# Patient Record
Sex: FEMALE | Race: WHITE | NOT HISPANIC OR LATINO | Employment: PART TIME | URBAN - METROPOLITAN AREA
[De-identification: names, ages, dates, MRNs, and addresses within clinical notes are randomized per-mention and may not be internally consistent; named-entity substitution may affect disease eponyms.]

---

## 2018-02-09 ENCOUNTER — OFFICE VISIT (OUTPATIENT)
Dept: URGENT CARE | Facility: CLINIC | Age: 68
End: 2018-02-09
Payer: COMMERCIAL

## 2018-02-09 VITALS
RESPIRATION RATE: 16 BRPM | WEIGHT: 149.2 LBS | BODY MASS INDEX: 29.29 KG/M2 | HEART RATE: 75 BPM | OXYGEN SATURATION: 98 % | SYSTOLIC BLOOD PRESSURE: 151 MMHG | TEMPERATURE: 98.3 F | DIASTOLIC BLOOD PRESSURE: 81 MMHG | HEIGHT: 60 IN

## 2018-02-09 DIAGNOSIS — J02.9 SORE THROAT: Primary | ICD-10-CM

## 2018-02-09 DIAGNOSIS — J06.9 VIRAL URI: ICD-10-CM

## 2018-02-09 PROCEDURE — G0463 HOSPITAL OUTPT CLINIC VISIT: HCPCS | Performed by: FAMILY MEDICINE

## 2018-02-09 PROCEDURE — 87070 CULTURE OTHR SPECIMN AEROBIC: CPT | Performed by: FAMILY MEDICINE

## 2018-02-09 PROCEDURE — 87430 STREP A AG IA: CPT | Performed by: FAMILY MEDICINE

## 2018-02-09 PROCEDURE — 87798 DETECT AGENT NOS DNA AMP: CPT | Performed by: FAMILY MEDICINE

## 2018-02-09 PROCEDURE — 99203 OFFICE O/P NEW LOW 30 MIN: CPT | Performed by: FAMILY MEDICINE

## 2018-02-09 RX ORDER — LISINOPRIL 5 MG/1
5 TABLET ORAL DAILY
COMMUNITY
End: 2019-04-23 | Stop reason: SDUPTHER

## 2018-02-09 RX ORDER — NAPROXEN 500 MG/1
250 TABLET ORAL 2 TIMES DAILY WITH MEALS
COMMUNITY
End: 2018-02-28 | Stop reason: DRUGHIGH

## 2018-02-09 RX ORDER — PRAVASTATIN SODIUM 40 MG
40 TABLET ORAL DAILY
COMMUNITY
End: 2019-04-23 | Stop reason: SDUPTHER

## 2018-02-09 RX ORDER — OMEPRAZOLE 20 MG/1
20 CAPSULE, DELAYED RELEASE ORAL DAILY
COMMUNITY
End: 2019-04-23 | Stop reason: ALTCHOICE

## 2018-02-09 RX ORDER — AMLODIPINE BESYLATE AND ATORVASTATIN CALCIUM 2.5; 1 MG/1; MG/1
1 TABLET, FILM COATED ORAL DAILY
COMMUNITY
End: 2018-02-28 | Stop reason: CLARIF

## 2018-02-09 RX ORDER — MONTELUKAST SODIUM 10 MG/1
10 TABLET ORAL
COMMUNITY
End: 2019-04-23 | Stop reason: SDUPTHER

## 2018-02-09 NOTE — PATIENT INSTRUCTIONS
Rapid strep -  will send for culture  Influenza culture done today will call with results, if positive patient will still be within the window for treatment within the next 48 hours  Tylenol or Motrin as needed  Started antihistamine, did not use any decongestants with history of hypertension

## 2018-02-09 NOTE — PROGRESS NOTES
Assessment/Plan:    No problem-specific Assessment & Plan notes found for this encounter  Diagnoses and all orders for this visit:    Sore throat  -     Throat culture    Viral URI  -     Influenza A/B and RSV by PCR    Other orders  -     amLODIPine-atorvastatin (CADUET) 2 5-10 MG per tablet; Take 1 tablet by mouth daily  -     fluticasone-salmeterol (ADVAIR) 100-50 mcg/dose; Inhale 1 puff every 12 (twelve) hours  -     lisinopril (ZESTRIL) 5 mg tablet; Take 5 mg by mouth daily  -     montelukast (SINGULAIR) 10 mg tablet; Take 10 mg by mouth daily at bedtime  -     naproxen (NAPROSYN) 500 mg tablet; Take 250 mg by mouth 2 (two) times a day with meals  -     pravastatin (PRAVACHOL) 40 mg tablet; Take 40 mg by mouth daily  -     omeprazole (PriLOSEC) 20 mg delayed release capsule; Take 20 mg by mouth daily  -     sertraline (ZOLOFT) 50 mg tablet; Take 50 mg by mouth daily          Subjective:      Patient ID: Barbara Burrell is a 75 y o  female  Patient presents with the acute onset of sore throat which started approximately 5 hours ago  She is concerned she may have influenza because she works at [employer] and has a lot of interaction with people  She states her daughter and her grandson both had influenza although denies direct contact with them  She is requesting to be tested for the flu  She denies chest tightness shortness of breath or wheezing, no cough  She thinks she may be starting to feel a little achey she  She denies fevers or chills, no nausea no vomiting  She has a history of allergic rhinitis for which she is currently taking Singulair and using Flonase  She also believes in the past 5 hours she can hear her voice starting to change          The following portions of the patient's history were reviewed and updated as appropriate: current medications, past family history, past medical history, past social history, past surgical history and problem list     Review of Systems Constitutional: Negative  HENT: Positive for congestion and sore throat  Eyes: Negative  Respiratory: Negative  Cardiovascular: Negative  Musculoskeletal: Negative  Objective:    Vitals:    02/09/18 1703   BP: 151/81   Pulse: 75   Resp: 16   Temp: 98 3 °F (36 8 °C)   SpO2: 98%        Physical Exam   Constitutional: She appears well-developed and well-nourished  No distress  HENT:   Head: Normocephalic  Right Ear: External ear normal    Left Ear: External ear normal    Mouth/Throat: Oropharynx is clear and moist  No oropharyngeal exudate  Mucosal erythema and clear rhinorrhea, mild PND, bilateral clear TM effusions   Eyes: Conjunctivae are normal  Pupils are equal, round, and reactive to light  Neck: Neck supple  Cardiovascular: Normal rate, regular rhythm and normal heart sounds  Pulmonary/Chest: Effort normal and breath sounds normal  No respiratory distress  She has no wheezes  She has no rales  Abdominal: Soft  Lymphadenopathy:     She has no cervical adenopathy

## 2018-02-10 LAB
FLUAV AG SPEC QL: NORMAL
FLUBV AG SPEC QL: NORMAL
RSV B RNA SPEC QL NAA+PROBE: NORMAL

## 2018-02-11 LAB — BACTERIA THROAT CULT: NORMAL

## 2018-02-13 ENCOUNTER — TELEPHONE (OUTPATIENT)
Dept: URGENT CARE | Facility: CLINIC | Age: 68
End: 2018-02-13

## 2018-02-13 NOTE — TELEPHONE ENCOUNTER
----- Message from Angel Batres DO sent at 2/13/2018  8:12 AM EST -----  Influenza culture and throat culture are negative

## 2018-02-28 ENCOUNTER — OFFICE VISIT (OUTPATIENT)
Dept: FAMILY MEDICINE CLINIC | Facility: HOSPITAL | Age: 68
End: 2018-02-28
Payer: COMMERCIAL

## 2018-02-28 VITALS
SYSTOLIC BLOOD PRESSURE: 128 MMHG | HEIGHT: 60 IN | HEART RATE: 76 BPM | DIASTOLIC BLOOD PRESSURE: 72 MMHG | TEMPERATURE: 98.1 F | WEIGHT: 143.6 LBS | BODY MASS INDEX: 28.19 KG/M2

## 2018-02-28 DIAGNOSIS — J45.31 MILD PERSISTENT ASTHMA WITH EXACERBATION: Primary | ICD-10-CM

## 2018-02-28 DIAGNOSIS — F33.0 MILD EPISODE OF RECURRENT MAJOR DEPRESSIVE DISORDER (HCC): ICD-10-CM

## 2018-02-28 DIAGNOSIS — J45.30 MILD PERSISTENT ASTHMA WITHOUT COMPLICATION: ICD-10-CM

## 2018-02-28 DIAGNOSIS — F41.1 GENERALIZED ANXIETY DISORDER: ICD-10-CM

## 2018-02-28 DIAGNOSIS — I10 ESSENTIAL HYPERTENSION: ICD-10-CM

## 2018-02-28 DIAGNOSIS — E78.2 MIXED HYPERLIPIDEMIA: ICD-10-CM

## 2018-02-28 PROBLEM — K21.9 GERD WITHOUT ESOPHAGITIS: Status: ACTIVE | Noted: 2018-02-28

## 2018-02-28 PROCEDURE — 3074F SYST BP LT 130 MM HG: CPT | Performed by: NURSE PRACTITIONER

## 2018-02-28 PROCEDURE — 3078F DIAST BP <80 MM HG: CPT | Performed by: NURSE PRACTITIONER

## 2018-02-28 PROCEDURE — 99203 OFFICE O/P NEW LOW 30 MIN: CPT | Performed by: NURSE PRACTITIONER

## 2018-02-28 PROCEDURE — 3725F SCREEN DEPRESSION PERFORMED: CPT | Performed by: NURSE PRACTITIONER

## 2018-02-28 RX ORDER — FLUTICASONE PROPIONATE 50 MCG
SPRAY, SUSPENSION (ML) NASAL
COMMUNITY
Start: 2018-01-02 | End: 2019-04-23 | Stop reason: ALTCHOICE

## 2018-02-28 RX ORDER — AMLODIPINE BESYLATE 2.5 MG/1
TABLET ORAL
COMMUNITY
Start: 2018-01-01 | End: 2018-02-28 | Stop reason: SDUPTHER

## 2018-02-28 RX ORDER — DOXYCYCLINE 100 MG/1
100 TABLET ORAL 2 TIMES DAILY
Qty: 20 TABLET | Refills: 0 | Status: SHIPPED | OUTPATIENT
Start: 2018-02-28 | End: 2018-03-10

## 2018-02-28 RX ORDER — GUAIFENESIN AND DEXTROMETHORPHAN HYDROBROMIDE 600; 30 MG/1; MG/1
1 TABLET, EXTENDED RELEASE ORAL EVERY 12 HOURS PRN
Refills: 3 | COMMUNITY
Start: 2018-01-09 | End: 2019-04-23 | Stop reason: ALTCHOICE

## 2018-02-28 RX ORDER — AMLODIPINE BESYLATE 2.5 MG/1
2.5 TABLET ORAL DAILY
Refills: 0
Start: 2018-02-28 | End: 2019-04-23 | Stop reason: SDUPTHER

## 2018-02-28 NOTE — ASSESSMENT & PLAN NOTE
Here today with possible exacerbation  Will start on antibiotic given fever  No need for prednisone at this time  Advised she start albuterol nebulizer  Call with any worsening or no improvement

## 2018-02-28 NOTE — ASSESSMENT & PLAN NOTE
Questionable need for omeprazole started by ENT  Has f/u with them this month so will discuss d/c with them

## 2018-02-28 NOTE — PROGRESS NOTES
Assessment/Plan: Old records reviewed  Reviewed chronic conditions and medications  F/U in 6 months  Mild persistent asthma without complication  Here today with possible exacerbation  Will start on antibiotic given fever  No need for prednisone at this time  Advised she start albuterol nebulizer  Call with any worsening or no improvement  Essential hypertension  BP well controlled  Continue on current regimen  Labs done 9/2017 with no issues  Mixed hyperlipidemia  FLP 9/2017 with good control  Can recheck in September  Continue on current regimen  Mild episode of recurrent major depressive disorder ([facility] [address] )  Depression well controlled  Continue on current regimen  Generalized anxiety disorder  Anxiety well controlled  Continue on current regimen  GERD without esophagitis  Questionable need for omeprazole started by ENT  Has f/u with them this month so will discuss d/c with them  Diagnoses and all orders for this visit:    Mild persistent asthma with exacerbation  -     doxycycline (ADOXA) 100 MG tablet; Take 1 tablet (100 mg total) by mouth 2 (two) times a day for 10 days    Mild persistent asthma without complication    Essential hypertension    Mixed hyperlipidemia    Generalized anxiety disorder    Mild episode of recurrent major depressive disorder ([facility] [address] )    Other orders  -     NAPROXEN  MG EC tablet;   -     fluticasone (FLONASE) 50 mcg/act nasal spray;   -     guaiFENesin-dextromethorphan (MUCINEX DM) 600-30 mg; Take 1 tablet by mouth every 12 (twelve) hours as needed  -     Discontinue: amLODIPine (NORVASC) 2 5 mg tablet;           Subjective:      Patient ID: Robina Lizarraga is a 75 y o  female  4 days ago started with chest congestion  Progressively getting worse  Last night with chills and temp 101 7  This morning no fever  Has asthma  Coughing up green mucus  Had flu shot  Some nasal congestion  Yellow mucus from nose  Doing 2 sinus rinses  H/o sinus surgery  Last surgery very effective  Sees pulmonary  Has albuterol neb at home but has not used  No sob or wheezing  Cough is mild  Aspergillosis  Found mold  Exercises and has been doing Weight Watchers with 10 pound recent weight loss  Was started on omeprazole about 1 month ago by ENT for possible reflux causing increased mucus production and coughing  Pt has not noticed a difference  Denies any heartburn symptoms  Has f/u soon to discuss  Feels mood has been good  No real depressive or anxiety symptoms  Was started on Zoloft in December and notes improvement in mood  The following portions of the patient's history were reviewed and updated as appropriate: allergies, current medications, past family history, past medical history, past social history, past surgical history and problem list     Review of Systems   Constitutional: Positive for fatigue and fever  Negative for chills and unexpected weight change  HENT: Positive for congestion and postnasal drip  Negative for ear pain, sinus pain, sinus pressure and sore throat  Respiratory: Positive for cough  Negative for shortness of breath and wheezing  Cardiovascular: Negative for chest pain and palpitations  Musculoskeletal: Negative for myalgias  Neurological: Negative for weakness, light-headedness and numbness  Psychiatric/Behavioral: Negative for dysphoric mood  The patient is not nervous/anxious  Objective:  Vitals:    02/28/18 0953   BP: 128/72   Pulse: 76   Temp: 98 1 °F (36 7 °C)      Physical Exam   Constitutional: She is oriented to person, place, and time  She appears well-developed and well-nourished  HENT:   Right Ear: Tympanic membrane, external ear and ear canal normal    Left Ear: Tympanic membrane, external ear and ear canal normal    Mouth/Throat: Uvula is midline, oropharynx is clear and moist and mucous membranes are normal    Cardiovascular: Normal rate, regular rhythm and normal heart sounds  Pulmonary/Chest: Effort normal and breath sounds normal    Lymphadenopathy:     She has no cervical adenopathy  Neurological: She is alert and oriented to person, place, and time  Skin: Skin is warm and dry  Psychiatric: She has a normal mood and affect  Vitals reviewed

## 2018-06-09 ENCOUNTER — APPOINTMENT (EMERGENCY)
Dept: RADIOLOGY | Facility: HOSPITAL | Age: 68
End: 2018-06-09
Payer: COMMERCIAL

## 2018-06-09 ENCOUNTER — HOSPITAL ENCOUNTER (EMERGENCY)
Facility: HOSPITAL | Age: 68
Discharge: HOME | End: 2018-06-09
Attending: EMERGENCY MEDICINE
Payer: COMMERCIAL

## 2018-06-09 VITALS
TEMPERATURE: 97 F | HEART RATE: 73 BPM | OXYGEN SATURATION: 97 % | DIASTOLIC BLOOD PRESSURE: 90 MMHG | RESPIRATION RATE: 18 BRPM | SYSTOLIC BLOOD PRESSURE: 185 MMHG

## 2018-06-09 DIAGNOSIS — S42.251A: Primary | ICD-10-CM

## 2018-06-09 PROCEDURE — 73080 X-RAY EXAM OF ELBOW: CPT

## 2018-06-09 PROCEDURE — 73030 X-RAY EXAM OF SHOULDER: CPT

## 2018-06-09 PROCEDURE — 99283 EMERGENCY DEPT VISIT LOW MDM: CPT

## 2018-06-09 PROCEDURE — 96372 THER/PROPH/DIAG INJ SC/IM: CPT

## 2018-06-09 RX ORDER — KETOROLAC TROMETHAMINE 30 MG/ML
30 INJECTION, SOLUTION INTRAMUSCULAR; INTRAVENOUS ONCE
Status: COMPLETED | OUTPATIENT
Start: 2018-06-09 | End: 2018-06-09

## 2018-06-09 RX ORDER — TRAMADOL HYDROCHLORIDE 50 MG/1
50 TABLET ORAL EVERY 6 HOURS PRN
Qty: 12 TABLET | Refills: 0 | Status: SHIPPED | OUTPATIENT
Start: 2018-06-09 | End: 2018-06-19

## 2018-06-09 RX ADMIN — KETOROLAC TROMETHAMINE 30 MG: 30 INJECTION, SOLUTION INTRAMUSCULAR; INTRAVENOUS at 20:17

## 2018-06-10 NOTE — DISCHARGE INSTRUCTIONS
Scapular Fracture   WHAT YOU NEED TO KNOW:   A scapular fracture is a break in the scapula (shoulder blade)  The scapula is a large, flat bone shaped like a triangle that is located on each side of your upper back  DISCHARGE INSTRUCTIONS:   Medicines:   · Pain medicine: You may be given a prescription medicine to decrease pain  Do not wait until the pain is severe before you ask for more medicine  If your pain medicine contains acetaminophen, do not take over-the-counter acetaminophen as well  Too much acetaminophen can damage your liver  · Take your medicine as directed  Contact your healthcare provider if you think your medicine is not helping or if you have side effects  Tell him of her if you are allergic to any medicine  Keep a list of the medicines, vitamins, and herbs you take  Include the amounts, and when and why you take them  Bring the list or the pill bottles to follow-up visits  Carry your medicine list with you in case of an emergency  Follow up with your healthcare provider or orthopedist within 2 to 3 days:  Write down your questions so you remember to ask them during your visits  Activity:  Talk to your healthcare provider or orthopedist before you start to exercise  Start slowly and do more as you get stronger  Exercise will help make your bones and muscles stronger  Do not play contact sports, such as football and wrestling, while your scapula is still healing  Ask when you can start playing contact sports  Physical therapy:  You may need physical therapy once your swelling and pain have improved  A physical therapist will teach you exercises to help improve movement and strength  Ice:  Ice helps decrease swelling and pain  Ice may also help prevent tissue damage  Use an ice pack, or put crushed ice in a plastic bag  Cover it with a towel and place it on your scapula for 15 to 20 minutes every hour or as directed    Sling care:  Healthcare providers may put your arm in a sling to support your scapula while it heals  Keep your sling clean, and adjust it so that your arm is comfortable  Rest:  Rest when you feel it is needed  Slowly start to do more each day  Return to your daily activities as directed  Contact your healthcare provider or orthopedist if:   · You have a fever  · You have more swelling than you did before your arm was put into the sling  · Your skin is itchy, swollen, or has a rash  · You have questions or concerns about your condition or care  Return to the emergency department if:   · You cannot move your fingers  · Any part of your arm becomes blue, pale, cold, or numb  · Your pain is not relieved or gets worse, even after you take medicine  · You suddenly feel lightheaded and short of breath  · You have chest pain when you take a deep breath or cough  You may cough up blood  · Your arm feels warm, tender, and painful  It may look swollen and red  © 2017 [facility] Information is for End User's use only and may not be sold, redistributed or otherwise used for commercial purposes  All illustrations and images included in CareNotes® are the copyrighted property of A D A M , Inc  or [employer]ey [employer]  The above information is an  only  It is not intended as medical advice for individual conditions or treatments  Talk to your doctor, nurse or pharmacist before following any medical regimen to see if it is safe and effective for you  How to Use a Sling   WHAT YOU NEED TO KNOW:   What is a sling? A sling is a strong fabric loop that hangs from your neck to support your arm  Your arm, bent at the elbow, rests in the sling  Some slings have a strap that goes down your back to take the weight off your neck  This strap is connected to the elbow side of the sling  Your healthcare provider will help you decide which sling is best for you and where you can get one  Why do I need a sling?   You may need a sling because of an injury or surgery to your hand, wrist, arm, or shoulder  A sling helps prevent your hand, arm, and shoulder from moving so your injury can heal  A sling can also help if you have a heavy cast on your arm  How do I use the sling? Your healthcare provider will teach you how to put on your sling  Follow the directions below to help you put on the sling at home:  · Gently bend your injured arm at the elbow and hold it in front of you, across your body  Your thumb should be pointing up  · Put the strap of the sling around your neck  The strap usually has an adhesive fabric to make it easy for you to fasten the strap  · Put your arm in the sling so your elbow is in the closed end of the sling  Your hand will be at the open end of the sling  · Put the edge of the sling so it covers the first fold of the little finger  · Wiggle your fingers as directed to prevent stiffness in your hand  CARE AGREEMENT:   You have the right to help plan your care  Learn about your health condition and how it may be treated  Discuss treatment options with your caregivers to decide what care you want to receive  You always have the right to refuse treatment  The above information is an  only  It is not intended as medical advice for individual conditions or treatments  Talk to your doctor, nurse or pharmacist before following any medical regimen to see if it is safe and effective for you  © 2017 [address] Information is for End User's use only and may not be sold, redistributed or otherwise used for commercial purposes  All illustrations and images included in CareNotes® are the copyrighted property of [employer] , Inc  or Ney Davila

## 2018-06-10 NOTE — ED PROVIDER NOTES
History  Chief Complaint   Patient presents with    Shoulder Injury     pt presents to ER stating this morning she was on a step stool on the third step, pt lost balance and fell landing on her right arm around 630 this am  pt has no complaints other then arm/shoulder pain  Standing on top level of 3 step stool this am and lost balance and fell onto her right side  Didn't hit head, no loc  Able to get up and has been ambulatory all day  Denies neck or back pain  Notes mild pain in the right hip and c/o more significant pain in the right shoulder / upper arm  Pain in right arm worse w/ movement  Took naproxen w/ some relief earlier today  Denies numbness or weakness to the arm  Notes pain in the shoulder w/ radiation toward the trapezius/lateral neck and down towards the elbow  Denies hx of previous fractures/surgeries to same arm  Denies cp/sob, no abd pain  No other co        History provided by:  Patient   used: No    Shoulder Injury   Location:  Shoulder, elbow and arm  Shoulder location:  R shoulder  Arm location:  R arm  Elbow location:  R elbow  Injury: yes    Mechanism of injury: fall    Fall:     Fall occurred:  From a ladder    Height of fall:  Less than 3 feet    Impact surface:  Hard floor    Point of impact: right side  Entrapped after fall: no    Pain details:     Quality:  Throbbing    Radiates to:  R upper arm    Severity:  Moderate    Onset quality:  Sudden    Timing:  Constant    Progression:  Worsening  Handedness:  Right-handed  Dislocation: no    Foreign body present:  No foreign bodies  Prior injury to area:  No  Relieved by:  Nothing  Worsened by:   Movement  Ineffective treatments:  NSAIDs and being still  Associated symptoms: decreased range of motion    Associated symptoms: no back pain, no fatigue, no fever, no muscle weakness, no neck pain, no numbness, no stiffness, no swelling and no tingling    Risk factors: no concern for non-accidental trauma Prior to Admission Medications   Prescriptions Last Dose Informant Patient Reported? Taking? NAPROXEN  MG EC tablet   Yes No   amLODIPine (NORVASC) 2 5 mg tablet   No No   Sig: Take 1 tablet (2 5 mg total) by mouth daily   fluticasone (FLONASE) 50 mcg/act nasal spray   Yes No   guaiFENesin-dextromethorphan (MUCINEX DM) 600-30 mg   Yes No   Sig: Take 1 tablet by mouth every 12 (twelve) hours as needed   lisinopril (ZESTRIL) 5 mg tablet   Yes No   Sig: Take 5 mg by mouth daily   montelukast (SINGULAIR) 10 mg tablet   Yes No   Sig: Take 10 mg by mouth daily at bedtime   omeprazole (PriLOSEC) 20 mg delayed release capsule   Yes No   Sig: Take 20 mg by mouth daily   pravastatin (PRAVACHOL) 40 mg tablet   Yes No   Sig: Take 40 mg by mouth daily   sertraline (ZOLOFT) 50 mg tablet   Yes No   Sig: Take 50 mg by mouth daily      Facility-Administered Medications: None       Past Medical History:   Diagnosis Date    Asthma     C  difficile enteritis     History of acute respiratory failure     Hypertension     Pneumonia     Respiratory disorder 2013       Past Surgical History:   Procedure Laterality Date    BRONCHOSCOPY      FRACTURE SURGERY      NASAL SINUS SURGERY Bilateral 09/2017    SINUS SURGERY      TUBAL LIGATION      WISDOM TOOTH EXTRACTION         Family History   Problem Relation Age of Onset    Heart disease Father     Heart failure Father     Diabetes Father     Rectal cancer Father     Heart disease Sister     Heart disease Brother     Aortic aneurysm Brother     Sudden death Brother     Cirrhosis Mother     Colon cancer Paternal Grandmother      I have reviewed and agree with the history as documented  Social History   Substance Use Topics    Smoking status: Never Smoker    Smokeless tobacco: Never Used    Alcohol use Yes      Comment: soically        Review of Systems   Constitutional: Negative for fatigue and fever     Musculoskeletal: Negative for back pain, neck pain and stiffness  All other systems reviewed and are negative  Physical Exam  Physical Exam   Constitutional: She appears well-developed and well-nourished  HENT:   Nose: Nose normal    Eyes: Conjunctivae are normal    Neck: No spinous process tenderness present  Cardiovascular: Normal rate  Pulmonary/Chest: Effort normal  She exhibits no tenderness  Musculoskeletal: She exhibits no deformity  Right shoulder: She exhibits tenderness, bony tenderness and pain  She exhibits normal range of motion, no swelling, no effusion, no spasm, normal pulse and normal strength  Right elbow: She exhibits normal range of motion, no swelling, no effusion and no deformity  Tenderness found  Radial head, medial epicondyle and lateral epicondyle tenderness noted  No olecranon process tenderness noted  Right wrist: She exhibits no bony tenderness  Cervical back: She exhibits no bony tenderness  Neurological: She is alert  Skin: Skin is warm  Psychiatric: She has a normal mood and affect  Nursing note and vitals reviewed        Vital Signs  ED Triage Vitals   Temperature Pulse Respirations Blood Pressure SpO2   06/09/18 1956 06/09/18 1955 06/09/18 1954 06/09/18 1954 06/09/18 1955   (!) 97 °F (36 1 °C) 73 18 (!) 185/90 97 %      Temp Source Heart Rate Source Patient Position - Orthostatic VS BP Location FiO2 (%)   06/09/18 1956 06/09/18 1954 06/09/18 1954 06/09/18 1954 --   Temporal Monitor Lying Right arm       Pain Score       06/09/18 1954       Worst Possible Pain           Vitals:    06/09/18 1954 06/09/18 1955   BP: (!) 185/90    Pulse:  73   Patient Position - Orthostatic VS: Lying        Visual Acuity      ED Medications  Medications   ketorolac (TORADOL) injection 30 mg (30 mg Intramuscular Given 6/9/18 2017)       Diagnostic Studies  Results Reviewed     None                 XR shoulder 2+ views RIGHT   ED Interpretation by Carissa Antony, DO (06/09 2041)   Abnormal   ?fx tubercle humeral head      XR elbow 3+ vw RIGHT   ED Interpretation by Adolph Jones DO (06/09 2039)   neg                 Procedures  Procedures       Phone Contacts  ED Phone Contact    ED Course                               MDM  Number of Diagnoses or Management Options  Closed fracture of greater tuberosity of right humerus: new and requires workup     Amount and/or Complexity of Data Reviewed  Tests in the radiology section of CPT®: ordered and reviewed  Obtain history from someone other than the patient: yes  Independent visualization of images, tracings, or specimens: yes      CritCare Time    Disposition  Final diagnoses:   Closed fracture of greater tuberosity of right humerus     Time reflects when diagnosis was documented in both MDM as applicable and the Disposition within this note     Time User Action Codes Description Comment    6/9/2018  8:44 PM Ruslan Saliva L Add [[id number]] Closed fracture of greater tuberosity of right humerus       ED Disposition     ED Disposition Condition Comment    Discharge  Maye Fox discharge to home/self care      Condition at discharge: Good        Follow-up Information     Follow up With Specialties Details Why Contact Info    [facility]  Schedule an appointment as soon as possible for a visit in 1 week for re-evaluation [address] [place] [id number]  [phone]          Discharge Medication List as of 6/9/2018  8:47 PM      START taking these medications    Details   traMADol (ULTRAM) 50 mg tablet Take 1 tablet (50 mg total) by mouth every 6 (six) hours as needed for moderate pain for up to 10 days, Starting Sat 6/9/2018, Until Tue 6/19/2018, Normal         CONTINUE these medications which have NOT CHANGED    Details   amLODIPine (NORVASC) 2 5 mg tablet Take 1 tablet (2 5 mg total) by mouth daily, Starting Wed 2/28/2018, No Print      fluticasone (FLONASE) 50 mcg/act nasal spray Starting Tue 1/2/2018, Historical Med guaiFENesin-dextromethorphan (MUCINEX DM) 600-30 mg Take 1 tablet by mouth every 12 (twelve) hours as needed, Starting Tue 1/9/2018, Historical Med      lisinopril (ZESTRIL) 5 mg tablet Take 5 mg by mouth daily, Historical Med      montelukast (SINGULAIR) 10 mg tablet Take 10 mg by mouth daily at bedtime, Historical Med      NAPROXEN  MG EC tablet Starting Mon 1/1/2018, Historical Med      omeprazole (PriLOSEC) 20 mg delayed release capsule Take 20 mg by mouth daily, Historical Med      pravastatin (PRAVACHOL) 40 mg tablet Take 40 mg by mouth daily, Historical Med      sertraline (ZOLOFT) 50 mg tablet Take 50 mg by mouth daily, Historical Med           No discharge procedures on file      ED Provider  Electronically Signed by           Rosaline Funes DO  06/09/18 7166

## 2018-06-13 ENCOUNTER — OFFICE VISIT (OUTPATIENT)
Dept: OBGYN CLINIC | Facility: CLINIC | Age: 68
End: 2018-06-13
Payer: COMMERCIAL

## 2018-06-13 VITALS — SYSTOLIC BLOOD PRESSURE: 122 MMHG | HEIGHT: 60 IN | DIASTOLIC BLOOD PRESSURE: 89 MMHG | HEART RATE: 87 BPM

## 2018-06-13 DIAGNOSIS — S42.291A CLOSED FRACTURE OF HEAD OF RIGHT HUMERUS, INITIAL ENCOUNTER: Primary | ICD-10-CM

## 2018-06-13 PROCEDURE — 23600 CLTX PROX HUMRL FX W/O MNPJ: CPT | Performed by: ORTHOPAEDIC SURGERY

## 2018-06-13 PROCEDURE — 99203 OFFICE O/P NEW LOW 30 MIN: CPT | Performed by: ORTHOPAEDIC SURGERY

## 2018-06-13 NOTE — ASSESSMENT & PLAN NOTE
Findings consistent with left proximal humerus head fracture, nondisplaced  Discussed findings and treatment options with the patient  I reviewed patient's left shoulder x-ray with her  Discussed prognosis of her left shoulder injury  I recommend patient to continue use the sling for comfort  I advised patient to avoid lifting and weight-bearing with her left arm  She may start doing pain gentle range of motion with her shoulder as tolerated  I would like to see patient back in 3 weeks and may increase her motion depends on her pain  All patient's questions were answered to his satisfaction  This note is created using dictation transcription  It may contain typographical errors, grammatical errors, improperly dictated words, background noise and other errors

## 2018-06-13 NOTE — PROGRESS NOTES
Assessment:     1  Closed fracture of head of right humerus, initial encounter        Plan:     Problem List Items Addressed This Visit        Musculoskeletal and Integument    Fracture of humeral head, right, closed - Primary     Findings consistent with left proximal humerus head fracture, nondisplaced  Discussed findings and treatment options with the patient  I reviewed patient's left shoulder x-ray with her  Discussed prognosis of her left shoulder injury  I recommend patient to continue use the sling for comfort  I advised patient to avoid lifting and weight-bearing with her left arm  She may start doing pain gentle range of motion with her shoulder as tolerated  I would like to see patient back in 3 weeks and may increase her motion depends on her pain  All patient's questions were answered to his satisfaction  This note is created using dictation transcription  It may contain typographical errors, grammatical errors, improperly dictated words, background noise and other errors  Subjective:     Patient ID: Mk Kim is a 75 y o  female  Chief Complaint:  68-year-old white female fell of a step ladder about 4 ft height on 6/9/2018  Patient was try to retreat a balloon while straddle between the ladder and the sofa  Patient fell on her left arm in start complaining of pain  Patient was seen in the ER and placed in a sling  She denies any other injury  She is complaining of pain in her shoulder when try to lift her left arm  She otherwise has no pain at rest   Information on patient's intake form was reviewed        Allergy:  Allergies   Allergen Reactions    Biaxin [Clarithromycin] Vomiting    Augmentin [Amoxicillin-Pot Clavulanate] Rash     Medications:  all current active meds have been reviewed  Past Medical History:  Past Medical History:   Diagnosis Date    Asthma     C  difficile enteritis     History of acute respiratory failure     Hypertension     Pneumonia     Respiratory disorder 2013     Past Surgical History:  Past Surgical History:   Procedure Laterality Date    BRONCHOSCOPY      FRACTURE SURGERY      NASAL SINUS SURGERY Bilateral 09/2017    SINUS SURGERY      TUBAL LIGATION      WISDOM TOOTH EXTRACTION       Family History:  Family History   Problem Relation Age of Onset    Heart disease Father     Heart failure Father     Diabetes Father     Rectal cancer Father     Heart disease Sister     Heart disease Brother     Aortic aneurysm Brother     Sudden death Brother     Cirrhosis Mother     Colon cancer Paternal Grandmother      Social History:  History   Alcohol Use    Yes     Comment: soically     History   Drug Use No     History   Smoking Status    Never Smoker   Smokeless Tobacco    Never Used     Review of Systems   Constitutional: Negative  HENT: Negative  Eyes: Negative  Respiratory: Negative  Cardiovascular: Negative  Gastrointestinal: Negative  Endocrine: Negative  Genitourinary: Negative  Musculoskeletal: Positive for arthralgias (Left shoulder)  Skin: Negative  Allergic/Immunologic: Negative  Neurological: Negative  Hematological: Negative  Psychiatric/Behavioral: Negative  Objective:  BP Readings from Last 1 Encounters:   06/13/18 122/89      Wt Readings from Last 1 Encounters:   02/28/18 65 1 kg (143 lb 9 6 oz)      BMI:   Estimated body mass index is 28 04 kg/m² as calculated from the following:    Height as of 2/28/18: 5' (1 524 m)  Weight as of 2/28/18: 65 1 kg (143 lb 9 6 oz)  BSA:   Estimated body surface area is 1 62 meters squared as calculated from the following:    Height as of 2/28/18: 5' (1 524 m)  Weight as of 2/28/18: 65 1 kg (143 lb 9 6 oz)  Physical Exam   Constitutional: She is oriented to person, place, and time  She appears well-developed  HENT:   Head: Normocephalic and atraumatic  Eyes: EOM are normal  Pupils are equal, round, and reactive to light     Neck: Neck supple  Neurological: She is alert and oriented to person, place, and time  Skin: Skin is warm  Psychiatric: She has a normal mood and affect  Nursing note and vitals reviewed  Left Shoulder Exam     Tenderness   Left shoulder tenderness location: Proximal humerus  Range of Motion   Active Abduction: abnormal   Left shoulder passive abduction: Limited due to pain  Left shoulder forward flexion: Pain  Other   Erythema: absent  Sensation: normal  Pulse: present             I have personally reviewed pertinent films in PACS and my interpretation is Right shoulder show nondisplaced humeral head fracture

## 2018-07-07 ENCOUNTER — OFFICE VISIT (OUTPATIENT)
Dept: URGENT CARE | Facility: CLINIC | Age: 68
End: 2018-07-07
Payer: COMMERCIAL

## 2018-07-07 VITALS
DIASTOLIC BLOOD PRESSURE: 80 MMHG | HEART RATE: 85 BPM | RESPIRATION RATE: 16 BRPM | TEMPERATURE: 98.7 F | HEIGHT: 60 IN | SYSTOLIC BLOOD PRESSURE: 128 MMHG | OXYGEN SATURATION: 98 % | WEIGHT: 138 LBS | BODY MASS INDEX: 27.09 KG/M2

## 2018-07-07 DIAGNOSIS — J06.9 UPPER RESPIRATORY TRACT INFECTION, UNSPECIFIED TYPE: Primary | ICD-10-CM

## 2018-07-07 PROCEDURE — 99213 OFFICE O/P EST LOW 20 MIN: CPT | Performed by: PREVENTIVE MEDICINE

## 2018-07-07 PROCEDURE — G0463 HOSPITAL OUTPT CLINIC VISIT: HCPCS | Performed by: PREVENTIVE MEDICINE

## 2018-07-07 RX ORDER — AZITHROMYCIN 250 MG/1
TABLET, FILM COATED ORAL
Qty: 6 TABLET | Refills: 0 | Status: SHIPPED | OUTPATIENT
Start: 2018-07-07 | End: 2018-07-12

## 2018-07-07 RX ORDER — ALPRAZOLAM 0.5 MG/1
TABLET ORAL
COMMUNITY
Start: 2014-03-25

## 2018-07-07 RX ORDER — ALBUTEROL SULFATE 2.5 MG/3ML
2.5 SOLUTION RESPIRATORY (INHALATION) EVERY 6 HOURS PRN
COMMUNITY
End: 2021-06-21 | Stop reason: SDUPTHER

## 2018-07-07 NOTE — PROGRESS NOTES
[facility]        NAME: Martinez Moctezuma is a 75 y o  female  : 1950    MRN: [id number]  DATE: 2018  TIME: 12:26 PM    Assessment and Plan   Upper respiratory tract infection, unspecified type [J06 9]  1  Upper respiratory tract infection, unspecified type  azithromycin (ZITHROMAX) 250 mg tablet         Patient Instructions       Follow up with PCP in 3-5 days  Proceed to  ER if symptoms worsen  Chief Complaint     Chief Complaint   Patient presents with    Sore Throat     Woke up with really bad sore throat and chest congestion, yellow phlegm with cough  Hx) Asthma, URI, sinus         History of Present Illness       Increased cough and congestion x2 days  History of asthma, sinusitis and asthmatic bronchitis  Review of Systems   Review of Systems   Respiratory: Positive for cough  Negative for chest tightness and wheezing            Current Medications       Current Outpatient Prescriptions:     ALPRAZolam (XANAX) 0 5 mg tablet, , Disp: , Rfl:     amLODIPine (NORVASC) 2 5 mg tablet, Take 1 tablet (2 5 mg total) by mouth daily, Disp: , Rfl: 0    fluticasone (FLONASE) 50 mcg/act nasal spray, , Disp: , Rfl:     lisinopril (ZESTRIL) 5 mg tablet, Take 5 mg by mouth daily, Disp: , Rfl:     montelukast (SINGULAIR) 10 mg tablet, Take 10 mg by mouth daily at bedtime, Disp: , Rfl:     NAPROXEN  MG EC tablet, , Disp: , Rfl:     pravastatin (PRAVACHOL) 40 mg tablet, Take 40 mg by mouth daily, Disp: , Rfl:     sertraline (ZOLOFT) 50 mg tablet, Take 50 mg by mouth daily, Disp: , Rfl:     albuterol (2 5 mg/3 mL) 0 083 % nebulizer solution, 2 5 mg every 6 (six) hours as needed, Disp: , Rfl:     aspirin 81 MG tablet, Take 81 mg by mouth, Disp: , Rfl:     azithromycin (ZITHROMAX) 250 mg tablet, Take 2 tablets today then 1 tablet daily x 4 days, Disp: 6 tablet, Rfl: 0    guaiFENesin-dextromethorphan (MUCINEX DM) 600-30 mg, Take 1 tablet by mouth every 12 (twelve) hours as needed, Disp: , Rfl: 3    omeprazole (PriLOSEC) 20 mg delayed release capsule, Take 20 mg by mouth daily, Disp: , Rfl:     Current Allergies     Allergies as of 07/07/2018 - Reviewed 07/07/2018   Allergen Reaction Noted    Azithromycin  09/07/2011    Biaxin [clarithromycin] Vomiting 09/07/2011    Succinylcholine  09/07/2011    Augmentin [amoxicillin-pot clavulanate] Rash 02/09/2018            The following portions of the patient's history were reviewed and updated as appropriate: allergies, current medications, past family history, past medical history, past social history, past surgical history and problem list      Past Medical History:   Diagnosis Date    Asthma     C  difficile enteritis     History of acute respiratory failure     Hypertension     Pneumonia     Respiratory disorder 2013       Past Surgical History:   Procedure Laterality Date    BRONCHOSCOPY      FRACTURE SURGERY      NASAL SINUS SURGERY Bilateral 09/2017    SINUS SURGERY      TUBAL LIGATION      WISDOM TOOTH EXTRACTION         Family History   Problem Relation Age of Onset    Heart disease Father     Heart failure Father     Diabetes Father     Rectal cancer Father     Heart disease Sister     Heart disease Brother     Aortic aneurysm Brother     Sudden death Brother     Cirrhosis Mother     Colon cancer Paternal Grandmother          Medications have been verified  Objective   /80   Pulse 85   Temp 98 7 °F (37 1 °C)   Resp 16   Ht 5' (1 524 m)   Wt 62 6 kg (138 lb)   SpO2 98%   BMI 26 95 kg/m²        Physical Exam     Physical Exam   HENT:   Right Ear: External ear normal    Left Ear: External ear normal    Mouth/Throat: Oropharynx is clear and moist    Cardiovascular: Normal heart sounds  Pulmonary/Chest: Breath sounds normal    Lymphadenopathy:     She has no cervical adenopathy

## 2018-07-10 ENCOUNTER — OFFICE VISIT (OUTPATIENT)
Dept: OBGYN CLINIC | Facility: CLINIC | Age: 68
End: 2018-07-10

## 2018-07-10 ENCOUNTER — APPOINTMENT (OUTPATIENT)
Dept: RADIOLOGY | Facility: CLINIC | Age: 68
End: 2018-07-10
Payer: COMMERCIAL

## 2018-07-10 VITALS
SYSTOLIC BLOOD PRESSURE: 150 MMHG | HEART RATE: 87 BPM | HEIGHT: 60 IN | WEIGHT: 140.8 LBS | DIASTOLIC BLOOD PRESSURE: 83 MMHG | BODY MASS INDEX: 27.64 KG/M2

## 2018-07-10 DIAGNOSIS — S42.291S CLOSED FRACTURE OF HEAD OF RIGHT HUMERUS, SEQUELA: Primary | ICD-10-CM

## 2018-07-10 DIAGNOSIS — S42.291S CLOSED FRACTURE OF HEAD OF RIGHT HUMERUS, SEQUELA: ICD-10-CM

## 2018-07-10 PROCEDURE — 73030 X-RAY EXAM OF SHOULDER: CPT

## 2018-07-10 PROCEDURE — 99024 POSTOP FOLLOW-UP VISIT: CPT | Performed by: ORTHOPAEDIC SURGERY

## 2018-07-10 NOTE — ASSESSMENT & PLAN NOTE
Findings consistent with right proximal humerus fracture  Discussed findings and treatment options with the patient  We will refer patient to physical therapy in 2 weeks  I have instructed patient exercise that she could do at home  She will continue to limit use of her right arm in weight-bearing and lifting  I will see patient back in 4-6 weeks for re-evaluation  All patient's questions were answered to her satisfaction  This note is created using dictation transcription  It may contain typographical errors, grammatical errors, improperly dictated words, background noise and other errors

## 2018-07-10 NOTE — PROGRESS NOTES
Assessment:     1  Closed fracture of head of right humerus, sequela        Plan:     Problem List Items Addressed This Visit        Musculoskeletal and Integument    Fracture of humeral head, right, closed - Primary     Findings consistent with right proximal humerus fracture  Discussed findings and treatment options with the patient  We will refer patient to physical therapy in 2 weeks  I have instructed patient exercise that she could do at home  She will continue to limit use of her right arm in weight-bearing and lifting  I will see patient back in 4-6 weeks for re-evaluation  All patient's questions were answered to her satisfaction  This note is created using dictation transcription  It may contain typographical errors, grammatical errors, improperly dictated words, background noise and other errors  Relevant Orders    XR shoulder 2+ vw right    Ambulatory referral to Physical Therapy         Subjective:     Patient ID: Mk  is a 75 y o  female  Chief Complaint:  14-year-old white female fell of a step ladder about 4 ft height on 6/9/2018  Patient was try to retreat a balloon while straddle between the ladder and the sofa  She has been using the sling when she is at work  She does remove the sling and do gentle shoulder motion  Pain is much less  She is able to actively moving her shoulder without significant pain        Allergy:  Allergies   Allergen Reactions    Biaxin [Clarithromycin] Vomiting    Succinylcholine     Augmentin [Amoxicillin-Pot Clavulanate] Rash     Medications:  all current active meds have been reviewed  Past Medical History:  Past Medical History:   Diagnosis Date    Asthma     C  difficile enteritis     History of acute respiratory failure     Hypertension     Pneumonia     Respiratory disorder 2013     Past Surgical History:  Past Surgical History:   Procedure Laterality Date    BRONCHOSCOPY      FRACTURE SURGERY      NASAL SINUS SURGERY Bilateral 09/2017    SINUS SURGERY      TUBAL LIGATION      WISDOM TOOTH EXTRACTION       Family History:  Family History   Problem Relation Age of Onset    Heart disease Father     Heart failure Father     Diabetes Father     Rectal cancer Father     Heart disease Sister     Heart disease Brother     Aortic aneurysm Brother    Matti Crews Sudden death Brother     Cirrhosis Mother     Colon cancer Paternal Grandmother      Social History:  History   Alcohol Use    Yes     Comment: soically     History   Drug Use No     History   Smoking Status    Never Smoker   Smokeless Tobacco    Never Used     Review of Systems   Constitutional: Negative  HENT: Negative  Eyes: Negative  Respiratory: Negative  Cardiovascular: Negative  Gastrointestinal: Negative  Endocrine: Negative  Genitourinary: Negative  Musculoskeletal: Positive for arthralgias (Left shoulder)  Skin: Negative  Allergic/Immunologic: Negative  Neurological: Negative  Hematological: Negative  Psychiatric/Behavioral: Negative  Objective:  BP Readings from Last 1 Encounters:   07/10/18 150/83      Wt Readings from Last 1 Encounters:   07/10/18 63 9 kg (140 lb 12 8 oz)      BMI:   Estimated body mass index is 27 5 kg/m² as calculated from the following:    Height as of this encounter: 5' (1 524 m)  Weight as of this encounter: 63 9 kg (140 lb 12 8 oz)  BSA:   Estimated body surface area is 1 61 meters squared as calculated from the following:    Height as of this encounter: 5' (1 524 m)  Weight as of this encounter: 63 9 kg (140 lb 12 8 oz)  Physical Exam   Constitutional: She is oriented to person, place, and time  She appears well-developed  HENT:   Head: Normocephalic and atraumatic  Eyes: EOM are normal  Pupils are equal, round, and reactive to light  Neck: Neck supple  Neurological: She is alert and oriented to person, place, and time  Skin: Skin is warm     Psychiatric: She has a normal mood and affect  Nursing note and vitals reviewed  Left Shoulder Exam     Tenderness   Left shoulder tenderness location: Proximal humerus  Range of Motion   Active Abduction:  90 abnormal   Left shoulder passive abduction: Limited due to pain  Forward Flexion:  110 (Pain) abnormal     Muscle Strength   Abduction: 3/5     Other   Erythema: absent  Sensation: normal  Pulse: present             I have personally reviewed pertinent films in PACS and my interpretation is Right shoulder show nondisplaced humeral head fracture, healing with good alignment

## 2018-08-08 ENCOUNTER — OFFICE VISIT (OUTPATIENT)
Dept: FAMILY MEDICINE CLINIC | Facility: HOSPITAL | Age: 68
End: 2018-08-08
Payer: COMMERCIAL

## 2018-08-08 VITALS
HEIGHT: 60 IN | TEMPERATURE: 98.7 F | SYSTOLIC BLOOD PRESSURE: 136 MMHG | WEIGHT: 143 LBS | HEART RATE: 84 BPM | DIASTOLIC BLOOD PRESSURE: 82 MMHG | BODY MASS INDEX: 28.07 KG/M2

## 2018-08-08 DIAGNOSIS — R21 RASH: Primary | ICD-10-CM

## 2018-08-08 PROCEDURE — 99214 OFFICE O/P EST MOD 30 MIN: CPT | Performed by: NURSE PRACTITIONER

## 2018-08-08 PROCEDURE — 3008F BODY MASS INDEX DOCD: CPT | Performed by: NURSE PRACTITIONER

## 2018-08-08 PROCEDURE — 1036F TOBACCO NON-USER: CPT | Performed by: NURSE PRACTITIONER

## 2018-08-08 RX ORDER — METHYLPREDNISOLONE 4 MG/1
TABLET ORAL
Qty: 21 TABLET | Refills: 0 | Status: SHIPPED | OUTPATIENT
Start: 2018-08-08 | End: 2019-04-23 | Stop reason: ALTCHOICE

## 2018-08-08 RX ORDER — SODIUM CHLORIDE FOR INHALATION 7 %
VIAL, NEBULIZER (ML) INHALATION
COMMUNITY
Start: 2014-02-23

## 2018-08-08 RX ORDER — SULFAMETHOXAZOLE AND TRIMETHOPRIM 800; 160 MG/1; MG/1
1 TABLET ORAL EVERY 12 HOURS SCHEDULED
Qty: 20 TABLET | Refills: 0 | Status: SHIPPED | OUTPATIENT
Start: 2018-08-08 | End: 2018-08-18

## 2018-08-08 NOTE — PROGRESS NOTES
Assessment/Plan:     Rash- possible allergic dermatitis of some sort but unclear of etiology  Given pain cannot exclude cellulitis  Will treat with both a steroid and antibiotic to cover both cellulitis and dermatitis  If not improving should see dermatology  Referral given  If worsening should call to let me know  Recommend she start using Aquaphor to help with dryness  Diagnoses and all orders for this visit:    Rash  -     Methylprednisolone 4 MG TBPK; Use as directed on package  -     sulfamethoxazole-trimethoprim (BACTRIM DS) 800-160 mg per tablet; Take 1 tablet by mouth every 12 (twelve) hours for 10 days  -     Ambulatory referral to Dermatology; Future    Other orders  -     Sodium Chloride 7 % NEBU;           Subjective:     Patient ID: Viviana Baer is a 70 y o  female  Last week started with dry itchy redness on face  Was in [facility]  Was out to dinner  Took benadryl w/o effect  On her way home her neck started to hurt and itch  Red  Feels like it moving down to chest  Burning pain  Intense itching  Used 's steroid cream w/o effect  Denies any new soap, lotion, make up  Denies any new medication  Denies exposure to poison ivy or other types of plants  Review of Systems   Constitutional: Negative for chills and fever  Skin: Positive for rash  The following portions of the patient's history were reviewed and updated as appropriate: allergies, current medications, past family history, past medical history, past social history, past surgical history and problem list     Objective:  Vitals:    08/08/18 1021   BP: 136/82   Pulse: 84   Temp: 98 7 °F (37 1 °C)      Physical Exam   Constitutional: She is oriented to person, place, and time  She appears well-developed and well-nourished  Cardiovascular: Normal rate, regular rhythm and normal heart sounds      Pulmonary/Chest: Effort normal and breath sounds normal    Neurological: She is alert and oriented to person, place, and time  Skin: Skin is warm and dry  Anterior neck with large red dry taunt patch  Non vesicular  Extending to upper chest    Areas of dryness and redness noted forehead and perioral     Psychiatric: She has a normal mood and affect

## 2018-11-11 ENCOUNTER — OFFICE VISIT (OUTPATIENT)
Dept: URGENT CARE | Facility: CLINIC | Age: 68
End: 2018-11-11
Payer: COMMERCIAL

## 2018-11-11 VITALS
OXYGEN SATURATION: 98 % | RESPIRATION RATE: 16 BRPM | HEART RATE: 90 BPM | HEIGHT: 60 IN | BODY MASS INDEX: 28.47 KG/M2 | TEMPERATURE: 98.4 F | DIASTOLIC BLOOD PRESSURE: 88 MMHG | SYSTOLIC BLOOD PRESSURE: 128 MMHG | WEIGHT: 145 LBS

## 2018-11-11 DIAGNOSIS — J40 BRONCHITIS: Primary | ICD-10-CM

## 2018-11-11 PROCEDURE — 99213 OFFICE O/P EST LOW 20 MIN: CPT | Performed by: PHYSICIAN ASSISTANT

## 2018-11-11 PROCEDURE — G0463 HOSPITAL OUTPT CLINIC VISIT: HCPCS | Performed by: PHYSICIAN ASSISTANT

## 2018-11-11 RX ORDER — AZITHROMYCIN 250 MG/1
TABLET, FILM COATED ORAL
Qty: 6 TABLET | Refills: 0 | Status: SHIPPED | OUTPATIENT
Start: 2018-11-11 | End: 2018-11-16

## 2018-11-11 NOTE — PATIENT INSTRUCTIONS
Take over-the-counter cough medications such as Mucinex    Take over-the-counter antihistamine such as Allegra or Zyrtec  Take azithromycin as directed  Increased fluids and rest  If no improvement in 3-4 days follow-up with PCP

## 2018-11-11 NOTE — PROGRESS NOTES
NAME: Gail Pryor is a 70 y o  female  : 1950    MRN: [id number]      Assessment and Plan   Bronchitis [J40]  1  Bronchitis  azithromycin (ZITHROMAX) 250 mg tablet       Patient reports she has taken azithromycin in the past, in fact the last time she was seen here she was prescribed it and reports she had no problems with it and would like it again today  Patient requesting abx as she reports long hx of respiratory illnesses and has been hospitalized in the past      Patient Instructions   Patient Instructions   Take over-the-counter cough medications such as Mucinex  Take over-the-counter antihistamine such as Allegra or Zyrtec  Take azithromycin as directed  Increased fluids and rest  If no improvement in 3-4 days follow-up with PCP    Proceed to ER if symptoms worsen  History of Present Illness     Patient presents complaining of three day history of congestion and cough  She reports that the past few morning she has been waking up with congestion of the bottom of her throat and states that throughout the day she has been feeling okay  She reports today she is worsened and is having more sinus pain and pressure, chest congestion and coughing  She reports a history of chronic sinusitis, asthma and several bronchoscopies due to lungs being full of mucus    She reports in the past she has been hospitalized for respiratory illnesses and states that when she gets sick Maggie Jaxson gets really sick    She denies any fever, chills, ear pain, runny nose shortness of breath or trouble breathing  She has not taken anything over-the-counter for this  Review of Systems   Review of Systems   Constitutional: Negative for chills and fever  HENT: Positive for congestion, sinus pain and sinus pressure  Negative for ear pain, rhinorrhea and sore throat  Respiratory: Positive for cough  Negative for chest tightness, shortness of breath, wheezing and stridor  Cardiovascular: Negative for chest pain  Current Medications       Current Outpatient Prescriptions:     albuterol (2 5 mg/3 mL) 0 083 % nebulizer solution, 2 5 mg every 6 (six) hours as needed, Disp: , Rfl:     ALPRAZolam (XANAX) 0 5 mg tablet, , Disp: , Rfl:     amLODIPine (NORVASC) 2 5 mg tablet, Take 1 tablet (2 5 mg total) by mouth daily, Disp: , Rfl: 0    aspirin 81 MG tablet, Take 81 mg by mouth, Disp: , Rfl:     fluticasone (FLONASE) 50 mcg/act nasal spray, , Disp: , Rfl:     lisinopril (ZESTRIL) 5 mg tablet, Take 5 mg by mouth daily, Disp: , Rfl:     montelukast (SINGULAIR) 10 mg tablet, Take 10 mg by mouth daily at bedtime, Disp: , Rfl:     NAPROXEN  MG EC tablet, , Disp: , Rfl:     pravastatin (PRAVACHOL) 40 mg tablet, Take 40 mg by mouth daily, Disp: , Rfl:     sertraline (ZOLOFT) 50 mg tablet, Take 50 mg by mouth daily, Disp: , Rfl:     Sodium Chloride 7 % NEBU, , Disp: , Rfl:     azithromycin (ZITHROMAX) 250 mg tablet, Take 2 tablets today then 1 tablet daily x 4 days, Disp: 6 tablet, Rfl: 0    guaiFENesin-dextromethorphan (MUCINEX DM) 600-30 mg, Take 1 tablet by mouth every 12 (twelve) hours as needed, Disp: , Rfl: 3    Methylprednisolone 4 MG TBPK, Use as directed on package (Patient not taking: Reported on 11/11/2018 ), Disp: 21 tablet, Rfl: 0    omeprazole (PriLOSEC) 20 mg delayed release capsule, Take 20 mg by mouth daily, Disp: , Rfl:     Current Allergies     Allergies as of 11/11/2018 - Reviewed 11/11/2018   Allergen Reaction Noted    Biaxin [clarithromycin] Vomiting 09/07/2011    Succinylcholine  09/07/2011    Augmentin [amoxicillin-pot clavulanate] Rash 02/09/2018              Past Medical History:   Diagnosis Date    Asthma     C  difficile enteritis     History of acute respiratory failure     Hypertension     Pneumonia     Respiratory disorder 2013       Past Surgical History:   Procedure Laterality Date    BRONCHOSCOPY      FRACTURE SURGERY      NASAL SINUS SURGERY Bilateral 09/2017    SINUS SURGERY      TUBAL LIGATION      WISDOM TOOTH EXTRACTION         Family History   Problem Relation Age of Onset    Heart disease Father     Heart failure Father     Diabetes Father     Rectal cancer Father     Heart disease Sister     Heart disease Brother     Aortic aneurysm Brother     Sudden death Brother     Cirrhosis Mother     Colon cancer Paternal Grandmother          Medications have been verified  The following portions of the patient's history were reviewed and updated as appropriate: allergies, current medications, past family history, past medical history, past social history, past surgical history and problem list     Objective   /88   Pulse 90   Temp 98 4 °F (36 9 °C)   Resp 16   Ht 5' (1 524 m)   Wt 65 8 kg (145 lb)   SpO2 98%   BMI 28 32 kg/m²      Physical Exam     Physical Exam   Constitutional: She appears well-developed and well-nourished  No distress  HENT:   TMs clear bilaterally without erythema or bulging  Nasal mucosa without erythema or edema  Oropharynx is clear without erythema, edema or exudates  +PND   Cardiovascular: Normal rate, regular rhythm and normal heart sounds  Pulmonary/Chest: Effort normal  No respiratory distress  She has no wheezes  She has no rales  Very mild expiratory rhonchi in the bilateral lower lung fields which clears with coughing   Lymphadenopathy:     She has no cervical adenopathy

## 2018-12-22 ENCOUNTER — OFFICE VISIT (OUTPATIENT)
Dept: URGENT CARE | Facility: CLINIC | Age: 68
End: 2018-12-22
Payer: COMMERCIAL

## 2018-12-22 VITALS
RESPIRATION RATE: 16 BRPM | OXYGEN SATURATION: 98 % | HEIGHT: 60 IN | BODY MASS INDEX: 28.47 KG/M2 | TEMPERATURE: 97.5 F | WEIGHT: 145 LBS | HEART RATE: 87 BPM

## 2018-12-22 DIAGNOSIS — L25.9 CONTACT DERMATITIS, UNSPECIFIED CONTACT DERMATITIS TYPE, UNSPECIFIED TRIGGER: Primary | ICD-10-CM

## 2018-12-22 PROCEDURE — 99213 OFFICE O/P EST LOW 20 MIN: CPT | Performed by: PHYSICIAN ASSISTANT

## 2018-12-22 PROCEDURE — G0463 HOSPITAL OUTPT CLINIC VISIT: HCPCS | Performed by: PHYSICIAN ASSISTANT

## 2018-12-22 NOTE — PROGRESS NOTES
[facility]        NAME: Dorien Felty is a 70 y o  female  : 1950    MRN: [id number]  DATE: 2018  TIME: 12:42 PM    Assessment and Plan   Contact dermatitis, unspecified contact dermatitis type, unspecified trigger [L25 9]  1  Contact dermatitis, unspecified contact dermatitis type, unspecified trigger           Patient Instructions     Patient Instructions   Use hydrocortisone cream as directed  Take Benadryl as directed  Monitor secondary signs of infection:  Redness, discharge, to touch, red streaking fever, chills  Follow up with PCP in 1-2 days  Go to the ER for worsening symptoms  Chief Complaint     Chief Complaint   Patient presents with    Rash     Rash on right arm started a few days ago  Spreading on arm  Area warm  Itchy  Starting on left arm now  History of Present Illness       This is a 68-year-old female complaining of rash on her right forearm times 2-3 days  Patient reports she was out pull some weeds in the garden bed recently  Patient reports it is itchy in worse at night  Patient reports trying over-the-counter hydrocortisone cream with minimal relief of symptoms  Patient denies any fever chills nausea vomiting diarrhea constipation chest pain shortness of breath      Rash   Pertinent negatives include no congestion, eye pain, fatigue, fever, shortness of breath, sore throat or vomiting  Review of Systems   Review of Systems   Constitutional: Negative for chills, fatigue and fever  HENT: Negative for congestion, ear pain, hearing loss, postnasal drip, sinus pain, sinus pressure and sore throat  Eyes: Negative for pain and discharge  Respiratory: Negative for chest tightness and shortness of breath  Cardiovascular: Negative for chest pain  Gastrointestinal: Negative for abdominal pain, constipation, nausea and vomiting  Genitourinary: Negative for difficulty urinating     Musculoskeletal: Negative for arthralgias and myalgias  Skin: Positive for rash  Neurological: Negative for dizziness and headaches  Psychiatric/Behavioral: Negative for behavioral problems           Current Medications       Current Outpatient Prescriptions:     albuterol (2 5 mg/3 mL) 0 083 % nebulizer solution, 2 5 mg every 6 (six) hours as needed, Disp: , Rfl:     ALPRAZolam (XANAX) 0 5 mg tablet, , Disp: , Rfl:     amLODIPine (NORVASC) 2 5 mg tablet, Take 1 tablet (2 5 mg total) by mouth daily, Disp: , Rfl: 0    aspirin 81 MG tablet, Take 81 mg by mouth, Disp: , Rfl:     fluticasone (FLONASE) 50 mcg/act nasal spray, , Disp: , Rfl:     guaiFENesin-dextromethorphan (MUCINEX DM) 600-30 mg, Take 1 tablet by mouth every 12 (twelve) hours as needed, Disp: , Rfl: 3    lisinopril (ZESTRIL) 5 mg tablet, Take 5 mg by mouth daily, Disp: , Rfl:     Methylprednisolone 4 MG TBPK, Use as directed on package (Patient not taking: Reported on 11/11/2018 ), Disp: 21 tablet, Rfl: 0    montelukast (SINGULAIR) 10 mg tablet, Take 10 mg by mouth daily at bedtime, Disp: , Rfl:     NAPROXEN  MG EC tablet, , Disp: , Rfl:     omeprazole (PriLOSEC) 20 mg delayed release capsule, Take 20 mg by mouth daily, Disp: , Rfl:     pravastatin (PRAVACHOL) 40 mg tablet, Take 40 mg by mouth daily, Disp: , Rfl:     sertraline (ZOLOFT) 50 mg tablet, Take 50 mg by mouth daily, Disp: , Rfl:     Sodium Chloride 7 % NEBU, , Disp: , Rfl:     Current Allergies     Allergies as of 12/22/2018 - Reviewed 12/22/2018   Allergen Reaction Noted    Biaxin [clarithromycin] Vomiting 09/07/2011    Succinylcholine  09/07/2011    Augmentin [amoxicillin-pot clavulanate] Rash 02/09/2018            The following portions of the patient's history were reviewed and updated as appropriate: allergies, current medications, past family history, past medical history, past social history, past surgical history and problem list      Past Medical History:   Diagnosis Date    Asthma     C  difficile enteritis     History of acute respiratory failure     Hypertension     Pneumonia     Respiratory disorder 2013       Past Surgical History:   Procedure Laterality Date    BRONCHOSCOPY      FRACTURE SURGERY      NASAL SINUS SURGERY Bilateral 09/2017    SINUS SURGERY      TUBAL LIGATION      WISDOM TOOTH EXTRACTION         Family History   Problem Relation Age of Onset    Heart disease Father     Heart failure Father     Diabetes Father     Rectal cancer Father     Heart disease Sister     Heart disease Brother     Aortic aneurysm Brother     Sudden death Brother     Cirrhosis Mother     Colon cancer Paternal Grandmother          Medications have been verified  Objective   Pulse 87   Temp 97 5 °F (36 4 °C)   Resp 16   Ht 5' (1 524 m)   Wt 65 8 kg (145 lb)   SpO2 98%   BMI 28 32 kg/m²        Physical Exam     Physical Exam   Constitutional: She is oriented to person, place, and time  She appears well-developed and well-nourished  HENT:   Right Ear: Tympanic membrane and external ear normal    Left Ear: Tympanic membrane and external ear normal    Neck: Normal range of motion  No edema present  Cardiovascular: Normal rate, regular rhythm, S1 normal, S2 normal and normal heart sounds  No murmur heard  Pulmonary/Chest: Effort normal and breath sounds normal  No respiratory distress  She has no wheezes  She has no rales  She exhibits no tenderness  Lymphadenopathy:     She has no cervical adenopathy  Neurological: She is alert and oriented to person, place, and time  Skin: Skin is warm, dry and intact  Rash noted  No purpura noted  Rash is maculopapular and vesicular  Rash is not macular, not papular, not nodular, not pustular and not urticarial         Psychiatric: She has a normal mood and affect  Her speech is normal and behavior is normal    Nursing note and vitals reviewed

## 2018-12-22 NOTE — PATIENT INSTRUCTIONS
Use hydrocortisone cream as directed  Take Benadryl as directed  Monitor secondary signs of infection:  Redness, discharge, to touch, red streaking fever, chills  Follow up with PCP in 1-2 days  Go to the ER for worsening symptoms

## 2019-04-09 ENCOUNTER — APPOINTMENT (OUTPATIENT)
Dept: RADIOLOGY | Facility: CLINIC | Age: 69
End: 2019-04-09
Payer: COMMERCIAL

## 2019-04-09 ENCOUNTER — OFFICE VISIT (OUTPATIENT)
Dept: OBGYN CLINIC | Facility: CLINIC | Age: 69
End: 2019-04-09
Payer: COMMERCIAL

## 2019-04-09 VITALS
HEIGHT: 60 IN | WEIGHT: 147 LBS | HEART RATE: 72 BPM | SYSTOLIC BLOOD PRESSURE: 122 MMHG | BODY MASS INDEX: 28.86 KG/M2 | DIASTOLIC BLOOD PRESSURE: 78 MMHG

## 2019-04-09 DIAGNOSIS — G89.29 CHRONIC RIGHT SHOULDER PAIN: ICD-10-CM

## 2019-04-09 DIAGNOSIS — M25.511 CHRONIC RIGHT SHOULDER PAIN: ICD-10-CM

## 2019-04-09 DIAGNOSIS — M77.8 TENDINITIS OF RIGHT SHOULDER: Primary | ICD-10-CM

## 2019-04-09 PROBLEM — S42.291A FRACTURE OF HUMERAL HEAD, RIGHT, CLOSED: Status: RESOLVED | Noted: 2018-06-13 | Resolved: 2019-04-09

## 2019-04-09 PROCEDURE — 73030 X-RAY EXAM OF SHOULDER: CPT

## 2019-04-09 PROCEDURE — 20610 DRAIN/INJ JOINT/BURSA W/O US: CPT | Performed by: ORTHOPAEDIC SURGERY

## 2019-04-09 PROCEDURE — 99213 OFFICE O/P EST LOW 20 MIN: CPT | Performed by: ORTHOPAEDIC SURGERY

## 2019-04-09 RX ORDER — LIDOCAINE HYDROCHLORIDE 10 MG/ML
7 INJECTION, SOLUTION EPIDURAL; INFILTRATION; INTRACAUDAL; PERINEURAL
Status: COMPLETED | OUTPATIENT
Start: 2019-04-09 | End: 2019-04-09

## 2019-04-09 RX ORDER — METHYLPREDNISOLONE ACETATE 40 MG/ML
1 INJECTION, SUSPENSION INTRA-ARTICULAR; INTRALESIONAL; INTRAMUSCULAR; SOFT TISSUE
Status: COMPLETED | OUTPATIENT
Start: 2019-04-09 | End: 2019-04-09

## 2019-04-09 RX ADMIN — METHYLPREDNISOLONE ACETATE 1 ML: 40 INJECTION, SUSPENSION INTRA-ARTICULAR; INTRALESIONAL; INTRAMUSCULAR; SOFT TISSUE at 14:48

## 2019-04-09 RX ADMIN — LIDOCAINE HYDROCHLORIDE 7 ML: 10 INJECTION, SOLUTION EPIDURAL; INFILTRATION; INTRACAUDAL; PERINEURAL at 14:48

## 2019-04-23 ENCOUNTER — OFFICE VISIT (OUTPATIENT)
Dept: FAMILY MEDICINE CLINIC | Facility: HOSPITAL | Age: 69
End: 2019-04-23
Payer: COMMERCIAL

## 2019-04-23 VITALS
TEMPERATURE: 98.6 F | SYSTOLIC BLOOD PRESSURE: 124 MMHG | WEIGHT: 144.6 LBS | DIASTOLIC BLOOD PRESSURE: 80 MMHG | BODY MASS INDEX: 28.39 KG/M2 | HEART RATE: 84 BPM | HEIGHT: 60 IN

## 2019-04-23 DIAGNOSIS — Z11.59 ENCOUNTER FOR HEPATITIS C SCREENING TEST FOR LOW RISK PATIENT: ICD-10-CM

## 2019-04-23 DIAGNOSIS — E78.2 MIXED HYPERLIPIDEMIA: ICD-10-CM

## 2019-04-23 DIAGNOSIS — J30.89 ALLERGIC RHINITIS DUE TO OTHER ALLERGIC TRIGGER, UNSPECIFIED SEASONALITY: ICD-10-CM

## 2019-04-23 DIAGNOSIS — F33.0 MILD EPISODE OF RECURRENT MAJOR DEPRESSIVE DISORDER (HCC): ICD-10-CM

## 2019-04-23 DIAGNOSIS — Z12.39 SCREENING FOR BREAST CANCER: ICD-10-CM

## 2019-04-23 DIAGNOSIS — J45.30 MILD PERSISTENT ASTHMA WITHOUT COMPLICATION: ICD-10-CM

## 2019-04-23 DIAGNOSIS — Z78.0 ASYMPTOMATIC POSTMENOPAUSAL STATE: ICD-10-CM

## 2019-04-23 DIAGNOSIS — F41.1 GENERALIZED ANXIETY DISORDER: ICD-10-CM

## 2019-04-23 DIAGNOSIS — Z00.00 MEDICARE ANNUAL WELLNESS VISIT, INITIAL: ICD-10-CM

## 2019-04-23 DIAGNOSIS — K21.9 GERD WITHOUT ESOPHAGITIS: ICD-10-CM

## 2019-04-23 DIAGNOSIS — I10 ESSENTIAL HYPERTENSION: Primary | ICD-10-CM

## 2019-04-23 PROCEDURE — G0438 PPPS, INITIAL VISIT: HCPCS | Performed by: NURSE PRACTITIONER

## 2019-04-23 PROCEDURE — 1170F FXNL STATUS ASSESSED: CPT | Performed by: NURSE PRACTITIONER

## 2019-04-23 PROCEDURE — 99214 OFFICE O/P EST MOD 30 MIN: CPT | Performed by: NURSE PRACTITIONER

## 2019-04-23 PROCEDURE — 1125F AMNT PAIN NOTED PAIN PRSNT: CPT | Performed by: NURSE PRACTITIONER

## 2019-04-23 PROCEDURE — 1160F RVW MEDS BY RX/DR IN RCRD: CPT | Performed by: NURSE PRACTITIONER

## 2019-04-23 RX ORDER — AMLODIPINE BESYLATE 2.5 MG/1
2.5 TABLET ORAL DAILY
Qty: 90 TABLET | Refills: 1
Start: 2019-04-23 | End: 2020-01-06 | Stop reason: SDUPTHER

## 2019-04-23 RX ORDER — PRAVASTATIN SODIUM 40 MG
40 TABLET ORAL DAILY
Qty: 90 TABLET | Refills: 1 | Status: SHIPPED | OUTPATIENT
Start: 2019-04-23 | End: 2019-11-13 | Stop reason: SDUPTHER

## 2019-04-23 RX ORDER — MONTELUKAST SODIUM 10 MG/1
10 TABLET ORAL
Qty: 90 TABLET | Refills: 1 | Status: SHIPPED | OUTPATIENT
Start: 2019-04-23 | End: 2020-01-02

## 2019-04-23 RX ORDER — LISINOPRIL 5 MG/1
5 TABLET ORAL DAILY
Qty: 90 TABLET | Refills: 1 | Status: SHIPPED | OUTPATIENT
Start: 2019-04-23 | End: 2019-11-13 | Stop reason: SDUPTHER

## 2019-05-14 ENCOUNTER — HOSPITAL ENCOUNTER (OUTPATIENT)
Dept: MAMMOGRAPHY | Facility: CLINIC | Age: 69
Discharge: HOME | End: 2019-05-14
Payer: COMMERCIAL

## 2019-05-14 VITALS — BODY MASS INDEX: 28.27 KG/M2 | WEIGHT: 144 LBS | HEIGHT: 60 IN

## 2019-05-14 DIAGNOSIS — Z78.0 ASYMPTOMATIC POSTMENOPAUSAL STATE: ICD-10-CM

## 2019-05-14 DIAGNOSIS — Z12.39 SCREENING FOR BREAST CANCER: ICD-10-CM

## 2019-05-14 PROCEDURE — 77063 BREAST TOMOSYNTHESIS BI: CPT

## 2019-05-14 PROCEDURE — 77080 DXA BONE DENSITY AXIAL: CPT

## 2019-05-14 PROCEDURE — 77067 SCR MAMMO BI INCL CAD: CPT

## 2019-05-15 LAB
ALBUMIN SERPL-MCNC: 4.4 G/DL (ref 3.6–4.8)
ALBUMIN/GLOB SERPL: 1.6 {RATIO} (ref 1.2–2.2)
ALP SERPL-CCNC: 64 IU/L (ref 39–117)
ALT SERPL-CCNC: 12 IU/L (ref 0–32)
AST SERPL-CCNC: 20 IU/L (ref 0–40)
BASOPHILS # BLD AUTO: 0 X10E3/UL (ref 0–0.2)
BASOPHILS NFR BLD AUTO: 1 %
BILIRUB SERPL-MCNC: 0.2 MG/DL (ref 0–1.2)
BUN SERPL-MCNC: 18 MG/DL (ref 8–27)
BUN/CREAT SERPL: 23 (ref 12–28)
CALCIUM SERPL-MCNC: 9.2 MG/DL (ref 8.7–10.3)
CHLORIDE SERPL-SCNC: 106 MMOL/L (ref 96–106)
CHOLEST SERPL-MCNC: 190 MG/DL (ref 100–199)
CHOLEST/HDLC SERPL: 3.2 RATIO (ref 0–4.4)
CO2 SERPL-SCNC: 24 MMOL/L (ref 20–29)
CREAT SERPL-MCNC: 0.79 MG/DL (ref 0.57–1)
EOSINOPHIL # BLD AUTO: 0.3 X10E3/UL (ref 0–0.4)
EOSINOPHIL NFR BLD AUTO: 6 %
ERYTHROCYTE [DISTWIDTH] IN BLOOD BY AUTOMATED COUNT: 14.9 % (ref 12.3–15.4)
GLOBULIN SER-MCNC: 2.7 G/DL (ref 1.5–4.5)
GLUCOSE SERPL-MCNC: 84 MG/DL (ref 65–99)
HCT VFR BLD AUTO: 42 % (ref 34–46.6)
HCV AB S/CO SERPL IA: <0.1 S/CO RATIO (ref 0–0.9)
HDLC SERPL-MCNC: 59 MG/DL
HGB BLD-MCNC: 13.7 G/DL (ref 11.1–15.9)
IMM GRANULOCYTES # BLD: 0 X10E3/UL (ref 0–0.1)
IMM GRANULOCYTES NFR BLD: 0 %
LDLC SERPL CALC-MCNC: 108 MG/DL (ref 0–99)
LYMPHOCYTES # BLD AUTO: 1.5 X10E3/UL (ref 0.7–3.1)
LYMPHOCYTES NFR BLD AUTO: 27 %
MCH RBC QN AUTO: 29.6 PG (ref 26.6–33)
MCHC RBC AUTO-ENTMCNC: 32.6 G/DL (ref 31.5–35.7)
MCV RBC AUTO: 91 FL (ref 79–97)
MONOCYTES # BLD AUTO: 0.4 X10E3/UL (ref 0.1–0.9)
MONOCYTES NFR BLD AUTO: 8 %
NEUTROPHILS # BLD AUTO: 3.1 X10E3/UL (ref 1.4–7)
NEUTROPHILS NFR BLD AUTO: 58 %
PLATELET # BLD AUTO: 270 X10E3/UL (ref 150–379)
POTASSIUM SERPL-SCNC: 4.7 MMOL/L (ref 3.5–5.2)
PROT SERPL-MCNC: 7.1 G/DL (ref 6–8.5)
RBC # BLD AUTO: 4.63 X10E6/UL (ref 3.77–5.28)
SL AMB EGFR AFRICAN AMERICAN: 88 ML/MIN/1.73
SL AMB EGFR NON AFRICAN AMERICAN: 77 ML/MIN/1.73
SL AMB VLDL CHOLESTEROL CALC: 23 MG/DL (ref 5–40)
SODIUM SERPL-SCNC: 142 MMOL/L (ref 134–144)
TRIGL SERPL-MCNC: 117 MG/DL (ref 0–149)
WBC # BLD AUTO: 5.3 X10E3/UL (ref 3.4–10.8)

## 2019-06-06 ENCOUNTER — OFFICE VISIT (OUTPATIENT)
Dept: FAMILY MEDICINE CLINIC | Facility: HOSPITAL | Age: 69
End: 2019-06-06
Payer: COMMERCIAL

## 2019-06-06 VITALS
DIASTOLIC BLOOD PRESSURE: 78 MMHG | SYSTOLIC BLOOD PRESSURE: 126 MMHG | BODY MASS INDEX: 28.23 KG/M2 | HEART RATE: 86 BPM | WEIGHT: 143.8 LBS | TEMPERATURE: 98.5 F | HEIGHT: 60 IN

## 2019-06-06 DIAGNOSIS — E78.2 MIXED HYPERLIPIDEMIA: ICD-10-CM

## 2019-06-06 DIAGNOSIS — I10 ESSENTIAL HYPERTENSION: ICD-10-CM

## 2019-06-06 DIAGNOSIS — M85.80 OSTEOPENIA, UNSPECIFIED LOCATION: ICD-10-CM

## 2019-06-06 DIAGNOSIS — F33.0 MILD EPISODE OF RECURRENT MAJOR DEPRESSIVE DISORDER (HCC): Primary | ICD-10-CM

## 2019-06-06 DIAGNOSIS — F41.1 GENERALIZED ANXIETY DISORDER: ICD-10-CM

## 2019-06-06 PROCEDURE — 99214 OFFICE O/P EST MOD 30 MIN: CPT | Performed by: NURSE PRACTITIONER

## 2019-06-06 PROCEDURE — 3725F SCREEN DEPRESSION PERFORMED: CPT | Performed by: NURSE PRACTITIONER

## 2019-06-06 PROCEDURE — 3008F BODY MASS INDEX DOCD: CPT | Performed by: NURSE PRACTITIONER

## 2019-06-06 PROCEDURE — 3074F SYST BP LT 130 MM HG: CPT | Performed by: NURSE PRACTITIONER

## 2019-06-06 PROCEDURE — 1036F TOBACCO NON-USER: CPT | Performed by: NURSE PRACTITIONER

## 2019-06-06 PROCEDURE — 3078F DIAST BP <80 MM HG: CPT | Performed by: NURSE PRACTITIONER

## 2019-08-15 ENCOUNTER — OFFICE VISIT (OUTPATIENT)
Dept: FAMILY MEDICINE CLINIC | Facility: HOSPITAL | Age: 69
End: 2019-08-15
Payer: COMMERCIAL

## 2019-08-15 VITALS
BODY MASS INDEX: 28.7 KG/M2 | DIASTOLIC BLOOD PRESSURE: 80 MMHG | TEMPERATURE: 99.1 F | HEART RATE: 97 BPM | HEIGHT: 60 IN | WEIGHT: 146.2 LBS | SYSTOLIC BLOOD PRESSURE: 128 MMHG | OXYGEN SATURATION: 100 %

## 2019-08-15 DIAGNOSIS — J45.30 MILD PERSISTENT ASTHMA WITHOUT COMPLICATION: ICD-10-CM

## 2019-08-15 DIAGNOSIS — J20.9 ACUTE BRONCHITIS, UNSPECIFIED ORGANISM: Primary | ICD-10-CM

## 2019-08-15 PROCEDURE — 99214 OFFICE O/P EST MOD 30 MIN: CPT | Performed by: NURSE PRACTITIONER

## 2019-08-15 PROCEDURE — 3008F BODY MASS INDEX DOCD: CPT | Performed by: NURSE PRACTITIONER

## 2019-08-15 PROCEDURE — 1036F TOBACCO NON-USER: CPT | Performed by: NURSE PRACTITIONER

## 2019-08-15 PROCEDURE — 1160F RVW MEDS BY RX/DR IN RCRD: CPT | Performed by: NURSE PRACTITIONER

## 2019-08-15 RX ORDER — ALBUTEROL SULFATE 90 UG/1
2 AEROSOL, METERED RESPIRATORY (INHALATION) EVERY 6 HOURS PRN
Qty: 1 INHALER | Refills: 0 | Status: SHIPPED | OUTPATIENT
Start: 2019-08-15 | End: 2021-10-07 | Stop reason: SDUPTHER

## 2019-08-15 RX ORDER — BENZONATATE 200 MG/1
200 CAPSULE ORAL 3 TIMES DAILY PRN
Qty: 20 CAPSULE | Refills: 0 | Status: SHIPPED | OUTPATIENT
Start: 2019-08-15 | End: 2020-03-09 | Stop reason: ALTCHOICE

## 2019-08-15 NOTE — PROGRESS NOTES
Assessment/Plan:     Acute bronchitis likely viral    Normal exam with afebrile  Watch closely given asthma history  Call with fever , worsening cough, sob, not improving  Start every 6 hour albuterol nebs  Diagnoses and all orders for this visit:    Acute bronchitis, unspecified organism  -     benzonatate (TESSALON) 200 MG capsule; Take 1 capsule (200 mg total) by mouth 3 (three) times a day as needed for cough  -     albuterol (PROAIR HFA) 90 mcg/act inhaler; Inhale 2 puffs every 6 (six) hours as needed for wheezing    Mild persistent asthma without complication          Subjective:     Patient ID: Jeffery Howell is a 78 y o  female  4 days ago lost voice and felt mucus in throt  2 days ago started with cough  Bringing up yellow green mucus  Cough is bad  No nasal congestion or runny nose  Denies sob  Some wheezing last night  No fever or chills  Just returned from [place]  Around a lot of people  Drove no air  Has asthma  Has albuterol nebs at home but not using  Has not tried any OTC meds  Prior to trip had an episode of severe abdominal cramps, diarrhea, weakness and feeling over heated  Went to ER  Was told she was fine  Normal blood work  Review of Systems   Constitutional: Positive for fatigue  Negative for chills and fever  HENT: Negative for congestion and sore throat  Respiratory: Positive for cough and wheezing  Negative for shortness of breath  The following portions of the patient's history were reviewed and updated as appropriate: allergies, current medications, past family history, past medical history, past social history, past surgical history and problem list     Objective:  Vitals:    08/15/19 1033   BP: 128/80   Pulse: 97   Temp: 99 1 °F (37 3 °C)   SpO2: 100%      Physical Exam   Constitutional: She is oriented to person, place, and time  She appears well-developed and well-nourished     HENT:   Right Ear: Tympanic membrane, external ear and ear canal normal    Left Ear: Tympanic membrane, external ear and ear canal normal    Mouth/Throat: Uvula is midline, oropharynx is clear and moist and mucous membranes are normal    Cardiovascular: Normal rate, regular rhythm and normal heart sounds  No murmur heard  Pulmonary/Chest: Effort normal and breath sounds normal    Lymphadenopathy:     She has no cervical adenopathy  Neurological: She is alert and oriented to person, place, and time  Skin: Skin is warm and dry  Psychiatric: She has a normal mood and affect

## 2019-09-13 DIAGNOSIS — F33.0 MILD EPISODE OF RECURRENT MAJOR DEPRESSIVE DISORDER (HCC): ICD-10-CM

## 2019-09-13 DIAGNOSIS — F41.1 GENERALIZED ANXIETY DISORDER: ICD-10-CM

## 2019-09-13 NOTE — TELEPHONE ENCOUNTER
Pt needs a refill of Zoloft  Please sent to [facility] [place] it is less expensive

## 2019-09-15 DIAGNOSIS — F33.0 MILD EPISODE OF RECURRENT MAJOR DEPRESSIVE DISORDER (HCC): ICD-10-CM

## 2019-09-15 DIAGNOSIS — F41.1 GENERALIZED ANXIETY DISORDER: ICD-10-CM

## 2019-09-20 ENCOUNTER — TELEPHONE (OUTPATIENT)
Dept: FAMILY MEDICINE CLINIC | Facility: HOSPITAL | Age: 69
End: 2019-09-20

## 2019-09-20 NOTE — TELEPHONE ENCOUNTER
Profile is a diet program    They will take $150 off program fee if a doctor recommends this program    Pt would like to speak with Dakotah Solis about it, can she call her or does she need to make an apt?  Please call pt

## 2019-09-23 NOTE — TELEPHONE ENCOUNTER
She is meeting someone tomorrow and she will find out what she actually needs  She will call back after that  She doesn't mind having to come in for appt

## 2019-09-23 NOTE — TELEPHONE ENCOUNTER
I would say if she wants to discuss in detail or needs a detailed note to have it covered than she should have an appointment  Otherwise I can just write a standard note detailing importance of weight loss  Not sure what she would like to do

## 2019-11-13 DIAGNOSIS — I10 ESSENTIAL HYPERTENSION: ICD-10-CM

## 2019-11-13 RX ORDER — PRAVASTATIN SODIUM 40 MG
40 TABLET ORAL DAILY
Qty: 90 TABLET | Refills: 0 | Status: SHIPPED | OUTPATIENT
Start: 2019-11-13 | End: 2020-01-02

## 2019-11-13 RX ORDER — LISINOPRIL 5 MG/1
5 TABLET ORAL DAILY
Qty: 90 TABLET | Refills: 0 | Status: SHIPPED | OUTPATIENT
Start: 2019-11-13 | End: 2020-01-02

## 2019-12-23 ENCOUNTER — HOSPITAL ENCOUNTER (EMERGENCY)
Facility: HOSPITAL | Age: 69
Discharge: HOME | End: 2019-12-23
Attending: EMERGENCY MEDICINE | Admitting: EMERGENCY MEDICINE
Payer: COMMERCIAL

## 2019-12-23 ENCOUNTER — APPOINTMENT (EMERGENCY)
Dept: RADIOLOGY | Facility: HOSPITAL | Age: 69
End: 2019-12-23
Payer: COMMERCIAL

## 2019-12-23 VITALS
DIASTOLIC BLOOD PRESSURE: 96 MMHG | HEART RATE: 92 BPM | OXYGEN SATURATION: 98 % | HEIGHT: 60 IN | SYSTOLIC BLOOD PRESSURE: 192 MMHG | BODY MASS INDEX: 25.13 KG/M2 | RESPIRATION RATE: 19 BRPM | TEMPERATURE: 98 F | WEIGHT: 128 LBS

## 2019-12-23 DIAGNOSIS — S52.532A CLOSED COLLES' FRACTURE OF LEFT RADIUS, INITIAL ENCOUNTER: Primary | ICD-10-CM

## 2019-12-23 PROCEDURE — 99283 EMERGENCY DEPT VISIT LOW MDM: CPT

## 2019-12-23 PROCEDURE — 96372 THER/PROPH/DIAG INJ SC/IM: CPT

## 2019-12-23 PROCEDURE — 29125 APPL SHORT ARM SPLINT STATIC: CPT | Performed by: EMERGENCY MEDICINE

## 2019-12-23 PROCEDURE — 99284 EMERGENCY DEPT VISIT MOD MDM: CPT | Performed by: EMERGENCY MEDICINE

## 2019-12-23 PROCEDURE — 73110 X-RAY EXAM OF WRIST: CPT

## 2019-12-23 RX ORDER — KETOROLAC TROMETHAMINE 30 MG/ML
30 INJECTION, SOLUTION INTRAMUSCULAR; INTRAVENOUS ONCE
Status: COMPLETED | OUTPATIENT
Start: 2019-12-23 | End: 2019-12-23

## 2019-12-23 RX ORDER — ACETAMINOPHEN 325 MG/1
650 TABLET ORAL ONCE
Status: COMPLETED | OUTPATIENT
Start: 2019-12-23 | End: 2019-12-23

## 2019-12-23 RX ORDER — HYDROCODONE BITARTRATE AND ACETAMINOPHEN 5; 325 MG/1; MG/1
.5-1 TABLET ORAL EVERY 6 HOURS PRN
Qty: 12 TABLET | Refills: 0 | Status: SHIPPED | OUTPATIENT
Start: 2019-12-23 | End: 2020-03-09 | Stop reason: ALTCHOICE

## 2019-12-23 RX ADMIN — KETOROLAC TROMETHAMINE 30 MG: 30 INJECTION, SOLUTION INTRAMUSCULAR at 17:39

## 2019-12-23 RX ADMIN — ACETAMINOPHEN 650 MG: 325 TABLET, FILM COATED ORAL at 18:29

## 2019-12-23 NOTE — ED PROCEDURE NOTE
PROCEDURE  Splint application  Date/Time: 12/23/2019 6:14 PM  Performed by: Bentley Howell DO  Authorized by: Bentley Howell DO     Patient location:  ED  Performing Provider:  Attending  Other Assisting Provider: No    Consent:     Consent obtained:  Verbal    Consent given by:  Patient and spouse    Risks discussed:  Numbness, pain and swelling    Alternatives discussed:  Referral  [place] protocol:     Procedure explained and questions answered to patient or proxy's satisfaction: yes      Radiology Images displayed and confirmed  If images not available, report reviewed: yes    Indication:     Indications: fracture    Pre-procedure details:     Sensation:  Normal and weakness    Skin color:  Pink  Procedure details:     Laterality:  Left    Location:  Wrist    Wrist:  L wrist    Strapping: no      Splint type:  Sugar tong    Supplies:  Cotton padding, elastic bandage, sling and Ortho-Glass  Post-procedure details:     Pain:  Improved    Sensation:  Normal    Neurovascular Exam: skin pink, capillary refill <2 sec, normal pulses and skin intact, warm, and dry      Patient tolerance of procedure:   Tolerated well, no immediate complications         Bentley Howell DO  12/23/19 [facility]

## 2019-12-23 NOTE — DISCHARGE INSTRUCTIONS
Alternate ibuprofen 400 milligrams with Tylenol 650 milligrams every 4 hours  Reserve Vicodin for severe pain  You may take 1/2 or 1 full pill of Vicodin every 6 hours if needed  Keep arm elevated  Follow up with Orthopedics tomorrow

## 2019-12-26 ENCOUNTER — OFFICE VISIT (OUTPATIENT)
Dept: OBGYN CLINIC | Facility: CLINIC | Age: 69
End: 2019-12-26
Payer: COMMERCIAL

## 2019-12-26 ENCOUNTER — HOSPITAL ENCOUNTER (OUTPATIENT)
Dept: RADIOLOGY | Facility: HOSPITAL | Age: 69
Discharge: HOME | End: 2019-12-26
Attending: ORTHOPAEDIC SURGERY
Payer: COMMERCIAL

## 2019-12-26 VITALS
SYSTOLIC BLOOD PRESSURE: 120 MMHG | WEIGHT: 128 LBS | BODY MASS INDEX: 25.13 KG/M2 | HEIGHT: 60 IN | DIASTOLIC BLOOD PRESSURE: 72 MMHG

## 2019-12-26 DIAGNOSIS — M25.532 PAIN IN LEFT WRIST: ICD-10-CM

## 2019-12-26 DIAGNOSIS — S52.532A CLOSED COLLES' FRACTURE OF LEFT RADIUS, INITIAL ENCOUNTER: Primary | ICD-10-CM

## 2019-12-26 PROCEDURE — 73110 X-RAY EXAM OF WRIST: CPT

## 2019-12-26 PROCEDURE — 25605 CLTX DST RDL FX/EPHYS SEP W/: CPT | Performed by: ORTHOPAEDIC SURGERY

## 2019-12-26 PROCEDURE — 99213 OFFICE O/P EST LOW 20 MIN: CPT | Performed by: ORTHOPAEDIC SURGERY

## 2019-12-27 NOTE — PROGRESS NOTES
Assessment:     1  Closed Colles' fracture of left radius, initial encounter    2  Pain in left wrist        Plan:     Problem List Items Addressed This Visit        Musculoskeletal and Integument    Closed Colles' fracture of left radius - Primary     Findings consistent with left distal radius fracture with slight angulation  Discussed findings and treatment options with the patient  I provided patient a hematoma block and place a short-arm cast   Post cast application x-ray showed the fracture is in good position  Cast care instructions provided  Continue elevation and cold compress over the wrist   Follow-up in 1 week with repeat x-ray in the cast   Discussed with family possibility of the fracture moving while casted  All patient's questions were answered to their satisfaction  This note is created using dictation transcription  It may contain typographical errors, grammatical errors, improperly dictated words, background noise and other errors  Relevant Orders    Fracture / Dislocation Treatment      Other Visit Diagnoses     Pain in left wrist        Relevant Orders    XR wrist 3+ vw left         Subjective:     Patient ID: Andreina Rutledge is a 78 y o  female  Chief Complaint:  78-year-old female status post fall on 12/23/2019  She fell backward and landed on her left wrist   She developed pain immediately over the left wrist   Patient was seen in the ER and placed in a splint  She denied other injury  She continued to complain of pain over her left wrist  Information on patient's intake form was reviewed      Allergy:  Allergies   Allergen Reactions    Succinylcholine GI Intolerance     Prolonged apnea      Amoxicillin-Pot Clavulanate Rash    Clarithromycin Vomiting, Nausea Only and Rash     Medications:  all current active meds have been reviewed  Past Medical History:  Past Medical History:   Diagnosis Date    Asthma     C  difficile enteritis     History of acute respiratory failure  Hypertension     Pneumonia     Respiratory disorder 2013     Past Surgical History:  Past Surgical History:   Procedure Laterality Date    BRONCHOSCOPY      FRACTURE SURGERY      NASAL SINUS SURGERY Bilateral 09/2017    SINUS SURGERY      TUBAL LIGATION      WISDOM TOOTH EXTRACTION       Family History:  Family History   Problem Relation Age of Onset    Heart disease Father     Heart failure Father     Diabetes Father     Rectal cancer Father     Heart disease Sister     Heart disease Brother     Aortic aneurysm Brother     Sudden death Brother     Cirrhosis Mother     Colon cancer Paternal Grandmother     No Known Problems Daughter     Lung cancer Paternal Uncle     No Known Problems Daughter     No Known Problems Daughter     No Known Problems Maternal Aunt     No Known Problems Maternal Aunt     No Known Problems Maternal Aunt     No Known Problems Paternal Aunt     No Known Problems Paternal Aunt      Social History:  Social History     Substance and Sexual Activity   Alcohol Use Yes    Comment: soically     Social History     Substance and Sexual Activity   Drug Use No     Social History     Tobacco Use   Smoking Status Never Smoker   Smokeless Tobacco Never Used     Review of Systems   Constitutional: Negative  HENT: Negative  Eyes: Negative  Respiratory: Negative  Cardiovascular: Negative  Gastrointestinal: Negative  Endocrine: Negative  Genitourinary: Negative  Musculoskeletal: Positive for arthralgias (Left wrist) and joint swelling (Left wrist)  Skin: Negative  Allergic/Immunologic: Negative  Neurological: Negative  Hematological: Negative  Psychiatric/Behavioral: Negative            Objective:  BP Readings from Last 1 Encounters:   12/26/19 120/72      Wt Readings from Last 1 Encounters:   12/26/19 58 1 kg (128 lb)      BMI:   Estimated body mass index is 25 kg/m² as calculated from the following:    Height as of this encounter: 5' (9 955 m)     Weight as of this encounter: 58 1 kg (128 lb)  BSA:   Estimated body surface area is 1 54 meters squared as calculated from the following:    Height as of this encounter: 5' (1 524 m)  Weight as of this encounter: 58 1 kg (128 lb)  Physical Exam   Constitutional: She is oriented to person, place, and time  She appears well-developed  HENT:   Head: Normocephalic and atraumatic  Eyes: Conjunctivae and EOM are normal    Neck: Neck supple  Pulmonary/Chest: Effort normal    Neurological: She is alert and oriented to person, place, and time  Skin: Skin is warm  Psychiatric: She has a normal mood and affect  Nursing note and vitals reviewed  Left Hand Exam     Tenderness   The patient is experiencing tenderness in the dorsal area and radial area  Range of Motion   Wrist   Extension: abnormal   Flexion: abnormal   Pronation: normal   Supination: abnormal     Other   Erythema: absent  Sensation: normal  Pulse: present    Comments: There is ecchymosis over the volar and dorsal of the wrist            I have personally reviewed pertinent films in PACS and my interpretation is Left wrist show distal radius fracture with dorsal angulation  Post cast application show the fracture on has improved alignment with volar tilting       Fracture / Dislocation Treatment  Date/Time: 12/26/2019 7:15 PM  Performed by: Villa Sarmiento MD  Authorized by: Villa Sarmiento MD     Patient Location:  Clinic  Verbal consent obtained?: Yes    Risks and benefits: Risks, benefits and alternatives were discussed    Consent given by:  Patient  Patient states understanding of procedure being performed: Yes    Injury location:  Wrist  Location details:  Left wrist  Injury type:  Fracture  Fracture type: distal radius    Fracture type: distal radius    Neurovascular status: Neurovascularly intact    Distal perfusion: normal    Neurological function: normal    Range of motion: reduced    Local anesthesia used?: Yes Anesthesia:  Hematoma block  Local anesthetic:  Lidocaine 1% without epinephrine  Anesthetic total (ml):  8  Manipulation performed?: Yes    Skeletal traction used: finger trap      Reduction successful?: Yes    Confirmation: Reduction confirmed by x-ray    Immobilization:  Cast  Cast type:  Short arm  Supplies used:  Cotton padding and fiberglass  Neurovascular status: Neurovascularly intact    Distal perfusion: normal    Neurological function: normal    Patient tolerance:  Patient tolerated the procedure well with no immediate complications

## 2019-12-27 NOTE — ASSESSMENT & PLAN NOTE
Findings consistent with left distal radius fracture with slight angulation  Discussed findings and treatment options with the patient  I provided patient a hematoma block and place a short-arm cast   Post cast application x-ray showed the fracture is in good position  Cast care instructions provided  Continue elevation and cold compress over the wrist   Follow-up in 1 week with repeat x-ray in the cast   Discussed with family possibility of the fracture moving while casted  All patient's questions were answered to their satisfaction  This note is created using dictation transcription  It may contain typographical errors, grammatical errors, improperly dictated words, background noise and other errors

## 2019-12-30 DIAGNOSIS — F33.0 MILD EPISODE OF RECURRENT MAJOR DEPRESSIVE DISORDER (HCC): Primary | ICD-10-CM

## 2019-12-31 ENCOUNTER — SOCIAL WORK (OUTPATIENT)
Dept: BEHAVIORAL/MENTAL HEALTH CLINIC | Facility: CLINIC | Age: 69
End: 2019-12-31
Payer: COMMERCIAL

## 2019-12-31 DIAGNOSIS — F33.0 MILD EPISODE OF RECURRENT MAJOR DEPRESSIVE DISORDER (HCC): ICD-10-CM

## 2019-12-31 DIAGNOSIS — I10 ESSENTIAL HYPERTENSION: ICD-10-CM

## 2019-12-31 DIAGNOSIS — F33.1 MODERATE RECURRENT MAJOR DEPRESSION (HCC): Primary | ICD-10-CM

## 2019-12-31 DIAGNOSIS — J30.89 ALLERGIC RHINITIS DUE TO OTHER ALLERGIC TRIGGER, UNSPECIFIED SEASONALITY: ICD-10-CM

## 2019-12-31 PROCEDURE — 90834 PSYTX W PT 45 MINUTES: CPT | Performed by: SOCIAL WORKER

## 2019-12-31 NOTE — PATIENT INSTRUCTIONS
Please attend your next psychotherapy session, you were added to the wait list for an earlier appointment
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
## 2020-01-02 RX ORDER — MONTELUKAST SODIUM 10 MG/1
TABLET ORAL
Qty: 90 TABLET | Refills: 0 | Status: SHIPPED | OUTPATIENT
Start: 2020-01-02 | End: 2021-06-21 | Stop reason: ALTCHOICE

## 2020-01-02 RX ORDER — LISINOPRIL 5 MG/1
5 TABLET ORAL DAILY
Qty: 90 TABLET | Refills: 0 | Status: SHIPPED | OUTPATIENT
Start: 2020-01-02 | End: 2020-03-23

## 2020-01-02 RX ORDER — PRAVASTATIN SODIUM 40 MG
40 TABLET ORAL DAILY
Qty: 90 TABLET | Refills: 0 | Status: SHIPPED | OUTPATIENT
Start: 2020-01-02 | End: 2020-03-23

## 2020-01-02 NOTE — TELEPHONE ENCOUNTER
Reills were sent without additional refills  Pt is overdue for f/u appointment  Needs to schedule for further refills

## 2020-01-06 ENCOUNTER — OFFICE VISIT (OUTPATIENT)
Dept: FAMILY MEDICINE CLINIC | Facility: HOSPITAL | Age: 70
End: 2020-01-06
Payer: COMMERCIAL

## 2020-01-06 VITALS
OXYGEN SATURATION: 99 % | SYSTOLIC BLOOD PRESSURE: 130 MMHG | WEIGHT: 132.6 LBS | DIASTOLIC BLOOD PRESSURE: 82 MMHG | HEIGHT: 60 IN | HEART RATE: 92 BPM | TEMPERATURE: 98.5 F | BODY MASS INDEX: 26.03 KG/M2

## 2020-01-06 DIAGNOSIS — E78.2 MIXED HYPERLIPIDEMIA: ICD-10-CM

## 2020-01-06 DIAGNOSIS — F33.0 MILD EPISODE OF RECURRENT MAJOR DEPRESSIVE DISORDER (HCC): ICD-10-CM

## 2020-01-06 DIAGNOSIS — I10 ESSENTIAL HYPERTENSION: ICD-10-CM

## 2020-01-06 DIAGNOSIS — F41.1 GENERALIZED ANXIETY DISORDER: ICD-10-CM

## 2020-01-06 DIAGNOSIS — J45.30 MILD PERSISTENT ASTHMA WITHOUT COMPLICATION: ICD-10-CM

## 2020-01-06 DIAGNOSIS — F32.0 CURRENT MILD EPISODE OF MAJOR DEPRESSIVE DISORDER WITHOUT PRIOR EPISODE (HCC): Primary | ICD-10-CM

## 2020-01-06 DIAGNOSIS — Z13.1 SCREENING FOR DIABETES MELLITUS: ICD-10-CM

## 2020-01-06 DIAGNOSIS — Z23 ENCOUNTER FOR IMMUNIZATION: ICD-10-CM

## 2020-01-06 PROCEDURE — G0008 ADMIN INFLUENZA VIRUS VAC: HCPCS | Performed by: NURSE PRACTITIONER

## 2020-01-06 PROCEDURE — 1160F RVW MEDS BY RX/DR IN RCRD: CPT | Performed by: NURSE PRACTITIONER

## 2020-01-06 PROCEDURE — 3075F SYST BP GE 130 - 139MM HG: CPT | Performed by: NURSE PRACTITIONER

## 2020-01-06 PROCEDURE — 90662 IIV NO PRSV INCREASED AG IM: CPT | Performed by: NURSE PRACTITIONER

## 2020-01-06 PROCEDURE — 99214 OFFICE O/P EST MOD 30 MIN: CPT | Performed by: NURSE PRACTITIONER

## 2020-01-06 PROCEDURE — 3725F SCREEN DEPRESSION PERFORMED: CPT | Performed by: NURSE PRACTITIONER

## 2020-01-06 PROCEDURE — 3079F DIAST BP 80-89 MM HG: CPT | Performed by: NURSE PRACTITIONER

## 2020-01-06 RX ORDER — AMLODIPINE BESYLATE 2.5 MG/1
2.5 TABLET ORAL DAILY
Qty: 90 TABLET | Refills: 1
Start: 2020-01-06 | End: 2020-05-12 | Stop reason: ALTCHOICE

## 2020-01-06 NOTE — ASSESSMENT & PLAN NOTE
Not controlled with multiple factors contributing to increased depression symptoms  Increase sertraline to 50 mg (full tablet)  Continue with therapy  F/U in 2 months  Call with worsening mood

## 2020-01-06 NOTE — PROGRESS NOTES
Assessment/Plan:    Current mild episode of major depressive disorder without prior episode ([address] )  Not controlled with multiple factors contributing to increased depression symptoms  Increase sertraline to 50 mg (full tablet)  Continue with therapy  F/U in 2 months  Call with worsening mood  Generalized anxiety disorder  See above plan  Mild persistent asthma without complication  Well controlled on current regimen  Continue with this  Call with any worsening breathing  Essential hypertension  BP well controlled  Continue on current regimen  F/U in 5 months  Mixed hyperlipidemia  Last FLP was controlled  Recheck in 5 months  Continue on current statin dose  Diagnoses and all orders for this visit:    Current mild episode of major depressive disorder without prior episode (HCC)    Generalized anxiety disorder  -     sertraline (ZOLOFT) 50 mg tablet; Take 1 tablet (50 mg total) by mouth daily    Essential hypertension  -     amLODIPine (NORVASC) 2 5 mg tablet; Take 1 tablet (2 5 mg total) by mouth daily  -     CBC and differential; Future  -     Comprehensive metabolic panel; Future  -     CBC and differential  -     Comprehensive metabolic panel    Mixed hyperlipidemia  -     CBC and differential; Future  -     Comprehensive metabolic panel; Future  -     Lipid panel; Future  -     CBC and differential  -     Comprehensive metabolic panel  -     Lipid panel    Mild persistent asthma without complication    Mild episode of recurrent major depressive disorder (HCC)  -     sertraline (ZOLOFT) 50 mg tablet; Take 1 tablet (50 mg total) by mouth daily    Screening for diabetes mellitus  -     Hemoglobin A1C; Future  -     Hemoglobin A1C          Subjective:      Patient ID: Amanda Balderas is a 78 y o  female  Has seen Sabrina Hauser  Has been feeling down  Issues with daughters  Marital issues  Friend issues  Jorgito Jimenez and brocruz wrist  Following with ortho   Has not started calcium supplement yet but did order them  Breathing has been great  Had sinus surgery which has made a significant difference  No coughing, wheezing, sob  No albuterol use  Started profile weight loss program in October  Has lost about 10 pounds  Has weekly coaching  Some shakes  Happy with results  Hypertension   This is a chronic problem  The current episode started more than 1 year ago  The problem is controlled  Pertinent negatives include no blurred vision, chest pain, headaches, palpitations, peripheral edema or shortness of breath  Risk factors for coronary artery disease include dyslipidemia and post-menopausal state  Past treatments include ACE inhibitors and calcium channel blockers  There are no compliance problems  Hyperlipidemia   This is a chronic problem  The current episode started more than 1 year ago  The problem is controlled  Recent lipid tests were reviewed and are normal  Pertinent negatives include no chest pain, focal sensory loss, focal weakness, leg pain, myalgias or shortness of breath  Current antihyperlipidemic treatment includes statins  There are no compliance problems  Risk factors for coronary artery disease include dyslipidemia and hypertension  The following portions of the patient's history were reviewed and updated as appropriate: allergies, current medications, past family history, past medical history, past social history, past surgical history and problem list     Review of Systems   Constitutional: Negative for unexpected weight change  Eyes: Negative for blurred vision and visual disturbance  Respiratory: Negative for cough, chest tightness, shortness of breath and wheezing  Cardiovascular: Negative for chest pain, palpitations and leg swelling  Musculoskeletal: Negative for myalgias  Neurological: Negative for dizziness, focal weakness, weakness, light-headedness, numbness and headaches  Psychiatric/Behavioral: Positive for dysphoric mood   Negative for sleep disturbance and suicidal ideas  The patient is nervous/anxious  Objective:  Vitals:    01/06/20 0856   BP: 130/82   Pulse: 92   Temp: 98 5 °F (36 9 °C)   SpO2: 99%      Physical Exam   Constitutional: She is oriented to person, place, and time  She appears well-developed and well-nourished  Cardiovascular: Normal rate, regular rhythm and normal heart sounds  No murmur heard  No carotid bruit B/L   Pulmonary/Chest: Effort normal and breath sounds normal    Neurological: She is alert and oriented to person, place, and time  Skin: Skin is warm and dry  Capillary refill takes less than 2 seconds  Psychiatric: She has a normal mood and affect  Vitals reviewed

## 2020-01-09 ENCOUNTER — APPOINTMENT (OUTPATIENT)
Dept: RADIOLOGY | Facility: CLINIC | Age: 70
End: 2020-01-09
Payer: COMMERCIAL

## 2020-01-09 ENCOUNTER — OFFICE VISIT (OUTPATIENT)
Dept: OBGYN CLINIC | Facility: CLINIC | Age: 70
End: 2020-01-09

## 2020-01-09 VITALS
DIASTOLIC BLOOD PRESSURE: 72 MMHG | HEART RATE: 80 BPM | BODY MASS INDEX: 25.13 KG/M2 | SYSTOLIC BLOOD PRESSURE: 118 MMHG | WEIGHT: 128 LBS | HEIGHT: 60 IN

## 2020-01-09 DIAGNOSIS — S52.532S CLOSED COLLES' FRACTURE OF LEFT RADIUS, SEQUELA: ICD-10-CM

## 2020-01-09 DIAGNOSIS — S52.532S CLOSED COLLES' FRACTURE OF LEFT RADIUS, SEQUELA: Primary | ICD-10-CM

## 2020-01-09 PROCEDURE — 73110 X-RAY EXAM OF WRIST: CPT

## 2020-01-09 PROCEDURE — 99024 POSTOP FOLLOW-UP VISIT: CPT | Performed by: PHYSICIAN ASSISTANT

## 2020-01-09 NOTE — PROGRESS NOTES
Assessment:     1  Closed Colles' fracture of left radius, sequela        Plan:     Problem List Items Addressed This Visit        Musculoskeletal and Integument    Closed Colles' fracture of left radius - Primary     Findings consistent with left distal radius fracture with slight angulation, status post closed reduction  Discussed findings and treatment options with the patient  X-rays were reviewed with her  Continue treatment with short-arm cast   Continue to limit use of that arm  Follow-up in 2 weeks with Dr Tammi Khan, x-rays in cast   All patient's questions were answered to their satisfaction  This note is created using dictation transcription  It may contain typographical errors, grammatical errors, improperly dictated words, background noise and other errors  Relevant Orders    XR wrist 3+ vw left         Subjective:     Patient ID: Marcella Yoder is a 78 y o  female  Chief Complaint:  14-year-old female status post fall on 12/23/2019 suffering a left distal radius fracture with slight angulation  She fell backward and landed on her left wrist   Dr Tammi Khan performed a closed reduction with short-arm cast application 2 weeks ago  She has tolerated the short-arm cast well  Pain is improving      Allergy:  Allergies   Allergen Reactions    Succinylcholine GI Intolerance     Prolonged apnea      Amoxicillin-Pot Clavulanate Rash    Clarithromycin Vomiting, Nausea Only and Rash     Medications:  all current active meds have been reviewed  Past Medical History:  Past Medical History:   Diagnosis Date    Asthma     C  difficile enteritis     History of acute respiratory failure     Hypertension     Pneumonia     Respiratory disorder 2013     Past Surgical History:  Past Surgical History:   Procedure Laterality Date    BRONCHOSCOPY      FRACTURE SURGERY      NASAL SINUS SURGERY Bilateral 09/2017    SINUS SURGERY      TUBAL LIGATION      WISDOM TOOTH EXTRACTION       Family History:  Family History   Problem Relation Age of Onset    Heart disease Father     Heart failure Father     Diabetes Father     Rectal cancer Father     Heart disease Sister     Heart disease Brother     Aortic aneurysm Brother     Sudden death Brother     Cirrhosis Mother     Colon cancer Paternal Grandmother     No Known Problems Daughter     Lung cancer Paternal Uncle     No Known Problems Daughter     No Known Problems Daughter     No Known Problems Maternal Aunt     No Known Problems Maternal Aunt     No Known Problems Maternal Aunt     No Known Problems Paternal Aunt     No Known Problems Paternal Aunt      Social History:  Social History     Substance and Sexual Activity   Alcohol Use Yes    Comment: soically     Social History     Substance and Sexual Activity   Drug Use No     Social History     Tobacco Use   Smoking Status Never Smoker   Smokeless Tobacco Never Used     Review of Systems   Constitutional: Negative  HENT: Negative  Eyes: Negative  Respiratory: Negative  Cardiovascular: Negative  Gastrointestinal: Negative  Endocrine: Negative  Genitourinary: Negative  Musculoskeletal: Positive for arthralgias (Left wrist)  Skin: Negative  Allergic/Immunologic: Negative  Neurological: Negative  Hematological: Negative  Psychiatric/Behavioral: Negative  Objective:  BP Readings from Last 1 Encounters:   01/09/20 118/72      Wt Readings from Last 1 Encounters:   01/09/20 58 1 kg (128 lb)      BMI:   Estimated body mass index is 25 kg/m² as calculated from the following:    Height as of this encounter: 5' (1 524 m)  Weight as of this encounter: 58 1 kg (128 lb)  BSA:   Estimated body surface area is 1 54 meters squared as calculated from the following:    Height as of this encounter: 5' (1 524 m)  Weight as of this encounter: 58 1 kg (128 lb)  Physical Exam   Constitutional: She is oriented to person, place, and time  She appears well-developed     HENT: Head: Normocephalic and atraumatic  Eyes: Conjunctivae and EOM are normal    Neck: Neck supple  Pulmonary/Chest: Effort normal    Neurological: She is alert and oriented to person, place, and time  Skin: Skin is warm  Psychiatric: She has a normal mood and affect  Nursing note and vitals reviewed  Left Hand Exam     Other   Erythema: absent  Sensation: normal    Comments:  Short arm cast intact with no evidence of loosening  No skin breakdown  Able to make a composite fist  Minimal swelling of fingers            I have personally reviewed pertinent films in PACS and my interpretation is Left wrist show distal radius fracture maintaining adequate alignment       Procedures

## 2020-01-09 NOTE — ASSESSMENT & PLAN NOTE
Findings consistent with left distal radius fracture with slight angulation, status post closed reduction  Discussed findings and treatment options with the patient  X-rays were reviewed with her  Continue treatment with short-arm cast   Continue to limit use of that arm  Follow-up in 2 weeks with Dr Godwin Arcos, x-rays in cast   All patient's questions were answered to their satisfaction  This note is created using dictation transcription  It may contain typographical errors, grammatical errors, improperly dictated words, background noise and other errors

## 2020-01-23 ENCOUNTER — APPOINTMENT (OUTPATIENT)
Dept: RADIOLOGY | Facility: CLINIC | Age: 70
End: 2020-01-23
Payer: COMMERCIAL

## 2020-01-23 ENCOUNTER — OFFICE VISIT (OUTPATIENT)
Dept: OBGYN CLINIC | Facility: CLINIC | Age: 70
End: 2020-01-23
Payer: COMMERCIAL

## 2020-01-23 VITALS
WEIGHT: 128 LBS | HEART RATE: 72 BPM | HEIGHT: 60 IN | SYSTOLIC BLOOD PRESSURE: 120 MMHG | BODY MASS INDEX: 25.13 KG/M2 | DIASTOLIC BLOOD PRESSURE: 62 MMHG

## 2020-01-23 DIAGNOSIS — S52.532D CLOSED COLLES' FRACTURE OF LEFT RADIUS WITH ROUTINE HEALING, SUBSEQUENT ENCOUNTER: ICD-10-CM

## 2020-01-23 DIAGNOSIS — M25.532 PAIN IN LEFT WRIST: ICD-10-CM

## 2020-01-23 DIAGNOSIS — M25.532 PAIN IN LEFT WRIST: Primary | ICD-10-CM

## 2020-01-23 PROCEDURE — 29075 APPL CST ELBW FNGR SHORT ARM: CPT | Performed by: PHYSICIAN ASSISTANT

## 2020-01-23 PROCEDURE — 99024 POSTOP FOLLOW-UP VISIT: CPT | Performed by: PHYSICIAN ASSISTANT

## 2020-01-23 PROCEDURE — 73110 X-RAY EXAM OF WRIST: CPT

## 2020-01-23 NOTE — PROGRESS NOTES
Assessment:     1  Pain in left wrist    2  Closed Colles' fracture of left radius with routine healing, subsequent encounter        Plan:     Problem List Items Addressed This Visit        Musculoskeletal and Integument    Closed Colles' fracture of left radius     Findings consistent with left distal radius fracture with slight angulation, status post closed reduction  Discussed findings and treatment options with the patient  X-rays were reviewed with her  Short-arm cast was replaced today since patient has been sticking a board down to itch her skin which has bunched up the padding  Discussed the importance of her not putting anything down her cast no matter how itchy she gets  She will follow up in 4 weeks, out of cast and x-rays  All patient's questions were answered to their satisfaction  This note is created using dictation transcription  It may contain typographical errors, grammatical errors, improperly dictated words, background noise and other errors  Relevant Orders    Cast application (Completed)      Other Visit Diagnoses     Pain in left wrist    -  Primary    Relevant Orders    XR wrist 3+ vw left         Subjective:     Patient ID: Sundar Locke is a 78 y o  female  Chief Complaint:  69-year-old female status post fall on 12/23/2019 suffering a left distal radius fracture with slight angulation  She fell backward and landed on her left wrist   Dr Blanca Garcia performed a closed reduction with short-arm cast application 4 weeks ago  She has tolerated the short-arm cast well  She admits to putting a metal piece down the proximal aspect for cast to scratch her skin at times    She denies any pain in her wrist     Allergy:  Allergies   Allergen Reactions    Succinylcholine GI Intolerance     Prolonged apnea      Amoxicillin-Pot Clavulanate Rash    Clarithromycin Vomiting, Nausea Only and Rash     Medications:  all current active meds have been reviewed  Past Medical History:  Past Medical History:   Diagnosis Date    Asthma     C  difficile enteritis     History of acute respiratory failure     Hypertension     Pneumonia     Respiratory disorder 2013     Past Surgical History:  Past Surgical History:   Procedure Laterality Date    BRONCHOSCOPY      FRACTURE SURGERY      NASAL SINUS SURGERY Bilateral 09/2017    SINUS SURGERY      TUBAL LIGATION      WISDOM TOOTH EXTRACTION       Family History:  Family History   Problem Relation Age of Onset    Heart disease Father     Heart failure Father     Diabetes Father     Rectal cancer Father     Heart disease Sister     Heart disease Brother     Aortic aneurysm Brother     Sudden death Brother     Cirrhosis Mother     Colon cancer Paternal Grandmother     No Known Problems Daughter     Lung cancer Paternal Uncle     No Known Problems Daughter     No Known Problems Daughter     No Known Problems Maternal Aunt     No Known Problems Maternal Aunt     No Known Problems Maternal Aunt     No Known Problems Paternal Aunt     No Known Problems Paternal Aunt      Social History:  Social History     Substance and Sexual Activity   Alcohol Use Yes    Comment: soically     Social History     Substance and Sexual Activity   Drug Use No     Social History     Tobacco Use   Smoking Status Never Smoker   Smokeless Tobacco Never Used     Review of Systems   Constitutional: Negative  HENT: Negative  Eyes: Negative  Respiratory: Negative  Cardiovascular: Negative  Gastrointestinal: Negative  Endocrine: Negative  Genitourinary: Negative  Musculoskeletal: Negative for arthralgias (Left wrist)  Skin: Negative  Allergic/Immunologic: Negative  Neurological: Negative  Hematological: Negative  Psychiatric/Behavioral: Negative            Objective:  BP Readings from Last 1 Encounters:   01/23/20 120/62      Wt Readings from Last 1 Encounters:   01/23/20 58 1 kg (128 lb)      BMI:   Estimated body mass index is 25 kg/m² as calculated from the following:    Height as of this encounter: 5' (1 524 m)  Weight as of this encounter: 58 1 kg (128 lb)  BSA:   Estimated body surface area is 1 54 meters squared as calculated from the following:    Height as of this encounter: 5' (1 524 m)  Weight as of this encounter: 58 1 kg (128 lb)  Physical Exam   Constitutional: She is oriented to person, place, and time  She appears well-developed  HENT:   Head: Normocephalic and atraumatic  Eyes: Conjunctivae and EOM are normal    Neck: Neck supple  Pulmonary/Chest: Effort normal    Neurological: She is alert and oriented to person, place, and time  Skin: Skin is warm  Psychiatric: She has a normal mood and affect  Nursing note and vitals reviewed  Left Hand Exam     Tenderness   The patient is experiencing tenderness in the radial area  Range of Motion   Wrist   Extension: abnormal   Flexion: abnormal   Pronation: normal   Supination: abnormal     Other   Erythema: absent  Sensation: normal    Comments:  No skin breakdown, but indentation of skin where she was pushing metal piece to scratch her skin  Able to make a composite fist  Minimal swelling of fingers            I have personally reviewed pertinent films in PACS and my interpretation is Left wrist show distal radius fracture maintaining adequate alignment with some evidence of healing  Cast application  Date/Time: 1/23/2020 12:17 PM  Performed by: Autumn Fields PA-C  Authorized by: Autumn Fields PA-C     Consent:     Consent obtained:  Verbal    Consent given by:  Patient  Pre-procedure details:     Sensation:  Normal  Procedure details:     Laterality:  Left    Location:  Wrist    Wrist:  L wristCast type:  Short arm    Supplies:  Cotton padding and fiberglass  Post-procedure details:     Pain:  Improved    Sensation:  Normal    Patient tolerance of procedure:   Tolerated well, no immediate complications

## 2020-01-23 NOTE — ASSESSMENT & PLAN NOTE
Findings consistent with left distal radius fracture with slight angulation, status post closed reduction  Discussed findings and treatment options with the patient  X-rays were reviewed with her  Short-arm cast was replaced today since patient has been sticking a board down to itch her skin which has bunched up the padding  Discussed the importance of her not putting anything down her cast no matter how itchy she gets  She will follow up in 4 weeks, out of cast and x-rays  All patient's questions were answered to their satisfaction  This note is created using dictation transcription  It may contain typographical errors, grammatical errors, improperly dictated words, background noise and other errors

## 2020-02-18 ENCOUNTER — SOCIAL WORK (OUTPATIENT)
Dept: BEHAVIORAL/MENTAL HEALTH CLINIC | Facility: CLINIC | Age: 70
End: 2020-02-18
Payer: COMMERCIAL

## 2020-02-18 DIAGNOSIS — F32.0 CURRENT MILD EPISODE OF MAJOR DEPRESSIVE DISORDER WITHOUT PRIOR EPISODE (HCC): Primary | ICD-10-CM

## 2020-02-18 DIAGNOSIS — F41.1 GENERALIZED ANXIETY DISORDER: ICD-10-CM

## 2020-02-18 PROCEDURE — 1036F TOBACCO NON-USER: CPT | Performed by: SOCIAL WORKER

## 2020-02-18 PROCEDURE — 90834 PSYTX W PT 45 MINUTES: CPT | Performed by: SOCIAL WORKER

## 2020-02-18 NOTE — PSYCH
Assessment/Plan:      Diagnoses and all orders for this visit:    Current mild episode of major depressive disorder without prior episode ([facility] [address] )    Generalized anxiety disorder        Subjective:     Patient ID: Ashok Reese is a 78 y o  female  HPI     8:45am-9:35am     (D) Maritza attended a psychotherapy session with this writer this afternoon  Jessie Finch reports since her last session, she continues to have her cast on her right arm, and is hoping to get it off soon  Jessie Finch reports that since her last session, her  has been supportive of her and helping her  Jessie Stef describes ongoing interpersonal relationship stressors with her (2) youngest daughters, who both serve/ served in the [employer]  Jessie Finch reports that her youngest daughter named Anshu Oliveira, has been distance for the past 3 5 years  Jessie Finch reports that her middle daughter Radha Ochoa stopped communicating with her this past September, and reports that she doesn't know why  Jessie Stef reports that she has made attempts to message her, calling and leaving messages her, sending text messages, and reports that Radha Ochoa hasn't acknowledged these attempts  Jessie Finch reports that the only daughter that she is in contact with her oldest daughter Joelle Mena, and then her son Shruthi Gonzalez  Jessie Stef reports that she used to be so close to Radha Ochoa and describes significant symbolic loss in relation to this  Radha Ochoa reports that her two youngest daughters were away this past August, and reports that they called her while they were there, and talked to her over the telephone  Jessie Finch reported that they were upset with Jessie Stef, because she had fallen and went to the ER and didn't tell them about it   Jessie Finch reports that her oldest (2) children describe Jessie Stef as, "overbearing," and reports, "I have issues as a result of my childhood " Jessie Finch spent time discussing her upbringing, reporting that her mother left when she was 2years old, and then she lived with her maternal grandmother and was back and forth between her and her maternal Aunt  Tawana Stroud reports that when she was 5years old, she moved in with her father and her step-mother  Tawana Stroud describes herself, "as a really good mother " Tawana Stroud spent time discussing her symptoms of worrying, anxiety, sadness, and depression in relation to ongoing interpersonal relationship stressors with her (2) youngest daughters  Tawana Stroud reports that she is constantly worried about them, and desires to have a meaningful relationship with them  Discussed and processed this at length  Provided ongoing psychoeducation  Reviewed limits and boundaries  Modeled effective forms of communication during session  Discussed ongoing skills  (P) Maritza plans to work on drafting a letter to her daughter Erick Mederos, and bring it into her next psychotherapy session in order to organize her thoughts, feelings, and to express them  Charisma Vargas plans to work on gathering feedback from her oldest daughter Mike Wright and son Jose Luis Pacheco, in order to get specific examples as to how Tawana Stroud can be, "overbearing," in order to discuss, process, and explore in psychotherapy sessions  Maritza plans to continue to focus on reframing from negative engagement with others, and implementing limits and boundaries  Tawana Stroud plans to prioritize her mental health needs  Maritza plans to continue to implement effective skills to self-manage symptoms, work on being present in the moment, utilizing deep breathing techniques, grounding techniques, distress tolerance skills, and coping skills  Maritza plans to continue to implement effective forms of communication to address ongoing interpersonal relationship stressors  Maritza plans to monitor and track her symptoms, stick with a healthy sleep hygiene schedule  Maritza plans to outreach for additional support as needed  Review of Systems      Objective:     Physical Exam      (A) Cary mood presented as anxious and depressed and her affect appeared to be congruent and tearful at times   Tawana Stroud describes ongoing anticipatory anxiety in relation to interpersonal relationship  stressors  Prabhu Breath describes symptoms of worrying, anxiety, sadness, and depression  Prabhu Breath reports sleep disturbances as a result of fractured relationship with her (2) youngst daughters  Pelletier Breath presented as future oriented and denies that she is experiencing any evident or immediate risk factors for self-harm, SI, or HI  Discussed upcoming plans  Pelletier Breath presented with good eye contact  Pelletier Breath presented as alert and oriented x3  Maritza's speech presented at a normal rate, volume, and rhythm  Prabhu Breath denies that she is experiencing any symptoms of grandiose thinking, trent, elevated/ hyperactive moods, or impulsivity, and does not appear to be endorsing criteria for trent at this time

## 2020-02-20 ENCOUNTER — OFFICE VISIT (OUTPATIENT)
Dept: OBGYN CLINIC | Facility: CLINIC | Age: 70
End: 2020-02-20

## 2020-02-20 ENCOUNTER — OFFICE VISIT (OUTPATIENT)
Dept: OCCUPATIONAL THERAPY | Facility: CLINIC | Age: 70
End: 2020-02-20
Payer: COMMERCIAL

## 2020-02-20 ENCOUNTER — APPOINTMENT (OUTPATIENT)
Dept: RADIOLOGY | Facility: CLINIC | Age: 70
End: 2020-02-20
Payer: COMMERCIAL

## 2020-02-20 VITALS
WEIGHT: 123 LBS | HEART RATE: 80 BPM | SYSTOLIC BLOOD PRESSURE: 120 MMHG | DIASTOLIC BLOOD PRESSURE: 78 MMHG | HEIGHT: 60 IN | BODY MASS INDEX: 24.15 KG/M2

## 2020-02-20 DIAGNOSIS — S52.532D CLOSED COLLES' FRACTURE OF LEFT RADIUS WITH ROUTINE HEALING, SUBSEQUENT ENCOUNTER: ICD-10-CM

## 2020-02-20 DIAGNOSIS — S52.532D CLOSED COLLES' FRACTURE OF LEFT RADIUS WITH ROUTINE HEALING, SUBSEQUENT ENCOUNTER: Primary | ICD-10-CM

## 2020-02-20 PROCEDURE — 3008F BODY MASS INDEX DOCD: CPT | Performed by: PHYSICIAN ASSISTANT

## 2020-02-20 PROCEDURE — L3906 WHO W/O JOINTS CF: HCPCS

## 2020-02-20 PROCEDURE — 3078F DIAST BP <80 MM HG: CPT | Performed by: PHYSICIAN ASSISTANT

## 2020-02-20 PROCEDURE — 99024 POSTOP FOLLOW-UP VISIT: CPT | Performed by: PHYSICIAN ASSISTANT

## 2020-02-20 PROCEDURE — 4040F PNEUMOC VAC/ADMIN/RCVD: CPT | Performed by: PHYSICIAN ASSISTANT

## 2020-02-20 PROCEDURE — 3074F SYST BP LT 130 MM HG: CPT | Performed by: PHYSICIAN ASSISTANT

## 2020-02-20 PROCEDURE — 1160F RVW MEDS BY RX/DR IN RCRD: CPT | Performed by: PHYSICIAN ASSISTANT

## 2020-02-20 PROCEDURE — 73110 X-RAY EXAM OF WRIST: CPT

## 2020-02-20 NOTE — PROGRESS NOTES
Orthosis    Diagnosis:   1  Closed Colles' fracture of left radius with routine healing, subsequent encounter  Ambulatory referral to Occupational Therapy     Indication: Fracture    Location: Left  wrist and hand  Supplies: Custom Fit Orthotic  Orthosis type: Volar Hand-Wrist  Wearing Schedule: Remove for hygiene only  Describe Position: Wrist in slight ulnar deviation, pt could not tolerate neutral    Precautions: Fracture    Patient or Caregiver expresses understanding of wearing Schedule and Precautions? Yes  Patient or Caregiver able to don/doff orthotic independently? Yes    Written orders provided to patient?  Yes  Orders Obtained: Written  Orders Obtained from: Linsey Siddiqui    Return for evaluation and treatment Yes 2/24 at [facility]

## 2020-02-20 NOTE — ASSESSMENT & PLAN NOTE
Findings consistent with left distal radius fracture with slight angulation and collapse, status post closed reduction  Discussed findings and treatment options with the patient  X-rays were reviewed with her  Short-arm cast was removed  She was given a prescription to start occupational therapy treatment  She was also given a prescription to have the occupational therapist make a custom molded volar splint to use with activities  Discussed that we will have to observe to see if she has symptoms of ulnar impingement in the future  She will follow up in 6 weeks for re-evaluation with Dr Chacho Reynoso  All patient's questions were answered to their satisfaction  This note is created using dictation transcription  It may contain typographical errors, grammatical errors, improperly dictated words, background noise and other errors

## 2020-02-20 NOTE — PROGRESS NOTES
Assessment:     1  Closed Colles' fracture of left radius with routine healing, subsequent encounter        Plan:     Problem List Items Addressed This Visit        Musculoskeletal and Integument    Closed Colles' fracture of left radius - Primary     Findings consistent with left distal radius fracture with slight angulation and collapse, status post closed reduction  Discussed findings and treatment options with the patient  X-rays were reviewed with her  Short-arm cast was removed  She was given a prescription to start occupational therapy treatment  She was also given a prescription to have the occupational therapist make a custom molded volar splint to use with activities  Discussed that we will have to observe to see if she has symptoms of ulnar impingement in the future  She will follow up in 6 weeks for re-evaluation with Dr Miguel Khan  All patient's questions were answered to their satisfaction  This note is created using dictation transcription  It may contain typographical errors, grammatical errors, improperly dictated words, background noise and other errors  Relevant Orders    XR wrist 3+ vw left    Ambulatory referral to PT/OT hand therapy    Ambulatory referral to Occupational Therapy         Subjective:     Patient ID: Bridgett Olguin is a 78 y o  female  Chief Complaint:  76-year-old female status post fall on 12/23/2019 suffering a left distal radius fracture with slight angulation  She fell backward and landed on her left wrist   Dr Miguel Khan performed a closed reduction with short-arm cast application 8 weeks ago  She has tolerated the short-arm cast well  She feels significant stiffness in her wrist at this time      Allergy:  Allergies   Allergen Reactions    Succinylcholine GI Intolerance     Prolonged apnea      Amoxicillin-Pot Clavulanate Rash    Clarithromycin Vomiting, Nausea Only and Rash     Medications:  all current active meds have been reviewed  Past Medical History:  Past Medical History:   Diagnosis Date    Asthma     C  difficile enteritis     History of acute respiratory failure     Hypertension     Pneumonia     Respiratory disorder 2013     Past Surgical History:  Past Surgical History:   Procedure Laterality Date    BRONCHOSCOPY      FRACTURE SURGERY      NASAL SINUS SURGERY Bilateral 09/2017    SINUS SURGERY      TUBAL LIGATION      WISDOM TOOTH EXTRACTION       Family History:  Family History   Problem Relation Age of Onset    Heart disease Father     Heart failure Father     Diabetes Father     Rectal cancer Father     Heart disease Sister     Heart disease Brother     Aortic aneurysm Brother     Sudden death Brother     Cirrhosis Mother     Colon cancer Paternal Grandmother     No Known Problems Daughter     Lung cancer Paternal Uncle     No Known Problems Daughter     No Known Problems Daughter     No Known Problems Maternal Aunt     No Known Problems Maternal Aunt     No Known Problems Maternal Aunt     No Known Problems Paternal Aunt     No Known Problems Paternal Aunt      Social History:  Social History     Substance and Sexual Activity   Alcohol Use Yes    Comment: soically     Social History     Substance and Sexual Activity   Drug Use No     Social History     Tobacco Use   Smoking Status Never Smoker   Smokeless Tobacco Never Used     Review of Systems   Constitutional: Negative  HENT: Negative  Eyes: Negative  Respiratory: Negative  Cardiovascular: Negative  Gastrointestinal: Negative  Endocrine: Negative  Genitourinary: Negative  Musculoskeletal: Negative for arthralgias (Left wrist)  Skin: Negative  Allergic/Immunologic: Negative  Neurological: Negative  Hematological: Negative  Psychiatric/Behavioral: Negative            Objective:  BP Readings from Last 1 Encounters:   02/20/20 120/78      Wt Readings from Last 1 Encounters:   02/20/20 55 8 kg (123 lb)      BMI:   Estimated body mass index is 24 02 kg/m² as calculated from the following:    Height as of this encounter: 5' (1 524 m)  Weight as of this encounter: 55 8 kg (123 lb)  BSA:   Estimated body surface area is 1 52 meters squared as calculated from the following:    Height as of this encounter: 5' (1 524 m)  Weight as of this encounter: 55 8 kg (123 lb)  Physical Exam   Constitutional: She is oriented to person, place, and time  She appears well-developed  HENT:   Head: Normocephalic and atraumatic  Eyes: Conjunctivae and EOM are normal    Neck: Neck supple  Pulmonary/Chest: Effort normal    Neurological: She is alert and oriented to person, place, and time  Skin: Skin is warm  Psychiatric: She has a normal mood and affect  Nursing note and vitals reviewed  Left Hand Exam     Tenderness   The patient is experiencing no tenderness  Range of Motion   Wrist   Extension:  20 abnormal   Flexion:  30 abnormal   Pronation: normal   Supination:  60 abnormal     Other   Erythema: absent  Sensation: normal    Comments:    Able to make a composite fist  Minimal swelling of fingers            I have personally reviewed pertinent films in PACS and my interpretation is X-rays left wrist reveal significant bone callus formation of the distal radius fracture  There is some collapse causing ulna plus  Procedures   Short-arm cast removed

## 2020-02-24 ENCOUNTER — EVALUATION (OUTPATIENT)
Dept: OCCUPATIONAL THERAPY | Facility: CLINIC | Age: 70
End: 2020-02-24
Payer: COMMERCIAL

## 2020-02-24 DIAGNOSIS — S52.532D CLOSED COLLES' FRACTURE OF LEFT RADIUS WITH ROUTINE HEALING, SUBSEQUENT ENCOUNTER: Primary | ICD-10-CM

## 2020-02-24 PROCEDURE — 97140 MANUAL THERAPY 1/> REGIONS: CPT | Performed by: OCCUPATIONAL THERAPIST

## 2020-02-24 PROCEDURE — 97110 THERAPEUTIC EXERCISES: CPT | Performed by: OCCUPATIONAL THERAPIST

## 2020-02-24 PROCEDURE — 97165 OT EVAL LOW COMPLEX 30 MIN: CPT | Performed by: OCCUPATIONAL THERAPIST

## 2020-02-24 NOTE — PROGRESS NOTES
OT Evaluation     Today's date: 2020  Patient name: Josep Feldman  : 1950  MRN: [id number]  Referring provider: Deep Araya PA-C  Dx:   Encounter Diagnosis     ICD-10-CM    1  Closed Colles' fracture of left radius with routine healing, subsequent encounter [id number] [id number]                   Assessment  Assessment details: Abdelrahman Garcia presents post immobilization for Colles fx   She has significant pain with use  She has limited wrist and forearm ROM   She has moderate edema   She is limited with use of L for heavy ADL  She struggles with  Self care   She is able to work her part time job  She has help from her  at home   Impairments: abnormal or restricted ROM, impaired physical strength, lacks appropriate home exercise program and pain with function  Functional limitations: limited with L for self care , housework , meal prep   Symptom irritability: moderateUnderstanding of Dx/Px/POC: excellent  Goals  STG- 2 wks  1- improve pain 25%  2-improve ROM 10%  3-I with HEP    LTG- 6 wks  1-improve ROM 50%  2-improve pain to 2/10 worst  3-improve  to 30#  4- I with ADL- self care and meals    Plan  Plan details: Pt may only attend 1 x wk secondary to high copay  Patient would benefit from: OT eval and skilled occupational therapy  Planned modality interventions: thermotherapy: hydrocollator packs, fluidotherapy and cryotherapy  Planned therapy interventions: manual therapy, joint mobilization, ADL retraining, massage, strengthening, stretching, therapeutic exercise, therapeutic activities, functional ROM exercises and home exercise program  Frequency: 2x week  Duration in weeks: 4  Plan of Care beginning date: 2020  Treatment plan discussed with: patient        Subjective Evaluation    History of Present Illness  Date of onset: 2019  Mechanism of injury: trauma  Mechanism of injury: Abdelrahman Garcia fell onto L wrist and suffered a colles fx  She was casted until last week   She was fitted with a WHO last week   Quality of life: good    Pain  Current pain ratin  At best pain ratin  At worst pain rating: 10  Location: L wrist   Quality: sharp  Relieving factors: rest  Aggravating factors: lifting  Progression: improved    Social Support  Steps to enter house: no  Stairs in house: no   Lives in: condominium  Lives with: spouse    Employment status: working  Hand dominance: right    Treatments  Previous treatment: immobilization  Patient Goals  Patient goals for therapy: decreased edema, decreased pain, increased motion, independence with ADLs/IADLs, increased strength and return to sport/leisure activities  Patient goal: regain use of L hand         Objective     Tenderness     Additional Tenderness Details  Tenderness RS and TFCC     Neurological Testing     Additional Neurological Details  Pt reports occasional parasthesias     2 point WNL    Active Range of Motion     Left Elbow   Flexion: WFL  Extension: WFL  Forearm supination: 60 degrees   Forearm pronation: 60 degrees     Left Wrist   Wrist flexion: 30 degrees   Wrist extension: 50 degrees   Radial deviation: 20 degrees   Ulnar deviation: 28 degrees     Additional Active Range of Motion Details  L digit and thumb ROM WNL     Passive Range of Motion     Left Wrist   Wrist flexion: 42 degrees   Wrist extension: 52 degrees   Radial deviation: 25 degrees   Ulnar deviation: 35 degrees     Strength/Myotome Testing     Left Wrist/Hand      (2nd hand position)     Trial 1: 2    Thumb Strength  Key/Lateral Pinch     Trail 1: 2  Palmar/Three-Point Pinch     Trial 1: 2    Right Wrist/Hand      (2nd hand position)     Trial 1: 44    Thumb Strength   Key/Lateral Pinch     Trial 1: 12  Palmar/Three-Point Pinch     Trial 1: 5    Tests     Left Wrist/Hand   Negative intrinsic muscle tightness       Swelling     Left Wrist/Hand   Circumference wrist: 17 cm    Right Wrist/Hand   Circumference wrist: 15 2 cm             Precautions: universal      Manual   retrograde 4m            graston 4m            MOB wrist 4m                                          Exercise Diary  2/24            AROM wrist 2x10            PROM wrist 2x10            AROM  digits 2x10            PROM digits   2x10                                                                                                                                                                                                                                Modalities  2/24            MH pre 10m

## 2020-03-02 ENCOUNTER — OFFICE VISIT (OUTPATIENT)
Dept: OCCUPATIONAL THERAPY | Facility: CLINIC | Age: 70
End: 2020-03-02
Payer: COMMERCIAL

## 2020-03-02 DIAGNOSIS — S52.532D CLOSED COLLES' FRACTURE OF LEFT RADIUS WITH ROUTINE HEALING, SUBSEQUENT ENCOUNTER: Primary | ICD-10-CM

## 2020-03-02 PROCEDURE — 97110 THERAPEUTIC EXERCISES: CPT | Performed by: OCCUPATIONAL THERAPIST

## 2020-03-02 PROCEDURE — 97022 WHIRLPOOL THERAPY: CPT | Performed by: OCCUPATIONAL THERAPIST

## 2020-03-02 PROCEDURE — 97140 MANUAL THERAPY 1/> REGIONS: CPT | Performed by: OCCUPATIONAL THERAPIST

## 2020-03-09 ENCOUNTER — OFFICE VISIT (OUTPATIENT)
Dept: OCCUPATIONAL THERAPY | Facility: CLINIC | Age: 70
End: 2020-03-09
Payer: COMMERCIAL

## 2020-03-09 ENCOUNTER — OFFICE VISIT (OUTPATIENT)
Dept: FAMILY MEDICINE CLINIC | Facility: HOSPITAL | Age: 70
End: 2020-03-09
Payer: COMMERCIAL

## 2020-03-09 VITALS
SYSTOLIC BLOOD PRESSURE: 126 MMHG | DIASTOLIC BLOOD PRESSURE: 76 MMHG | TEMPERATURE: 98.1 F | HEIGHT: 60 IN | WEIGHT: 129 LBS | HEART RATE: 72 BPM | BODY MASS INDEX: 25.32 KG/M2

## 2020-03-09 DIAGNOSIS — F33.0 MILD EPISODE OF RECURRENT MAJOR DEPRESSIVE DISORDER (HCC): ICD-10-CM

## 2020-03-09 DIAGNOSIS — F41.1 GENERALIZED ANXIETY DISORDER: ICD-10-CM

## 2020-03-09 DIAGNOSIS — F32.0 CURRENT MILD EPISODE OF MAJOR DEPRESSIVE DISORDER WITHOUT PRIOR EPISODE (HCC): Primary | ICD-10-CM

## 2020-03-09 DIAGNOSIS — I10 ESSENTIAL HYPERTENSION: ICD-10-CM

## 2020-03-09 DIAGNOSIS — S52.532D CLOSED COLLES' FRACTURE OF LEFT RADIUS WITH ROUTINE HEALING, SUBSEQUENT ENCOUNTER: Primary | ICD-10-CM

## 2020-03-09 PROCEDURE — 99214 OFFICE O/P EST MOD 30 MIN: CPT | Performed by: NURSE PRACTITIONER

## 2020-03-09 PROCEDURE — 97140 MANUAL THERAPY 1/> REGIONS: CPT | Performed by: OCCUPATIONAL THERAPIST

## 2020-03-09 PROCEDURE — 4040F PNEUMOC VAC/ADMIN/RCVD: CPT | Performed by: NURSE PRACTITIONER

## 2020-03-09 PROCEDURE — 3008F BODY MASS INDEX DOCD: CPT | Performed by: NURSE PRACTITIONER

## 2020-03-09 PROCEDURE — 97110 THERAPEUTIC EXERCISES: CPT | Performed by: OCCUPATIONAL THERAPIST

## 2020-03-09 PROCEDURE — 1160F RVW MEDS BY RX/DR IN RCRD: CPT | Performed by: NURSE PRACTITIONER

## 2020-03-09 PROCEDURE — 97022 WHIRLPOOL THERAPY: CPT | Performed by: OCCUPATIONAL THERAPIST

## 2020-03-09 PROCEDURE — 1036F TOBACCO NON-USER: CPT | Performed by: NURSE PRACTITIONER

## 2020-03-09 PROCEDURE — 3078F DIAST BP <80 MM HG: CPT | Performed by: NURSE PRACTITIONER

## 2020-03-09 PROCEDURE — 3074F SYST BP LT 130 MM HG: CPT | Performed by: NURSE PRACTITIONER

## 2020-03-09 RX ORDER — SERTRALINE HYDROCHLORIDE 100 MG/1
100 TABLET, FILM COATED ORAL DAILY
Qty: 30 TABLET | Refills: 2 | Status: SHIPPED | OUTPATIENT
Start: 2020-03-09 | End: 2020-05-12 | Stop reason: SDUPTHER

## 2020-03-09 NOTE — ASSESSMENT & PLAN NOTE
BP has been stable off amlodipine  Can continue off for now  F/U in 2 months and if BP remains stable can stay off permanently

## 2020-03-09 NOTE — PROGRESS NOTES
Daily Note     Today's date: 3/9/2020  Patient name: John Taylor  : 1950  MRN: [id number]  Referring provider: Vianca Melissa PA-C  Dx:   Encounter Diagnosis     ICD-10-CM    1  Closed Colles' fracture of left radius with routine healing, subsequent encounter S52 532D                   Subjective: I am sore      Objective: See treatment diary below      Assessment: Tolerated treatment well  Patient has pain at end range      Plan: Continue per plan of care        Precautions: universal      Manual  2/24 3/2 3/9          retrograde 4m 4m 4m          graston 4m 4m 4m          MOB wrist 4m 4m 4m                                        Exercise Diary  2/24 3/2 3/9          AROM wrist 2x10 2x10 2x10          PROM wrist 2x10 2x10 2x10          AROM  digits 2x10 2x10 2x10          PROM digits   2x10 2x10 2x10           wrist ROM w/cone  3x10 3x10          powerweb    Yellow  3x10 Red  3x10          Pinch pins   Yellow  3x10 Red  3x10          Pegs with    3x10 3x10                                                                                                                                                                          Modalities  2/24 3/2 3/9          MH pre 10m            fluido  10m 10m                          Pt has not been seen for > 2 wks - discharge

## 2020-03-09 NOTE — ASSESSMENT & PLAN NOTE
Improved with increase in sertraline but could use further improvement  Increase sertraline to 100 mg  Continue with therapy  F/U in 2 months  Call with any worsening mood

## 2020-03-09 NOTE — PROGRESS NOTES
[facility] Plan of Care: balance, strength, and gait training instructions were provided  Assessment/Plan:    Current mild episode of major depressive disorder without prior episode ([address] )  Improved with increase in sertraline but could use further improvement  Increase sertraline to 100 mg  Continue with therapy  F/U in 2 months  Call with any worsening mood  Generalized anxiety disorder  See above plan  Essential hypertension  BP has been stable off amlodipine  Can continue off for now  F/U in 2 months and if BP remains stable can stay off permanently  Diagnoses and all orders for this visit:    Current mild episode of major depressive disorder without prior episode (HCC)    Generalized anxiety disorder  -     sertraline (ZOLOFT) 100 mg tablet; Take 1 tablet (100 mg total) by mouth daily    Mild episode of recurrent major depressive disorder (HCC)  -     sertraline (ZOLOFT) 100 mg tablet; Take 1 tablet (100 mg total) by mouth daily    Essential hypertension          Subjective:      Patient ID: Sundar Locke is a 78 y o  female  Things have been going well with the medication adjustment  She spoke with her daughter yesterday and feels more hopeful about that situation  She is estranged from both daughters and that has been difficult for her; both daughters had a conference call with her  She is still having periods of crying, waking up at night crying at times  She also has childhood issues affecting her relationships now  No side effects noted with the medication increase from last appointment  Denies any plan to hurt herself  Appetite has been good  There was an issue with amlodipine prescription in January and she never received it  Has not been taking since January  BP has been good at doctor appointments  Feels good  Would like to continue off of it if possible         The following portions of the patient's history were reviewed and updated as appropriate: allergies, current medications, past family history, past medical history, past social history, past surgical history and problem list     Review of Systems   Constitutional: Negative for activity change, appetite change, fatigue and unexpected weight change  Psychiatric/Behavioral: Positive for dysphoric mood  Negative for sleep disturbance and suicidal ideas  The patient is nervous/anxious  Objective:  Vitals:    03/09/20 0854   BP: 126/76   Pulse: 72   Temp: 98 1 °F (36 7 °C)      Physical Exam   Constitutional: She is oriented to person, place, and time  She appears well-developed and well-nourished  Cardiovascular: Normal rate, regular rhythm and normal heart sounds  No murmur heard  Pulmonary/Chest: Effort normal and breath sounds normal    Neurological: She is alert and oriented to person, place, and time  Skin: Skin is warm and dry  Psychiatric: She has a normal mood and affect  Her behavior is normal  Judgment and thought content normal    Vitals reviewed

## 2020-03-21 DIAGNOSIS — I10 ESSENTIAL HYPERTENSION: ICD-10-CM

## 2020-03-23 ENCOUNTER — APPOINTMENT (OUTPATIENT)
Dept: OCCUPATIONAL THERAPY | Facility: CLINIC | Age: 70
End: 2020-03-23
Payer: COMMERCIAL

## 2020-03-23 RX ORDER — LISINOPRIL 5 MG/1
TABLET ORAL
Qty: 90 TABLET | Refills: 0 | Status: SHIPPED | OUTPATIENT
Start: 2020-03-23 | End: 2020-07-18

## 2020-03-23 RX ORDER — PRAVASTATIN SODIUM 40 MG
TABLET ORAL
Qty: 90 TABLET | Refills: 0 | Status: SHIPPED | OUTPATIENT
Start: 2020-03-23 | End: 2020-07-18

## 2020-04-24 ENCOUNTER — TELEPHONE (OUTPATIENT)
Dept: BEHAVIORAL/MENTAL HEALTH CLINIC | Facility: CLINIC | Age: 70
End: 2020-04-24

## 2020-04-28 ENCOUNTER — TELEPHONE (OUTPATIENT)
Dept: BEHAVIORAL/MENTAL HEALTH CLINIC | Facility: CLINIC | Age: 70
End: 2020-04-28

## 2020-05-12 ENCOUNTER — OFFICE VISIT (OUTPATIENT)
Dept: FAMILY MEDICINE CLINIC | Facility: HOSPITAL | Age: 70
End: 2020-05-12
Payer: COMMERCIAL

## 2020-05-12 VITALS
HEIGHT: 60 IN | BODY MASS INDEX: 26.27 KG/M2 | WEIGHT: 133.8 LBS | TEMPERATURE: 97.4 F | HEART RATE: 78 BPM | DIASTOLIC BLOOD PRESSURE: 74 MMHG | SYSTOLIC BLOOD PRESSURE: 128 MMHG

## 2020-05-12 DIAGNOSIS — F32.0 CURRENT MILD EPISODE OF MAJOR DEPRESSIVE DISORDER WITHOUT PRIOR EPISODE (HCC): ICD-10-CM

## 2020-05-12 DIAGNOSIS — Z00.00 MEDICARE ANNUAL WELLNESS VISIT, SUBSEQUENT: Primary | ICD-10-CM

## 2020-05-12 DIAGNOSIS — F41.1 GENERALIZED ANXIETY DISORDER: ICD-10-CM

## 2020-05-12 DIAGNOSIS — Z12.31 SCREENING MAMMOGRAM, ENCOUNTER FOR: ICD-10-CM

## 2020-05-12 DIAGNOSIS — F33.0 MILD EPISODE OF RECURRENT MAJOR DEPRESSIVE DISORDER (HCC): ICD-10-CM

## 2020-05-12 DIAGNOSIS — J45.30 MILD PERSISTENT ASTHMA WITHOUT COMPLICATION: ICD-10-CM

## 2020-05-12 DIAGNOSIS — Z12.11 SCREEN FOR COLON CANCER: ICD-10-CM

## 2020-05-12 DIAGNOSIS — E78.2 MIXED HYPERLIPIDEMIA: ICD-10-CM

## 2020-05-12 DIAGNOSIS — I10 ESSENTIAL HYPERTENSION: ICD-10-CM

## 2020-05-12 PROBLEM — S52.532A CLOSED COLLES' FRACTURE OF LEFT RADIUS: Status: RESOLVED | Noted: 2019-12-26 | Resolved: 2020-05-12

## 2020-05-12 PROBLEM — K21.9 GERD WITHOUT ESOPHAGITIS: Status: RESOLVED | Noted: 2018-02-28 | Resolved: 2020-05-12

## 2020-05-12 PROCEDURE — 1036F TOBACCO NON-USER: CPT | Performed by: NURSE PRACTITIONER

## 2020-05-12 PROCEDURE — 1170F FXNL STATUS ASSESSED: CPT | Performed by: NURSE PRACTITIONER

## 2020-05-12 PROCEDURE — 3078F DIAST BP <80 MM HG: CPT | Performed by: NURSE PRACTITIONER

## 2020-05-12 PROCEDURE — 99214 OFFICE O/P EST MOD 30 MIN: CPT | Performed by: NURSE PRACTITIONER

## 2020-05-12 PROCEDURE — G0439 PPPS, SUBSEQ VISIT: HCPCS | Performed by: NURSE PRACTITIONER

## 2020-05-12 PROCEDURE — 3074F SYST BP LT 130 MM HG: CPT | Performed by: NURSE PRACTITIONER

## 2020-05-12 PROCEDURE — 4040F PNEUMOC VAC/ADMIN/RCVD: CPT | Performed by: NURSE PRACTITIONER

## 2020-05-12 PROCEDURE — 3008F BODY MASS INDEX DOCD: CPT | Performed by: NURSE PRACTITIONER

## 2020-05-12 PROCEDURE — 1125F AMNT PAIN NOTED PAIN PRSNT: CPT | Performed by: NURSE PRACTITIONER

## 2020-05-12 PROCEDURE — 1160F RVW MEDS BY RX/DR IN RCRD: CPT | Performed by: NURSE PRACTITIONER

## 2020-05-12 RX ORDER — SERTRALINE HYDROCHLORIDE 100 MG/1
100 TABLET, FILM COATED ORAL DAILY
Qty: 90 TABLET | Refills: 1 | Status: SHIPPED | OUTPATIENT
Start: 2020-05-12 | End: 2021-03-09 | Stop reason: SDUPTHER

## 2020-07-14 ENCOUNTER — OFFICE VISIT (OUTPATIENT)
Dept: FAMILY MEDICINE CLINIC | Facility: HOSPITAL | Age: 70
End: 2020-07-14
Payer: COMMERCIAL

## 2020-07-14 VITALS
WEIGHT: 136.6 LBS | SYSTOLIC BLOOD PRESSURE: 132 MMHG | TEMPERATURE: 97.1 F | HEART RATE: 92 BPM | BODY MASS INDEX: 26.82 KG/M2 | OXYGEN SATURATION: 99 % | HEIGHT: 60 IN | DIASTOLIC BLOOD PRESSURE: 82 MMHG

## 2020-07-14 DIAGNOSIS — I80.01 THROMBOPHLEBITIS OF SUPERFICIAL VEINS OF RIGHT LOWER EXTREMITY: Primary | ICD-10-CM

## 2020-07-14 PROCEDURE — 1160F RVW MEDS BY RX/DR IN RCRD: CPT | Performed by: NURSE PRACTITIONER

## 2020-07-14 PROCEDURE — 3079F DIAST BP 80-89 MM HG: CPT | Performed by: NURSE PRACTITIONER

## 2020-07-14 PROCEDURE — 99213 OFFICE O/P EST LOW 20 MIN: CPT | Performed by: NURSE PRACTITIONER

## 2020-07-14 PROCEDURE — 4040F PNEUMOC VAC/ADMIN/RCVD: CPT | Performed by: NURSE PRACTITIONER

## 2020-07-14 PROCEDURE — 3075F SYST BP GE 130 - 139MM HG: CPT | Performed by: NURSE PRACTITIONER

## 2020-07-14 PROCEDURE — 1036F TOBACCO NON-USER: CPT | Performed by: NURSE PRACTITIONER

## 2020-07-14 PROCEDURE — 3008F BODY MASS INDEX DOCD: CPT | Performed by: NURSE PRACTITIONER

## 2020-07-14 NOTE — PROGRESS NOTES
Assessment/Plan:     Reassured pt that this appears as superficial thrombophlebitis  Advised ibuprofen, heat and elevation  Call if not improving or worsening or any swelling or pain of lower leg  Discussed compression stockings but pt deferring at this time  Diagnoses and all orders for this visit:    Thrombophlebitis of superficial veins of right lower extremity          Subjective:     Patient ID: Johnson Purcell is a 78 y o  female  This morning started with intense itching over right lower leg varicose vein  Then started to swell in that area  Swelling has gone down and itching has stopped  Swelling is localized to the vein  Has multiple varicose veins in both legs  Does not wear compression hose  No pain in lower leg and able to bear weight  Review of Systems   Cardiovascular: Positive for leg swelling  The following portions of the patient's history were reviewed and updated as appropriate: allergies, current medications, past family history, past medical history, past social history, past surgical history and problem list     Objective:  Vitals:    07/14/20 1419   BP: 132/82   Pulse: 92   Temp: (!) 97 1 °F (36 2 °C)   SpO2: 99%      Physical Exam   Constitutional: She is oriented to person, place, and time  She appears well-developed and well-nourished  Cardiovascular: Normal rate, regular rhythm and normal heart sounds  Pulmonary/Chest: Effort normal and breath sounds normal    Neurological: She is alert and oriented to person, place, and time  Skin: Skin is warm and dry  Capillary refill takes less than 2 seconds  Right lateral aspect of lower leg (below knee) with superficial hard and swollen varicosity noted  No redness  Ecchymosis noted  No calf swelling  No calf pain  Psychiatric: She has a normal mood and affect

## 2020-07-14 NOTE — PATIENT INSTRUCTIONS
Superficial Thrombophlebitis   WHAT YOU NEED TO KNOW:   Superficial thrombophlebitis (STP) is inflammation of a vein just under your skin (superficial vein)  The inflammation causes a blood clot to form in your vein  STP most often happens in your leg but may also happen in your arm  DISCHARGE INSTRUCTIONS:   Call 911 for any of the following:   · You feel lightheaded, short of breath, and have chest pain  · You cough up blood  Seek care immediately if:   · Your leg or arm turns pale or blue  · Your leg or arm feels hot or cold  · Your arm or leg feels warm, tender, and painful  It may look swollen and red  Contact your healthcare provider or hematologist if:   · Your symptoms return after treatment  · You have questions or concerns about your condition or care  Medicines: You may  need any of the following:  · NSAIDs , such as ibuprofen, help decrease swelling, pain, and fever  NSAIDs can cause stomach bleeding or kidney problems in certain people  If you take blood thinner medicine, always ask your healthcare provider if NSAIDs are safe for you  Always read the medicine label and follow directions  · Blood thinners    help prevent blood clots  Examples of blood thinners include heparin and warfarin  Clots can cause strokes, heart attacks, and death  The following are general safety guidelines to follow while you are taking a blood thinner:    ¨ Watch for bleeding and bruising while you take blood thinners  Watch for bleeding from your gums or nose  Watch for blood in your urine and bowel movements  Use a soft washcloth on your skin, and a soft toothbrush to brush your teeth  This can keep your skin and gums from bleeding  If you shave, use an electric shaver  Do not play contact sports  ¨ Tell your dentist and other healthcare providers that you take anticoagulants  Wear a bracelet or necklace that says you take this medicine       ¨ Do not start or stop any medicines unless your healthcare provider tells you to  Many medicines cannot be used with blood thinners  ¨ Tell your healthcare provider right away if you forget to take the medicine, or if you take too much  ¨ Warfarin  is a blood thinner that you may need to take  The following are things you should be aware of if you take warfarin  § Foods and medicines can affect the amount of warfarin in your blood  Do not make major changes to your diet while you take warfarin  Warfarin works best when you eat about the same amount of vitamin K every day  Vitamin K is found in green leafy vegetables and certain other foods  Ask for more information about what to eat when you are taking warfarin  § You will need to see your healthcare provider for follow-up visits when you are on warfarin  You will need regular blood tests  These tests are used to decide how much medicine you need  · Antiplatelets , such as aspirin, help prevent blood clots  Take your antiplatelet medicine exactly as directed  These medicines make it more likely for you to bleed or bruise  If you are told to take aspirin, do not take acetaminophen or ibuprofen instead  · Take your medicine as directed  Contact your healthcare provider if you think your medicine is not helping or if you have side effects  Tell him of her if you are allergic to any medicine  Keep a list of the medicines, vitamins, and herbs you take  Include the amounts, and when and why you take them  Bring the list or the pill bottles to follow-up visits  Carry your medicine list with you in case of an emergency  Follow up with your healthcare provider as directed:  Write down your questions so you remember to ask them during your visits  Manage your symptoms and prevent STP:  STP can increase your risk for a blood clot in deeper veins in your arms or legs  It can also increase your risk for a blood clot in your lungs   Do the following to decrease your risk for more blood clots and manage your symptoms:  · Wear pressure stockings as directed  Pressure stockings improve blood flow and help prevent clots in your legs  Wear the stockings during the day  Do not wear them when you sleep  · Elevate your leg or arm above the level of your heart as often as you can  This will help decrease swelling and pain  Prop your leg or arm on pillows or blankets to keep it elevated comfortably  · Apply a warm compress to your arm or leg  This will help decrease swelling and pain  Wet a washcloth in warm water  Do not  use hot water  Apply the warm compress for 10 minutes  Repeat this 4 times each day  · Maintain a healthy weight  This will help decrease your risk for another blood clot  Ask your healthcare provider how much you should weigh  Ask him or her to help you create a weight loss plan if you are overweight  · Do not smoke  Nicotine and other chemicals in cigarettes and cigars can damage blood vessels and increase your risk for blood clots  Ask your healthcare provider for information if you currently smoke and need help to quit  E-cigarettes or smokeless tobacco still contain nicotine  Talk to your healthcare provider before you use these products  · Stay active  Activity helps prevent blood clots  Do not sit for more than an hour  If you travel by car or work at a desk, move and stretch in your seat several times each hour  In an airplane, get up and walk every hour  Exercise your legs while you are sitting by raising and lowering your heels  Keep your toes on the floor while you do this  You can also raise and lower your toes while keeping your heels on the floor  Also tighten and release your leg muscles while you are sitting  · Exercise regularly  Exercise can help increase your blood flow and prevent a blood clot  Walking is a good low-impact exercise  Talk to your healthcare provider about the best exercise plan for you  · Do not inject illegal drugs    Talk to your healthcare provider if you use IV drugs and need help to quit  © 2017 [address] [employer] Information is for End User's use only and may not be sold, redistributed or otherwise used for commercial purposes  All illustrations and images included in CareNotes® are the copyrighted property of A D A M [employer]  or [employer]  The above information is an  only  It is not intended as medical advice for individual conditions or treatments  Talk to your doctor, nurse or pharmacist before following any medical regimen to see if it is safe and effective for you

## 2020-07-17 DIAGNOSIS — I10 ESSENTIAL HYPERTENSION: ICD-10-CM

## 2020-07-18 RX ORDER — PRAVASTATIN SODIUM 40 MG
TABLET ORAL
Qty: 90 TABLET | Refills: 0 | Status: SHIPPED | OUTPATIENT
Start: 2020-07-18 | End: 2021-03-09 | Stop reason: SDUPTHER

## 2020-07-18 RX ORDER — LISINOPRIL 5 MG/1
TABLET ORAL
Qty: 90 TABLET | Refills: 0 | Status: SHIPPED | OUTPATIENT
Start: 2020-07-18 | End: 2021-03-09 | Stop reason: SDUPTHER

## 2020-10-23 ENCOUNTER — CLINICAL SUPPORT (OUTPATIENT)
Dept: FAMILY MEDICINE CLINIC | Facility: HOSPITAL | Age: 70
End: 2020-10-23
Payer: COMMERCIAL

## 2020-10-23 DIAGNOSIS — Z11.1 ENCOUNTER FOR PPD TEST: Primary | ICD-10-CM

## 2020-10-23 PROCEDURE — 86580 TB INTRADERMAL TEST: CPT

## 2020-10-26 ENCOUNTER — TELEPHONE (OUTPATIENT)
Dept: FAMILY MEDICINE CLINIC | Facility: HOSPITAL | Age: 70
End: 2020-10-26

## 2020-10-26 ENCOUNTER — CLINICAL SUPPORT (OUTPATIENT)
Dept: FAMILY MEDICINE CLINIC | Facility: HOSPITAL | Age: 70
End: 2020-10-26
Payer: COMMERCIAL

## 2020-10-26 DIAGNOSIS — Z11.1 ENCOUNTER FOR PPD SKIN TEST READING: Primary | ICD-10-CM

## 2020-10-26 DIAGNOSIS — Z23 ENCOUNTER FOR IMMUNIZATION: ICD-10-CM

## 2020-10-26 LAB
INDURATION: 0 MM
TB SKIN TEST: NEGATIVE

## 2020-10-26 PROCEDURE — G0008 ADMIN INFLUENZA VIRUS VAC: HCPCS | Performed by: INTERNAL MEDICINE

## 2020-10-26 PROCEDURE — 90662 IIV NO PRSV INCREASED AG IM: CPT | Performed by: INTERNAL MEDICINE

## 2021-02-08 ENCOUNTER — HOSPITAL ENCOUNTER (OUTPATIENT)
Dept: RADIOLOGY | Facility: HOSPITAL | Age: 71
Discharge: HOME | End: 2021-02-08
Payer: COMMERCIAL

## 2021-02-08 ENCOUNTER — OFFICE VISIT (OUTPATIENT)
Dept: FAMILY MEDICINE CLINIC | Facility: HOSPITAL | Age: 71
End: 2021-02-08

## 2021-02-08 VITALS
TEMPERATURE: 97 F | BODY MASS INDEX: 29.53 KG/M2 | HEIGHT: 60 IN | HEART RATE: 94 BPM | SYSTOLIC BLOOD PRESSURE: 140 MMHG | WEIGHT: 150.4 LBS | OXYGEN SATURATION: 98 % | DIASTOLIC BLOOD PRESSURE: 82 MMHG

## 2021-02-08 DIAGNOSIS — M54.2 NECK PAIN: ICD-10-CM

## 2021-02-08 DIAGNOSIS — M54.2 NECK PAIN: Primary | ICD-10-CM

## 2021-02-08 DIAGNOSIS — E78.2 MIXED HYPERLIPIDEMIA: ICD-10-CM

## 2021-02-08 LAB
ALBUMIN SERPL-MCNC: 4.2 G/DL (ref 3.7–4.7)
ALBUMIN/GLOB SERPL: 1.4 {RATIO} (ref 1.2–2.2)
ALP SERPL-CCNC: 75 IU/L (ref 39–117)
ALT SERPL-CCNC: 10 IU/L (ref 0–32)
AST SERPL-CCNC: 18 IU/L (ref 0–40)
BASOPHILS # BLD AUTO: 0.1 X10E3/UL (ref 0–0.2)
BASOPHILS NFR BLD AUTO: 1 %
BILIRUB SERPL-MCNC: 0.2 MG/DL (ref 0–1.2)
BUN SERPL-MCNC: 23 MG/DL (ref 8–27)
BUN/CREAT SERPL: 31 (ref 12–28)
CALCIUM SERPL-MCNC: 9.6 MG/DL (ref 8.7–10.3)
CHLORIDE SERPL-SCNC: 105 MMOL/L (ref 96–106)
CHOLEST SERPL-MCNC: 199 MG/DL (ref 100–199)
CHOLEST/HDLC SERPL: 3.4 RATIO (ref 0–4.4)
CO2 SERPL-SCNC: 26 MMOL/L (ref 20–29)
CREAT SERPL-MCNC: 0.75 MG/DL (ref 0.57–1)
EOSINOPHIL # BLD AUTO: 0.3 X10E3/UL (ref 0–0.4)
EOSINOPHIL NFR BLD AUTO: 4 %
ERYTHROCYTE [DISTWIDTH] IN BLOOD BY AUTOMATED COUNT: 13.1 % (ref 11.7–15.4)
GLOBULIN SER-MCNC: 3.1 G/DL (ref 1.5–4.5)
GLUCOSE SERPL-MCNC: 85 MG/DL (ref 65–99)
HCT VFR BLD AUTO: 41.1 % (ref 34–46.6)
HDLC SERPL-MCNC: 59 MG/DL
HGB BLD-MCNC: 13.6 G/DL (ref 11.1–15.9)
IMM GRANULOCYTES # BLD: 0 X10E3/UL (ref 0–0.1)
IMM GRANULOCYTES NFR BLD: 0 %
LDLC SERPL CALC-MCNC: 124 MG/DL (ref 0–99)
LYMPHOCYTES # BLD AUTO: 1.3 X10E3/UL (ref 0.7–3.1)
LYMPHOCYTES NFR BLD AUTO: 20 %
MCH RBC QN AUTO: 29.4 PG (ref 26.6–33)
MCHC RBC AUTO-ENTMCNC: 33.1 G/DL (ref 31.5–35.7)
MCV RBC AUTO: 89 FL (ref 79–97)
MONOCYTES # BLD AUTO: 0.5 X10E3/UL (ref 0.1–0.9)
MONOCYTES NFR BLD AUTO: 7 %
NEUTROPHILS # BLD AUTO: 4.3 X10E3/UL (ref 1.4–7)
NEUTROPHILS NFR BLD AUTO: 68 %
PLATELET # BLD AUTO: 240 X10E3/UL (ref 150–450)
POTASSIUM SERPL-SCNC: 4.6 MMOL/L (ref 3.5–5.2)
PROT SERPL-MCNC: 7.3 G/DL (ref 6–8.5)
RBC # BLD AUTO: 4.62 X10E6/UL (ref 3.77–5.28)
SL AMB EGFR AFRICAN AMERICAN: 93 ML/MIN/1.73
SL AMB EGFR NON AFRICAN AMERICAN: 80 ML/MIN/1.73
SL AMB VLDL CHOLESTEROL CALC: 16 MG/DL (ref 5–40)
SODIUM SERPL-SCNC: 142 MMOL/L (ref 134–144)
TRIGL SERPL-MCNC: 86 MG/DL (ref 0–149)
WBC # BLD AUTO: 6.4 X10E3/UL (ref 3.4–10.8)

## 2021-02-08 PROCEDURE — 99214 OFFICE O/P EST MOD 30 MIN: CPT | Performed by: NURSE PRACTITIONER

## 2021-02-08 PROCEDURE — 72050 X-RAY EXAM NECK SPINE 4/5VWS: CPT

## 2021-02-08 RX ORDER — CYCLOBENZAPRINE HCL 10 MG
10 TABLET ORAL 3 TIMES DAILY PRN
Qty: 30 TABLET | Refills: 0 | Status: SHIPPED | OUTPATIENT
Start: 2021-02-08 | End: 2022-02-14

## 2021-02-08 NOTE — PROGRESS NOTES
Assessment/Plan:   I suspect neck pain is more muscular in nature  Start muscle relaxant and obtain xray and start PT  Call if no better or worsening  Pt is overdue for f/u of chronic conditions and also has not had any blood work done  Reordered lab slips  F/U in 1 month  Diagnoses and all orders for this visit:    Neck pain  -     XR spine cervical complete 4 or 5 vw non injury; Future  -     cyclobenzaprine (FLEXERIL) 10 mg tablet; Take 1 tablet (10 mg total) by mouth 3 (three) times a day as needed for muscle spasms  -     Ambulatory referral to Physical Therapy; Future    Mixed hyperlipidemia  -     CBC and differential; Future  -     Comprehensive metabolic panel; Future  -     Lipid panel; Future  -     CBC and differential  -     Comprehensive metabolic panel  -     Lipid panel          Subjective:     Patient ID: Coral Fan is a 69 y o  female  B/L neck pain for almost 2 months  Was doing a lot of crocheting at the time  Difficulty moving neck  Loud clicking  Feels lumps in neck that feel better with massage  Also with pain at base of neck  No radiating pain to arms  Pain is starting to move to both shoulders  No N/T/weakness  Taking naproxen or Tylenol which helps a little  Using heat  Not affecting sleep  Neck Pain   Pertinent negatives include no fever, headaches, numbness or weakness  Review of Systems   Constitutional: Negative for chills and fever  Musculoskeletal: Positive for neck pain  Neurological: Negative for dizziness, weakness, numbness and headaches  The following portions of the patient's history were reviewed and updated as appropriate: allergies, current medications, past family history, past medical history, past social history, past surgical history and problem list     Objective:  Vitals:    02/08/21 0852   BP: 140/82   Pulse: 94   Temp: (!) 97 °F (36 1 °C)   SpO2: 98%      Physical Exam  Vitals signs reviewed     Constitutional:       General: She is not in acute distress  Appearance: Normal appearance  Cardiovascular:      Rate and Rhythm: Normal rate and regular rhythm  Heart sounds: Normal heart sounds  No murmur  Pulmonary:      Effort: Pulmonary effort is normal       Breath sounds: Normal breath sounds  Musculoskeletal:      Cervical back: She exhibits decreased range of motion  She exhibits no tenderness, no bony tenderness, no pain and normal pulse  Comments: Restricted ROM with cervical spine extension and lateral rotation both left and right  Tenderness noted over scapular area  No tenderness over cervical spine  Neurological:      Mental Status: She is alert and oriented to person, place, and time  Psychiatric:         Mood and Affect: Mood normal          Behavior: Behavior normal          Thought Content:  Thought content normal          Judgment: Judgment normal

## 2021-02-17 ENCOUNTER — EVALUATION (OUTPATIENT)
Dept: PHYSICAL THERAPY | Facility: CLINIC | Age: 71
End: 2021-02-17
Payer: COMMERCIAL

## 2021-02-17 DIAGNOSIS — M54.2 NECK PAIN: Primary | ICD-10-CM

## 2021-02-17 PROCEDURE — 97162 PT EVAL MOD COMPLEX 30 MIN: CPT | Performed by: PHYSICAL THERAPIST

## 2021-02-17 NOTE — PROGRESS NOTES
PT Evaluation     Today's date: 2021  Patient name: Nina Gonzalez  : 1950  MRN: [id number]  Referring provider: HANK Smith  Dx:   Encounter Diagnosis     ICD-10-CM    1  Neck pain  M54 2 Ambulatory referral to Physical Therapy                  Assessment  Assessment details: Pt is a 69y o  year old female coming to outpatient PT with a diagnosis of neck pain onset 2020  Pt presents with increased pain and TTP, decreased cervical AROM with (+) segmental hypomobility testing, and overall decreased functional mobility  Pt would benefit from skilled PT services in order to address these deficits and reach maximum level of function  Thank you kindly for the referral!  Impairments: abnormal muscle tone, abnormal or restricted ROM, activity intolerance, lacks appropriate home exercise program and pain with function  Barriers to therapy: 1  Pt has a $40 co pay and is not able to attend therapy > 1 time per week  Understanding of Dx/Px/POC: good   Prognosis: fair    Goals  STG's ( 3-4 weeks)  1  Pt will be independent in HEP  2  Pt will have decreased pain at best rated 2/10  LTG's ( 6- 8 weeks)  1  Improve FOTO score by 4-6 points  2  Improve cervical ROM to max abilty  3  Pt will have less pain with sleeping activities  4  Pt will have decreased pain when turning her head to drive    Plan  Patient would benefit from: PT eval and skilled physical therapy  Planned modality interventions: TENS  Planned therapy interventions: joint mobilization, manual therapy, neuromuscular re-education, home exercise program, functional ROM exercises, flexibility, strengthening, stretching, therapeutic activities and therapeutic exercise  Frequency: 1x week  Duration in weeks: 6  Plan of Care beginning date: 2021  Plan of Care expiration date: 3/31/2021  Treatment plan discussed with: patient        Subjective Evaluation    History of Present Illness  Mechanism of injury: Pt reports neck pain onset 2020   Pt thinks her neck was aggravated by increased crocheting activities, however the pain has not gone away  Pt has tried new pillows without relief  Pt was given a muscle relaxer, which makes her tired, but doesn't seem to help her neck pain  Pt had been getting increased clicking in her neck, but that is resolving this past week  X-ray testing shows multi level degenerative changes  Pt has increased pain when turning her head to drive  Pt notices increased muscle knots on the side of her neck  Pt has increased pain when looking up to the ceiling and looking down to read      Work: retired; part time work at an [employer] school  Hobbies: crocheting, crafts  Gait: no abnormalities  Pain  At best pain ratin  At worst pain ratin  Location: neck  Quality: tight  Relieving factors: heat    Social Support  Lives with: spouse and adult children    Employment status: working (part time)  Hand dominance: right      Diagnostic Tests  X-ray: abnormal  Treatments  Previous treatment: medication  Patient Goals  Patient goals for therapy: decreased pain  Patient goal: to learn home exercises        Objective     Neurological Testing     Sensation   Cervical/Thoracic   Left   Intact: light touch    Right   Intact: light touch    Reflexes   Left   Biceps (C5/C6): normal (2+)  Brachioradialis (C6): normal (2+)  Triceps (C7): normal (2+)    Right   Biceps (C5/C6): normal (2+)  Brachioradialis (C6): normal (2+)  Triceps (C7): normal (2+)    Active Range of Motion   Cervical/Thoracic Spine       Cervical    Flexion: 25 degrees  with pain  Extension: 50 degrees     with pain  Left lateral flexion: 18 degrees     with pain  Right lateral flexion: 18 degrees     with pain  Left rotation: 35 degrees with pain  Right rotation: 40 degrees    with pain    Additional Active Range of Motion Details  Posture: pt sits with mild rounded shoulders/ forward head  (+) TTP with increased muscle tone B cervical psp, upper trap, levator scap stretch  (-) Alar ligament laxity  (-) sharp isabella test  No change with cervical distraction  (+) cervical hypomobility with PA and lateral glides  Pt has very limited PROM > 50% limited with cervical rotation    UE ROM and strength are WF's      Flowsheet Rows      Most Recent Value   PT/OT G-Codes   Current Score  46   Projected Score  61          Dx: neck pain  EPOC: 3/31/21  CO-MORBIDITIES:  PERSONAL FACTORS:  Precautions: high pain      Manuals             Cervical/ upper trap MFR             Cervical PA and lateral joint mobs                                       Neuro Re-Ed                                                                                                        Ther Ex             UBE BW             TB LPD             TB row             Chin tuck             Cervical rotation SNAG             Gentle upper trap stretch             Gentle levator scap stretch                          Ther Activity                                       Gait Training                                       Modalities

## 2021-02-24 ENCOUNTER — OFFICE VISIT (OUTPATIENT)
Dept: PHYSICAL THERAPY | Facility: CLINIC | Age: 71
End: 2021-02-24
Payer: COMMERCIAL

## 2021-02-24 DIAGNOSIS — M54.2 NECK PAIN: Primary | ICD-10-CM

## 2021-02-24 DIAGNOSIS — M54.12 CERVICAL RADICULOPATHY: ICD-10-CM

## 2021-02-24 PROCEDURE — 97110 THERAPEUTIC EXERCISES: CPT

## 2021-02-24 PROCEDURE — 97140 MANUAL THERAPY 1/> REGIONS: CPT

## 2021-02-24 NOTE — PROGRESS NOTES
Daily Note     Today's date: 2021  Patient name: Martita Herr  : 1950  MRN: [id number]  Referring provider: HANK Pérez  Dx:   Encounter Diagnosis     ICD-10-CM    1  Neck pain  M54 2                   Subjective: Pt arrives 5 min late to session and reports traveling to [facility] to receive covid vaccine after session  Pt notes LV went well and MFR helped greatly and is feeling a lot better  Pt notes noticing a difference in cervical rotation when driving, but is overall more tight in the R side  Objective: See treatment diary below      Assessment: Tolerated treatment well  Verbal cues provided to pt to activate postural muscles during TB exer  Pt presents w/ tension in UT during exer  Patient would benefit from continued PT to increase strength and ROM  Plan: Continue per plan of care        Dx: neck pain  EPOC: 3/31/21  CO-MORBIDITIES:  PERSONAL FACTORS:  Precautions: high pain      Manuals             Cervical/ upper trap MFR nk            Cervical PA and lateral joint mobs              20'                         Neuro Re-Ed             Shrugs w/ UT decel  GTB                                                                                         Ther Ex             UBE BW 3'            TB LPD [id number]            TB row OTB x15            Chin tuck 10            Cervical rotation SNAG 10            Gentle upper trap stretch 3x20"            Gentle levator scap stretch 3x20"                         Ther Activity                                       Gait Training                                       Modalities

## 2021-03-03 ENCOUNTER — RA CDI HCC (OUTPATIENT)
Dept: OTHER | Facility: HOSPITAL | Age: 71
End: 2021-03-03

## 2021-03-03 DIAGNOSIS — Z23 ENCOUNTER FOR IMMUNIZATION: ICD-10-CM

## 2021-03-03 NOTE — PROGRESS NOTES
[place] [address]  coding oppertunities             Chart reviewed, (number of) suggestions sent to provider: 1     Problem listed updated   Provider Accepted, (number of) suggestions accepted: 1              [place] [address]  coding oppertunities             Chart reviewed, (number of) suggestions sent to provider: 1      DX: F33 0 Major depressive disorder, recurrent, mild

## 2021-03-09 ENCOUNTER — OFFICE VISIT (OUTPATIENT)
Dept: FAMILY MEDICINE CLINIC | Facility: HOSPITAL | Age: 71
End: 2021-03-09
Payer: COMMERCIAL

## 2021-03-09 VITALS
HEART RATE: 94 BPM | HEIGHT: 60 IN | SYSTOLIC BLOOD PRESSURE: 134 MMHG | DIASTOLIC BLOOD PRESSURE: 82 MMHG | OXYGEN SATURATION: 99 % | BODY MASS INDEX: 30.43 KG/M2 | WEIGHT: 155 LBS | TEMPERATURE: 97.3 F

## 2021-03-09 DIAGNOSIS — J45.30 MILD PERSISTENT ASTHMA WITHOUT COMPLICATION: ICD-10-CM

## 2021-03-09 DIAGNOSIS — F41.1 GENERALIZED ANXIETY DISORDER: ICD-10-CM

## 2021-03-09 DIAGNOSIS — F32.0 CURRENT MILD EPISODE OF MAJOR DEPRESSIVE DISORDER WITHOUT PRIOR EPISODE (HCC): ICD-10-CM

## 2021-03-09 DIAGNOSIS — I10 ESSENTIAL HYPERTENSION: Primary | ICD-10-CM

## 2021-03-09 DIAGNOSIS — E78.2 MIXED HYPERLIPIDEMIA: ICD-10-CM

## 2021-03-09 DIAGNOSIS — F33.0 MILD EPISODE OF RECURRENT MAJOR DEPRESSIVE DISORDER (HCC): ICD-10-CM

## 2021-03-09 PROBLEM — M77.8 TENDINITIS OF RIGHT SHOULDER: Status: RESOLVED | Noted: 2019-04-09 | Resolved: 2021-03-09

## 2021-03-09 PROCEDURE — 3008F BODY MASS INDEX DOCD: CPT | Performed by: NURSE PRACTITIONER

## 2021-03-09 PROCEDURE — 3725F SCREEN DEPRESSION PERFORMED: CPT | Performed by: NURSE PRACTITIONER

## 2021-03-09 PROCEDURE — 1160F RVW MEDS BY RX/DR IN RCRD: CPT | Performed by: NURSE PRACTITIONER

## 2021-03-09 PROCEDURE — 99214 OFFICE O/P EST MOD 30 MIN: CPT | Performed by: NURSE PRACTITIONER

## 2021-03-09 PROCEDURE — 3079F DIAST BP 80-89 MM HG: CPT | Performed by: NURSE PRACTITIONER

## 2021-03-09 PROCEDURE — 3075F SYST BP GE 130 - 139MM HG: CPT | Performed by: NURSE PRACTITIONER

## 2021-03-09 PROCEDURE — 1036F TOBACCO NON-USER: CPT | Performed by: NURSE PRACTITIONER

## 2021-03-09 RX ORDER — LISINOPRIL 5 MG/1
5 TABLET ORAL DAILY
Qty: 90 TABLET | Refills: 1 | Status: SHIPPED | OUTPATIENT
Start: 2021-03-09 | End: 2021-09-05

## 2021-03-09 RX ORDER — PRAVASTATIN SODIUM 80 MG/1
40 TABLET ORAL DAILY
Qty: 90 TABLET | Refills: 1 | Status: SHIPPED | OUTPATIENT
Start: 2021-03-09 | End: 2022-03-28 | Stop reason: SDUPTHER

## 2021-03-09 RX ORDER — SERTRALINE HYDROCHLORIDE 100 MG/1
100 TABLET, FILM COATED ORAL DAILY
Qty: 90 TABLET | Refills: 1 | Status: SHIPPED | OUTPATIENT
Start: 2021-03-09 | End: 2021-10-11 | Stop reason: ALTCHOICE

## 2021-03-09 NOTE — PROGRESS NOTES
Assessment/Plan:    Essential hypertension  BP well controlled  Continue on current regimen  F/U in 6 months  Mixed hyperlipidemia  LDL continues to climb  ASCVD risk=15 1%  Significant family history of CAD  Increase pravastatin to 80 mg  Recheck in 3 months and f/u at that time  Current mild episode of major depressive disorder without prior episode (HCC)  PHQ_9=5  Continue on current regimen  Generalized anxiety disorder  UBALDO-7=3  Continue on current regimen  Mild persistent asthma without complication  Well controlled for a long time  May trial off Singulair if she wishes but with any return of asthma or allergy symptoms should restart  Diagnoses and all orders for this visit:    Essential hypertension  -     pravastatin (PRAVACHOL) 80 mg tablet; Take 0 5 tablets (40 mg total) by mouth daily  -     lisinopril (ZESTRIL) 5 mg tablet; Take 1 tablet (5 mg total) by mouth daily    Mild persistent asthma without complication    Mixed hyperlipidemia  -     Lipid panel; Future  -     Lipid panel    Current mild episode of major depressive disorder without prior episode (HCC)    Generalized anxiety disorder  -     sertraline (ZOLOFT) 100 mg tablet; Take 1 tablet (100 mg total) by mouth daily    Mild episode of recurrent major depressive disorder (HCC)  -     sertraline (ZOLOFT) 100 mg tablet; Take 1 tablet (100 mg total) by mouth daily          Subjective:      Patient ID: Stephan Davidson is a 69 y o  female  Has been gaining weight  Eating a lot  Neck pain is much better  Doing PT  Mood has been good  Has good and bad days  Anxiety is ok  Not using Xanax  Breathing has been good  No albuterol use  Family h/o heart problems  Hypertension  This is a chronic problem  The current episode started more than 1 year ago  The problem is controlled   Pertinent negatives include no blurred vision, chest pain, headaches, malaise/fatigue, palpitations, peripheral edema or shortness of breath  Risk factors for coronary artery disease include dyslipidemia and obesity  Past treatments include ACE inhibitors  There are no compliance problems  Hyperlipidemia  This is a chronic problem  The current episode started more than 1 year ago  The problem is controlled  Recent lipid tests were reviewed and are high  Pertinent negatives include no chest pain, focal sensory loss, focal weakness, leg pain, myalgias or shortness of breath  Current antihyperlipidemic treatment includes statins  Compliance problems include adherence to diet and adherence to exercise  Risk factors for coronary artery disease include dyslipidemia, hypertension, male sex, obesity and post-menopausal        The following portions of the patient's history were reviewed and updated as appropriate: allergies, current medications, past family history, past medical history, past social history, past surgical history and problem list     Review of Systems   Constitutional: Negative for malaise/fatigue and unexpected weight change  Eyes: Negative for blurred vision and visual disturbance  Respiratory: Negative for shortness of breath  Cardiovascular: Negative for chest pain, palpitations and leg swelling  Musculoskeletal: Negative for myalgias  Neurological: Negative for dizziness, focal weakness, light-headedness and headaches  Psychiatric/Behavioral: Negative for dysphoric mood, sleep disturbance and suicidal ideas  The patient is not nervous/anxious  Objective:  Vitals:    03/09/21 0855   BP: 134/82   Pulse: 94   Temp: (!) 97 3 °F (36 3 °C)   SpO2: 99%      Physical Exam  Vitals signs reviewed  Constitutional:       Appearance: Normal appearance  She is well-developed  Neck:      Vascular: No carotid bruit  Cardiovascular:      Rate and Rhythm: Normal rate and regular rhythm  Heart sounds: Normal heart sounds  No murmur     Pulmonary:      Effort: Pulmonary effort is normal       Breath sounds: Normal breath sounds  Skin:     General: Skin is warm and dry  Neurological:      Mental Status: She is alert and oriented to person, place, and time  Psychiatric:         Mood and Affect: Mood normal          Behavior: Behavior normal          Thought Content: Thought content normal          Judgment: Judgment normal          BMI Counseling: Body mass index is 30 27 kg/m²  The BMI is above normal  Nutrition recommendations include reducing portion sizes, decreasing overall calorie intake, 3-5 servings of fruits/vegetables daily, reducing fast food intake, consuming healthier snacks, decreasing soda and/or juice intake, moderation in carbohydrate intake and increasing intake of lean protein  Exercise recommendations include moderate aerobic physical activity for 150 minutes/week

## 2021-03-09 NOTE — ASSESSMENT & PLAN NOTE
LDL continues to climb  ASCVD risk=15 1%  Significant family history of CAD  Increase pravastatin to 80 mg  Recheck in 3 months and f/u at that time

## 2021-03-09 NOTE — PATIENT INSTRUCTIONS
Obesity   AMBULATORY CARE:   Obesity  is when your body mass index (BMI) is greater than 30  Your healthcare provider will use your height and weight to measure your BMI  The risks of obesity include  many health problems, such as injuries or physical disability  You may need tests to check for the following:  · Diabetes    · High blood pressure or high cholesterol    · Heart disease    · Gallbladder or liver disease    · Cancer of the colon, breast, prostate, liver, or kidney    · Sleep apnea    · Arthritis or gout    Seek care immediately if:   · You have a severe headache, confusion, or difficulty speaking  · You have weakness on one side of your body  · You have chest pain, sweating, or shortness of breath  Contact your healthcare provider if:   · You have symptoms of gallbladder or liver disease, such as pain in your upper abdomen  · You have knee or hip pain and discomfort while walking  · You have symptoms of diabetes, such as intense hunger and thirst, and frequent urination  · You have symptoms of sleep apnea, such as snoring or daytime sleepiness  · You have questions or concerns about your condition or care  Treatment for obesity  focuses on helping you lose weight to improve your health  Even a small decrease in BMI can reduce the risk for many health problems  Your healthcare provider will help you set a weight-loss goal   · Lifestyle changes  are the first step in treating obesity  These include making healthy food choices and getting regular physical activity  Your healthcare provider may suggest a weight-loss program that involves coaching, education, and therapy  · Medicine  may help you lose weight when it is used with a healthy diet and physical activity  · Surgery  can help you lose weight if you are very obese and have other health problems  There are several types of weight-loss surgery  Ask your healthcare provider for more information      Be successful losing weight:   · Set small, realistic goals  An example of a small goal is to walk for 20 minutes 5 days a week  Anther goal is to lose 5% of your body weight  · Tell friends, family members, and coworkers about your goals  and ask for their support  Ask a friend to lose weight with you, or join a weight-loss support group  · Identify foods or triggers that may cause you to overeat , and find ways to avoid them  Remove tempting high-calorie foods from your home and workplace  Place a bowl of fresh fruit on your kitchen counter  If stress causes you to eat, then find other ways to cope with stress  · Keep a diary to track what you eat and drink  Also write down how many minutes of physical activity you do each day  Weigh yourself once a week and record it in your diary  Eating changes: You will need to eat 500 to 1,000 fewer calories each day than you currently eat to lose 1 to 2 pounds a week  The following changes will help you cut calories:  · Eat smaller portions  Use small plates, no larger than 9 inches in diameter  Fill your plate half full of fruits and vegetables  Measure your food using measuring cups until you know what a serving size looks like  · Eat 3 meals and 1 or 2 snacks each day  Plan your meals in advance  Nora Fisher and eat at home most of the time  Eat slowly  Do not skip meals  Skipping meals can lead to overeating later in the day  This can make it harder for you to lose weight  Talk with a dietitian to help you make a meal plan and schedule that is right for you  · Eat fruits and vegetables at every meal   They are low in calories and high in fiber, which makes you feel full  Do not add butter, margarine, or cream sauce to vegetables  Use herbs to season steamed vegetables  · Eat less fat and fewer fried foods  Eat more baked or grilled chicken and fish  These protein sources are lower in calories and fat than red meat  Limit fast food   Dress your salads with olive oil and vinegar instead of bottled dressing  · Limit the amount of sugar you eat  Do not drink sugary beverages  Limit alcohol  Activity changes:  Physical activity is good for your body in many ways  It helps you burn calories and build strong muscles  It decreases stress and depression, and improves your mood  It can also help you sleep better  Talk to your healthcare provider before you begin an exercise program   · Exercise for at least 30 minutes 5 days a week  Start slowly  Set aside time each day for physical activity that you enjoy and that is convenient for you  It is best to do both weight training and an activity that increases your heart rate, such as walking, bicycling, or swimming  · Find ways to be more active  Do yard work and housecleaning  Walk up the stairs instead of using elevators  Spend your leisure time going to events that require walking, such as outdoor festivals or fairs  This extra physical activity can help you lose weight and keep it off  Follow up with your healthcare provider as directed: You may need to meet with a dietitian  Write down your questions so you remember to ask them during your visits  © Copyright [facility] [address] Information is for End User's use only and may not be sold, redistributed or otherwise used for commercial purposes  All illustrations and images included in CareNotes® are the copyrighted property of [employer]  or [facility] Derik Humphreys   The above information is an  only  It is not intended as medical advice for individual conditions or treatments  Talk to your doctor, nurse or pharmacist before following any medical regimen to see if it is safe and effective for you

## 2021-03-09 NOTE — ASSESSMENT & PLAN NOTE
Well controlled for a long time  May trial off Singulair if she wishes but with any return of asthma or allergy symptoms should restart

## 2021-03-23 NOTE — PROGRESS NOTES
Daily Note     Today's date: 3/2/2020  Patient name: Nina Gonzalez  : 1950  MRN: [id number]  Referring provider: Gilma Dietrich PA-C  Dx:   Encounter Diagnosis     ICD-10-CM    1  Closed Colles' fracture of left radius with routine healing, subsequent encounter S52 532D                   Subjective: I am doing ok      Objective: See treatment diary below      Assessment: Tolerated treatment well  Patient has improved ROM   She has pain at end range  Plan: Continue per plan of care        Precautions: universal      Manual  2/24 3/2           retrograde 4m 4m           graston 4m 4m           MOB wrist 4m 4m                                         Exercise Diary             AROM wrist 2x10 2x10           PROM wrist 2x10 2x10           AROM  digits 2x10 2x10           PROM digits   2x10 2x10            wrist ROM w/cone  3x10           powerweb    Yellow  3x10           Pinch pins   Yellow  3x10           Pegs with    3x10                                                                                                                                                                           Modalities   3/2           MH pre 10m            fluido  10m That inpatient services furnished since the previous certification or recertification were, and continue to be required: for treatment that could reasonably be expected to improve the patient's condition; or, for diagnostic study;    That the hospital records show that the services furnished were: intensive treatment services; admission and related services necessary for diagnostic study or equivalent services;    That the patient continues to need, on a daily basis active inpatient treatment furnished directly by or requiring the supervision of inpatient psychiatric facility personnel.

## 2021-05-10 ENCOUNTER — OFFICE VISIT (OUTPATIENT)
Dept: FAMILY MEDICINE CLINIC | Facility: HOSPITAL | Age: 71
End: 2021-05-10
Payer: COMMERCIAL

## 2021-05-10 ENCOUNTER — HOSPITAL ENCOUNTER (OUTPATIENT)
Dept: RADIOLOGY | Facility: HOSPITAL | Age: 71
Discharge: HOME | End: 2021-05-10
Payer: COMMERCIAL

## 2021-05-10 VITALS
DIASTOLIC BLOOD PRESSURE: 70 MMHG | OXYGEN SATURATION: 95 % | HEIGHT: 60 IN | BODY MASS INDEX: 29.64 KG/M2 | WEIGHT: 151 LBS | SYSTOLIC BLOOD PRESSURE: 130 MMHG | HEART RATE: 76 BPM | TEMPERATURE: 98.6 F

## 2021-05-10 DIAGNOSIS — M25.562 ACUTE PAIN OF LEFT KNEE: Primary | ICD-10-CM

## 2021-05-10 DIAGNOSIS — M25.562 ACUTE PAIN OF LEFT KNEE: ICD-10-CM

## 2021-05-10 PROCEDURE — 73562 X-RAY EXAM OF KNEE 3: CPT

## 2021-05-10 PROCEDURE — 99214 OFFICE O/P EST MOD 30 MIN: CPT | Performed by: NURSE PRACTITIONER

## 2021-05-10 NOTE — PROGRESS NOTES
Assessment/Plan:     Check knee xray  Concern for meniscus injury  Refer to ortho  Diagnoses and all orders for this visit:    Acute pain of left knee  -     Ambulatory referral to Orthopedic Surgery; Future  -     XR knee 3 vw left non injury; Future          Subjective:     Patient ID: Shantel Hannon is a 69 y o  female  Months ago used her left knee to push herself up onto her bed  Since then has had pain but most recently pain has significantly worsened  Pain with walking, going up and down stairs  Has noticed some swelling that started about 2 weeks ago when the pain intensified  Will get piercing pain at rest from time to time otherwise no pain at rest  Has been taking naproxen which seems to help  Has been using ice also  No knee or leg weakness  Denies knee giving out  Review of Systems   Musculoskeletal: Positive for arthralgias (left knee) and joint swelling (left knee)  Neurological: Negative for weakness and numbness  The following portions of the patient's history were reviewed and updated as appropriate: allergies, current medications, past family history, past medical history, past social history, past surgical history and problem list     Objective:  Vitals:    05/10/21 1054   BP: 130/70   Pulse: 76   Temp: 98 6 °F (37 °C)   SpO2: 95%      Physical Exam  Vitals signs reviewed  Constitutional:       Appearance: Normal appearance  Cardiovascular:      Rate and Rhythm: Normal rate and regular rhythm  Heart sounds: Normal heart sounds  Pulmonary:      Effort: Pulmonary effort is normal       Breath sounds: Normal breath sounds  Musculoskeletal:      Left knee: She exhibits effusion  She exhibits normal range of motion, no deformity, no erythema, normal alignment, no LCL laxity, normal patellar mobility and no MCL laxity  Tenderness found  Medial joint line tenderness noted  Comments: Effusion anterior and medial aspect left knee      Skin:     General: Skin is warm and dry  Neurological:      Mental Status: She is alert and oriented to person, place, and time  Psychiatric:         Mood and Affect: Mood normal          Behavior: Behavior normal          Thought Content:  Thought content normal          Judgment: Judgment normal

## 2021-05-14 ENCOUNTER — OFFICE VISIT (OUTPATIENT)
Dept: OBGYN CLINIC | Facility: CLINIC | Age: 71
End: 2021-05-14
Payer: COMMERCIAL

## 2021-05-14 ENCOUNTER — APPOINTMENT (OUTPATIENT)
Dept: RADIOLOGY | Facility: CLINIC | Age: 71
End: 2021-05-14
Payer: COMMERCIAL

## 2021-05-14 VITALS
WEIGHT: 150 LBS | SYSTOLIC BLOOD PRESSURE: 120 MMHG | DIASTOLIC BLOOD PRESSURE: 80 MMHG | BODY MASS INDEX: 29.45 KG/M2 | TEMPERATURE: 98.6 F | HEIGHT: 60 IN

## 2021-05-14 DIAGNOSIS — M25.562 LEFT KNEE PAIN, UNSPECIFIED CHRONICITY: ICD-10-CM

## 2021-05-14 DIAGNOSIS — M25.562 ACUTE PAIN OF LEFT KNEE: ICD-10-CM

## 2021-05-14 DIAGNOSIS — M70.52 PES ANSERINUS BURSITIS OF LEFT KNEE: Primary | ICD-10-CM

## 2021-05-14 PROCEDURE — 99213 OFFICE O/P EST LOW 20 MIN: CPT | Performed by: ORTHOPAEDIC SURGERY

## 2021-05-14 PROCEDURE — 73560 X-RAY EXAM OF KNEE 1 OR 2: CPT

## 2021-05-14 PROCEDURE — 1160F RVW MEDS BY RX/DR IN RCRD: CPT | Performed by: ORTHOPAEDIC SURGERY

## 2021-05-14 PROCEDURE — 1036F TOBACCO NON-USER: CPT | Performed by: ORTHOPAEDIC SURGERY

## 2021-05-14 PROCEDURE — 3008F BODY MASS INDEX DOCD: CPT | Performed by: ORTHOPAEDIC SURGERY

## 2021-05-14 PROCEDURE — 20610 DRAIN/INJ JOINT/BURSA W/O US: CPT | Performed by: ORTHOPAEDIC SURGERY

## 2021-05-14 RX ORDER — BETAMETHASONE SODIUM PHOSPHATE AND BETAMETHASONE ACETATE 3; 3 MG/ML; MG/ML
12 INJECTION, SUSPENSION INTRA-ARTICULAR; INTRALESIONAL; INTRAMUSCULAR; SOFT TISSUE
Status: COMPLETED | OUTPATIENT
Start: 2021-05-14 | End: 2021-05-14

## 2021-05-14 RX ORDER — LIDOCAINE HYDROCHLORIDE 10 MG/ML
1 INJECTION, SOLUTION INFILTRATION; PERINEURAL
Status: COMPLETED | OUTPATIENT
Start: 2021-05-14 | End: 2021-05-14

## 2021-05-14 RX ADMIN — LIDOCAINE HYDROCHLORIDE 1 ML: 10 INJECTION, SOLUTION INFILTRATION; PERINEURAL at 13:57

## 2021-05-14 RX ADMIN — BETAMETHASONE SODIUM PHOSPHATE AND BETAMETHASONE ACETATE 12 MG: 3; 3 INJECTION, SUSPENSION INTRA-ARTICULAR; INTRALESIONAL; INTRAMUSCULAR; SOFT TISSUE at 14:38

## 2021-05-14 NOTE — PROGRESS NOTES
Assessment:     1  Pes anserinus bursitis of left knee    2  Left knee pain, unspecified chronicity        Plan:     Problem List Items Addressed This Visit        Musculoskeletal and Integument    Pes anserinus bursitis of left knee - Primary     Findings consistent with left knee pes bursitis, mild osteoarthritis  Imaging and prognosis reviewed  I discussed prognosis of her knee condition  Conservative measures discussed with patient  Start with CSI in pes bursa to calm inflammation, ice area over next few days  She can apply OTC voltaren as well, take naproxen  Reviewed stretching exercises in office with patient  Avoid kneeling, crawling, repetitive walking or stairs until symptoms calm down  This condition can take a few months to resolve  See back in 6-8 weeks, if doing better cancel appt  All patient's questions were answered to her satisfaction  This note is created using dictation transcription  It may contain typographical errors, grammatical errors, improperly dictated words, background noise and other errors  Relevant Medications    lidocaine (XYLOCAINE) 1 % injection 1 mL (Completed)    betamethasone acetate-betamethasone sodium phosphate (CELESTONE) injection 12 mg (Completed)    Other Relevant Orders    Large joint arthrocentesis: L anserine bursa (Completed)      Other Visit Diagnoses     Left knee pain, unspecified chronicity        Relevant Orders    XR knee 1 or 2 vw left          Subjective:     Patient ID: Giovani Mayorga is a 69 y o  female  Chief Complaint:  69 yr old female in for evaluation of her left knee  Referred by Zackery Mitchell  Sometime back she was getting into her bed, placed her left knee on mattress kneeling and tried to lift her entire body up on left leg/knee  Noted discomfort following event but nothing that had checked out   Few weeks ago her pain gradually increased along with swelling, pain was deep in knee with pulling sensation, also noted stabbing pain medial aspect  Pain with walking, stairs  Denies any instability  No treatment, no surgery  Icing as needed, using naproxen  Allergy:  Allergies   Allergen Reactions    Succinylcholine GI Intolerance     Prolonged apnea      Amoxicillin-Pot Clavulanate Rash    Clarithromycin Vomiting, Nausea Only and Rash     Medications:  all current active meds have been reviewed  Past Medical History:  Past Medical History:   Diagnosis Date    Asthma     C  difficile enteritis     History of acute respiratory failure     Hypertension     Pneumonia     Respiratory disorder 2013    Tendinitis of right shoulder 4/9/2019     Past Surgical History:  Past Surgical History:   Procedure Laterality Date    BRONCHOSCOPY      FRACTURE SURGERY      NASAL SINUS SURGERY Bilateral 09/2017    SINUS SURGERY      TUBAL LIGATION      WISDOM TOOTH EXTRACTION       Family History:  Family History   Problem Relation Age of Onset    Heart disease Father     Heart failure Father     Diabetes Father     Rectal cancer Father     Heart disease Sister     Heart disease Brother     Aortic aneurysm Brother     Sudden death Brother     Cirrhosis Mother     Colon cancer Paternal Grandmother     No Known Problems Daughter     Lung cancer Paternal Uncle     No Known Problems Daughter     No Known Problems Daughter     No Known Problems Maternal Aunt     No Known Problems Maternal Aunt     No Known Problems Maternal Aunt     No Known Problems Paternal Aunt     No Known Problems Paternal Aunt      Social History:  Social History     Substance and Sexual Activity   Alcohol Use Yes    Comment: soically     Social History     Substance and Sexual Activity   Drug Use No     Social History     Tobacco Use   Smoking Status Never Smoker   Smokeless Tobacco Never Used     Review of Systems   Constitutional: Negative for chills and fever  HENT: Negative for ear pain and sore throat  Eyes: Negative for pain and visual disturbance  Respiratory: Negative for cough and shortness of breath  Cardiovascular: Negative for chest pain and palpitations  Gastrointestinal: Negative for abdominal pain and vomiting  Genitourinary: Negative for dysuria and hematuria  Musculoskeletal: Positive for arthralgias (Left knee), gait problem (Antalgic) and joint swelling (Left knee)  Negative for back pain  Skin: Negative for color change and rash  Neurological: Negative for seizures and syncope  Psychiatric/Behavioral: Negative  All other systems reviewed and are negative  Objective:  BP Readings from Last 1 Encounters:   05/14/21 120/80      Wt Readings from Last 1 Encounters:   05/14/21 68 kg (150 lb)      BMI:   Estimated body mass index is 29 29 kg/m² as calculated from the following:    Height as of this encounter: 5' (1 524 m)  Weight as of this encounter: 68 kg (150 lb)  BSA:   Estimated body surface area is 1 65 meters squared as calculated from the following:    Height as of this encounter: 5' (1 524 m)  Weight as of this encounter: 68 kg (150 lb)  Physical Exam  Vitals signs and nursing note reviewed  Constitutional:       Appearance: Normal appearance  She is well-developed  HENT:      Head: Normocephalic and atraumatic  Right Ear: External ear normal       Left Ear: External ear normal    Eyes:      Extraocular Movements: Extraocular movements intact  Conjunctiva/sclera: Conjunctivae normal    Neck:      Musculoskeletal: Neck supple  Pulmonary:      Effort: Pulmonary effort is normal    Musculoskeletal:      Left knee: She exhibits no effusion  Skin:     General: Skin is warm and dry  Neurological:      Mental Status: She is alert and oriented to person, place, and time  Deep Tendon Reflexes: Reflexes are normal and symmetric     Psychiatric:         Mood and Affect: Mood normal          Behavior: Behavior normal        Left Knee Exam     Muscle Strength   The patient has normal left knee strength  Tenderness   The patient is experiencing tenderness in the medial joint line and pes anserinus  Range of Motion   Extension: normal   Flexion: normal     Tests   Kian:  Medial - negative Lateral - negative  Varus: negative Valgus: negative  Patellar apprehension: negative    Other   Erythema: absent  Scars: absent  Sensation: normal  Pulse: present  Swelling: mild  Effusion: no effusion present    Comments: Well tracking patella with crepitation   Localized swelling in region of pes bursa             I have personally reviewed pertinent films in PACS and my interpretation is xr left knee demonstrates narrowing medial and patellofemoral compartments with small periarticular osteophytes   Large joint arthrocentesis: L anserine bursa  Universal Protocol:  Consent: Verbal consent obtained    Risks and benefits: risks, benefits and alternatives were discussed  Consent given by: patient  Patient understanding: patient states understanding of the procedure being performed  Site marked: the operative site was marked  Patient identity confirmed: verbally with patient    Supporting Documentation  Indications: pain   Procedure Details  Location: knee - L anserine bursa  Preparation: Patient was prepped and draped in the usual sterile fashion  Needle size: 25 G  Ultrasound guidance: no  Approach: anteromedial  Medications administered: 1 mL lidocaine 1 %; 12 mg betamethasone acetate-betamethasone sodium phosphate 6 (3-3) mg/mL    Patient tolerance: patient tolerated the procedure well with no immediate complications  Dressing:  Sterile dressing applied        Scribe Attestation    I,:  Javier Bryson am acting as a scribe while in the presence of the attending physician :       I,:  Aditi Jamil MD personally performed the services described in this documentation    as scribed in my presence :

## 2021-05-14 NOTE — ASSESSMENT & PLAN NOTE
Findings consistent with left knee pes bursitis, mild osteoarthritis  Imaging and prognosis reviewed  I discussed prognosis of her knee condition  Conservative measures discussed with patient  Start with CSI in pes bursa to calm inflammation, ice area over next few days  She can apply OTC voltaren as well, take naproxen  Reviewed stretching exercises in office with patient  Avoid kneeling, crawling, repetitive walking or stairs until symptoms calm down  This condition can take a few months to resolve  See back in 6-8 weeks, if doing better cancel appt  All patient's questions were answered to her satisfaction  This note is created using dictation transcription  It may contain typographical errors, grammatical errors, improperly dictated words, background noise and other errors

## 2021-06-10 ENCOUNTER — RA CDI HCC (OUTPATIENT)
Dept: OTHER | Facility: HOSPITAL | Age: 71
End: 2021-06-10

## 2021-06-10 NOTE — PROGRESS NOTES
[facility] 74  coding opportunities          Chart reviewed, no opportunity found: CHART REVIEWED, NO OPPORTUNITY FOUND                     Patients insurance company: [employer] only)

## 2021-06-17 LAB
CHOLEST SERPL-MCNC: 180 MG/DL (ref 100–199)
CHOLEST/HDLC SERPL: 3.2 RATIO (ref 0–4.4)
HDLC SERPL-MCNC: 57 MG/DL
LDLC SERPL CALC-MCNC: 106 MG/DL (ref 0–99)
SL AMB VLDL CHOLESTEROL CALC: 17 MG/DL (ref 5–40)
TRIGL SERPL-MCNC: 96 MG/DL (ref 0–149)

## 2021-06-21 ENCOUNTER — OFFICE VISIT (OUTPATIENT)
Dept: FAMILY MEDICINE CLINIC | Facility: HOSPITAL | Age: 71
End: 2021-06-21
Payer: COMMERCIAL

## 2021-06-21 VITALS
TEMPERATURE: 97.8 F | HEIGHT: 60 IN | HEART RATE: 80 BPM | SYSTOLIC BLOOD PRESSURE: 122 MMHG | BODY MASS INDEX: 30 KG/M2 | DIASTOLIC BLOOD PRESSURE: 80 MMHG | WEIGHT: 152.8 LBS

## 2021-06-21 DIAGNOSIS — M85.80 OSTEOPENIA, UNSPECIFIED LOCATION: ICD-10-CM

## 2021-06-21 DIAGNOSIS — F41.1 GENERALIZED ANXIETY DISORDER: ICD-10-CM

## 2021-06-21 DIAGNOSIS — E78.2 MIXED HYPERLIPIDEMIA: ICD-10-CM

## 2021-06-21 DIAGNOSIS — I10 ESSENTIAL HYPERTENSION: Primary | ICD-10-CM

## 2021-06-21 DIAGNOSIS — Z00.00 MEDICARE ANNUAL WELLNESS VISIT, SUBSEQUENT: ICD-10-CM

## 2021-06-21 DIAGNOSIS — Z12.11 SCREENING FOR COLON CANCER: ICD-10-CM

## 2021-06-21 DIAGNOSIS — J45.30 MILD PERSISTENT ASTHMA WITHOUT COMPLICATION: ICD-10-CM

## 2021-06-21 DIAGNOSIS — Z12.39 ENCOUNTER FOR OTHER SCREENING FOR MALIGNANT NEOPLASM OF BREAST: ICD-10-CM

## 2021-06-21 DIAGNOSIS — F32.0 CURRENT MILD EPISODE OF MAJOR DEPRESSIVE DISORDER WITHOUT PRIOR EPISODE (HCC): ICD-10-CM

## 2021-06-21 PROCEDURE — 3079F DIAST BP 80-89 MM HG: CPT | Performed by: NURSE PRACTITIONER

## 2021-06-21 PROCEDURE — 99214 OFFICE O/P EST MOD 30 MIN: CPT | Performed by: NURSE PRACTITIONER

## 2021-06-21 PROCEDURE — 3725F SCREEN DEPRESSION PERFORMED: CPT | Performed by: NURSE PRACTITIONER

## 2021-06-21 PROCEDURE — 1125F AMNT PAIN NOTED PAIN PRSNT: CPT | Performed by: NURSE PRACTITIONER

## 2021-06-21 PROCEDURE — G0439 PPPS, SUBSEQ VISIT: HCPCS | Performed by: NURSE PRACTITIONER

## 2021-06-21 PROCEDURE — 1170F FXNL STATUS ASSESSED: CPT | Performed by: NURSE PRACTITIONER

## 2021-06-21 PROCEDURE — 3288F FALL RISK ASSESSMENT DOCD: CPT | Performed by: NURSE PRACTITIONER

## 2021-06-21 PROCEDURE — 3074F SYST BP LT 130 MM HG: CPT | Performed by: NURSE PRACTITIONER

## 2021-06-21 NOTE — PROGRESS NOTES
Assessment and Plan:     Problem List Items Addressed This Visit        Respiratory    Mild persistent asthma without complication     Well controlled off Singulair with no albuterol use  Cardiovascular and Mediastinum    Essential hypertension - Primary     BP well controlled  Continue on current regimen  F/U in 6 months  Relevant Orders    CBC and differential    Comprehensive metabolic panel       Musculoskeletal and Integument    Osteopenia     Due for dexa  Relevant Orders    DXA bone density spine hip and pelvis       Other    Generalized anxiety disorder     Mood is controlled off meds  No Xanax use  Call with worsening mood  Mixed hyperlipidemia     LDL is improved on only 40 mg (did not increase dose)  Keep at 40 mg for now  Recheck in 6 months  Relevant Orders    Comprehensive metabolic panel    Lipid panel    Current mild episode of major depressive disorder without prior episode ([address] )     Controlled off medication  Call with worsening mood  Other Visit Diagnoses     Medicare annual wellness visit, subsequent        Screening for colon cancer        Relevant Orders    Ambulatory referral to Gastroenterology    Encounter for other screening for malignant neoplasm of breast        Relevant Orders    Mammo screening bilateral w 3d & cad           Preventive health issues were discussed with patient, and age appropriate screening tests were ordered as noted in patient's After Visit Summary  Personalized health advice and appropriate referrals for health education or preventive services given if needed, as noted in patient's After Visit Summary       History of Present Illness:     Patient presents for Medicare Annual Wellness visit    Patient Care Team:  Maribel Crews as PCP - General (Family Medicine)     Problem List:     Patient Active Problem List   Diagnosis    Mild persistent asthma without complication    Essential hypertension    Generalized anxiety disorder    Mixed hyperlipidemia    Osteopenia    Current mild episode of major depressive disorder without prior episode (HCC)    Pes anserinus bursitis of left knee      Past Medical and Surgical History:     Past Medical History:   Diagnosis Date    Asthma     C  difficile enteritis     History of acute respiratory failure     Hypertension     Pneumonia     Respiratory disorder 2013    Tendinitis of right shoulder 4/9/2019     Past Surgical History:   Procedure Laterality Date    BRONCHOSCOPY      FRACTURE SURGERY      NASAL SINUS SURGERY Bilateral 09/2017    SINUS SURGERY      TUBAL LIGATION      WISDOM TOOTH EXTRACTION        Family History:     Family History   Problem Relation Age of Onset    Heart disease Father     Heart failure Father     Diabetes Father     Rectal cancer Father     Heart disease Sister     Heart disease Brother     Aortic aneurysm Brother     Sudden death Brother     Cirrhosis Mother     Colon cancer Paternal Grandmother     No Known Problems Daughter     Lung cancer Paternal Uncle     No Known Problems Daughter     No Known Problems Daughter     No Known Problems Maternal Aunt     No Known Problems Maternal Aunt     No Known Problems Maternal Aunt     No Known Problems Paternal Aunt     No Known Problems Paternal Aunt       Social History:     Social History     Socioeconomic History    Marital status: /Civil Union     Spouse name: None    Number of children: None    Years of education: None    Highest education level: None   Occupational History    None   Tobacco Use    Smoking status: Never Smoker    Smokeless tobacco: Never Used   Vaping Use    Vaping Use: Never used   Substance and Sexual Activity    Alcohol use: Yes     Comment: soically    Drug use: No    Sexual activity: None   Other Topics Concern    None   Social History Narrative    None     Social Determinants of Health     Financial Resource Strain:     Difficulty of Paying Living Expenses:    Food Insecurity:     Worried About Running Out of Food in the Last Year:     [address] in the Last Year:    Transportation Needs:     Lack of Transportation (Medical):  Lack of Transportation (Non-Medical):    Physical Activity:     Days of Exercise per Week:     Minutes of Exercise per Session:    Stress:     Feeling of Stress :    Social Connections:     Frequency of Communication with Friends and Family:     Frequency of Social Gatherings with Friends and Family:     Attends [employer] Services:     Active Member of Clubs or Organizations:     Attends Club or Organization Meetings:     Marital Status:    Intimate Partner Violence:     Fear of Current or Ex-Partner:     Emotionally Abused:     Physically Abused:     Sexually Abused:       Medications and Allergies:     Current Outpatient Medications   Medication Sig Dispense Refill    albuterol (PROAIR HFA) 90 mcg/act inhaler Inhale 2 puffs every 6 (six) hours as needed for wheezing 1 Inhaler 0    ALPRAZolam (XANAX) 0 5 mg tablet       aspirin 81 MG tablet Take 81 mg by mouth      cyclobenzaprine (FLEXERIL) 10 mg tablet Take 1 tablet (10 mg total) by mouth 3 (three) times a day as needed for muscle spasms 30 tablet 0    Diclofenac Sodium (VOLTAREN) 1 % Apply 2 g topically 4 (four) times a day      lisinopril (ZESTRIL) 5 mg tablet Take 1 tablet (5 mg total) by mouth daily 90 tablet 1    NAPROXEN  MG EC tablet       pravastatin (PRAVACHOL) 80 mg tablet Take 0 5 tablets (40 mg total) by mouth daily 90 tablet 1    sertraline (ZOLOFT) 100 mg tablet Take 1 tablet (100 mg total) by mouth daily 90 tablet 1    Sodium Chloride 7 % NEBU  (Patient not taking: Reported on 6/21/2021)       No current facility-administered medications for this visit       Allergies   Allergen Reactions    Succinylcholine GI Intolerance     Prolonged apnea      Amoxicillin-Pot Clavulanate Rash  Clarithromycin Vomiting, Nausea Only and Rash      Immunizations:     Immunization History   Administered Date(s) Administered    H1N1, All Formulations 11/21/2009    INFLUENZA 08/01/2014    Influenza, high dose seasonal 0 7 mL 01/06/2020, 10/26/2020    Influenza, seasonal, injectable 11/27/2017    Pneumococcal Conjugate 13-Valent 10/15/2015    Pneumococcal Polysaccharide PPV23 08/01/2014, 08/27/2014    SARS-CoV-2 / COVID-19 mRNA IM (FLX Micro) 02/24/2021, 03/17/2021    Tuberculin Skin Test-PPD Intradermal 10/23/2020      Health Maintenance:         Topic Date Due    Colorectal Cancer Screening  03/19/2020    MAMMOGRAM  05/14/2020    Hepatitis C Screening  Completed         Topic Date Due    DTaP,Tdap,and Td Vaccines (1 - Tdap) Never done      Medicare Health Risk Assessment:     /80   Pulse 80   Temp 97 8 °F (36 6 °C)   Ht 5' (1 524 m)   Wt 69 3 kg (152 lb 12 8 oz)   BMI 29 84 kg/m²      Marimar Che is here for her Subsequent Wellness visit  Health Risk Assessment:   Patient rates overall health as excellent  Patient feels that their physical health rating is slightly worse  Patient is satisfied with their life  Eyesight was rated as same  Hearing was rated as same  Patient feels that their emotional and mental health rating is much better  Patients states they are never, rarely angry  Patient states they are never, rarely unusually tired/fatigued  Pain experienced in the last 7 days has been some  Patient's pain rating has been 6/10  Patient states that she has experienced no weight loss or gain in last 6 months  Depression Screening:   PHQ-2 Score: 0  PHQ-9 Score: 0      Fall Risk Screening: In the past year, patient has experienced: no history of falling in past year      Urinary Incontinence Screening:   Patient has not leaked urine accidently in the last six months  Home Safety:  Patient has trouble with stairs inside or outside of their home   Patient has working smoke alarms and has working carbon monoxide detector  Home safety hazards include: none  Nutrition:   Current diet is Regular  Medications:   Patient is not currently taking any over-the-counter supplements  Patient is able to manage medications  Activities of Daily Living (ADLs)/Instrumental Activities of Daily Living (IADLs):   Walk and transfer into and out of bed and chair?: Yes  Dress and groom yourself?: Yes    Bathe or shower yourself?: Yes    Feed yourself? Yes  Do your laundry/housekeeping?: Yes  Manage your money, pay your bills and track your expenses?: Yes  Make your own meals?: Yes    Do your own shopping?: Yes    Previous Hospitalizations:   Any hospitalizations or ED visits within the last 12 months?: No      Advance Care Planning:   Living will: No    Durable POA for healthcare: No    Advanced directive: No      Cognitive Screening:   Provider or family/friend/caregiver concerned regarding cognition?: No    PREVENTIVE SCREENINGS      Cardiovascular Screening:    General: Screening Not Indicated and History Lipid Disorder      Diabetes Screening:     General: Screening Current      Colorectal Cancer Screening:     General: Screening Current    Due for: Colonoscopy - High Risk      Breast Cancer Screening:       Due for: Mammogram        Cervical Cancer Screening:    General: Screening Not Indicated      Osteoporosis Screening:      Due for: DXA Axial      Abdominal Aortic Aneurysm (AAA) Screening:        General: Screening Not Indicated      Lung Cancer Screening:     General: Screening Not Indicated      Hepatitis C Screening:    General: Screening Current    Screening, Brief Intervention, and Referral to Treatment (SBIRT)    Screening  Typical number of drinks in a day: 0  Typical number of drinks in a week: 0  Interpretation: Low risk drinking behavior      Single Item Drug Screening:  How often have you used an illegal drug (including marijuana) or a prescription medication for non-medical reasons in the past year? never    Single Item Drug Screen Score: 0  Interpretation: Negative screen for possible drug use disorder    Other Counseling Topics:   Skin self-exam, sunscreen and calcium and vitamin D intake and regular weightbearing exercise  Discussed shingles vaccine  Will likely get at pharmacy         Mumtaz Mendoza

## 2021-06-21 NOTE — PROGRESS NOTES
Assessment/Plan:    Essential hypertension  BP well controlled  Continue on current regimen  F/U in 6 months  Mild persistent asthma without complication  Well controlled off Singulair with no albuterol use  Osteopenia  Due for dexa  Current mild episode of major depressive disorder without prior episode ([facility] [address] )  Controlled off medication  Call with worsening mood  Generalized anxiety disorder  Mood is controlled off meds  No Xanax use  Call with worsening mood  Mixed hyperlipidemia  LDL is improved on only 40 mg (did not increase dose)  Keep at 40 mg for now  Recheck in 6 months  Diagnoses and all orders for this visit:    Essential hypertension  -     CBC and differential; Future  -     Comprehensive metabolic panel; Future  -     CBC and differential  -     Comprehensive metabolic panel    Current mild episode of major depressive disorder without prior episode (HCC)    Generalized anxiety disorder    Mixed hyperlipidemia  -     Comprehensive metabolic panel; Future  -     Lipid panel; Future  -     Comprehensive metabolic panel  -     Lipid panel    Mild persistent asthma without complication    Osteopenia, unspecified location  -     DXA bone density spine hip and pelvis; Future    Medicare annual wellness visit, subsequent    Screening for colon cancer  -     Ambulatory referral to Gastroenterology; Future    Encounter for other screening for malignant neoplasm of breast  -     Mammo screening bilateral w 3d & cad; Future    Other orders  -     Diclofenac Sodium (VOLTAREN) 1 %; Apply 2 g topically 4 (four) times a day          Subjective:      Patient ID: Rochelle iRvera is a 69 y o  female  Breathing has been fantastic w/o Singulair  No albuterol use  Mood has been excellent  No Xanax use  Hypertension  This is a chronic problem  The current episode started more than 1 year ago  The problem is controlled   Pertinent negatives include no anxiety, blurred vision, chest pain, headaches, palpitations, peripheral edema or shortness of breath  Risk factors for coronary artery disease include dyslipidemia and post-menopausal state  Past treatments include ACE inhibitors  There are no compliance problems  The following portions of the patient's history were reviewed and updated as appropriate: allergies, current medications, past family history, past medical history, past social history, past surgical history and problem list     Review of Systems   Constitutional: Negative for chills, fatigue and fever  Eyes: Negative for blurred vision and visual disturbance  Respiratory: Negative for cough, chest tightness, shortness of breath and wheezing  Cardiovascular: Negative for chest pain, palpitations and leg swelling  Neurological: Negative for dizziness, syncope, light-headedness and headaches  Psychiatric/Behavioral: Negative for dysphoric mood  The patient is not nervous/anxious  Objective:  Vitals:    06/21/21 1339   BP: 122/80   Pulse: 80   Temp: 97 8 °F (36 6 °C)      Physical Exam  Vitals reviewed  Constitutional:       Appearance: Normal appearance  She is well-developed  Neck:      Vascular: No carotid bruit  Cardiovascular:      Rate and Rhythm: Normal rate and regular rhythm  Heart sounds: Normal heart sounds  No murmur heard  Pulmonary:      Effort: Pulmonary effort is normal       Breath sounds: Normal breath sounds  Skin:     General: Skin is warm and dry  Neurological:      Mental Status: She is alert and oriented to person, place, and time  Psychiatric:         Mood and Affect: Mood normal          Behavior: Behavior normal          Thought Content:  Thought content normal          Judgment: Judgment normal

## 2021-06-21 NOTE — PATIENT INSTRUCTIONS
Medicare Preventive Visit Patient Instructions  Thank you for completing your Welcome to Medicare Visit or Medicare Annual Wellness Visit today  Your next wellness visit will be due in one year (6/22/2022)  The screening/preventive services that you may require over the next 5-10 years are detailed below  Some tests may not apply to you based off risk factors and/or age  Screening tests ordered at today's visit but not completed yet may show as past due  Also, please note that scanned in results may not display below  Preventive Screenings:  Service Recommendations Previous Testing/Comments   Colorectal Cancer Screening  * Colonoscopy    * Fecal Occult Blood Test (FOBT)/Fecal Immunochemical Test (FIT)  * Fecal DNA/Cologuard Test  * Flexible Sigmoidoscopy Age: 54-65 years old   Colonoscopy: every 10 years (may be performed more frequently if at higher risk)  OR  FOBT/FIT: every 1 year  OR  Cologuard: every 3 years  OR  Sigmoidoscopy: every 5 years  Screening may be recommended earlier than age 48 if at higher risk for colorectal cancer  Also, an individualized decision between you and your healthcare provider will decide whether screening between the ages of 74-80 would be appropriate  Colonoscopy: 03/19/2015  FOBT/FIT: Not on file  Cologuard: Not on file  Sigmoidoscopy: Not on file    Screening Current     Breast Cancer Screening Age: 36 years old  Frequency: every 1-2 years  Not required if history of left and right mastectomy Mammogram: 05/14/2019        Cervical Cancer Screening Between the ages of 21-29, pap smear recommended once every 3 years  Between the ages of 33-67, can perform pap smear with HPV co-testing every 5 years     Recommendations may differ for women with a history of total hysterectomy, cervical cancer, or abnormal pap smears in past  Pap Smear: Not on file    Screening Not Indicated   Hepatitis C Screening Once for adults born between 1945 and 1965  More frequently in patients at high risk for Hepatitis C Hep C Antibody: 05/14/2019    Screening Current   Diabetes Screening 1-2 times per year if you're at risk for diabetes or have pre-diabetes Fasting glucose: No results in last 5 years   A1C: No results in last 5 years    Screening Current   Cholesterol Screening Once every 5 years if you don't have a lipid disorder  May order more often based on risk factors  Lipid panel: 06/17/2021    Screening Not Indicated  History Lipid Disorder     Other Preventive Screenings Covered by Medicare:  1  Abdominal Aortic Aneurysm (AAA) Screening: covered once if your at risk  You're considered to be at risk if you have a family history of AAA  2  Lung Cancer Screening: covers low dose CT scan once per year if you meet all of the following conditions: (1) Age 50-69; (2) No signs or symptoms of lung cancer; (3) Current smoker or have quit smoking within the last 15 years; (4) You have a tobacco smoking history of at least 30 pack years (packs per day multiplied by number of years you smoked); (5) You get a written order from a healthcare provider  3  Glaucoma Screening: covered annually if you're considered high risk: (1) You have diabetes OR (2) Family history of glaucoma OR (3)  aged 48 and older OR (3)  American aged 72 and older  3  Osteoporosis Screening: covered every 2 years if you meet one of the following conditions: (1) You're estrogen deficient and at risk for osteoporosis based off medical history and other findings; (2) Have a vertebral abnormality; (3) On glucocorticoid therapy for more than 3 months; (4) Have primary hyperparathyroidism; (5) On osteoporosis medications and need to assess response to drug therapy  · Last bone density test (DXA Scan): 05/14/2019   5  HIV Screening: covered annually if you're between the age of 15-65  Also covered annually if you are younger than 13 and older than 72 with risk factors for HIV infection   For pregnant patients, it is covered up to 3 times per pregnancy  Immunizations:  Immunization Recommendations   Influenza Vaccine Annual influenza vaccination during flu season is recommended for all persons aged >= 6 months who do not have contraindications   Pneumococcal Vaccine (Prevnar and Pneumovax)  * Prevnar = PCV13  * Pneumovax = PPSV23   Adults 25-60 years old: 1-3 doses may be recommended based on certain risk factors  Adults 72 years old: Prevnar (PCV13) vaccine recommended followed by Pneumovax (PPSV23) vaccine  If already received PPSV23 since turning 65, then PCV13 recommended at least one year after PPSV23 dose  Hepatitis B Vaccine 3 dose series if at intermediate or high risk (ex: diabetes, end stage renal disease, liver disease)   Tetanus (Td) Vaccine - COST NOT COVERED BY MEDICARE PART B Following completion of primary series, a booster dose should be given every 10 years to maintain immunity against tetanus  Td may also be given as tetanus wound prophylaxis  Tdap Vaccine - COST NOT COVERED BY MEDICARE PART B Recommended at least once for all adults  For pregnant patients, recommended with each pregnancy  Shingles Vaccine (Shingrix) - COST NOT COVERED BY MEDICARE PART B  2 shot series recommended in those aged 48 and above     Health Maintenance Due:      Topic Date Due    Colorectal Cancer Screening  03/19/2020    MAMMOGRAM  05/14/2020    Hepatitis C Screening  Completed     Immunizations Due:      Topic Date Due    DTaP,Tdap,and Td Vaccines (1 - Tdap) Never done     Advance Directives   What are advance directives? Advance directives are legal documents that state your wishes and plans for medical care  These plans are made ahead of time in case you lose your ability to make decisions for yourself  Advance directives can apply to any medical decision, such as the treatments you want, and if you want to donate organs  What are the types of advance directives?   There are many types of advance directives, and each state has rules about how to use them  You may choose a combination of any of the following:  · Living will: This is a written record of the treatment you want  You can also choose which treatments you do not want, which to limit, and which to stop at a certain time  This includes surgery, medicine, IV fluid, and tube feedings  · Durable power of  for healthcare [facility]): This is a written record that states who you want to make healthcare choices for you when you are unable to make them for yourself  This person, called a proxy, is usually a family member or a friend  You may choose more than 1 proxy  · Do not resuscitate (DNR) order:  A DNR order is used in case your heart stops beating or you stop breathing  It is a request not to have certain forms of treatment, such as CPR  A DNR order may be included in other types of advance directives  · Medical directive: This covers the care that you want if you are in a coma, near death, or unable to make decisions for yourself  You can list the treatments you want for each condition  Treatment may include pain medicine, surgery, blood transfusions, dialysis, IV or tube feedings, and a ventilator (breathing machine)  · Values history: This document has questions about your views, beliefs, and how you feel and think about life  This information can help others choose the care that you would choose  Why are advance directives important? An advance directive helps you control your care  Although spoken wishes may be used, it is better to have your wishes written down  Spoken wishes can be misunderstood, or not followed  Treatments may be given even if you do not want them  An advance directive may make it easier for your family to make difficult choices about your care     Weight Management   Why it is important to manage your weight:  Being overweight increases your risk of health conditions such as heart disease, high blood pressure, type 2 diabetes, and certain types of cancer  It can also increase your risk for osteoarthritis, sleep apnea, and other respiratory problems  Aim for a slow, steady weight loss  Even a small amount of weight loss can lower your risk of health problems  How to lose weight safely:  A safe and healthy way to lose weight is to eat fewer calories and get regular exercise  You can lose up about 1 pound a week by decreasing the number of calories you eat by 500 calories each day  Healthy meal plan for weight management:  A healthy meal plan includes a variety of foods, contains fewer calories, and helps you stay healthy  A healthy meal plan includes the following:  · Eat whole-grain foods more often  A healthy meal plan should contain fiber  Fiber is the part of grains, fruits, and vegetables that is not broken down by your body  Whole-grain foods are healthy and provide extra fiber in your diet  Some examples of whole-grain foods are whole-wheat breads and pastas, oatmeal, brown rice, and bulgur  · Eat a variety of vegetables every day  Include dark, leafy greens such as spinach, kale, marva greens, and mustard greens  Eat yellow and orange vegetables such as carrots, sweet potatoes, and winter squash  · Eat a variety of fruits every day  Choose fresh or canned fruit (canned in its own juice or light syrup) instead of juice  Fruit juice has very little or no fiber  · Eat low-fat dairy foods  Drink fat-free (skim) milk or 1% milk  Eat fat-free yogurt and low-fat cottage cheese  Try low-fat cheeses such as mozzarella and other reduced-fat cheeses  · Choose meat and other protein foods that are low in fat  Choose beans or other legumes such as split peas or lentils  Choose fish, skinless poultry (chicken or turkey), or lean cuts of red meat (beef or pork)  Before you cook meat or poultry, cut off any visible fat  · Use less fat and oil  Try baking foods instead of frying them   Add less fat, such as margarine, sour cream, regular salad dressing and mayonnaise to foods  Eat fewer high-fat foods  Some examples of high-fat foods include french fries, doughnuts, ice cream, and cakes  · Eat fewer sweets  Limit foods and drinks that are high in sugar  This includes candy, cookies, regular soda, and sweetened drinks  Exercise:  Exercise at least 30 minutes per day on most days of the week  Some examples of exercise include walking, biking, dancing, and swimming  You can also fit in more physical activity by taking the stairs instead of the elevator or parking farther away from stores  Ask your healthcare provider about the best exercise plan for you  © Copyright [employer] 2018 Information is for End User's use only and may not be sold, redistributed or otherwise used for commercial purposes  All illustrations and images included in CareNotes® are the copyrighted property of [employer]  or [facility] Preventive Visit Patient Instructions  Thank you for completing your Welcome to Medicare Visit or Medicare Annual Wellness Visit today  Your next wellness visit will be due in one year (6/22/2022)  The screening/preventive services that you may require over the next 5-10 years are detailed below  Some tests may not apply to you based off risk factors and/or age  Screening tests ordered at today's visit but not completed yet may show as past due  Also, please note that scanned in results may not display below    Preventive Screenings:  Service Recommendations Previous Testing/Comments   Colorectal Cancer Screening  * Colonoscopy    * Fecal Occult Blood Test (FOBT)/Fecal Immunochemical Test (FIT)  * Fecal DNA/Cologuard Test  * Flexible Sigmoidoscopy Age: 54-65 years old   Colonoscopy: every 10 years (may be performed more frequently if at higher risk)  OR  FOBT/FIT: every 1 year  OR  Cologuard: every 3 years  OR  Sigmoidoscopy: every 5 years  Screening may be recommended earlier than age 48 if at higher risk for colorectal cancer  Also, an individualized decision between you and your healthcare provider will decide whether screening between the ages of 74-80 would be appropriate  Colonoscopy: 03/19/2015  FOBT/FIT: Not on file  Cologuard: Not on file  Sigmoidoscopy: Not on file    Screening Current     Breast Cancer Screening Age: 36 years old  Frequency: every 1-2 years  Not required if history of left and right mastectomy Mammogram: 05/14/2019        Cervical Cancer Screening Between the ages of 21-29, pap smear recommended once every 3 years  Between the ages of 33-67, can perform pap smear with HPV co-testing every 5 years  Recommendations may differ for women with a history of total hysterectomy, cervical cancer, or abnormal pap smears in past  Pap Smear: Not on file    Screening Not Indicated   Hepatitis C Screening Once for adults born between 1945 and 1965  More frequently in patients at high risk for Hepatitis C Hep C Antibody: 05/14/2019    Screening Current   Diabetes Screening 1-2 times per year if you're at risk for diabetes or have pre-diabetes Fasting glucose: No results in last 5 years   A1C: No results in last 5 years    Screening Current   Cholesterol Screening Once every 5 years if you don't have a lipid disorder  May order more often based on risk factors  Lipid panel: 06/17/2021    Screening Not Indicated  History Lipid Disorder     Other Preventive Screenings Covered by Medicare:  6  Abdominal Aortic Aneurysm (AAA) Screening: covered once if your at risk  You're considered to be at risk if you have a family history of AAA    7  Lung Cancer Screening: covers low dose CT scan once per year if you meet all of the following conditions: (1) Age 50-69; (2) No signs or symptoms of lung cancer; (3) Current smoker or have quit smoking within the last 15 years; (4) You have a tobacco smoking history of at least 30 pack years (packs per day multiplied by number of years you smoked); (5) You get a written order from a healthcare provider  8  Glaucoma Screening: covered annually if you're considered high risk: (1) You have diabetes OR (2) Family history of glaucoma OR (3)  aged 48 and older OR (3)  American aged 72 and older  5  Osteoporosis Screening: covered every 2 years if you meet one of the following conditions: (1) You're estrogen deficient and at risk for osteoporosis based off medical history and other findings; (2) Have a vertebral abnormality; (3) On glucocorticoid therapy for more than 3 months; (4) Have primary hyperparathyroidism; (5) On osteoporosis medications and need to assess response to drug therapy  · Last bone density test (DXA Scan): 05/14/2019   10  HIV Screening: covered annually if you're between the age of 15-65  Also covered annually if you are younger than 13 and older than 72 with risk factors for HIV infection  For pregnant patients, it is covered up to 3 times per pregnancy  Immunizations:  Immunization Recommendations   Influenza Vaccine Annual influenza vaccination during flu season is recommended for all persons aged >= 6 months who do not have contraindications   Pneumococcal Vaccine (Prevnar and Pneumovax)  * Prevnar = PCV13  * Pneumovax = PPSV23   Adults 25-60 years old: 1-3 doses may be recommended based on certain risk factors  Adults 72 years old: Prevnar (PCV13) vaccine recommended followed by Pneumovax (PPSV23) vaccine  If already received PPSV23 since turning 65, then PCV13 recommended at least one year after PPSV23 dose  Hepatitis B Vaccine 3 dose series if at intermediate or high risk (ex: diabetes, end stage renal disease, liver disease)   Tetanus (Td) Vaccine - COST NOT COVERED BY MEDICARE PART B Following completion of primary series, a booster dose should be given every 10 years to maintain immunity against tetanus  Td may also be given as tetanus wound prophylaxis     Tdap Vaccine - COST NOT COVERED BY MEDICARE PART B Recommended at least once for all adults  For pregnant patients, recommended with each pregnancy  Shingles Vaccine (Shingrix) - COST NOT COVERED BY MEDICARE PART B  2 shot series recommended in those aged 48 and above     Health Maintenance Due:      Topic Date Due    Colorectal Cancer Screening  03/19/2020    MAMMOGRAM  05/14/2020    Hepatitis C Screening  Completed     Immunizations Due:      Topic Date Due    DTaP,Tdap,and Td Vaccines (1 - Tdap) Never done     Advance Directives   What are advance directives? Advance directives are legal documents that state your wishes and plans for medical care  These plans are made ahead of time in case you lose your ability to make decisions for yourself  Advance directives can apply to any medical decision, such as the treatments you want, and if you want to donate organs  What are the types of advance directives? There are many types of advance directives, and each state has rules about how to use them  You may choose a combination of any of the following:  · Living will: This is a written record of the treatment you want  You can also choose which treatments you do not want, which to limit, and which to stop at a certain time  This includes surgery, medicine, IV fluid, and tube feedings  · Durable power of  for healthcare [place] [facility]): This is a written record that states who you want to make healthcare choices for you when you are unable to make them for yourself  This person, called a proxy, is usually a family member or a friend  You may choose more than 1 proxy  · Do not resuscitate (DNR) order:  A DNR order is used in case your heart stops beating or you stop breathing  It is a request not to have certain forms of treatment, such as CPR  A DNR order may be included in other types of advance directives  · Medical directive: This covers the care that you want if you are in a coma, near death, or unable to make decisions for yourself   You can list the treatments you want for each condition  Treatment may include pain medicine, surgery, blood transfusions, dialysis, IV or tube feedings, and a ventilator (breathing machine)  · Values history: This document has questions about your views, beliefs, and how you feel and think about life  This information can help others choose the care that you would choose  Why are advance directives important? An advance directive helps you control your care  Although spoken wishes may be used, it is better to have your wishes written down  Spoken wishes can be misunderstood, or not followed  Treatments may be given even if you do not want them  An advance directive may make it easier for your family to make difficult choices about your care  Weight Management   Why it is important to manage your weight:  Being overweight increases your risk of health conditions such as heart disease, high blood pressure, type 2 diabetes, and certain types of cancer  It can also increase your risk for osteoarthritis, sleep apnea, and other respiratory problems  Aim for a slow, steady weight loss  Even a small amount of weight loss can lower your risk of health problems  How to lose weight safely:  A safe and healthy way to lose weight is to eat fewer calories and get regular exercise  You can lose up about 1 pound a week by decreasing the number of calories you eat by 500 calories each day  Healthy meal plan for weight management:  A healthy meal plan includes a variety of foods, contains fewer calories, and helps you stay healthy  A healthy meal plan includes the following:  · Eat whole-grain foods more often  A healthy meal plan should contain fiber  Fiber is the part of grains, fruits, and vegetables that is not broken down by your body  Whole-grain foods are healthy and provide extra fiber in your diet  Some examples of whole-grain foods are whole-wheat breads and pastas, oatmeal, brown rice, and bulgur  · Eat a variety of vegetables every day    Include dark, leafy greens such as spinach, kale, marva greens, and mustard greens  Eat yellow and orange vegetables such as carrots, sweet potatoes, and winter squash  · Eat a variety of fruits every day  Choose fresh or canned fruit (canned in its own juice or light syrup) instead of juice  Fruit juice has very little or no fiber  · Eat low-fat dairy foods  Drink fat-free (skim) milk or 1% milk  Eat fat-free yogurt and low-fat cottage cheese  Try low-fat cheeses such as mozzarella and other reduced-fat cheeses  · Choose meat and other protein foods that are low in fat  Choose beans or other legumes such as split peas or lentils  Choose fish, skinless poultry (chicken or turkey), or lean cuts of red meat (beef or pork)  Before you cook meat or poultry, cut off any visible fat  · Use less fat and oil  Try baking foods instead of frying them  Add less fat, such as margarine, sour cream, regular salad dressing and mayonnaise to foods  Eat fewer high-fat foods  Some examples of high-fat foods include french fries, doughnuts, ice cream, and cakes  · Eat fewer sweets  Limit foods and drinks that are high in sugar  This includes candy, cookies, regular soda, and sweetened drinks  Exercise:  Exercise at least 30 minutes per day on most days of the week  Some examples of exercise include walking, biking, dancing, and swimming  You can also fit in more physical activity by taking the stairs instead of the elevator or parking farther away from stores  Ask your healthcare provider about the best exercise plan for you  © Copyright [employer] 2018 Information is for End User's use only and may not be sold, redistributed or otherwise used for commercial purposes   All illustrations and images included in CareNotes® are the copyrighted property of [employer] A M [employer]  or [address] [place] [address]

## 2021-07-02 ENCOUNTER — OFFICE VISIT (OUTPATIENT)
Dept: OBGYN CLINIC | Facility: CLINIC | Age: 71
End: 2021-07-02
Payer: COMMERCIAL

## 2021-07-02 VITALS
SYSTOLIC BLOOD PRESSURE: 123 MMHG | HEIGHT: 60 IN | BODY MASS INDEX: 29.84 KG/M2 | DIASTOLIC BLOOD PRESSURE: 70 MMHG | WEIGHT: 152 LBS

## 2021-07-02 DIAGNOSIS — M70.52 PES ANSERINUS BURSITIS OF LEFT KNEE: Primary | ICD-10-CM

## 2021-07-02 PROCEDURE — 1160F RVW MEDS BY RX/DR IN RCRD: CPT | Performed by: ORTHOPAEDIC SURGERY

## 2021-07-02 PROCEDURE — 99213 OFFICE O/P EST LOW 20 MIN: CPT | Performed by: ORTHOPAEDIC SURGERY

## 2021-07-02 PROCEDURE — 3008F BODY MASS INDEX DOCD: CPT | Performed by: ORTHOPAEDIC SURGERY

## 2021-07-02 PROCEDURE — 1036F TOBACCO NON-USER: CPT | Performed by: ORTHOPAEDIC SURGERY

## 2021-07-02 NOTE — ASSESSMENT & PLAN NOTE
Findings consistent with left knee pes anserine bursitis  Findings and treatment options were discussed with the patient  Discussed that her recent trip to [place] most likely a reaggravated the condition  Recommend formal physical therapy at this point  Hamstring stretches were demonstrated for the patient the office again that she has to do on a daily basis  Continue icing as needed  Follow-up in 6 weeks for re-evaluation  Possible repeat cortisone injection if there is no improvement  All patient's questions were answered to her satisfaction  This note is created using dictation transcription  It may contain typographical errors, grammatical errors, improperly dictated words, background noise and other errors

## 2021-07-02 NOTE — PROGRESS NOTES
Assessment:     1  Pes anserinus bursitis of left knee        Plan:    The patient was seen and examined by Dr James Garber and myself  Problem List Items Addressed This Visit        Musculoskeletal and Integument    Pes anserinus bursitis of left knee - Primary       Findings consistent with left knee pes anserine bursitis  Findings and treatment options were discussed with the patient  Discussed that her recent trip to [place] most likely a reaggravated the condition  Recommend formal physical therapy at this point  Hamstring stretches were demonstrated for the patient the office again that she has to do on a daily basis  Continue icing as needed  Follow-up in 6 weeks for re-evaluation  Possible repeat cortisone injection if there is no improvement  All patient's questions were answered to her satisfaction  This note is created using dictation transcription  It may contain typographical errors, grammatical errors, improperly dictated words, background noise and other errors  Relevant Orders    Ambulatory referral to Physical Therapy          Subjective:     Patient ID: Uriel Vergara is a 69 y o  female  Chief Complaint:  69 yr old female following up for left knee pes anserine bursitis  She was last seen 6 weeks ago and was given a cortisone injection to left pes bursa  She did get relief for a few weeks  She then went to [place] for 5 days recently and did a lot of walking on inclines and declines  The pain returned  She denies any injury      Allergy:  Allergies   Allergen Reactions    Succinylcholine GI Intolerance     Prolonged apnea      Amoxicillin-Pot Clavulanate Rash    Clarithromycin Vomiting, Nausea Only and Rash     Medications:  all current active meds have been reviewed  Past Medical History:  Past Medical History:   Diagnosis Date    Asthma     C  difficile enteritis     History of acute respiratory failure     Hypertension     Pneumonia     Respiratory disorder 2013  Tendinitis of right shoulder 4/9/2019     Past Surgical History:  Past Surgical History:   Procedure Laterality Date    BRONCHOSCOPY      FRACTURE SURGERY      NASAL SINUS SURGERY Bilateral 09/2017    SINUS SURGERY      TUBAL LIGATION      WISDOM TOOTH EXTRACTION       Family History:  Family History   Problem Relation Age of Onset    Heart disease Father     Heart failure Father     Diabetes Father     Rectal cancer Father     Heart disease Sister     Heart disease Brother     Aortic aneurysm Brother     Sudden death Brother     Cirrhosis Mother     Colon cancer Paternal Grandmother     No Known Problems Daughter     Lung cancer Paternal Uncle     No Known Problems Daughter     No Known Problems Daughter     No Known Problems Maternal Aunt     No Known Problems Maternal Aunt     No Known Problems Maternal Aunt     No Known Problems Paternal Aunt     No Known Problems Paternal Aunt      Social History:  Social History     Substance and Sexual Activity   Alcohol Use Yes    Comment: soically     Social History     Substance and Sexual Activity   Drug Use No     Social History     Tobacco Use   Smoking Status Never Smoker   Smokeless Tobacco Never Used     Review of Systems   Constitutional: Negative for chills and fever  HENT: Negative for ear pain and sore throat  Eyes: Negative for pain and visual disturbance  Respiratory: Negative for cough and shortness of breath  Cardiovascular: Negative for chest pain and palpitations  Gastrointestinal: Negative for abdominal pain and vomiting  Genitourinary: Negative for dysuria and hematuria  Musculoskeletal: Positive for arthralgias (Left knee) and gait problem (antalgic)  Negative for back pain and joint swelling  Skin: Negative for color change and rash  Neurological: Negative for seizures and syncope  Psychiatric/Behavioral: Negative  All other systems reviewed and are negative          Objective:  BP Readings from Last 1 Encounters:   07/02/21 123/70      Wt Readings from Last 1 Encounters:   07/02/21 68 9 kg (152 lb)      BMI:   Estimated body mass index is 29 69 kg/m² as calculated from the following:    Height as of this encounter: 5' (1 524 m)  Weight as of this encounter: 68 9 kg (152 lb)  BSA:   Estimated body surface area is 1 66 meters squared as calculated from the following:    Height as of this encounter: 5' (1 524 m)  Weight as of this encounter: 68 9 kg (152 lb)  Physical Exam  Vitals and nursing note reviewed  Constitutional:       Appearance: Normal appearance  She is well-developed  HENT:      Head: Normocephalic and atraumatic  Right Ear: External ear normal       Left Ear: External ear normal    Eyes:      Extraocular Movements: Extraocular movements intact  Conjunctiva/sclera: Conjunctivae normal    Pulmonary:      Effort: Pulmonary effort is normal    Musculoskeletal:      Cervical back: Neck supple  Left knee: No effusion  Instability Tests: Medial Kian test negative and lateral Kian test negative  Skin:     General: Skin is warm and dry  Neurological:      Mental Status: She is alert and oriented to person, place, and time  Deep Tendon Reflexes: Reflexes are normal and symmetric  Psychiatric:         Mood and Affect: Mood normal          Behavior: Behavior normal        Left Knee Exam     Muscle Strength   The patient has normal left knee strength  Tenderness   The patient is experiencing tenderness in the pes anserinus  Range of Motion   The patient has normal left knee ROM  Tests   Kian:  Medial - negative Lateral - negative  Varus: negative Valgus: negative  Patellar apprehension: negative    Other   Erythema: absent  Scars: absent  Sensation: normal  Pulse: present  Swelling: none  Effusion: no effusion present    Comments: Well tracking patella with crepitation               No new imaging       Procedures

## 2021-09-03 DIAGNOSIS — I10 ESSENTIAL HYPERTENSION: ICD-10-CM

## 2021-09-05 RX ORDER — LISINOPRIL 5 MG/1
TABLET ORAL
Qty: 90 TABLET | Refills: 1 | Status: SHIPPED | OUTPATIENT
Start: 2021-09-05 | End: 2022-01-18 | Stop reason: SDUPTHER

## 2021-09-30 ENCOUNTER — HOSPITAL ENCOUNTER (OUTPATIENT)
Dept: MAMMOGRAPHY | Facility: IMAGING CENTER | Age: 71
Discharge: HOME | End: 2021-09-30
Payer: COMMERCIAL

## 2021-09-30 VITALS — HEIGHT: 60 IN | BODY MASS INDEX: 29.45 KG/M2 | WEIGHT: 150 LBS

## 2021-09-30 DIAGNOSIS — Z12.31 VISIT FOR SCREENING MAMMOGRAM: ICD-10-CM

## 2021-09-30 PROCEDURE — 77063 BREAST TOMOSYNTHESIS BI: CPT

## 2021-09-30 PROCEDURE — 77067 SCR MAMMO BI INCL CAD: CPT

## 2021-10-07 ENCOUNTER — TELEMEDICINE (OUTPATIENT)
Dept: FAMILY MEDICINE CLINIC | Facility: HOSPITAL | Age: 71
End: 2021-10-07
Payer: COMMERCIAL

## 2021-10-07 VITALS — BODY MASS INDEX: 29.45 KG/M2 | TEMPERATURE: 96.8 F | WEIGHT: 150 LBS | HEIGHT: 60 IN

## 2021-10-07 DIAGNOSIS — J20.9 ACUTE BRONCHITIS, UNSPECIFIED ORGANISM: ICD-10-CM

## 2021-10-07 DIAGNOSIS — B34.9 VIRAL INFECTION, UNSPECIFIED: Primary | ICD-10-CM

## 2021-10-07 PROCEDURE — U0005 INFEC AGEN DETEC AMPLI PROBE: HCPCS | Performed by: NURSE PRACTITIONER

## 2021-10-07 PROCEDURE — U0003 INFECTIOUS AGENT DETECTION BY NUCLEIC ACID (DNA OR RNA); SEVERE ACUTE RESPIRATORY SYNDROME CORONAVIRUS 2 (SARS-COV-2) (CORONAVIRUS DISEASE [COVID-19]), AMPLIFIED PROBE TECHNIQUE, MAKING USE OF HIGH THROUGHPUT TECHNOLOGIES AS DESCRIBED BY CMS-2020-01-R: HCPCS | Performed by: NURSE PRACTITIONER

## 2021-10-07 PROCEDURE — 99213 OFFICE O/P EST LOW 20 MIN: CPT | Performed by: NURSE PRACTITIONER

## 2021-10-07 RX ORDER — ALBUTEROL SULFATE 90 UG/1
2 AEROSOL, METERED RESPIRATORY (INHALATION) EVERY 6 HOURS PRN
Qty: 18 G | Refills: 0 | Status: SHIPPED | OUTPATIENT
Start: 2021-10-07 | End: 2021-10-25

## 2021-10-08 ENCOUNTER — TELEPHONE (OUTPATIENT)
Dept: FAMILY MEDICINE CLINIC | Facility: HOSPITAL | Age: 71
End: 2021-10-08

## 2021-10-08 LAB — SARS-COV-2 RNA RESP QL NAA+PROBE: NEGATIVE

## 2021-10-11 ENCOUNTER — OFFICE VISIT (OUTPATIENT)
Dept: FAMILY MEDICINE CLINIC | Facility: HOSPITAL | Age: 71
End: 2021-10-11
Payer: COMMERCIAL

## 2021-10-11 VITALS
WEIGHT: 150 LBS | SYSTOLIC BLOOD PRESSURE: 128 MMHG | TEMPERATURE: 97.4 F | BODY MASS INDEX: 29.45 KG/M2 | HEIGHT: 60 IN | OXYGEN SATURATION: 98 % | DIASTOLIC BLOOD PRESSURE: 70 MMHG | HEART RATE: 90 BPM

## 2021-10-11 DIAGNOSIS — J06.9 UPPER RESPIRATORY INFECTION, VIRAL: ICD-10-CM

## 2021-10-11 DIAGNOSIS — J45.21 MILD INTERMITTENT ASTHMA WITH EXACERBATION: Primary | ICD-10-CM

## 2021-10-11 PROCEDURE — 3078F DIAST BP <80 MM HG: CPT | Performed by: NURSE PRACTITIONER

## 2021-10-11 PROCEDURE — 3008F BODY MASS INDEX DOCD: CPT | Performed by: NURSE PRACTITIONER

## 2021-10-11 PROCEDURE — 1160F RVW MEDS BY RX/DR IN RCRD: CPT | Performed by: NURSE PRACTITIONER

## 2021-10-11 PROCEDURE — 99214 OFFICE O/P EST MOD 30 MIN: CPT | Performed by: NURSE PRACTITIONER

## 2021-10-11 PROCEDURE — 1036F TOBACCO NON-USER: CPT | Performed by: NURSE PRACTITIONER

## 2021-10-11 PROCEDURE — 3074F SYST BP LT 130 MM HG: CPT | Performed by: NURSE PRACTITIONER

## 2021-10-11 RX ORDER — PREDNISONE 10 MG/1
TABLET ORAL
Qty: 32 TABLET | Refills: 0 | Status: SHIPPED | OUTPATIENT
Start: 2021-10-11 | End: 2021-12-16 | Stop reason: ALTCHOICE

## 2021-10-25 DIAGNOSIS — J20.9 ACUTE BRONCHITIS, UNSPECIFIED ORGANISM: ICD-10-CM

## 2021-10-25 RX ORDER — ALBUTEROL SULFATE 90 UG/1
AEROSOL, METERED RESPIRATORY (INHALATION)
Qty: 9 G | Refills: 1 | Status: SHIPPED | OUTPATIENT
Start: 2021-10-25 | End: 2021-12-21 | Stop reason: SDUPTHER

## 2021-12-06 ENCOUNTER — RA CDI HCC (OUTPATIENT)
Dept: OTHER | Facility: HOSPITAL | Age: 71
End: 2021-12-06

## 2021-12-16 ENCOUNTER — TELEMEDICINE (OUTPATIENT)
Dept: FAMILY MEDICINE CLINIC | Facility: HOSPITAL | Age: 71
End: 2021-12-16
Payer: COMMERCIAL

## 2021-12-16 VITALS — HEIGHT: 60 IN | BODY MASS INDEX: 29.45 KG/M2 | WEIGHT: 150 LBS

## 2021-12-16 DIAGNOSIS — J45.30 MILD PERSISTENT ASTHMA WITHOUT COMPLICATION: ICD-10-CM

## 2021-12-16 DIAGNOSIS — J06.9 VIRAL UPPER RESPIRATORY TRACT INFECTION WITH COUGH: Primary | ICD-10-CM

## 2021-12-16 LAB
ALBUMIN SERPL-MCNC: 4.4 G/DL (ref 3.7–4.7)
ALBUMIN/GLOB SERPL: 1.5 {RATIO} (ref 1.2–2.2)
ALP SERPL-CCNC: 74 IU/L (ref 44–121)
ALT SERPL-CCNC: 12 IU/L (ref 0–32)
AST SERPL-CCNC: 21 IU/L (ref 0–40)
BASOPHILS # BLD AUTO: 0.1 X10E3/UL (ref 0–0.2)
BASOPHILS NFR BLD AUTO: 2 %
BILIRUB SERPL-MCNC: 0.2 MG/DL (ref 0–1.2)
BUN SERPL-MCNC: 20 MG/DL (ref 8–27)
BUN/CREAT SERPL: 27 (ref 12–28)
CALCIUM SERPL-MCNC: 9.6 MG/DL (ref 8.7–10.3)
CHLORIDE SERPL-SCNC: 106 MMOL/L (ref 96–106)
CHOLEST SERPL-MCNC: 168 MG/DL (ref 100–199)
CHOLEST/HDLC SERPL: 2.9 RATIO (ref 0–4.4)
CO2 SERPL-SCNC: 24 MMOL/L (ref 20–29)
CREAT SERPL-MCNC: 0.73 MG/DL (ref 0.57–1)
EOSINOPHIL # BLD AUTO: 0.3 X10E3/UL (ref 0–0.4)
EOSINOPHIL NFR BLD AUTO: 6 %
ERYTHROCYTE [DISTWIDTH] IN BLOOD BY AUTOMATED COUNT: 13.3 % (ref 11.7–15.4)
GLOBULIN SER-MCNC: 2.9 G/DL (ref 1.5–4.5)
GLUCOSE SERPL-MCNC: 93 MG/DL (ref 65–99)
HCT VFR BLD AUTO: 39.6 % (ref 34–46.6)
HDLC SERPL-MCNC: 57 MG/DL
HGB BLD-MCNC: 13.2 G/DL (ref 11.1–15.9)
IMM GRANULOCYTES # BLD: 0 X10E3/UL (ref 0–0.1)
IMM GRANULOCYTES NFR BLD: 0 %
LDLC SERPL CALC-MCNC: 94 MG/DL (ref 0–99)
LYMPHOCYTES # BLD AUTO: 1.5 X10E3/UL (ref 0.7–3.1)
LYMPHOCYTES NFR BLD AUTO: 27 %
MCH RBC QN AUTO: 29.2 PG (ref 26.6–33)
MCHC RBC AUTO-ENTMCNC: 33.3 G/DL (ref 31.5–35.7)
MCV RBC AUTO: 88 FL (ref 79–97)
MONOCYTES # BLD AUTO: 0.4 X10E3/UL (ref 0.1–0.9)
MONOCYTES NFR BLD AUTO: 8 %
NEUTROPHILS # BLD AUTO: 3.2 X10E3/UL (ref 1.4–7)
NEUTROPHILS NFR BLD AUTO: 57 %
PLATELET # BLD AUTO: 289 X10E3/UL (ref 150–450)
POTASSIUM SERPL-SCNC: 4 MMOL/L (ref 3.5–5.2)
PROT SERPL-MCNC: 7.3 G/DL (ref 6–8.5)
RBC # BLD AUTO: 4.52 X10E6/UL (ref 3.77–5.28)
SL AMB EGFR AFRICAN AMERICAN: 95 ML/MIN/1.73
SL AMB EGFR NON AFRICAN AMERICAN: 83 ML/MIN/1.73
SL AMB VLDL CHOLESTEROL CALC: 17 MG/DL (ref 5–40)
SODIUM SERPL-SCNC: 141 MMOL/L (ref 134–144)
TRIGL SERPL-MCNC: 92 MG/DL (ref 0–149)
WBC # BLD AUTO: 5.6 X10E3/UL (ref 3.4–10.8)

## 2021-12-16 PROCEDURE — 99214 OFFICE O/P EST MOD 30 MIN: CPT | Performed by: NURSE PRACTITIONER

## 2021-12-16 RX ORDER — PREDNISONE 10 MG/1
TABLET ORAL
Qty: 32 TABLET | Refills: 0 | Status: SHIPPED | OUTPATIENT
Start: 2021-12-16 | End: 2022-01-18 | Stop reason: ALTCHOICE

## 2021-12-21 ENCOUNTER — OFFICE VISIT (OUTPATIENT)
Dept: FAMILY MEDICINE CLINIC | Facility: HOSPITAL | Age: 71
End: 2021-12-21
Payer: COMMERCIAL

## 2021-12-21 VITALS
WEIGHT: 151.2 LBS | DIASTOLIC BLOOD PRESSURE: 82 MMHG | TEMPERATURE: 98.1 F | SYSTOLIC BLOOD PRESSURE: 130 MMHG | OXYGEN SATURATION: 98 % | HEART RATE: 76 BPM | BODY MASS INDEX: 29.68 KG/M2 | HEIGHT: 60 IN

## 2021-12-21 DIAGNOSIS — F32.0 CURRENT MILD EPISODE OF MAJOR DEPRESSIVE DISORDER WITHOUT PRIOR EPISODE (HCC): ICD-10-CM

## 2021-12-21 DIAGNOSIS — J45.30 MILD PERSISTENT ASTHMA WITHOUT COMPLICATION: ICD-10-CM

## 2021-12-21 DIAGNOSIS — J20.9 ACUTE BRONCHITIS, UNSPECIFIED ORGANISM: ICD-10-CM

## 2021-12-21 DIAGNOSIS — I10 ESSENTIAL HYPERTENSION: Primary | ICD-10-CM

## 2021-12-21 DIAGNOSIS — J01.40 ACUTE NON-RECURRENT PANSINUSITIS: ICD-10-CM

## 2021-12-21 DIAGNOSIS — E78.2 MIXED HYPERLIPIDEMIA: ICD-10-CM

## 2021-12-21 PROCEDURE — 3079F DIAST BP 80-89 MM HG: CPT | Performed by: NURSE PRACTITIONER

## 2021-12-21 PROCEDURE — 3075F SYST BP GE 130 - 139MM HG: CPT | Performed by: NURSE PRACTITIONER

## 2021-12-21 PROCEDURE — 99214 OFFICE O/P EST MOD 30 MIN: CPT | Performed by: NURSE PRACTITIONER

## 2021-12-21 PROCEDURE — 3008F BODY MASS INDEX DOCD: CPT | Performed by: NURSE PRACTITIONER

## 2021-12-21 PROCEDURE — 1160F RVW MEDS BY RX/DR IN RCRD: CPT | Performed by: NURSE PRACTITIONER

## 2021-12-21 PROCEDURE — 1036F TOBACCO NON-USER: CPT | Performed by: NURSE PRACTITIONER

## 2021-12-21 RX ORDER — ALBUTEROL SULFATE 90 UG/1
2 AEROSOL, METERED RESPIRATORY (INHALATION) EVERY 6 HOURS PRN
Qty: 9 G | Refills: 1 | Status: SHIPPED | OUTPATIENT
Start: 2021-12-21

## 2021-12-21 RX ORDER — ESCITALOPRAM OXALATE 10 MG/1
10 TABLET ORAL DAILY
Qty: 30 TABLET | Refills: 1 | Status: SHIPPED | OUTPATIENT
Start: 2021-12-21 | End: 2022-01-18 | Stop reason: SDUPTHER

## 2022-01-04 ENCOUNTER — HOSPITAL ENCOUNTER (EMERGENCY)
Facility: HOSPITAL | Age: 72
Discharge: HOME | End: 2022-01-04
Attending: EMERGENCY MEDICINE
Payer: COMMERCIAL

## 2022-01-04 ENCOUNTER — APPOINTMENT (EMERGENCY)
Dept: RADIOLOGY | Facility: HOSPITAL | Age: 72
End: 2022-01-04
Payer: COMMERCIAL

## 2022-01-04 VITALS
TEMPERATURE: 98.3 F | OXYGEN SATURATION: 96 % | SYSTOLIC BLOOD PRESSURE: 191 MMHG | DIASTOLIC BLOOD PRESSURE: 122 MMHG | RESPIRATION RATE: 18 BRPM | HEART RATE: 122 BPM

## 2022-01-04 DIAGNOSIS — S90.32XA CONTUSION OF LEFT FOOT, INITIAL ENCOUNTER: Primary | ICD-10-CM

## 2022-01-04 PROCEDURE — 99284 EMERGENCY DEPT VISIT MOD MDM: CPT | Performed by: EMERGENCY MEDICINE

## 2022-01-04 PROCEDURE — 99283 EMERGENCY DEPT VISIT LOW MDM: CPT

## 2022-01-04 PROCEDURE — 73630 X-RAY EXAM OF FOOT: CPT

## 2022-01-04 RX ORDER — IBUPROFEN 400 MG/1
400 TABLET ORAL ONCE
Status: COMPLETED | OUTPATIENT
Start: 2022-01-04 | End: 2022-01-04

## 2022-01-04 RX ADMIN — IBUPROFEN 400 MG: 400 TABLET, FILM COATED ORAL at 21:19

## 2022-01-05 ENCOUNTER — TELEPHONE (OUTPATIENT)
Dept: FAMILY MEDICINE CLINIC | Facility: HOSPITAL | Age: 72
End: 2022-01-05

## 2022-01-05 ENCOUNTER — OFFICE VISIT (OUTPATIENT)
Dept: FAMILY MEDICINE CLINIC | Facility: HOSPITAL | Age: 72
End: 2022-01-05
Payer: COMMERCIAL

## 2022-01-05 VITALS
OXYGEN SATURATION: 98 % | BODY MASS INDEX: 29.57 KG/M2 | DIASTOLIC BLOOD PRESSURE: 86 MMHG | HEART RATE: 96 BPM | TEMPERATURE: 98 F | SYSTOLIC BLOOD PRESSURE: 146 MMHG | HEIGHT: 60 IN | WEIGHT: 150.6 LBS

## 2022-01-05 DIAGNOSIS — M79.672 LEFT FOOT PAIN: ICD-10-CM

## 2022-01-05 DIAGNOSIS — I10 ESSENTIAL HYPERTENSION: Primary | ICD-10-CM

## 2022-01-05 PROCEDURE — 3079F DIAST BP 80-89 MM HG: CPT | Performed by: NURSE PRACTITIONER

## 2022-01-05 PROCEDURE — 3077F SYST BP >= 140 MM HG: CPT | Performed by: NURSE PRACTITIONER

## 2022-01-05 PROCEDURE — 99214 OFFICE O/P EST MOD 30 MIN: CPT | Performed by: NURSE PRACTITIONER

## 2022-01-05 NOTE — ED PROVIDER NOTES
History  Chief Complaint   Patient presents with    Foot Injury     Pt to ED with c/o left foot injury earlier today , pt denies falling      56-year-old female presents for evaluation of an injury to her left foot which occurred earlier today  The patient states that she was walking up the garden and had a gurney implement fall on the dorsum of her left foot  Patient states her pain is worse with movement  She took Tylenol without improvement patient denies any numbness or tingling  Patient denies falling  She has associated swelling  Prior to Admission Medications   Prescriptions Last Dose Informant Patient Reported? Taking?    ALPRAZolam (XANAX) 0 5 mg tablet  Self Yes No   Diclofenac Sodium (VOLTAREN) 1 %  Self Yes No   Sig: Apply 2 g topically 4 (four) times a day   NAPROXEN  MG EC tablet  Self Yes No   Sodium Chloride 7 % NEBU  Self Yes No   albuterol (PROVENTIL HFA,VENTOLIN HFA) 90 mcg/act inhaler   No No   Sig: Inhale 2 puffs every 6 (six) hours as needed for wheezing   aspirin 81 MG tablet  Self Yes No   Sig: Take 81 mg by mouth   cyclobenzaprine (FLEXERIL) 10 mg tablet  Self No No   Sig: Take 1 tablet (10 mg total) by mouth 3 (three) times a day as needed for muscle spasms   escitalopram (Lexapro) 10 mg tablet   No No   Sig: Take 1 tablet (10 mg total) by mouth daily   lisinopril (ZESTRIL) 5 mg tablet   No No   Sig: TAKE 1 TABLET EVERY DAY   pravastatin (PRAVACHOL) 80 mg tablet  Self No No   Sig: Take 0 5 tablets (40 mg total) by mouth daily   predniSONE 10 mg tablet   No No   Si tabs x 3 days, 3 tabs x 3 days 2 tabs x 3 days 1 tab x 3 days, 1/2 tab x 3 days then stop      Facility-Administered Medications: None       Past Medical History:   Diagnosis Date    Asthma     C  difficile enteritis     History of acute respiratory failure     Hypertension     Pneumonia     Respiratory disorder 2013    Tendinitis of right shoulder 2019       Past Surgical History:   Procedure Laterality Date    BRONCHOSCOPY      FRACTURE SURGERY      NASAL SINUS SURGERY Bilateral 09/2017    SINUS SURGERY      TUBAL LIGATION      WISDOM TOOTH EXTRACTION         Family History   Problem Relation Age of Onset    Heart disease Father     Heart failure Father     Diabetes Father     Rectal cancer Father     Heart disease Sister     Heart disease Brother     Aortic aneurysm Brother     Sudden death Brother     Cirrhosis Mother     Colon cancer Paternal Grandmother     No Known Problems Daughter     Lung cancer Paternal Uncle     No Known Problems Daughter     No Known Problems Daughter     No Known Problems Maternal Aunt     No Known Problems Maternal Aunt     No Known Problems Maternal Aunt     No Known Problems Paternal Aunt     No Known Problems Paternal Aunt     No Known Problems Maternal Grandmother     No Known Problems Maternal Grandfather     No Known Problems Paternal Grandfather     Throat cancer Paternal Uncle      I have reviewed and agree with the history as documented  E-Cigarette/Vaping    E-Cigarette Use Never User      E-Cigarette/Vaping Substances     Social History     Tobacco Use    Smoking status: Never Smoker    Smokeless tobacco: Never Used   Vaping Use    Vaping Use: Never used   Substance Use Topics    Alcohol use: Yes     Comment: soically    Drug use: No       Review of Systems   Constitutional: Negative for fever  Musculoskeletal: Positive for arthralgias and joint swelling  All other systems reviewed and are negative  Physical Exam  Physical Exam  Vitals and nursing note reviewed  Constitutional:       General: She is not in acute distress  Appearance: She is well-developed  HENT:      Head: Normocephalic and atraumatic  Right Ear: External ear normal       Left Ear: External ear normal    Eyes:      General: No scleral icterus  Cardiovascular:      Pulses: Normal pulses     Pulmonary:      Effort: Pulmonary effort is normal  No respiratory distress  Abdominal:      General: There is no distension  Musculoskeletal:         General: Swelling ( Dorsum left foot), tenderness ( Dorsum left foot) and signs of injury present  Normal range of motion  Cervical back: Normal range of motion  Comments: No calf tenderness or tenderness over the Achilles   Skin:     General: Skin is warm and dry  Capillary Refill: Capillary refill takes less than 2 seconds  Findings: No rash  Neurological:      General: No focal deficit present  Mental Status: She is alert  Mental status is at baseline  Sensory: No sensory deficit  Psychiatric:         Mood and Affect: Mood normal          Vital Signs  ED Triage Vitals   Temperature Pulse Respirations Blood Pressure SpO2   01/04/22 1914 01/04/22 1914 01/04/22 1914 01/04/22 1915 01/04/22 1914   98 3 °F (36 8 °C) (!) 122 18 (!) 191/122 96 %      Temp src Heart Rate Source Patient Position - Orthostatic VS BP Location FiO2 (%)   -- -- -- -- --             Pain Score       01/04/22 2119       8           Vitals:    01/04/22 1914 01/04/22 1915   BP:  (!) 191/122   Pulse: (!) 122          Visual Acuity      ED Medications  Medications   ibuprofen (MOTRIN) tablet 400 mg (400 mg Oral Given 1/4/22 2119)       Diagnostic Studies  Results Reviewed     None                 XR foot 3+ views LEFT   ED Interpretation by Merle Shin DO (01/04 2114)   No acute osseous abnormality                 Procedures  Procedures         ED Course                                             MDM  Number of Diagnoses or Management Options  Contusion of left foot, initial encounter: new and requires workup  Diagnosis management comments: /obtain x-ray to rule out fracture versus dislocation    There is no tenderness over the deep venous system    Discussed plan for postop shoe, outpatient ortho follow-up pain control, ice and elevation    The patient (and any family present) verbalized understanding of the discharge instructions and warnings that would necessitate return to the Emergency Department  All questions were answered prior to discharge  Amount and/or Complexity of Data Reviewed  Tests in the radiology section of CPT®: reviewed  Independent visualization of images, tracings, or specimens: yes        Disposition  Final diagnoses:   Contusion of left foot, initial encounter     Time reflects when diagnosis was documented in both MDM as applicable and the Disposition within this note     Time User Action Codes Description Comment    1/4/2022  9:14 PM Bulmaro Gaspar Add [S90 32XA] Contusion of left foot, initial encounter       ED Disposition     ED Disposition Condition Date/Time Comment    Discharge Stable Tue Jan 4, 2022  9:13 PM Gail Other discharge to home/self care              Follow-up Information     Follow up With Specialties Details Why Contact Info Additional [id number] [facility] Orthopedic Surgery Schedule an appointment as soon as possible for a visit  For further evaluation [address] [place] [id number] [facility] [id number]  [facility], [address] [place] [address], [place], [place], [facility] [address]          Discharge Medication List as of 1/4/2022  9:21 PM      CONTINUE these medications which have NOT CHANGED    Details   albuterol (PROVENTIL HFA,VENTOLIN HFA) 90 mcg/act inhaler Inhale 2 puffs every 6 (six) hours as needed for wheezing, Starting Tue 12/21/2021, Normal      ALPRAZolam (XANAX) 0 5 mg tablet Starting Tue 3/25/2014, Historical Med      aspirin 81 MG tablet Take 81 mg by mouth, Historical Med      cyclobenzaprine (FLEXERIL) 10 mg tablet Take 1 tablet (10 mg total) by mouth 3 (three) times a day as needed for muscle spasms, Starting Mon 2/8/2021, Normal      Diclofenac Sodium (VOLTAREN) 1 % Apply 2 g topically 4 (four) times a day, Historical Med escitalopram (Lexapro) 10 mg tablet Take 1 tablet (10 mg total) by mouth daily, Starting Tue 12/21/2021, Normal      lisinopril (ZESTRIL) 5 mg tablet TAKE 1 TABLET EVERY DAY, Normal      NAPROXEN  MG EC tablet Starting Mon 1/1/2018, Historical Med      pravastatin (PRAVACHOL) 80 mg tablet Take 0 5 tablets (40 mg total) by mouth daily, Starting Tue 3/9/2021, Normal      predniSONE 10 mg tablet 4 tabs x 3 days, 3 tabs x 3 days 2 tabs x 3 days 1 tab x 3 days, 1/2 tab x 3 days then stop, Normal      Sodium Chloride 7 % NEBU Starting Sun 2/23/2014, Historical Med             No discharge procedures on file      PDMP Review     None          ED Provider  Electronically Signed by           Lavinia Dill DO  01/04/22 8778

## 2022-01-05 NOTE — PROGRESS NOTES
Assessment/Plan:     BP is elevated likely from some increased anxiety but will increase lisinopril to 10 mg  Take two 5 mg tabs for now  F/U on 1/18 as scheduled for BP check  Continue to take BP at home and call if continues to be over 150/90  Left foot pain and swelling have resolved  Xray is normal  Monitor for now  If pain recurs then should see ortho  Diagnoses and all orders for this visit:    Essential hypertension    Left foot pain          Subjective:     Patient ID: Fabio Valverde is a 66 y o  female  Tripped yesterday (did not fall)  A few hours later her left foot started hurting  Was unable to bear weight  Iced her foot  Went to ER  BP was very high 190s/100s  Never rechecked her BP>  Xray was normal  Pain and swelling are much better  This morning taking Bp and running 150-160s  Had bad HA this morning  Took ibuprofen and HA is better  Review of Systems   Eyes: Negative for visual disturbance  Respiratory: Negative for shortness of breath  Cardiovascular: Negative for chest pain and leg swelling  Musculoskeletal: Positive for arthralgias (left foot) and joint swelling (left foot)  Neurological: Positive for headaches  Negative for dizziness and light-headedness  The following portions of the patient's history were reviewed and updated as appropriate: allergies, current medications, past family history, past medical history, past social history, past surgical history and problem list     Objective:  Vitals:    01/05/22 1337   BP: 146/86   Pulse: 96   Temp: 98 °F (36 7 °C)   SpO2: 98%      Physical Exam  Vitals reviewed  Constitutional:       Appearance: Normal appearance  Cardiovascular:      Rate and Rhythm: Normal rate  Heart sounds: Normal heart sounds  Pulmonary:      Effort: Pulmonary effort is normal       Breath sounds: Normal breath sounds  Musculoskeletal:      Left foot: Normal    Skin:     General: Skin is warm and dry     Neurological: Mental Status: She is alert and oriented to person, place, and time  Psychiatric:         Mood and Affect: Mood normal          Behavior: Behavior normal          Thought Content:  Thought content normal          Judgment: Judgment normal

## 2022-01-05 NOTE — TELEPHONE ENCOUNTER
[place] last [place]  Injured left foot  She doesn't know what she did  She lunged over pillows on the floor and her pant leg got snagged on something, she was catching her fall, hours later she started feeling severe pain in her foot  Couldn't walk on it  When she returned home her bp 191/122  xrays were taken  Didn't show anything  Sent her home as a bruise  Swelling is down  Pain is gone  However they never rechecked her bp  She didn't realize it was so high until after reviewing the summary  Today she has a severe headache  Her kids are very concerned, wants her to pass on the message  Mostly concerned about the bp over the foot  She has a cuff at home  155/74 after the ER, home, relaxing  She will call back again to relay current bp  She needs help working the machine, her daughter will help her

## 2022-01-05 NOTE — DISCHARGE INSTRUCTIONS
Take OTC tylenol and motrin as needed for pain    Elevate the foot and ice the foot for pain control

## 2022-01-12 ENCOUNTER — RA CDI HCC (OUTPATIENT)
Dept: OTHER | Facility: HOSPITAL | Age: 72
End: 2022-01-12

## 2022-01-12 NOTE — PROGRESS NOTES
[facility] 75  coding opportunities          Number of diagnosis code(s) already on the problem list added to FYI fla     DX: F32 0 Major Depressive Disorder, Single Episode, Mild          Patients insurance company: [employer] (Medicare Advantage only)        DX: F32 0 was used

## 2022-01-18 ENCOUNTER — OFFICE VISIT (OUTPATIENT)
Dept: FAMILY MEDICINE CLINIC | Facility: HOSPITAL | Age: 72
End: 2022-01-18
Payer: COMMERCIAL

## 2022-01-18 ENCOUNTER — TELEPHONE (OUTPATIENT)
Dept: FAMILY MEDICINE CLINIC | Facility: HOSPITAL | Age: 72
End: 2022-01-18

## 2022-01-18 VITALS
HEART RATE: 92 BPM | HEIGHT: 60 IN | OXYGEN SATURATION: 98 % | SYSTOLIC BLOOD PRESSURE: 158 MMHG | WEIGHT: 150.4 LBS | DIASTOLIC BLOOD PRESSURE: 92 MMHG | BODY MASS INDEX: 29.53 KG/M2 | TEMPERATURE: 98.4 F

## 2022-01-18 DIAGNOSIS — Z12.11 SCREEN FOR COLON CANCER: ICD-10-CM

## 2022-01-18 DIAGNOSIS — F32.0 CURRENT MILD EPISODE OF MAJOR DEPRESSIVE DISORDER WITHOUT PRIOR EPISODE (HCC): ICD-10-CM

## 2022-01-18 DIAGNOSIS — I10 ESSENTIAL HYPERTENSION: Primary | ICD-10-CM

## 2022-01-18 DIAGNOSIS — E78.2 MIXED HYPERLIPIDEMIA: ICD-10-CM

## 2022-01-18 DIAGNOSIS — F41.1 GENERALIZED ANXIETY DISORDER: ICD-10-CM

## 2022-01-18 DIAGNOSIS — J32.9 CHRONIC SINUSITIS, UNSPECIFIED LOCATION: ICD-10-CM

## 2022-01-18 DIAGNOSIS — J45.30 MILD PERSISTENT ASTHMA WITHOUT COMPLICATION: ICD-10-CM

## 2022-01-18 PROBLEM — R09.81 NASAL CONGESTION: Status: ACTIVE | Noted: 2022-01-18

## 2022-01-18 PROCEDURE — 99214 OFFICE O/P EST MOD 30 MIN: CPT | Performed by: NURSE PRACTITIONER

## 2022-01-18 PROCEDURE — 3725F SCREEN DEPRESSION PERFORMED: CPT | Performed by: NURSE PRACTITIONER

## 2022-01-18 PROCEDURE — 1160F RVW MEDS BY RX/DR IN RCRD: CPT | Performed by: NURSE PRACTITIONER

## 2022-01-18 PROCEDURE — 3008F BODY MASS INDEX DOCD: CPT | Performed by: NURSE PRACTITIONER

## 2022-01-18 PROCEDURE — 1036F TOBACCO NON-USER: CPT | Performed by: NURSE PRACTITIONER

## 2022-01-18 RX ORDER — ESCITALOPRAM OXALATE 10 MG/1
10 TABLET ORAL DAILY
Qty: 30 TABLET | Refills: 1 | Status: SHIPPED | OUTPATIENT
Start: 2022-01-18 | End: 2022-02-15 | Stop reason: SDUPTHER

## 2022-01-18 RX ORDER — LISINOPRIL 20 MG/1
20 TABLET ORAL DAILY
Qty: 30 TABLET | Refills: 1 | Status: SHIPPED | OUTPATIENT
Start: 2022-01-18 | End: 2022-02-15 | Stop reason: SDUPTHER

## 2022-01-18 RX ORDER — ESCITALOPRAM OXALATE 10 MG/1
10 TABLET ORAL DAILY
Qty: 90 TABLET | Refills: 1 | Status: SHIPPED | OUTPATIENT
Start: 2022-01-18 | End: 2022-01-18 | Stop reason: SDUPTHER

## 2022-01-18 NOTE — ASSESSMENT & PLAN NOTE
Ongoing since fall  Gets short term relief with prednisone  H/O sinus surgery  Will be seeing ENT tomorrow

## 2022-01-18 NOTE — ASSESSMENT & PLAN NOTE
BP remains elevated despite increase of lisinopril  Will increase further to 20 mg    I suspect some of the sinus issues she is having may be contributing  F/U in 4 weeks for BP check

## 2022-01-18 NOTE — PROGRESS NOTES
Assessment/Plan:    Essential hypertension  BP remains elevated despite increase of lisinopril  Will increase further to 20 mg    I suspect some of the sinus issues she is having may be contributing  F/U in 4 weeks for BP check  Generalized anxiety disorder  UBALDO-7=0  Continue on current regimen  Call with worsening mood  Current mild episode of major depressive disorder without prior episode (HCC)  PHQ-9=7  Continue on current regimen  Call with worsening mood  Mixed hyperlipidemia  FLP excellent  Continue on statin dose  Recheck yearly  Mild persistent asthma without complication  Controlled  Can stop albuterol twice/day and use only prn  Chronic sinusitis  Ongoing since fall  Gets short term relief with prednisone  H/O sinus surgery  Will be seeing ENT tomorrow  Diagnoses and all orders for this visit:    Essential hypertension  -     lisinopril (ZESTRIL) 20 mg tablet; Take 1 tablet (20 mg total) by mouth daily    Generalized anxiety disorder    Current mild episode of major depressive disorder without prior episode (HCC)  -     escitalopram (Lexapro) 10 mg tablet; Take 1 tablet (10 mg total) by mouth daily    Mixed hyperlipidemia    Mild persistent asthma without complication    Chronic sinusitis, unspecified location    Screen for colon cancer  -     Ambulatory referral to Gastroenterology; Future          Subjective:      Patient ID: Rodolfo Mejia is a 66 y o  female  Checking BP and getting high in 170s/100s but also getting 120s/80s  Still with sinus pressure  Was on prednisone  Not sleeping at night  Going to see ENT tomorrow  Not taking any decongestants and antihistamine  Lisinopril was increased to 10 mg  Mood is better  No anxiety  Breathing has been good  Using albuterol twice/day since she was sick         The following portions of the patient's history were reviewed and updated as appropriate: allergies, current medications, past family history, past medical history, past social history, past surgical history and problem list     Review of Systems   Constitutional: Positive for appetite change and fatigue  Negative for chills and fever  HENT: Positive for congestion and sinus pressure  Eyes: Negative for visual disturbance  Respiratory: Negative for cough, shortness of breath and wheezing  Cardiovascular: Negative for chest pain, palpitations and leg swelling  Neurological: Positive for headaches  Negative for dizziness, syncope and light-headedness  Psychiatric/Behavioral: Positive for sleep disturbance  Negative for dysphoric mood and suicidal ideas  The patient is not nervous/anxious  Objective:  Vitals:    01/18/22 0933   BP: 158/92   Pulse: 92   Temp: 98 4 °F (36 9 °C)   SpO2: 98%      Physical Exam  Vitals reviewed  Constitutional:       Appearance: Normal appearance  Neck:      Vascular: No carotid bruit  Cardiovascular:      Rate and Rhythm: Normal rate and regular rhythm  Heart sounds: Normal heart sounds  No murmur heard  Pulmonary:      Effort: Pulmonary effort is normal       Breath sounds: Normal breath sounds  Skin:     General: Skin is warm and dry  Neurological:      Mental Status: She is alert and oriented to person, place, and time  Psychiatric:         Mood and Affect: Mood normal          Behavior: Behavior normal          Thought Content:  Thought content normal          Judgment: Judgment normal

## 2022-02-08 ENCOUNTER — RA CDI HCC (OUTPATIENT)
Dept: OTHER | Facility: HOSPITAL | Age: 72
End: 2022-02-08

## 2022-02-08 NOTE — PROGRESS NOTES
[facility] 75  coding opportunities       Chart reviewed, no opportunity found: CHART REVIEWED, NO OPPORTUNITY FOUND                        Patients insurance company: [employer] Advantage only)

## 2022-02-15 ENCOUNTER — OFFICE VISIT (OUTPATIENT)
Dept: FAMILY MEDICINE CLINIC | Facility: HOSPITAL | Age: 72
End: 2022-02-15
Payer: COMMERCIAL

## 2022-02-15 VITALS
WEIGHT: 146.6 LBS | HEART RATE: 97 BPM | SYSTOLIC BLOOD PRESSURE: 132 MMHG | DIASTOLIC BLOOD PRESSURE: 80 MMHG | HEIGHT: 60 IN | BODY MASS INDEX: 28.78 KG/M2 | TEMPERATURE: 97.5 F

## 2022-02-15 DIAGNOSIS — F32.0 CURRENT MILD EPISODE OF MAJOR DEPRESSIVE DISORDER WITHOUT PRIOR EPISODE (HCC): ICD-10-CM

## 2022-02-15 DIAGNOSIS — J45.30 MILD PERSISTENT ASTHMA WITHOUT COMPLICATION: ICD-10-CM

## 2022-02-15 DIAGNOSIS — I10 ESSENTIAL HYPERTENSION: Primary | ICD-10-CM

## 2022-02-15 PROCEDURE — 99214 OFFICE O/P EST MOD 30 MIN: CPT | Performed by: NURSE PRACTITIONER

## 2022-02-15 PROCEDURE — 3075F SYST BP GE 130 - 139MM HG: CPT | Performed by: NURSE PRACTITIONER

## 2022-02-15 PROCEDURE — 1160F RVW MEDS BY RX/DR IN RCRD: CPT | Performed by: NURSE PRACTITIONER

## 2022-02-15 PROCEDURE — 3008F BODY MASS INDEX DOCD: CPT | Performed by: NURSE PRACTITIONER

## 2022-02-15 PROCEDURE — 1036F TOBACCO NON-USER: CPT | Performed by: NURSE PRACTITIONER

## 2022-02-15 PROCEDURE — 3079F DIAST BP 80-89 MM HG: CPT | Performed by: NURSE PRACTITIONER

## 2022-02-15 RX ORDER — ESCITALOPRAM OXALATE 10 MG/1
10 TABLET ORAL DAILY
Qty: 90 TABLET | Refills: 1 | Status: SHIPPED | OUTPATIENT
Start: 2022-02-15 | End: 2022-02-15 | Stop reason: SDUPTHER

## 2022-02-15 RX ORDER — ESCITALOPRAM OXALATE 10 MG/1
10 TABLET ORAL DAILY
Qty: 30 TABLET | Refills: 0 | Status: SHIPPED | OUTPATIENT
Start: 2022-02-15 | End: 2022-03-10

## 2022-02-15 RX ORDER — LISINOPRIL 20 MG/1
20 TABLET ORAL DAILY
Qty: 30 TABLET | Refills: 0 | Status: SHIPPED | OUTPATIENT
Start: 2022-02-15 | End: 2022-02-17 | Stop reason: SINTOL

## 2022-02-15 RX ORDER — MUPIROCIN 100 %
30 POWDER (GRAM) MISCELLANEOUS 2 TIMES DAILY
COMMUNITY

## 2022-02-15 RX ORDER — ESCITALOPRAM OXALATE 10 MG/1
10 TABLET ORAL DAILY
Qty: 90 TABLET | Status: CANCELLED | OUTPATIENT
Start: 2022-02-15

## 2022-02-15 RX ORDER — LISINOPRIL 20 MG/1
20 TABLET ORAL DAILY
Qty: 90 TABLET | Refills: 1 | Status: SHIPPED | OUTPATIENT
Start: 2022-02-15 | End: 2022-02-15 | Stop reason: SDUPTHER

## 2022-02-15 RX ORDER — DEXAMETHASONE 4 MG/1
2 TABLET ORAL 2 TIMES DAILY
Qty: 36 G | Refills: 0 | Status: SHIPPED | OUTPATIENT
Start: 2022-02-15

## 2022-02-15 NOTE — PROGRESS NOTES
Assessment/Plan:    Essential hypertension  BP is well controlled  Continue on current regimen  Refill sent to mail order but also needed a 30 day supply sent to retail pharmacy since she only has a few pills left  F/U in 4 months  Mild persistent asthma without complication  With chronic cough which may be asthma related  Add Flovent inhaler to use twice daily  Instructed to rinse mouth after each use  Continue with albuterol prn  F/U in 4 months  Current mild episode of major depressive disorder without prior episode ([facility] )  Needs 30 day supply lexapro sent to retail pharmacy and 90 day sent to mail order  Diagnoses and all orders for this visit:    Essential hypertension  -     Discontinue: lisinopril (ZESTRIL) 20 mg tablet; Take 1 tablet (20 mg total) by mouth daily  -     lisinopril (ZESTRIL) 20 mg tablet; Take 1 tablet (20 mg total) by mouth daily    Mild persistent asthma without complication  -     fluticasone (Flovent HFA) 110 MCG/ACT inhaler; Inhale 2 puffs 2 (two) times a day Rinse mouth after use  Current mild episode of major depressive disorder without prior episode ([facility])  -     Discontinue: escitalopram (Lexapro) 10 mg tablet; Take 1 tablet (10 mg total) by mouth daily  -     escitalopram (Lexapro) 10 mg tablet; Take 1 tablet (10 mg total) by mouth daily    Other orders  -     Mupirocin POWD; Inhale 30 mg 2 (two) times a day          Subjective:      Patient ID: Lindsay Prajapati is a 66 y o  female  Not checking BP at home  Saw ENT and now using bactroban in sinus rinse and feeling much better  Bad cough  Has some wheezing with coughing  No sob  Coughing is daily throughout the day  Using albuterol multiple times/day         The following portions of the patient's history were reviewed and updated as appropriate: allergies, current medications, past family history, past medical history, past social history, past surgical history and problem list     Review of Systems Constitutional: Negative for fatigue  Eyes: Negative for visual disturbance  Respiratory: Positive for cough and wheezing  Negative for shortness of breath  Neurological: Negative for dizziness, syncope, light-headedness and headaches  Psychiatric/Behavioral: Negative for dysphoric mood  The patient is not nervous/anxious  Objective:  Vitals:    02/15/22 0955   BP: 132/80   Pulse: 97   Temp: 97 5 °F (36 4 °C)      Physical Exam  Vitals reviewed  Constitutional:       Appearance: Normal appearance  She is well-developed  Cardiovascular:      Rate and Rhythm: Normal rate and regular rhythm  Heart sounds: Normal heart sounds  No murmur heard  Pulmonary:      Effort: Pulmonary effort is normal       Breath sounds: Normal breath sounds  Skin:     General: Skin is warm and dry  Neurological:      Mental Status: She is alert and oriented to person, place, and time  Psychiatric:         Mood and Affect: Mood normal          Behavior: Behavior normal          Thought Content:  Thought content normal          Judgment: Judgment normal

## 2022-02-15 NOTE — ASSESSMENT & PLAN NOTE
BP is well controlled  Continue on current regimen  Refill sent to mail order but also needed a 30 day supply sent to retail pharmacy since she only has a few pills left  F/U in 4 months

## 2022-02-15 NOTE — ASSESSMENT & PLAN NOTE
With chronic cough which may be asthma related  Add Flovent inhaler to use twice daily  Instructed to rinse mouth after each use  Continue with albuterol prn  F/U in 4 months

## 2022-03-10 DIAGNOSIS — F32.0 CURRENT MILD EPISODE OF MAJOR DEPRESSIVE DISORDER WITHOUT PRIOR EPISODE (HCC): ICD-10-CM

## 2022-03-10 RX ORDER — ESCITALOPRAM OXALATE 10 MG/1
TABLET ORAL
Qty: 30 TABLET | Refills: 0 | Status: SHIPPED | OUTPATIENT
Start: 2022-03-10 | End: 2022-04-19

## 2022-03-28 DIAGNOSIS — I10 ESSENTIAL HYPERTENSION: ICD-10-CM

## 2022-03-28 RX ORDER — PRAVASTATIN SODIUM 80 MG/1
40 TABLET ORAL DAILY
Qty: 90 TABLET | Refills: 1 | Status: SHIPPED | OUTPATIENT
Start: 2022-03-28

## 2022-04-16 DIAGNOSIS — I10 ESSENTIAL HYPERTENSION: ICD-10-CM

## 2022-04-16 RX ORDER — LOSARTAN POTASSIUM 50 MG/1
TABLET ORAL
Qty: 30 TABLET | Refills: 1 | Status: SHIPPED | OUTPATIENT
Start: 2022-04-16 | End: 2022-06-16

## 2022-04-19 DIAGNOSIS — F32.0 CURRENT MILD EPISODE OF MAJOR DEPRESSIVE DISORDER WITHOUT PRIOR EPISODE (HCC): ICD-10-CM

## 2022-04-19 RX ORDER — ESCITALOPRAM OXALATE 10 MG/1
TABLET ORAL
Qty: 30 TABLET | Refills: 0 | Status: SHIPPED | OUTPATIENT
Start: 2022-04-19 | End: 2022-04-19 | Stop reason: SDUPTHER

## 2022-04-19 RX ORDER — ESCITALOPRAM OXALATE 10 MG/1
10 TABLET ORAL DAILY
Qty: 30 TABLET | Refills: 0 | Status: SHIPPED | OUTPATIENT
Start: 2022-04-19 | End: 2022-05-14

## 2022-05-14 DIAGNOSIS — F32.0 CURRENT MILD EPISODE OF MAJOR DEPRESSIVE DISORDER WITHOUT PRIOR EPISODE (HCC): ICD-10-CM

## 2022-05-14 RX ORDER — ESCITALOPRAM OXALATE 10 MG/1
TABLET ORAL
Qty: 30 TABLET | Refills: 0 | Status: SHIPPED | OUTPATIENT
Start: 2022-05-14 | End: 2022-06-19

## 2022-06-16 DIAGNOSIS — I10 ESSENTIAL HYPERTENSION: ICD-10-CM

## 2022-06-16 RX ORDER — LOSARTAN POTASSIUM 50 MG/1
TABLET ORAL
Qty: 30 TABLET | Refills: 1 | Status: SHIPPED | OUTPATIENT
Start: 2022-06-16 | End: 2022-08-09

## 2022-06-19 DIAGNOSIS — F32.0 CURRENT MILD EPISODE OF MAJOR DEPRESSIVE DISORDER WITHOUT PRIOR EPISODE (HCC): ICD-10-CM

## 2022-06-19 RX ORDER — ESCITALOPRAM OXALATE 10 MG/1
TABLET ORAL
Qty: 30 TABLET | Refills: 0 | Status: SHIPPED | OUTPATIENT
Start: 2022-06-19 | End: 2022-07-18

## 2022-06-22 ENCOUNTER — OFFICE VISIT (OUTPATIENT)
Dept: FAMILY MEDICINE CLINIC | Facility: HOSPITAL | Age: 72
End: 2022-06-22
Payer: COMMERCIAL

## 2022-06-22 VITALS
WEIGHT: 149.6 LBS | BODY MASS INDEX: 29.37 KG/M2 | TEMPERATURE: 97.2 F | HEIGHT: 60 IN | DIASTOLIC BLOOD PRESSURE: 90 MMHG | HEART RATE: 83 BPM | SYSTOLIC BLOOD PRESSURE: 132 MMHG

## 2022-06-22 DIAGNOSIS — I10 ESSENTIAL HYPERTENSION: ICD-10-CM

## 2022-06-22 DIAGNOSIS — F32.0 CURRENT MILD EPISODE OF MAJOR DEPRESSIVE DISORDER WITHOUT PRIOR EPISODE (HCC): ICD-10-CM

## 2022-06-22 DIAGNOSIS — Z12.11 SCREENING FOR COLON CANCER: ICD-10-CM

## 2022-06-22 DIAGNOSIS — E78.2 MIXED HYPERLIPIDEMIA: ICD-10-CM

## 2022-06-22 DIAGNOSIS — Z00.00 MEDICARE ANNUAL WELLNESS VISIT, SUBSEQUENT: Primary | ICD-10-CM

## 2022-06-22 DIAGNOSIS — Z12.11 ENCOUNTER FOR SCREENING COLONOSCOPY: ICD-10-CM

## 2022-06-22 DIAGNOSIS — K21.9 GASTROESOPHAGEAL REFLUX DISEASE, UNSPECIFIED WHETHER ESOPHAGITIS PRESENT: ICD-10-CM

## 2022-06-22 DIAGNOSIS — K59.00 CONSTIPATION, UNSPECIFIED CONSTIPATION TYPE: ICD-10-CM

## 2022-06-22 DIAGNOSIS — J45.30 MILD PERSISTENT ASTHMA WITHOUT COMPLICATION: ICD-10-CM

## 2022-06-22 DIAGNOSIS — F41.1 GENERALIZED ANXIETY DISORDER: ICD-10-CM

## 2022-06-22 PROCEDURE — 3288F FALL RISK ASSESSMENT DOCD: CPT | Performed by: NURSE PRACTITIONER

## 2022-06-22 PROCEDURE — 99214 OFFICE O/P EST MOD 30 MIN: CPT | Performed by: NURSE PRACTITIONER

## 2022-06-22 PROCEDURE — 3080F DIAST BP >= 90 MM HG: CPT | Performed by: NURSE PRACTITIONER

## 2022-06-22 PROCEDURE — G0439 PPPS, SUBSEQ VISIT: HCPCS | Performed by: NURSE PRACTITIONER

## 2022-06-22 PROCEDURE — 1160F RVW MEDS BY RX/DR IN RCRD: CPT | Performed by: NURSE PRACTITIONER

## 2022-06-22 PROCEDURE — 1036F TOBACCO NON-USER: CPT | Performed by: NURSE PRACTITIONER

## 2022-06-22 PROCEDURE — 3725F SCREEN DEPRESSION PERFORMED: CPT | Performed by: NURSE PRACTITIONER

## 2022-06-22 PROCEDURE — 3075F SYST BP GE 130 - 139MM HG: CPT | Performed by: NURSE PRACTITIONER

## 2022-06-22 PROCEDURE — 1170F FXNL STATUS ASSESSED: CPT | Performed by: NURSE PRACTITIONER

## 2022-06-22 PROCEDURE — 3008F BODY MASS INDEX DOCD: CPT | Performed by: NURSE PRACTITIONER

## 2022-06-22 PROCEDURE — 1125F AMNT PAIN NOTED PAIN PRSNT: CPT | Performed by: NURSE PRACTITIONER

## 2022-06-22 RX ORDER — FAMOTIDINE 20 MG/1
20 TABLET, FILM COATED ORAL 2 TIMES DAILY
Qty: 60 TABLET | Refills: 5 | Status: SHIPPED | OUTPATIENT
Start: 2022-06-22

## 2022-06-22 NOTE — ASSESSMENT & PLAN NOTE
We discussed trigger avoidance at length  Will start pepcid  F/U in 6 months  Call if symptoms do not improve

## 2022-06-22 NOTE — PATIENT INSTRUCTIONS
Medicare Preventive Visit Patient Instructions  Thank you for completing your Welcome to Medicare Visit or Medicare Annual Wellness Visit today  Your next wellness visit will be due in one year (6/23/2023)  The screening/preventive services that you may require over the next 5-10 years are detailed below  Some tests may not apply to you based off risk factors and/or age  Screening tests ordered at today's visit but not completed yet may show as past due  Also, please note that scanned in results may not display below  Preventive Screenings:  Service Recommendations Previous Testing/Comments   Colorectal Cancer Screening  * Colonoscopy    * Fecal Occult Blood Test (FOBT)/Fecal Immunochemical Test (FIT)  * Fecal DNA/Cologuard Test  * Flexible Sigmoidoscopy Age: 54-65 years old   Colonoscopy: every 10 years (may be performed more frequently if at higher risk)  OR  FOBT/FIT: every 1 year  OR  Cologuard: every 3 years  OR  Sigmoidoscopy: every 5 years  Screening may be recommended earlier than age 48 if at higher risk for colorectal cancer  Also, an individualized decision between you and your healthcare provider will decide whether screening between the ages of 74-80 would be appropriate  Colonoscopy: 03/19/2015  FOBT/FIT: Not on file  Cologuard: Not on file  Sigmoidoscopy: Not on file          Breast Cancer Screening Age: 36 years old  Frequency: every 1-2 years  Not required if history of left and right mastectomy Mammogram: 09/30/2021        Cervical Cancer Screening Between the ages of 21-29, pap smear recommended once every 3 years  Between the ages of 33-67, can perform pap smear with HPV co-testing every 5 years     Recommendations may differ for women with a history of total hysterectomy, cervical cancer, or abnormal pap smears in past  Pap Smear: Not on file        Hepatitis C Screening Once for adults born between [facility]  More frequently in patients at high risk for Hepatitis C Hep C Antibody: 05/14/2019        Diabetes Screening 1-2 times per year if you're at risk for diabetes or have pre-diabetes Fasting glucose: No results in last 5 years   A1C: No results in last 5 years        Cholesterol Screening Once every 5 years if you don't have a lipid disorder  May order more often based on risk factors  Lipid panel: 12/15/2021          Other Preventive Screenings Covered by Medicare:  1  Abdominal Aortic Aneurysm (AAA) Screening: covered once if your at risk  You're considered to be at risk if you have a family history of AAA  2  Lung Cancer Screening: covers low dose CT scan once per year if you meet all of the following conditions: (1) Age 50-69; (2) No signs or symptoms of lung cancer; (3) Current smoker or have quit smoking within the last 15 years; (4) You have a tobacco smoking history of at least 30 pack years (packs per day multiplied by number of years you smoked); (5) You get a written order from a healthcare provider  3  Glaucoma Screening: covered annually if you're considered high risk: (1) You have diabetes OR (2) Family history of glaucoma OR (3)  aged 48 and older OR (3)  American aged 72 and older  3  Osteoporosis Screening: covered every 2 years if you meet one of the following conditions: (1) You're estrogen deficient and at risk for osteoporosis based off medical history and other findings; (2) Have a vertebral abnormality; (3) On glucocorticoid therapy for more than 3 months; (4) Have primary hyperparathyroidism; (5) On osteoporosis medications and need to assess response to drug therapy  · Last bone density test (DXA Scan): 05/14/2019   5  HIV Screening: covered annually if you're between the age of 15-65  Also covered annually if you are younger than 13 and older than 72 with risk factors for HIV infection  For pregnant patients, it is covered up to 3 times per pregnancy      Immunizations:  Immunization Recommendations   Influenza Vaccine Annual influenza vaccination during flu season is recommended for all persons aged >= 6 months who do not have contraindications   Pneumococcal Vaccine (Prevnar and Pneumovax)  * Prevnar = PCV13  * Pneumovax = PPSV23   Adults 25-60 years old: 1-3 doses may be recommended based on certain risk factors  Adults 72 years old: Prevnar (PCV13) vaccine recommended followed by Pneumovax (PPSV23) vaccine  If already received PPSV23 since turning 65, then PCV13 recommended at least one year after PPSV23 dose  Hepatitis B Vaccine 3 dose series if at intermediate or high risk (ex: diabetes, end stage renal disease, liver disease)   Tetanus (Td) Vaccine - COST NOT COVERED BY MEDICARE PART B Following completion of primary series, a booster dose should be given every 10 years to maintain immunity against tetanus  Td may also be given as tetanus wound prophylaxis  Tdap Vaccine - COST NOT COVERED BY MEDICARE PART B Recommended at least once for all adults  For pregnant patients, recommended with each pregnancy  Shingles Vaccine (Shingrix) - COST NOT COVERED BY MEDICARE PART B  2 shot series recommended in those aged 48 and above     Health Maintenance Due:      Topic Date Due    Colorectal Cancer Screening  03/19/2020    Breast Cancer Screening: Mammogram  09/30/2022    Hepatitis C Screening  Completed     Immunizations Due:      Topic Date Due    DTaP,Tdap,and Td Vaccines (1 - Tdap) Never done    COVID-19 Vaccine (3 - Booster for Pfizer series) 08/17/2021     Advance Directives   What are advance directives? Advance directives are legal documents that state your wishes and plans for medical care  These plans are made ahead of time in case you lose your ability to make decisions for yourself  Advance directives can apply to any medical decision, such as the treatments you want, and if you want to donate organs  What are the types of advance directives?   There are many types of advance directives, and each state has rules about how to use them  You may choose a combination of any of the following:  · Living will: This is a written record of the treatment you want  You can also choose which treatments you do not want, which to limit, and which to stop at a certain time  This includes surgery, medicine, IV fluid, and tube feedings  · Durable power of  for healthcare [place] [facility]): This is a written record that states who you want to make healthcare choices for you when you are unable to make them for yourself  This person, called a proxy, is usually a family member or a friend  You may choose more than 1 proxy  · Do not resuscitate (DNR) order:  A DNR order is used in case your heart stops beating or you stop breathing  It is a request not to have certain forms of treatment, such as CPR  A DNR order may be included in other types of advance directives  · Medical directive: This covers the care that you want if you are in a coma, near death, or unable to make decisions for yourself  You can list the treatments you want for each condition  Treatment may include pain medicine, surgery, blood transfusions, dialysis, IV or tube feedings, and a ventilator (breathing machine)  · Values history: This document has questions about your views, beliefs, and how you feel and think about life  This information can help others choose the care that you would choose  Why are advance directives important? An advance directive helps you control your care  Although spoken wishes may be used, it is better to have your wishes written down  Spoken wishes can be misunderstood, or not followed  Treatments may be given even if you do not want them  An advance directive may make it easier for your family to make difficult choices about your care  Weight Management   Why it is important to manage your weight:  Being overweight increases your risk of health conditions such as heart disease, high blood pressure, type 2 diabetes, and certain types of cancer   It can also increase your risk for osteoarthritis, sleep apnea, and other respiratory problems  Aim for a slow, steady weight loss  Even a small amount of weight loss can lower your risk of health problems  How to lose weight safely:  A safe and healthy way to lose weight is to eat fewer calories and get regular exercise  You can lose up about 1 pound a week by decreasing the number of calories you eat by 500 calories each day  Healthy meal plan for weight management:  A healthy meal plan includes a variety of foods, contains fewer calories, and helps you stay healthy  A healthy meal plan includes the following:  · Eat whole-grain foods more often  A healthy meal plan should contain fiber  Fiber is the part of grains, fruits, and vegetables that is not broken down by your body  Whole-grain foods are healthy and provide extra fiber in your diet  Some examples of whole-grain foods are whole-wheat breads and pastas, oatmeal, brown rice, and bulgur  · Eat a variety of vegetables every day  Include dark, leafy greens such as spinach, kale, marva greens, and mustard greens  Eat yellow and orange vegetables such as carrots, sweet potatoes, and winter squash  · Eat a variety of fruits every day  Choose fresh or canned fruit (canned in its own juice or light syrup) instead of juice  Fruit juice has very little or no fiber  · Eat low-fat dairy foods  Drink fat-free (skim) milk or 1% milk  Eat fat-free yogurt and low-fat cottage cheese  Try low-fat cheeses such as mozzarella and other reduced-fat cheeses  · Choose meat and other protein foods that are low in fat  Choose beans or other legumes such as split peas or lentils  Choose fish, skinless poultry (chicken or turkey), or lean cuts of red meat (beef or pork)  Before you cook meat or poultry, cut off any visible fat  · Use less fat and oil  Try baking foods instead of frying them   Add less fat, such as margarine, sour cream, regular salad dressing and mayonnaise to foods  Eat fewer high-fat foods  Some examples of high-fat foods include french fries, doughnuts, ice cream, and cakes  · Eat fewer sweets  Limit foods and drinks that are high in sugar  This includes candy, cookies, regular soda, and sweetened drinks  Exercise:  Exercise at least 30 minutes per day on most days of the week  Some examples of exercise include walking, biking, dancing, and swimming  You can also fit in more physical activity by taking the stairs instead of the elevator or parking farther away from stores  Ask your healthcare provider about the best exercise plan for you  © Copyright [employer] 2018 Information is for End User's use only and may not be sold, redistributed or otherwise used for commercial purposes  All illustrations and images included in CareNotes® are the copyrighted property of [employer] [facility] [employer] [facility]  or [facility] Preventive Visit Patient Instructions  Thank you for completing your Welcome to Medicare Visit or Medicare Annual Wellness Visit today  Your next wellness visit will be due in one year (6/23/2023)  The screening/preventive services that you may require over the next 5-10 years are detailed below  Some tests may not apply to you based off risk factors and/or age  Screening tests ordered at today's visit but not completed yet may show as past due  Also, please note that scanned in results may not display below  Preventive Screenings:  Service Recommendations Previous Testing/Comments   Colorectal Cancer Screening  * Colonoscopy    * Fecal Occult Blood Test (FOBT)/Fecal Immunochemical Test (FIT)  * Fecal DNA/Cologuard Test  * Flexible Sigmoidoscopy Age: 54-65 years old   Colonoscopy: every 10 years (may be performed more frequently if at higher risk)  OR  FOBT/FIT: every 1 year  OR  Cologuard: every 3 years  OR  Sigmoidoscopy: every 5 years  Screening may be recommended earlier than age 48 if at higher risk for colorectal cancer   Also, an individualized decision between you and your healthcare provider will decide whether screening between the ages of 74-80 would be appropriate  Colonoscopy: 03/19/2015  FOBT/FIT: Not on file  Cologuard: Not on file  Sigmoidoscopy: Not on file    Screening Current     Breast Cancer Screening Age: 36 years old  Frequency: every 1-2 years  Not required if history of left and right mastectomy Mammogram: 09/30/2021    Screening Current   Cervical Cancer Screening Between the ages of 21-29, pap smear recommended once every 3 years  Between the ages of 33-67, can perform pap smear with HPV co-testing every 5 years  Recommendations may differ for women with a history of total hysterectomy, cervical cancer, or abnormal pap smears in past  Pap Smear: Not on file    Screening Not Indicated   Hepatitis C Screening Once for adults born between 1945 and 1965  More frequently in patients at high risk for Hepatitis C Hep C Antibody: 05/14/2019    Screening Current   Diabetes Screening 1-2 times per year if you're at risk for diabetes or have pre-diabetes Fasting glucose: No results in last 5 years   A1C: No results in last 5 years    Screening Current   Cholesterol Screening Once every 5 years if you don't have a lipid disorder  May order more often based on risk factors  Lipid panel: 12/15/2021    Screening Not Indicated  History Lipid Disorder     Other Preventive Screenings Covered by Medicare:  6  Abdominal Aortic Aneurysm (AAA) Screening: covered once if your at risk  You're considered to be at risk if you have a family history of AAA    7  Lung Cancer Screening: covers low dose CT scan once per year if you meet all of the following conditions: (1) Age 50-69; (2) No signs or symptoms of lung cancer; (3) Current smoker or have quit smoking within the last 15 years; (4) You have a tobacco smoking history of at least 30 pack years (packs per day multiplied by number of years you smoked); (5) You get a written order from a healthcare provider  8  Glaucoma Screening: covered annually if you're considered high risk: (1) You have diabetes OR (2) Family history of glaucoma OR (3)  aged 48 and older OR (3)  American aged 72 and older  5  Osteoporosis Screening: covered every 2 years if you meet one of the following conditions: (1) You're estrogen deficient and at risk for osteoporosis based off medical history and other findings; (2) Have a vertebral abnormality; (3) On glucocorticoid therapy for more than 3 months; (4) Have primary hyperparathyroidism; (5) On osteoporosis medications and need to assess response to drug therapy  · Last bone density test (DXA Scan): 05/14/2019   10  HIV Screening: covered annually if you're between the age of 15-65  Also covered annually if you are younger than 13 and older than 72 with risk factors for HIV infection  For pregnant patients, it is covered up to 3 times per pregnancy  Immunizations:  Immunization Recommendations   Influenza Vaccine Annual influenza vaccination during flu season is recommended for all persons aged >= 6 months who do not have contraindications   Pneumococcal Vaccine (Prevnar and Pneumovax)  * Prevnar = PCV13  * Pneumovax = PPSV23   Adults 25-60 years old: 1-3 doses may be recommended based on certain risk factors  Adults 72 years old: Prevnar (PCV13) vaccine recommended followed by Pneumovax (PPSV23) vaccine  If already received PPSV23 since turning 65, then PCV13 recommended at least one year after PPSV23 dose  Hepatitis B Vaccine 3 dose series if at intermediate or high risk (ex: diabetes, end stage renal disease, liver disease)   Tetanus (Td) Vaccine - COST NOT COVERED BY MEDICARE PART B Following completion of primary series, a booster dose should be given every 10 years to maintain immunity against tetanus  Td may also be given as tetanus wound prophylaxis     Tdap Vaccine - COST NOT COVERED BY MEDICARE PART B Recommended at least once for all adults  For pregnant patients, recommended with each pregnancy  Shingles Vaccine (Shingrix) - COST NOT COVERED BY MEDICARE PART B  2 shot series recommended in those aged 48 and above     Health Maintenance Due:      Topic Date Due    Colorectal Cancer Screening  03/19/2020    Breast Cancer Screening: Mammogram  09/30/2022    Hepatitis C Screening  Completed     Immunizations Due:      Topic Date Due    DTaP,Tdap,and Td Vaccines (1 - Tdap) Never done    COVID-19 Vaccine (3 - Booster for Pfizer series) 08/17/2021     Advance Directives   What are advance directives? Advance directives are legal documents that state your wishes and plans for medical care  These plans are made ahead of time in case you lose your ability to make decisions for yourself  Advance directives can apply to any medical decision, such as the treatments you want, and if you want to donate organs  What are the types of advance directives? There are many types of advance directives, and each state has rules about how to use them  You may choose a combination of any of the following:  · Living will: This is a written record of the treatment you want  You can also choose which treatments you do not want, which to limit, and which to stop at a certain time  This includes surgery, medicine, IV fluid, and tube feedings  · Durable power of  for healthcare [place] [facility]): This is a written record that states who you want to make healthcare choices for you when you are unable to make them for yourself  This person, called a proxy, is usually a family member or a friend  You may choose more than 1 proxy  · Do not resuscitate (DNR) order:  A DNR order is used in case your heart stops beating or you stop breathing  It is a request not to have certain forms of treatment, such as CPR  A DNR order may be included in other types of advance directives  · Medical directive:   This covers the care that you want if you are in a coma, near death, or unable to make decisions for yourself  You can list the treatments you want for each condition  Treatment may include pain medicine, surgery, blood transfusions, dialysis, IV or tube feedings, and a ventilator (breathing machine)  · Values history: This document has questions about your views, beliefs, and how you feel and think about life  This information can help others choose the care that you would choose  Why are advance directives important? An advance directive helps you control your care  Although spoken wishes may be used, it is better to have your wishes written down  Spoken wishes can be misunderstood, or not followed  Treatments may be given even if you do not want them  An advance directive may make it easier for your family to make difficult choices about your care  Weight Management   Why it is important to manage your weight:  Being overweight increases your risk of health conditions such as heart disease, high blood pressure, type 2 diabetes, and certain types of cancer  It can also increase your risk for osteoarthritis, sleep apnea, and other respiratory problems  Aim for a slow, steady weight loss  Even a small amount of weight loss can lower your risk of health problems  How to lose weight safely:  A safe and healthy way to lose weight is to eat fewer calories and get regular exercise  You can lose up about 1 pound a week by decreasing the number of calories you eat by 500 calories each day  Healthy meal plan for weight management:  A healthy meal plan includes a variety of foods, contains fewer calories, and helps you stay healthy  A healthy meal plan includes the following:  · Eat whole-grain foods more often  A healthy meal plan should contain fiber  Fiber is the part of grains, fruits, and vegetables that is not broken down by your body  Whole-grain foods are healthy and provide extra fiber in your diet   Some examples of whole-grain foods are whole-wheat breads and pastas, oatmeal, brown rice, and bulgur  · Eat a variety of vegetables every day  Include dark, leafy greens such as spinach, kale, marva greens, and mustard greens  Eat yellow and orange vegetables such as carrots, sweet potatoes, and winter squash  · Eat a variety of fruits every day  Choose fresh or canned fruit (canned in its own juice or light syrup) instead of juice  Fruit juice has very little or no fiber  · Eat low-fat dairy foods  Drink fat-free (skim) milk or 1% milk  Eat fat-free yogurt and low-fat cottage cheese  Try low-fat cheeses such as mozzarella and other reduced-fat cheeses  · Choose meat and other protein foods that are low in fat  Choose beans or other legumes such as split peas or lentils  Choose fish, skinless poultry (chicken or turkey), or lean cuts of red meat (beef or pork)  Before you cook meat or poultry, cut off any visible fat  · Use less fat and oil  Try baking foods instead of frying them  Add less fat, such as margarine, sour cream, regular salad dressing and mayonnaise to foods  Eat fewer high-fat foods  Some examples of high-fat foods include french fries, doughnuts, ice cream, and cakes  · Eat fewer sweets  Limit foods and drinks that are high in sugar  This includes candy, cookies, regular soda, and sweetened drinks  Exercise:  Exercise at least 30 minutes per day on most days of the week  Some examples of exercise include walking, biking, dancing, and swimming  You can also fit in more physical activity by taking the stairs instead of the elevator or parking farther away from stores  Ask your healthcare provider about the best exercise plan for you  © Copyright [employer] 2018 Information is for End User's use only and may not be sold, redistributed or otherwise used for commercial purposes   All illustrations and images included in CareNotes® are the copyrighted property of [employer]  or [address] [place] [address]

## 2022-06-22 NOTE — PROGRESS NOTES
Assessment and Plan:     Problem List Items Addressed This Visit    None     Visit Diagnoses     Screening for colon cancer    -  Primary    Relevant Orders    Ambulatory referral for colonoscopy    Medicare annual wellness visit, subsequent        Encounter for screening colonoscopy        Relevant Orders    Ambulatory referral for colonoscopy           Preventive health issues were discussed with patient, and age appropriate screening tests were ordered as noted in patient's After Visit Summary  Personalized health advice and appropriate referrals for health education or preventive services given if needed, as noted in patient's After Visit Summary       History of Present Illness:     Patient presents for a Medicare Wellness Visit    HPI   Patient Care Team:  Tapan Gannon as PCP - General (Family Medicine)     Review of Systems:     Review of Systems     Problem List:     Patient Active Problem List   Diagnosis    Mild persistent asthma without complication    Essential hypertension    Generalized anxiety disorder    Mixed hyperlipidemia    Osteopenia    Current mild episode of major depressive disorder without prior episode (HCC)    Pes anserinus bursitis of left knee    Chronic sinusitis    Nasal congestion      Past Medical and Surgical History:     Past Medical History:   Diagnosis Date    Asthma     C  difficile enteritis     History of acute respiratory failure     Hypertension     Pneumonia     Respiratory disorder 2013    Tendinitis of right shoulder 4/9/2019     Past Surgical History:   Procedure Laterality Date    BRONCHOSCOPY      FRACTURE SURGERY      NASAL SINUS SURGERY Bilateral 09/2017    SINUS SURGERY      TUBAL LIGATION      WISDOM TOOTH EXTRACTION        Family History:     Family History   Problem Relation Age of Onset    Heart disease Father     Heart failure Father     Diabetes Father     Rectal cancer Father     Heart disease Sister     Heart disease Brother     Aortic aneurysm Brother     Sudden death Brother     Cirrhosis Mother     Colon cancer Paternal Grandmother     No Known Problems Daughter     Lung cancer Paternal Uncle     No Known Problems Daughter     No Known Problems Daughter     No Known Problems Maternal Aunt     No Known Problems Maternal Aunt     No Known Problems Maternal Aunt     No Known Problems Paternal Aunt     No Known Problems Paternal Aunt     No Known Problems Maternal Grandmother     No Known Problems Maternal Grandfather     No Known Problems Paternal Grandfather     Throat cancer Paternal Uncle     Hypertension Brother       Social History:     Social History     Socioeconomic History    Marital status: /Civil Union     Spouse name: None    Number of children: None    Years of education: None    Highest education level: None   Occupational History    None   Tobacco Use    Smoking status: Never Smoker    Smokeless tobacco: Never Used   Vaping Use    Vaping Use: Never used   Substance and Sexual Activity    Alcohol use: Yes     Comment: soically    Drug use: No    Sexual activity: None   Other Topics Concern    None   Social History Narrative    None     Social Determinants of Health     Financial Resource Strain: Not on file   Food Insecurity: Not on file   Transportation Needs: Not on file   Physical Activity: Not on file   Stress: Not on file   Social Connections: Not on file   Intimate Partner Violence: Not on file   Housing Stability: Not on file      Medications and Allergies:     Current Outpatient Medications   Medication Sig Dispense Refill    albuterol (PROVENTIL HFA,VENTOLIN HFA) 90 mcg/act inhaler Inhale 2 puffs every 6 (six) hours as needed for wheezing 9 g 1    ALPRAZolam (XANAX) 0 5 mg tablet       aspirin 81 MG tablet Take 81 mg by mouth      Diclofenac Sodium (VOLTAREN) 1 % Apply 2 g topically 4 (four) times a day      escitalopram (LEXAPRO) 10 mg tablet TAKE 1 TABLET BY MOUTH EVERY DAY 30 tablet 0    losartan (COZAAR) 50 mg tablet TAKE 1 TABLET BY MOUTH EVERY DAY 30 tablet 1    Mupirocin POWD Inhale 30 mg 2 (two) times a day      NAPROXEN  MG EC tablet       pravastatin (PRAVACHOL) 80 mg tablet Take 0 5 tablets (40 mg total) by mouth daily 90 tablet 1    Sodium Chloride 7 % NEBU       fluticasone (Flovent HFA) 110 MCG/ACT inhaler Inhale 2 puffs 2 (two) times a day Rinse mouth after use  (Patient not taking: Reported on 6/22/2022) 36 g 0     No current facility-administered medications for this visit       Allergies   Allergen Reactions    Succinylcholine GI Intolerance     Prolonged apnea      Amoxicillin-Pot Clavulanate Rash and Hives    Azithromycin Rash and Hives    Clarithromycin Vomiting, Nausea Only, Rash and Hives      Immunizations:     Immunization History   Administered Date(s) Administered    COVID-19 PFIZER VACCINE 0 3 ML IM 02/24/2021, 03/17/2021    H1N1, All Formulations 11/21/2009    INFLUENZA 08/01/2014, 10/21/2021, 10/21/2021    Influenza, high dose seasonal 0 7 mL 01/06/2020, 10/26/2020    Influenza, seasonal, injectable 11/27/2017    Pneumococcal Conjugate 13-Valent 10/15/2015    Pneumococcal Polysaccharide PPV23 08/01/2014, 08/27/2014    Tuberculin Skin Test-PPD Intradermal 10/23/2020      Health Maintenance:         Topic Date Due    Colorectal Cancer Screening  03/19/2020    Breast Cancer Screening: Mammogram  09/30/2022    Hepatitis C Screening  Completed         Topic Date Due    DTaP,Tdap,and Td Vaccines (1 - Tdap) Never done    COVID-19 Vaccine (3 - Booster for Pfizer series) 08/17/2021      Medicare Screening Tests and Risk Assessments:     Annual Wellness Visit   Visual Acuity Screening    Right eye Left eye Both eyes   Without correction: 20/50 20/40 20/30   With correction:           Physical Exam:     /90   Pulse 83   Temp (!) 97 2 °F (36 2 °C)   Ht 5' (1 524 m)   Wt 67 9 kg (149 lb 9 6 oz)   BMI 29 22 kg/m² Physical Exam     Jordana William

## 2022-06-22 NOTE — PROGRESS NOTES
"Chief Complaint   Patient presents with     RECHECK     Here today fro hemangioma follow up      BP (!) 86/51 (BP Location: Left arm, Patient Position: Sitting, Cuff Size: Child)  Pulse 88  Ht 2' 4.86\" (73.3 cm)  Wt 20 lb 11.6 oz (9.4 kg)  BMI 17.5 kg/m2  Sarah Carroll LPN    " Assessment/Plan:    Essential hypertension  BP well controlled  Continue on current regimen  F/U in 6 months  Mild persistent asthma without complication  Not using ICS or rescue inhaler  Cough has gone away with switch from lisinopril to losartan  Current mild episode of major depressive disorder without prior episode (HCC)  Mood is controlled  Continue on current regimen  Call with worsening mood  F/U in 6 months  Generalized anxiety disorder  Mood is controlled  Continue on current regimen  Call with worsening mood  F/U in 6 months  Mixed hyperlipidemia  FLP is well controlled  Continue on current statin dose  Recheck in 6 months  F/U in 6 months/      Gastroesophageal reflux disease  We discussed trigger avoidance at length  Will start pepcid  F/U in 6 months  Call if symptoms do not improve  Diagnoses and all orders for this visit:    Medicare annual wellness visit, subsequent    Essential hypertension  -     CBC and differential; Future  -     Comprehensive metabolic panel; Future  -     CBC and differential  -     Comprehensive metabolic panel    Mild persistent asthma without complication    Current mild episode of major depressive disorder without prior episode (HCC)    Generalized anxiety disorder    Mixed hyperlipidemia  -     CBC and differential; Future  -     Comprehensive metabolic panel; Future  -     Lipid panel; Future  -     CBC and differential  -     Comprehensive metabolic panel  -     Lipid panel    Gastroesophageal reflux disease, unspecified whether esophagitis present  -     famotidine (PEPCID) 20 mg tablet; Take 1 tablet (20 mg total) by mouth 2 (two) times a day    Constipation, unspecified constipation type  Comments:  Poor diet and water intake  Discussed increasing fiber rich foods and goal of 60 oz water/day and exercise  Screening for colon cancer  -     Ambulatory referral for colonoscopy;  Future    Encounter for screening colonoscopy  -     Ambulatory referral for colonoscopy; Future          Subjective:      Patient ID: Kayleigh Skelton is a 66 y o  female  Pt presents for follow up appointment  Only concern at this time is indigestion and heartburn  Pt reports that while eating pasta within the last month she experienced reflux to the point of vomiting  Pt denies throat pain, difficulty swallowing, blood in her vomit  Pt reports she eats a lot of spicy food and drinks one cup of coffee daily  Only drinks alcohol socially  Reports she experiences symptoms a few times a week  Reports she had indigestion before even eating this morning  Pt denies waking up at night due to symptoms, reports it is typically worse when she is waking up  Patient has not taken any medication for her symptoms  Pt also reports constipation  Pt will regularly go 3 days between bowel movements  Reports this is not new, but has been going on for years now  Does not take any medication, believes it is likely related to dehydration  Pt reports her mood has been stable  Denies chest pain, SOB, palpitations  Patient reports no formal exercise, eats junk food frequently  Plans to start eating more fiber, fruits, and vegetables  Patient reports that in the morning she sneezes frequently, still using medicated sinus rhythm  Pt reports she has not been using either inhaler  The following portions of the patient's history were reviewed and updated as appropriate: allergies, current medications, past family history, past medical history, past social history, past surgical history and problem list     Review of Systems   Constitutional: Negative for chills, fever and unexpected weight change  HENT: Positive for sneezing  Eyes: Negative for visual disturbance  Respiratory: Negative for cough, chest tightness, shortness of breath and wheezing  Cardiovascular: Negative for chest pain, palpitations and leg swelling     Gastrointestinal: Positive for constipation  Negative for abdominal distention, abdominal pain, blood in stool, diarrhea, nausea and vomiting  Heartburn   Neurological: Negative for dizziness, syncope, light-headedness and headaches  Psychiatric/Behavioral: Positive for sleep disturbance  Negative for dysphoric mood and suicidal ideas  The patient is not nervous/anxious  Objective:  Vitals:    06/22/22 1036   BP: 132/90   Pulse: 83   Temp: (!) 97 2 °F (36 2 °C)      Physical Exam  Vitals reviewed  Constitutional:       Appearance: Normal appearance  Neck:      Vascular: No carotid bruit  Cardiovascular:      Rate and Rhythm: Normal rate and regular rhythm  Heart sounds: Normal heart sounds  No murmur heard  Pulmonary:      Effort: Pulmonary effort is normal       Breath sounds: Normal breath sounds  Abdominal:      General: Abdomen is flat  Bowel sounds are normal       Palpations: Abdomen is soft  There is no hepatomegaly or splenomegaly  Tenderness: There is no abdominal tenderness  Skin:     General: Skin is warm and dry  Neurological:      Mental Status: She is alert and oriented to person, place, and time  Psychiatric:         Mood and Affect: Mood normal          Behavior: Behavior normal          Thought Content:  Thought content normal          Judgment: Judgment normal

## 2022-06-22 NOTE — PROGRESS NOTES
Assessment and Plan:     Problem List Items Addressed This Visit        Digestive    Gastroesophageal reflux disease     We discussed trigger avoidance at length  Will start pepcid  F/U in 6 months  Call if symptoms do not improve  Relevant Medications    famotidine (PEPCID) 20 mg tablet       Respiratory    Mild persistent asthma without complication     Not using ICS or rescue inhaler  Cough has gone away with switch from lisinopril to losartan  Cardiovascular and Mediastinum    Essential hypertension     BP well controlled  Continue on current regimen  F/U in 6 months  Relevant Orders    CBC and differential    Comprehensive metabolic panel       Other    Generalized anxiety disorder     Mood is controlled  Continue on current regimen  Call with worsening mood  F/U in 6 months  Mixed hyperlipidemia     FLP is well controlled  Continue on current statin dose  Recheck in 6 months  F/U in 6 months/             Relevant Orders    CBC and differential    Comprehensive metabolic panel    Lipid panel    Current mild episode of major depressive disorder without prior episode (HCC)     Mood is controlled  Continue on current regimen  Call with worsening mood  F/U in 6 months  Other Visit Diagnoses     Medicare annual wellness visit, subsequent    -  Primary    Constipation, unspecified constipation type        Poor diet and water intake  Discussed increasing fiber rich foods and goal of 60 oz water/day and exercise  Screening for colon cancer        Relevant Orders    Ambulatory referral for colonoscopy    Encounter for screening colonoscopy        Relevant Orders    Ambulatory referral for colonoscopy        BMI Counseling: Body mass index is 29 22 kg/m²   The BMI is above normal  Nutrition recommendations include decreasing portion sizes, encouraging healthy choices of fruits and vegetables, decreasing fast food intake, consuming healthier snacks, limiting drinks that contain sugar, moderation in carbohydrate intake and increasing intake of lean protein  Exercise recommendations include moderate physical activity 150 minutes/week  No pharmacotherapy was ordered  Rationale for BMI follow-up plan is due to patient being overweight or obese  Preventive health issues were discussed with patient, and age appropriate screening tests were ordered as noted in patient's After Visit Summary  Personalized health advice and appropriate referrals for health education or preventive services given if needed, as noted in patient's After Visit Summary       History of Present Illness:     Patient presents for a Medicare Wellness Visit      Patient Care Team:  Tom Newell as PCP - General (Family Medicine)     Review of Systems:          Problem List:     Patient Active Problem List   Diagnosis    Mild persistent asthma without complication    Essential hypertension    Generalized anxiety disorder    Mixed hyperlipidemia    Gastroesophageal reflux disease    Osteopenia    Current mild episode of major depressive disorder without prior episode ([facility])    Pes anserinus bursitis of left knee    Chronic sinusitis    Nasal congestion      Past Medical and Surgical History:     Past Medical History:   Diagnosis Date    Asthma     C  difficile enteritis     History of acute respiratory failure     Hypertension     Pneumonia     Respiratory disorder 2013    Tendinitis of right shoulder 4/9/2019     Past Surgical History:   Procedure Laterality Date    BRONCHOSCOPY      FRACTURE SURGERY      NASAL SINUS SURGERY Bilateral 09/2017    SINUS SURGERY      TUBAL LIGATION      WISDOM TOOTH EXTRACTION        Family History:     Family History   Problem Relation Age of Onset    Heart disease Father     Heart failure Father     Diabetes Father     Rectal cancer Father     Heart disease Sister     Heart disease Brother    Kajal Draper Aortic aneurysm Brother     Sudden death Brother     Cirrhosis Mother     Colon cancer Paternal Grandmother     No Known Problems Daughter     Lung cancer Paternal Uncle     No Known Problems Daughter     No Known Problems Daughter     No Known Problems Maternal Aunt     No Known Problems Maternal Aunt     No Known Problems Maternal Aunt     No Known Problems Paternal Aunt     No Known Problems Paternal Aunt     No Known Problems Maternal Grandmother     No Known Problems Maternal Grandfather     No Known Problems Paternal Grandfather     Throat cancer Paternal Uncle     Hypertension Brother       Social History:     Social History     Socioeconomic History    Marital status: /Civil Union     Spouse name: None    Number of children: None    Years of education: None    Highest education level: None   Occupational History    None   Tobacco Use    Smoking status: Never Smoker    Smokeless tobacco: Never Used   Vaping Use    Vaping Use: Never used   Substance and Sexual Activity    Alcohol use: Yes     Comment: soically    Drug use: No    Sexual activity: None   Other Topics Concern    None   Social History Narrative    None     Social Determinants of Health     Financial Resource Strain: Not on file   Food Insecurity: Not on file   Transportation Needs: Not on file   Physical Activity: Not on file   Stress: Not on file   Social Connections: Not on file   Intimate Partner Violence: Not on file   Housing Stability: Not on file      Medications and Allergies:     Current Outpatient Medications   Medication Sig Dispense Refill    albuterol (PROVENTIL HFA,VENTOLIN HFA) 90 mcg/act inhaler Inhale 2 puffs every 6 (six) hours as needed for wheezing 9 g 1    ALPRAZolam (XANAX) 0 5 mg tablet       aspirin 81 MG tablet Take 81 mg by mouth      Diclofenac Sodium (VOLTAREN) 1 % Apply 2 g topically 4 (four) times a day      escitalopram (LEXAPRO) 10 mg tablet TAKE 1 TABLET BY MOUTH EVERY DAY 30 tablet 0    famotidine (PEPCID) 20 mg tablet Take 1 tablet (20 mg total) by mouth 2 (two) times a day 60 tablet 5    losartan (COZAAR) 50 mg tablet TAKE 1 TABLET BY MOUTH EVERY DAY 30 tablet 1    Mupirocin POWD Inhale 30 mg 2 (two) times a day      NAPROXEN  MG EC tablet       pravastatin (PRAVACHOL) 80 mg tablet Take 0 5 tablets (40 mg total) by mouth daily 90 tablet 1    Sodium Chloride 7 % NEBU       fluticasone (Flovent HFA) 110 MCG/ACT inhaler Inhale 2 puffs 2 (two) times a day Rinse mouth after use  (Patient not taking: Reported on 6/22/2022) 36 g 0     No current facility-administered medications for this visit  Allergies   Allergen Reactions    Succinylcholine GI Intolerance     Prolonged apnea      Amoxicillin-Pot Clavulanate Rash and Hives    Azithromycin Rash and Hives    Clarithromycin Vomiting, Nausea Only, Rash and Hives      Immunizations:     Immunization History   Administered Date(s) Administered    COVID-19 PFIZER VACCINE 0 3 ML IM 02/24/2021, 03/17/2021    H1N1, All Formulations 11/21/2009    INFLUENZA 08/01/2014, 10/21/2021, 10/21/2021    Influenza, high dose seasonal 0 7 mL 01/06/2020, 10/26/2020    Influenza, seasonal, injectable 11/27/2017    Pneumococcal Conjugate 13-Valent 10/15/2015    Pneumococcal Polysaccharide PPV23 08/01/2014, 08/27/2014    Tuberculin Skin Test-PPD Intradermal 10/23/2020      Health Maintenance:         Topic Date Due    Colorectal Cancer Screening  03/19/2020    Breast Cancer Screening: Mammogram  09/30/2022    Hepatitis C Screening  Completed         Topic Date Due    DTaP,Tdap,and Td Vaccines (1 - Tdap) Never done    COVID-19 Vaccine (3 - Booster for Carlson Peter series) 08/17/2021      Medicare Screening Tests and Risk Assessments:     Shiela Ramey is here for her Subsequent Wellness visit  Health Risk Assessment:   Patient rates overall health as good  Patient feels that their physical health rating is same   Patient is very satisfied with their life  Eyesight was rated as same  Hearing was rated as same  Patient feels that their emotional and mental health rating is same  Patients states they are never, rarely angry  Patient states they are never, rarely unusually tired/fatigued  Pain experienced in the last 7 days has been none  Patient states that she has experienced no weight loss or gain in last 6 months  Depression Screening:   PHQ-9 Score: 6      Fall Risk Screening: In the past year, patient has experienced: no history of falling in past year      Urinary Incontinence Screening:   Patient has not leaked urine accidently in the last six months  Home Safety:  Patient does not have trouble with stairs inside or outside of their home  Patient has working smoke alarms and has working carbon monoxide detector  Home safety hazards include: none  Nutrition:   Current diet is Regular  Medications:   Patient is not currently taking any over-the-counter supplements  Patient is able to manage medications  Activities of Daily Living (ADLs)/Instrumental Activities of Daily Living (IADLs):   Walk and transfer into and out of bed and chair?: Yes  Dress and groom yourself?: Yes    Bathe or shower yourself?: Yes    Feed yourself?  Yes  Do your laundry/housekeeping?: Yes  Manage your money, pay your bills and track your expenses?: Yes  Make your own meals?: Yes    Do your own shopping?: Yes    Previous Hospitalizations:   Any hospitalizations or ED visits within the last 12 months?: Yes    How many hospitalizations have you had in the last year?: 1-2    Advance Care Planning:   Living will: No    Durable POA for healthcare: No    Advanced directive: No    Five wishes given: Yes      PREVENTIVE SCREENINGS      Cardiovascular Screening:    General: Screening Not Indicated and History Lipid Disorder      Diabetes Screening:     General: Screening Current      Colorectal Cancer Screening:     General: Screening Current    Due for: Colonoscopy - High Risk      Breast Cancer Screening:     General: Screening Current      Cervical Cancer Screening:    General: Screening Not Indicated      Osteoporosis Screening:    General: Screening Current      Abdominal Aortic Aneurysm (AAA) Screening:        General: Screening Not Indicated      Lung Cancer Screening:     General: Screening Not Indicated      Hepatitis C Screening:    General: Screening Current    Screening, Brief Intervention, and Referral to Treatment (SBIRT)    Screening  Typical number of drinks in a day: 0  Typical number of drinks in a week: 0  Interpretation: Low risk drinking behavior      Single Item Drug Screening:  How often have you used an illegal drug (including marijuana) or a prescription medication for non-medical reasons in the past year? never    Single Item Drug Screen Score: 0  Interpretation: Negative screen for possible drug use disorder     Visual Acuity Screening    Right eye Left eye Both eyes   Without correction: 20/50 20/40 20/30   With correction:           Physical Exam:     /90   Pulse 83   Temp (!) 97 2 °F (36 2 °C)   Ht 5' (1 524 m)   Wt 67 9 kg (149 lb 9 6 oz)   BMI 29 22 kg/m²         HANK Anderson

## 2022-07-11 ENCOUNTER — TELEPHONE (OUTPATIENT)
Dept: FAMILY MEDICINE CLINIC | Facility: HOSPITAL | Age: 72
End: 2022-07-11

## 2022-07-18 ENCOUNTER — OFFICE VISIT (OUTPATIENT)
Dept: FAMILY MEDICINE CLINIC | Facility: HOSPITAL | Age: 72
End: 2022-07-18
Payer: COMMERCIAL

## 2022-07-18 VITALS
HEART RATE: 96 BPM | OXYGEN SATURATION: 98 % | HEIGHT: 60 IN | SYSTOLIC BLOOD PRESSURE: 144 MMHG | BODY MASS INDEX: 30.08 KG/M2 | WEIGHT: 153.2 LBS | DIASTOLIC BLOOD PRESSURE: 88 MMHG | TEMPERATURE: 97.6 F

## 2022-07-18 DIAGNOSIS — I10 ESSENTIAL HYPERTENSION: Primary | ICD-10-CM

## 2022-07-18 DIAGNOSIS — F32.0 CURRENT MILD EPISODE OF MAJOR DEPRESSIVE DISORDER WITHOUT PRIOR EPISODE (HCC): ICD-10-CM

## 2022-07-18 PROCEDURE — 3079F DIAST BP 80-89 MM HG: CPT | Performed by: NURSE PRACTITIONER

## 2022-07-18 PROCEDURE — 3077F SYST BP >= 140 MM HG: CPT | Performed by: NURSE PRACTITIONER

## 2022-07-18 PROCEDURE — 99213 OFFICE O/P EST LOW 20 MIN: CPT | Performed by: NURSE PRACTITIONER

## 2022-07-18 PROCEDURE — 1160F RVW MEDS BY RX/DR IN RCRD: CPT | Performed by: NURSE PRACTITIONER

## 2022-07-18 RX ORDER — ESCITALOPRAM OXALATE 10 MG/1
TABLET ORAL
Qty: 30 TABLET | Refills: 0 | Status: SHIPPED | OUTPATIENT
Start: 2022-07-18 | End: 2022-08-14

## 2022-07-18 RX ORDER — AMOXICILLIN AND CLAVULANATE POTASSIUM 875; 125 MG/1; MG/1
TABLET, FILM COATED ORAL
COMMUNITY
Start: 2022-07-11 | End: 2022-08-17

## 2022-07-18 RX ORDER — PREDNISONE 10 MG/1
TABLET ORAL
COMMUNITY
Start: 2022-07-11 | End: 2022-08-17

## 2022-07-18 NOTE — ASSESSMENT & PLAN NOTE
Doing well on 100 mg losartan  Continue on this dose  Will call when in need of refill  Plan to f/u in December as scheduled

## 2022-07-18 NOTE — PROGRESS NOTES
Assessment/Plan:    Essential hypertension  Doing well on 100 mg losartan  Continue on this dose  Will call when in need of refill  Plan to f/u in December as scheduled  Diagnoses and all orders for this visit:    Essential hypertension    Other orders  -     amoxicillin-clavulanate (AUGMENTIN) 875-125 mg per tablet; TAKE 1 TABLET BY MOUTH EVERY 12 HOURS FOR 10 DAYS  -     predniSONE 10 mg tablet; TAKE 3 TABLETS BY MOUTH FOR 3 DAYS, THEN 2 TAB FOR NEXT 3 DAYS, THEN 1 TAB FOR NEXT 3 DAYS          Subjective:      Patient ID: Jamila Avila is a 66 y o  female  Pt presents for an urgent care follow up  Went initially a week ago with sinus infection concerns and was told her BP was very high (213/122)  Pt reports she had no cardiac or neuro symptoms at the time  Reports they did a CT of her head at the time and gave her nitro in order to bring her BP down  On the 12th as a result, losartan was increased from 50 to 100 mg  Pt reports that home blood pressures since that change have been 167'D systolic  Pt denies lightheadedness, dizziness  Is currently taking prednisone for a diagnosed sinus infection  No concerns at this time  Denies chest pain, SOB, palpitations  Denies any other neuro symptoms  Reports the antibiotic and steroids are clearing up her sinus infection  The following portions of the patient's history were reviewed and updated as appropriate: allergies, current medications, past family history, past medical history, past social history, past surgical history and problem list     Review of Systems   Constitutional: Negative for fatigue and unexpected weight change  Eyes: Negative for visual disturbance  Respiratory: Negative for shortness of breath  Cardiovascular: Negative for chest pain, palpitations and leg swelling  Neurological: Negative for dizziness, light-headedness and headaches           Objective:  Vitals:    07/18/22 1546   BP: 144/88   Pulse: 96   Temp: 97 6 °F (36 4 °C)   SpO2: 98%      Physical Exam  Vitals reviewed  Constitutional:       Appearance: Normal appearance  Neck:      Vascular: No carotid bruit  Cardiovascular:      Rate and Rhythm: Normal rate and regular rhythm  Heart sounds: Normal heart sounds  No murmur heard  Pulmonary:      Effort: Pulmonary effort is normal       Breath sounds: Normal breath sounds  Skin:     General: Skin is warm and dry  Neurological:      Mental Status: She is alert and oriented to person, place, and time  Psychiatric:         Mood and Affect: Mood normal          Behavior: Behavior normal          Thought Content:  Thought content normal          Judgment: Judgment normal

## 2022-08-09 DIAGNOSIS — I10 ESSENTIAL HYPERTENSION: ICD-10-CM

## 2022-08-09 RX ORDER — LOSARTAN POTASSIUM 100 MG/1
50 TABLET ORAL DAILY
Qty: 30 TABLET | Refills: 2 | Status: SHIPPED | OUTPATIENT
Start: 2022-08-09 | End: 2022-08-09 | Stop reason: SDUPTHER

## 2022-08-09 RX ORDER — LOSARTAN POTASSIUM 50 MG/1
TABLET ORAL
Qty: 30 TABLET | Refills: 1 | Status: SHIPPED | OUTPATIENT
Start: 2022-08-09 | End: 2022-08-09 | Stop reason: SDUPTHER

## 2022-08-09 RX ORDER — LOSARTAN POTASSIUM 100 MG/1
TABLET ORAL
Qty: 30 TABLET | Refills: 2 | Status: SHIPPED | OUTPATIENT
Start: 2022-08-09 | End: 2022-10-26

## 2022-08-14 DIAGNOSIS — F32.0 CURRENT MILD EPISODE OF MAJOR DEPRESSIVE DISORDER WITHOUT PRIOR EPISODE (HCC): ICD-10-CM

## 2022-08-14 RX ORDER — ESCITALOPRAM OXALATE 10 MG/1
TABLET ORAL
Qty: 30 TABLET | Refills: 0 | Status: SHIPPED | OUTPATIENT
Start: 2022-08-14 | End: 2022-09-05

## 2022-08-16 ENCOUNTER — APPOINTMENT (OUTPATIENT)
Dept: RADIOLOGY | Facility: HOSPITAL | Age: 72
End: 2022-08-16
Payer: COMMERCIAL

## 2022-08-16 ENCOUNTER — APPOINTMENT (EMERGENCY)
Dept: CT IMAGING | Facility: HOSPITAL | Age: 72
End: 2022-08-16
Payer: COMMERCIAL

## 2022-08-16 ENCOUNTER — HOSPITAL ENCOUNTER (OUTPATIENT)
Facility: HOSPITAL | Age: 72
Setting detail: OBSERVATION
Discharge: HOME | End: 2022-08-17
Attending: EMERGENCY MEDICINE | Admitting: HOSPITALIST
Payer: COMMERCIAL

## 2022-08-16 DIAGNOSIS — R27.0 ATAXIA: ICD-10-CM

## 2022-08-16 DIAGNOSIS — R42 DIZZINESS: ICD-10-CM

## 2022-08-16 DIAGNOSIS — R42 VERTIGO: Primary | ICD-10-CM

## 2022-08-16 LAB
ALBUMIN SERPL BCP-MCNC: 3.5 G/DL (ref 3.5–5)
ALP SERPL-CCNC: 75 U/L (ref 46–116)
ALT SERPL W P-5'-P-CCNC: 16 U/L (ref 12–78)
ANION GAP SERPL CALCULATED.3IONS-SCNC: 8 MMOL/L (ref 4–13)
APTT PPP: 31 SECONDS (ref 23–37)
AST SERPL W P-5'-P-CCNC: 15 U/L (ref 5–45)
BASOPHILS # BLD AUTO: 0.04 THOUSANDS/ΜL (ref 0–0.1)
BASOPHILS NFR BLD AUTO: 1 % (ref 0–1)
BILIRUB SERPL-MCNC: 0.2 MG/DL (ref 0.2–1)
BILIRUB UR QL STRIP: NEGATIVE
BUN SERPL-MCNC: 20 MG/DL (ref 5–25)
CALCIUM SERPL-MCNC: 9 MG/DL (ref 8.3–10.1)
CARDIAC TROPONIN I PNL SERPL HS: <2 NG/L
CARDIAC TROPONIN I PNL SERPL HS: <2 NG/L
CHLORIDE SERPL-SCNC: 107 MMOL/L (ref 96–108)
CLARITY UR: CLEAR
CO2 SERPL-SCNC: 27 MMOL/L (ref 21–32)
COLOR UR: YELLOW
CREAT SERPL-MCNC: 0.84 MG/DL (ref 0.6–1.3)
EOSINOPHIL # BLD AUTO: 0.21 THOUSAND/ΜL (ref 0–0.61)
EOSINOPHIL NFR BLD AUTO: 5 % (ref 0–6)
ERYTHROCYTE [DISTWIDTH] IN BLOOD BY AUTOMATED COUNT: 14 % (ref 11.6–15.1)
GFR SERPL CREATININE-BSD FRML MDRD: 69 ML/MIN/1.73SQ M
GLUCOSE SERPL-MCNC: 103 MG/DL (ref 65–140)
GLUCOSE SERPL-MCNC: 107 MG/DL (ref 65–140)
GLUCOSE UR STRIP-MCNC: NEGATIVE MG/DL
HCT VFR BLD AUTO: 42.7 % (ref 34.8–46.1)
HGB BLD-MCNC: 13.7 G/DL (ref 11.5–15.4)
HGB UR QL STRIP.AUTO: NEGATIVE
IMM GRANULOCYTES # BLD AUTO: 0 THOUSAND/UL (ref 0–0.2)
IMM GRANULOCYTES NFR BLD AUTO: 0 % (ref 0–2)
INR PPP: 0.89 (ref 0.84–1.19)
KETONES UR STRIP-MCNC: NEGATIVE MG/DL
LEUKOCYTE ESTERASE UR QL STRIP: NEGATIVE
LYMPHOCYTES # BLD AUTO: 1.91 THOUSANDS/ΜL (ref 0.6–4.47)
LYMPHOCYTES NFR BLD AUTO: 41 % (ref 14–44)
MAGNESIUM SERPL-MCNC: 2.3 MG/DL (ref 1.6–2.6)
MCH RBC QN AUTO: 29.3 PG (ref 26.8–34.3)
MCHC RBC AUTO-ENTMCNC: 32.1 G/DL (ref 31.4–37.4)
MCV RBC AUTO: 91 FL (ref 82–98)
MONOCYTES # BLD AUTO: 0.56 THOUSAND/ΜL (ref 0.17–1.22)
MONOCYTES NFR BLD AUTO: 12 % (ref 4–12)
NEUTROPHILS # BLD AUTO: 1.95 THOUSANDS/ΜL (ref 1.85–7.62)
NEUTS SEG NFR BLD AUTO: 41 % (ref 43–75)
NITRITE UR QL STRIP: NEGATIVE
NRBC BLD AUTO-RTO: 0 /100 WBCS
PH UR STRIP.AUTO: 6 [PH]
PLATELET # BLD AUTO: 237 THOUSANDS/UL (ref 149–390)
PMV BLD AUTO: 8.9 FL (ref 8.9–12.7)
POTASSIUM SERPL-SCNC: 4.6 MMOL/L (ref 3.5–5.3)
PROT SERPL-MCNC: 7.5 G/DL (ref 6.4–8.4)
PROT UR STRIP-MCNC: NEGATIVE MG/DL
PROTHROMBIN TIME: 12.7 SECONDS (ref 11.6–14.5)
RBC # BLD AUTO: 4.67 MILLION/UL (ref 3.81–5.12)
SODIUM SERPL-SCNC: 142 MMOL/L (ref 135–147)
SP GR UR STRIP.AUTO: 1.01 (ref 1–1.03)
UROBILINOGEN UR QL STRIP.AUTO: 0.2 E.U./DL
WBC # BLD AUTO: 4.67 THOUSAND/UL (ref 4.31–10.16)

## 2022-08-16 PROCEDURE — 99214 OFFICE O/P EST MOD 30 MIN: CPT | Performed by: PHYSICIAN ASSISTANT

## 2022-08-16 PROCEDURE — 85025 COMPLETE CBC W/AUTO DIFF WBC: CPT

## 2022-08-16 PROCEDURE — 82948 REAGENT STRIP/BLOOD GLUCOSE: CPT

## 2022-08-16 PROCEDURE — 70498 CT ANGIOGRAPHY NECK: CPT

## 2022-08-16 PROCEDURE — 81003 URINALYSIS AUTO W/O SCOPE: CPT | Performed by: EMERGENCY MEDICINE

## 2022-08-16 PROCEDURE — 36415 COLL VENOUS BLD VENIPUNCTURE: CPT

## 2022-08-16 PROCEDURE — 93005 ELECTROCARDIOGRAM TRACING: CPT

## 2022-08-16 PROCEDURE — 70496 CT ANGIOGRAPHY HEAD: CPT

## 2022-08-16 PROCEDURE — 85610 PROTHROMBIN TIME: CPT | Performed by: EMERGENCY MEDICINE

## 2022-08-16 PROCEDURE — 71045 X-RAY EXAM CHEST 1 VIEW: CPT

## 2022-08-16 PROCEDURE — 80053 COMPREHEN METABOLIC PANEL: CPT

## 2022-08-16 PROCEDURE — 99285 EMERGENCY DEPT VISIT HI MDM: CPT

## 2022-08-16 PROCEDURE — 99285 EMERGENCY DEPT VISIT HI MDM: CPT | Performed by: EMERGENCY MEDICINE

## 2022-08-16 PROCEDURE — 85730 THROMBOPLASTIN TIME PARTIAL: CPT | Performed by: EMERGENCY MEDICINE

## 2022-08-16 PROCEDURE — 83735 ASSAY OF MAGNESIUM: CPT | Performed by: EMERGENCY MEDICINE

## 2022-08-16 PROCEDURE — 99219 PR INITIAL OBSERVATION CARE/DAY 50 MINUTES: CPT | Performed by: PHYSICIAN ASSISTANT

## 2022-08-16 PROCEDURE — G1004 CDSM NDSC: HCPCS

## 2022-08-16 PROCEDURE — 84484 ASSAY OF TROPONIN QUANT: CPT | Performed by: EMERGENCY MEDICINE

## 2022-08-16 RX ADMIN — IOHEXOL 65 ML: 350 INJECTION, SOLUTION INTRAVENOUS at 22:17

## 2022-08-17 ENCOUNTER — APPOINTMENT (OUTPATIENT)
Dept: MRI IMAGING | Facility: HOSPITAL | Age: 72
End: 2022-08-17
Payer: COMMERCIAL

## 2022-08-17 ENCOUNTER — TRANSITIONAL CARE MANAGEMENT (OUTPATIENT)
Dept: FAMILY MEDICINE CLINIC | Facility: HOSPITAL | Age: 72
End: 2022-08-17

## 2022-08-17 ENCOUNTER — APPOINTMENT (OUTPATIENT)
Dept: NON INVASIVE DIAGNOSTICS | Facility: HOSPITAL | Age: 72
End: 2022-08-17
Payer: COMMERCIAL

## 2022-08-17 VITALS
OXYGEN SATURATION: 95 % | DIASTOLIC BLOOD PRESSURE: 82 MMHG | BODY MASS INDEX: 29.45 KG/M2 | RESPIRATION RATE: 16 BRPM | TEMPERATURE: 98.2 F | SYSTOLIC BLOOD PRESSURE: 146 MMHG | WEIGHT: 150 LBS | HEIGHT: 60 IN | HEART RATE: 71 BPM

## 2022-08-17 PROBLEM — K21.9 GASTROESOPHAGEAL REFLUX DISEASE: Status: RESOLVED | Noted: 2018-02-28 | Resolved: 2022-08-17

## 2022-08-17 PROBLEM — R42 DIZZINESS: Status: ACTIVE | Noted: 2022-01-18

## 2022-08-17 PROBLEM — R42 DIZZINESS: Chronic | Status: ACTIVE | Noted: 2022-01-18

## 2022-08-17 LAB
CHOLEST SERPL-MCNC: 122 MG/DL
EST. AVERAGE GLUCOSE BLD GHB EST-MCNC: 111 MG/DL
HBA1C MFR BLD: 5.5 %
HDLC SERPL-MCNC: 48 MG/DL
LDLC SERPL CALC-MCNC: 61 MG/DL (ref 0–100)
TRIGL SERPL-MCNC: 63 MG/DL

## 2022-08-17 PROCEDURE — 36415 COLL VENOUS BLD VENIPUNCTURE: CPT | Performed by: PHYSICIAN ASSISTANT

## 2022-08-17 PROCEDURE — 99217 PR OBSERVATION CARE DISCHARGE MANAGEMENT: CPT | Performed by: HOSPITALIST

## 2022-08-17 PROCEDURE — 83036 HEMOGLOBIN GLYCOSYLATED A1C: CPT | Performed by: PHYSICIAN ASSISTANT

## 2022-08-17 PROCEDURE — 70551 MRI BRAIN STEM W/O DYE: CPT

## 2022-08-17 PROCEDURE — 80061 LIPID PANEL: CPT | Performed by: PHYSICIAN ASSISTANT

## 2022-08-17 PROCEDURE — 97530 THERAPEUTIC ACTIVITIES: CPT

## 2022-08-17 PROCEDURE — 97162 PT EVAL MOD COMPLEX 30 MIN: CPT

## 2022-08-17 PROCEDURE — 99204 OFFICE O/P NEW MOD 45 MIN: CPT | Performed by: PSYCHIATRY & NEUROLOGY

## 2022-08-17 PROCEDURE — G1004 CDSM NDSC: HCPCS

## 2022-08-17 RX ORDER — LOPERAMIDE HYDROCHLORIDE 2 MG/1
2 CAPSULE ORAL 2 TIMES DAILY PRN
Status: DISCONTINUED | OUTPATIENT
Start: 2022-08-17 | End: 2022-08-17 | Stop reason: HOSPADM

## 2022-08-17 RX ORDER — MAGNESIUM HYDROXIDE/ALUMINUM HYDROXICE/SIMETHICONE 120; 1200; 1200 MG/30ML; MG/30ML; MG/30ML
30 SUSPENSION ORAL EVERY 6 HOURS PRN
Status: DISCONTINUED | OUTPATIENT
Start: 2022-08-17 | End: 2022-08-17 | Stop reason: HOSPADM

## 2022-08-17 RX ORDER — LOSARTAN POTASSIUM 50 MG/1
100 TABLET ORAL DAILY
Status: DISCONTINUED | OUTPATIENT
Start: 2022-08-18 | End: 2022-08-17 | Stop reason: HOSPADM

## 2022-08-17 RX ORDER — ASPIRIN 325 MG
325 TABLET ORAL ONCE
Status: COMPLETED | OUTPATIENT
Start: 2022-08-17 | End: 2022-08-17

## 2022-08-17 RX ORDER — SENNOSIDES 8.6 MG
1 TABLET ORAL
Status: DISCONTINUED | OUTPATIENT
Start: 2022-08-17 | End: 2022-08-17 | Stop reason: HOSPADM

## 2022-08-17 RX ORDER — ESCITALOPRAM OXALATE 10 MG/1
10 TABLET ORAL DAILY
Status: DISCONTINUED | OUTPATIENT
Start: 2022-08-17 | End: 2022-08-17 | Stop reason: HOSPADM

## 2022-08-17 RX ORDER — ONDANSETRON 2 MG/ML
4 INJECTION INTRAMUSCULAR; INTRAVENOUS EVERY 6 HOURS PRN
Status: DISCONTINUED | OUTPATIENT
Start: 2022-08-17 | End: 2022-08-17 | Stop reason: HOSPADM

## 2022-08-17 RX ORDER — ASPIRIN 81 MG/1
81 TABLET ORAL ONCE
Status: DISCONTINUED | OUTPATIENT
Start: 2022-08-18 | End: 2022-08-17 | Stop reason: HOSPADM

## 2022-08-17 RX ORDER — HEPARIN SODIUM 5000 [USP'U]/ML
5000 INJECTION, SOLUTION INTRAVENOUS; SUBCUTANEOUS EVERY 8 HOURS SCHEDULED
Status: DISCONTINUED | OUTPATIENT
Start: 2022-08-17 | End: 2022-08-17 | Stop reason: HOSPADM

## 2022-08-17 RX ORDER — FLUTICASONE PROPIONATE 110 UG/1
2 AEROSOL, METERED RESPIRATORY (INHALATION) 2 TIMES DAILY
Status: DISCONTINUED | OUTPATIENT
Start: 2022-08-17 | End: 2022-08-17

## 2022-08-17 RX ORDER — MECLIZINE HCL 12.5 MG/1
25 TABLET ORAL EVERY 8 HOURS SCHEDULED
Status: DISCONTINUED | OUTPATIENT
Start: 2022-08-17 | End: 2022-08-17 | Stop reason: HOSPADM

## 2022-08-17 RX ORDER — ACETAMINOPHEN 325 MG/1
650 TABLET ORAL EVERY 6 HOURS PRN
Status: DISCONTINUED | OUTPATIENT
Start: 2022-08-17 | End: 2022-08-17 | Stop reason: HOSPADM

## 2022-08-17 RX ORDER — MECLIZINE HYDROCHLORIDE 25 MG/1
25 TABLET ORAL EVERY 8 HOURS PRN
Qty: 30 TABLET | Refills: 0 | Status: SHIPPED | OUTPATIENT
Start: 2022-08-17

## 2022-08-17 RX ORDER — FAMOTIDINE 20 MG/1
20 TABLET, FILM COATED ORAL 2 TIMES DAILY
Status: DISCONTINUED | OUTPATIENT
Start: 2022-08-17 | End: 2022-08-17 | Stop reason: HOSPADM

## 2022-08-17 RX ORDER — LANOLIN ALCOHOL/MO/W.PET/CERES
6 CREAM (GRAM) TOPICAL
Status: DISCONTINUED | OUTPATIENT
Start: 2022-08-17 | End: 2022-08-17 | Stop reason: HOSPADM

## 2022-08-17 RX ORDER — ALBUTEROL SULFATE 90 UG/1
2 AEROSOL, METERED RESPIRATORY (INHALATION) EVERY 6 HOURS PRN
Status: DISCONTINUED | OUTPATIENT
Start: 2022-08-17 | End: 2022-08-17 | Stop reason: HOSPADM

## 2022-08-17 RX ORDER — PRAVASTATIN SODIUM 40 MG
40 TABLET ORAL DAILY
Status: DISCONTINUED | OUTPATIENT
Start: 2022-08-17 | End: 2022-08-17 | Stop reason: HOSPADM

## 2022-08-17 RX ADMIN — MECLIZINE 25 MG: 12.5 TABLET ORAL at 05:47

## 2022-08-17 RX ADMIN — ESCITALOPRAM OXALATE 10 MG: 10 TABLET ORAL at 08:28

## 2022-08-17 RX ADMIN — FAMOTIDINE 20 MG: 20 TABLET, FILM COATED ORAL at 08:28

## 2022-08-17 RX ADMIN — HEPARIN SODIUM 5000 UNITS: 5000 INJECTION INTRAVENOUS; SUBCUTANEOUS at 05:48

## 2022-08-17 RX ADMIN — PRAVASTATIN SODIUM 40 MG: 40 TABLET ORAL at 08:29

## 2022-08-17 RX ADMIN — MECLIZINE 25 MG: 12.5 TABLET ORAL at 15:26

## 2022-08-17 RX ADMIN — ASPIRIN 325 MG ORAL TABLET 325 MG: 325 PILL ORAL at 01:51

## 2022-08-17 NOTE — H&P
[facility]  H&P- Katy Ta 1950, 67 y o  female MRN: [id number]  Unit/Bed#: ED 01 Encounter: [id number]  Primary Care Provider: HANK Strange   Date and time admitted to hospital: 8/16/2022  8:17 PM    * Dizziness with ataxia  Assessment & Plan  · Ddx: vertigo vs CVA  · CTA head & neck no abnormality  · MRI brain ordered  · Permissive HTN to 200/120 mmHg  · Neuro consult  · FLP & HgbA1c ordered  · Defer echo to neuro  · Tele  · Alyson meclizine  PT/OT evals ordered    Generalized anxiety disorder  Assessment & Plan  · Stable, Cont home lexapro    Essential hypertension  Assessment & Plan  · Cont home losartan 100 mg qd after permissive HTN phase which would be 8/18 am      VTE Pharmacologic Prophylaxis: VTE Score: 7 High Risk (Score >/= 5) - Pharmacological DVT Prophylaxis Ordered: heparin  Sequential Compression Devices Ordered  Code Status: Level 1 - Full Code   Discussion with family: day shift can call tomorrow am  family was just at the bedside and aware of plan for admission  Anticipated Length of Stay: Patient will be admitted on an observation basis with an anticipated length of stay of less than 2 midnights secondary to ro cva, neuro consult , pt ot evals  Total Time for Visit, including Counseling / Coordination of Care: 45 minutes Greater than 50% of this total time spent on direct patient counseling and coordination of care  Chief Complaint: dizziness x 10 hours    History of Present Illness:  Katy Ta is a 67 y o  female with a PMH of htn, asthma, elsi, gerd, obesity, hld who presents with dizziness x 10 hours  Patient reports she was in her normal state of health  Woke up this am and when she turned to talk to her  in bed felt sudden dizziness like room was spinning around her, improved momentarliy with rest, she stayed in bed most of the day to sleep   There is an underlying continued sensation of dizziness although not like the room is spinning however the room spinning feeling has happened 4 or 5 times today always assc with movement and looking to the left  No n/v  No ha  No tinnitus  Recently recovered from sinus infection with augmentin and prednisone use  Per pt her PCP told her there was "fluid behind the ears " during the physical exam at that time (7/18/22)  Patient denies ever feeling like this  She had vertigo once before for a few hours , approx 10 years ago which was "not assc with the room spinning sensation "    Patient will be admitted for neuro consult and MRI brain  Review of Systems:  Review of Systems   Constitutional: Negative  HENT: Negative for ear pain, sinus pressure, sinus pain, tinnitus, trouble swallowing and voice change  Eyes: Negative  Negative for photophobia and visual disturbance  Respiratory: Negative  Cardiovascular: Negative  Negative for palpitations  Gastrointestinal: Negative  Endocrine: Negative  Genitourinary: Negative  Musculoskeletal: Negative  Skin: Negative  Allergic/Immunologic: Negative  Neurological: Positive for dizziness  Negative for tremors, seizures, syncope, facial asymmetry, speech difficulty, weakness, light-headedness, numbness and headaches  Hematological: Negative  Psychiatric/Behavioral: Negative  Past Medical and Surgical History:   Past Medical History:   Diagnosis Date    Asthma     C  difficile enteritis     History of acute respiratory failure     Hypertension     Pneumonia     Respiratory disorder 2013    Tendinitis of right shoulder 4/9/2019       Past Surgical History:   Procedure Laterality Date    BRONCHOSCOPY      FRACTURE SURGERY      NASAL SINUS SURGERY Bilateral 09/2017    SINUS SURGERY      TUBAL LIGATION      WISDOM TOOTH EXTRACTION         Meds/Allergies:  Prior to Admission medications    Medication Sig Start Date End Date Taking?  Authorizing Provider   losartan (COZAAR) 100 MG tablet Take 100mg tablet by mouth daily  8/9/22   Phyllistine Antes, HANK   albuterol (PROVENTIL HFA,[facility]) 90 mcg/act inhaler Inhale 2 puffs every 6 (six) hours as needed for wheezing 12/21/21   HANK Virk   ALPRAZolam Yin Gabriel) 0 5 mg tablet  3/25/14   Historical Provider, MD   amoxicillin-clavulanate (AUGMENTIN) 875-125 mg per tablet TAKE 1 TABLET BY MOUTH EVERY 12 HOURS FOR 10 DAYS 7/11/22   Historical Provider, MD   aspirin 81 MG tablet Take 81 mg by mouth    Historical Provider, MD   Diclofenac Sodium (VOLTAREN) 1 % Apply 2 g topically 4 (four) times a day    Historical Provider, MD   escitalopram (LEXAPRO) 10 mg tablet TAKE 1 TABLET BY MOUTH EVERY DAY 8/14/22   HANK Virk   famotidine (PEPCID) 20 mg tablet Take 1 tablet (20 mg total) by mouth 2 (two) times a day 6/22/22   HANK Virk   fluticasone (Flovent HFA) 110 MCG/ACT inhaler Inhale 2 puffs 2 (two) times a day Rinse mouth after use  2/15/22   HANK Virk   Mupirocin POWD Inhale 30 mg 2 (two) times a day    Historical Provider, MD   NAPROXEN  MG EC tablet  1/1/18   Historical Provider, MD   pravastatin (PRAVACHOL) 80 mg tablet Take 0 5 tablets (40 mg total) by mouth daily 3/28/22   HANK Virk   predniSONE 10 mg tablet TAKE 3 TABLETS BY MOUTH FOR 3 DAYS, THEN 2 TAB FOR NEXT 3 DAYS, THEN 1 TAB FOR NEXT 3 DAYS 7/11/22   Historical Provider, MD   Sodium Chloride 7 % NEBU  2/23/14   Historical Provider, MD GTZ have reviewed home medications with patient personally  Allergies:    Allergies   Allergen Reactions    Succinylcholine GI Intolerance     Prolonged apnea      Amoxicillin-Pot Clavulanate Rash and Hives    Azithromycin Rash and Hives    Clarithromycin Vomiting, Nausea Only, Rash and Hives       Social History:  Marital Status: /Civil Union   Occupation: none  Patient Pre-hospital Living Situation: Home  Patient Pre-hospital Level of Mobility: walks with cane  Patient Pre-hospital Diet Restrictions: none  Substance Use History: Social History     Substance and Sexual Activity   Alcohol Use Not Currently    Comment: soically     Social History     Tobacco Use   Smoking Status Never Smoker   Smokeless Tobacco Never Used     Social History     Substance and Sexual Activity   Drug Use No       Family History:  Family History   Problem Relation Age of Onset    Heart disease Father     Heart failure Father     Diabetes Father     Rectal cancer Father     Heart disease Sister     Heart disease Brother     Aortic aneurysm Brother    [place] Bossnes Sudden death Brother     Cirrhosis Mother     Colon cancer Paternal Grandmother     No Known Problems Daughter     Lung cancer Paternal Uncle     No Known Problems Daughter     No Known Problems Daughter     No Known Problems Maternal Aunt     No Known Problems Maternal Aunt     No Known Problems Maternal Aunt     No Known Problems Paternal Aunt     No Known Problems Paternal Aunt     No Known Problems Maternal Grandmother     No Known Problems Maternal Grandfather     No Known Problems Paternal Grandfather     Throat cancer Paternal Uncle     Hypertension Brother        Physical Exam:     Vitals:   Blood Pressure: 168/75 (08/17/22 0007)  Pulse: 72 (08/17/22 0039)  Temperature: 98 3 °F (36 8 °C) (08/16/22 2020)  Temp Source: Oral (08/16/22 2020)  Respirations: 20 (08/17/22 0039)  Height: 5' (152 4 cm) (08/16/22 2020)  Weight - Scale: 68 kg (150 lb) (08/16/22 2020)  SpO2: 95 % (08/17/22 0039)    Physical Exam  Vitals and nursing note reviewed  Constitutional:       Appearance: Normal appearance  She is not ill-appearing  HENT:      Head: Normocephalic and atraumatic  Nose: Nose normal       Mouth/Throat:      Mouth: Mucous membranes are moist    Eyes:      Extraocular Movements: Extraocular movements intact  Conjunctiva/sclera: Conjunctivae normal       Comments: No nystagmus with eom   Cardiovascular:      Rate and Rhythm: Normal rate and regular rhythm        Pulses: Normal pulses  Pulmonary:      Effort: Pulmonary effort is normal    Abdominal:      General: Bowel sounds are normal    Musculoskeletal:         General: No swelling or tenderness  Normal range of motion  Cervical back: Normal range of motion  Skin:     General: Skin is warm and dry  Neurological:      General: No focal deficit present  Mental Status: She is alert and oriented to person, place, and time  Cranial Nerves: No cranial nerve deficit  Sensory: No sensory deficit  Motor: No weakness  Coordination: Coordination normal       Comments: Did not watch gait   Psychiatric:         Mood and Affect: Mood normal          Behavior: Behavior normal           Additional Data:     Lab Results:  Results from last 7 days   Lab Units 08/16/22 2030   WBC Thousand/uL 4 67   HEMOGLOBIN g/dL 13 7   HEMATOCRIT % 42 7   PLATELETS Thousands/uL 237   NEUTROS PCT % 41*   LYMPHS PCT % 41   MONOS PCT % 12   EOS PCT % 5     Results from last 7 days   Lab Units 08/16/22 2030   SODIUM mmol/L 142   POTASSIUM mmol/L 4 6   CHLORIDE mmol/L 107   CO2 mmol/L 27   BUN mg/dL 20   CREATININE mg/dL 0 84   ANION GAP mmol/L 8   CALCIUM mg/dL 9 0   ALBUMIN g/dL 3 5   TOTAL BILIRUBIN mg/dL 0 20   ALK PHOS U/L 75   ALT U/L 16   AST U/L 15   GLUCOSE RANDOM mg/dL 107     Results from last 7 days   Lab Units 08/16/22  2106   INR  0 89     Results from last 7 days   Lab Units 08/16/22 2030   POC GLUCOSE mg/dl 103               Imaging: Reviewed radiology reports from this admission including: cta h& neck and Personally reviewed the following imaging: chest xray  CTA head and neck with and without contrast   Final Result by Rodolfo Morales MD (08/17 0015)   Addendum 1 of 1 by Rodolfo Morales MD (08/17 0015)   ADDENDUM:      There is a 2 cm right thyroid nodule for which ultrasound is recommended  Final      1  No acute intracranial hemorrhage, midline shift, or mass effect  Chronic small vessel ischemic changes     2  Mucosal thickening within the paranasal sinuses, correlate for sinusitis  3   Dominant right and diminutive left vertebral artery  4    Mild stenosis of the proximal P2 segment of the right posterior cerebral artery  Workstation performed: [id number]         XR chest 1 view portable   ED Interpretation by Karla Rosenbaum DO (08/16 2142)   nad      MRI inpatient order    (Results Pending)       EKG and Other Studies Reviewed on Admission:   · EKG: i reviewed the ED provider's interpretation of the EKG  ** Please Note: This note has been constructed using a voice recognition system   **

## 2022-08-17 NOTE — PHYSICAL THERAPY NOTE
PHYSICAL THERAPY Evaluation    Performed at least 2 patient identifiers during session:  Patient Active Problem List   Diagnosis    Essential hypertension    Generalized anxiety disorder    Mixed hyperlipidemia    Osteopenia    Current mild episode of major depressive disorder without prior episode (HCC)    Pes anserinus bursitis of left knee    Chronic sinusitis    Dizziness       Past Medical History:   Diagnosis Date    Asthma     C  difficile enteritis     History of acute respiratory failure     Hypertension     Pneumonia     Respiratory disorder     Tendinitis of right shoulder 2019       Past Surgical History:   Procedure Laterality Date    BRONCHOSCOPY      FRACTURE SURGERY      NASAL SINUS SURGERY Bilateral 2017    SINUS SURGERY      TUBAL LIGATION      WISDOM TOOTH EXTRACTION     ;     22 1041   PT Last Visit   PT Visit Date 22   Note Type   Note type Evaluation   Home Living   Type of [address] [place] [address] One level  (0 OSITO)   Bathroom Shower/Tub Walk-in shower   [address] [place] chair   Home Equipment   (no DME at baseline)   Prior Function   Level of [place] Independent with ADLs and functional mobility   Lives With [place] Help From Family   ADL Assistance Independent   IADLs Independent   General   Additional Pertinent History Orthostatic BP measurements:  Supine:  147/70mmHg, Sitting EOB:  125/73mmHg, standin/72mmHg;  supine after mobility:  161/73mmHg;  TT to Dr Praveen Gibson with update     Family/Caregiver Present Yes  (daughter)   Cognition   Overall Cognitive Status WFL   Arousal/Participation Alert   Orientation Level Oriented X4   Memory Within functional limits   Following Commands Follows all commands and directions without difficulty   RUE Assessment   RUE Assessment WFL  (WFL and equal bilaterally)   LUE Assessment   LUE Assessment WFL  (WFL and equal bilaterally)   RLE Assessment   RLE Assessment WFL  (WFL and equal bilaterally)   LLE Assessment   LLE Assessment WFL  (WFL and equal bilaterally)   Vestibular   Vestibular Comments Smooth pursuits WFL bilaterally; saccades WFL bilaterally; gaze stabilization WFL with head turns bilaterally; [place] (-) bilateraly  Pt denies room spinning dizziness this session however does c/o light headed when sitting up;  see additional info for orthostatic BP measurements  Coordination   Movements are Fluid and Coordinated 1   Sensation WFL  [facility] for light touch throughout BUE and BLE)   Finger to Nose & Finger to Finger  Intact   Heel to Shin Intact   Rapid Alternating Movements Intact   Bed Mobility   Supine to Sit 6  Modified independent   Sit to Supine 6  Modified independent   Transfers   Sit to Stand 6  Modified independent   Stand to Sit 6  Modified independent   Ambulation/Elevation   Gait pattern WNL   Gait Assistance 6  Modified independent   Assistive Device None   Distance 150ft without AD, guarded gait with decreased arm swing however no LOB or unsteadiness  Balance   Static Sitting Good   Dynamic Sitting Good   Static Standing Good   Dynamic Standing Good   Ambulatory Good   Activity Tolerance   Activity Tolerance   (no adverse effects to to PT noted)   Nurse Made Aware Petra   Assessment   Prognosis Good   Assessment Pt is a 67 y o  female who presented to ED 8/16/22 with c/o dizziness  Dx:  CVA vs vertigo, ataxia, anxiety disorder  Comorbidities affecting pt's physical performance at time of assessment include: vertigo, HTN, HLD, GERD  Personal factors affecting pt at time of IE include: nausea, BP, anxiety  PLOF and home set up listed above; Upon evaluation: Pt mod I for bed mobility, mod I for sit to stand, and mod I for ambulation without AD  Full objective findings from PT assessment regarding body systems outlined above   The following objective measures performed on IE also reveal limitations: (-) BPPV as above;  (+) orthostatic measurements  Pt's clinical presentation is currently unstable/unpredictable seen in pt's presentation of continuous monitoring in ED, BP  Pt appears to be functioning at baseline for mobility and (-) BPPV; no need for skilled PT;  Educated pt on OOB for all meals and ambulate TID while in hospital with assist for line/tube management and monitoring BP  Will DC PT order  The patient's AM-PAC Basic Mobility Inpatient Short Form Raw Score is 24  A Raw score of greater than 17 suggests the patient may benefit from discharge to home  Please also refer to the recommendation of the Physical Therapist for safe discharge planning     Goals   Patient Goals To feel better   Recommendation   PT Discharge Recommendation No rehabilitation needs  (Education provided for OOB for all meals, upright throughout the day as tolerated;  ambulation TID;  pt verbalizes understanding and in agreement )   AM-PAC Basic Mobility Inpatient   Turning in Bed Without Bedrails 4   Lying on Back to Sitting on Edge of Flat Bed 4   Moving Bed to Chair 4   Standing Up From Chair 4   Walk in Room 4   Climb 3-5 Stairs 4   Basic Mobility Inpatient Raw Score 24   Basic Mobility Standardized Score 57 68   Highest Level Of Mobility   JH-HLM Goal 8: Walk 250 feet or more   JH-HLM Achieved 7: Walk 25 feet or more       Haley Mendez, PT      Patient Name: Lindsay Prajapati  SJMSF'C Date: 8/17/2022

## 2022-08-17 NOTE — ASSESSMENT & PLAN NOTE
· Ddx: peripheral vertigo vs CVA  · CTA head & neck no abnormality  · Loaded with ASA, cont daily ASA from home  · MRI brain negative  · Permissive HTN to 200/120 mmHg  · Neuro consult appreciated  · FLP & HgbA1c ordered  · Echo pending can be performed as outpt  · Tele  · Alyson meclizine   PT/OT evals ordered

## 2022-08-17 NOTE — DISCHARGE SUMMARY
[place]  Discharge- Arline Baumgarten 1950, 67 y o  female MRN: [id number]  Unit/Bed#: -01 Encounter: [id number]  Primary Care Provider: HANK Guerrero   Date and time admitted to hospital: 8/16/2022  8:17 PM    Generalized anxiety disorder  Assessment & Plan  · Stable, Cont home lexapro    Essential hypertension  Assessment & Plan  · Cont home losartan 100 mg qd after permissive HTN phase which would be 8/18 am    * Dizziness  Assessment & Plan  · Ddx: peripheral vertigo vs CVA  · CTA head & neck no abnormality  · Loaded with ASA, cont daily ASA from home  · MRI brain negative  · Permissive HTN to 200/120 mmHg  · Neuro consult appreciated  · FLP & HgbA1c ordered  · Echo pending can be performed as outpt  · Tele  · Alyson meclizine  PT/OT evals ordered          Hospital Course:     Arline Baumgarten is a 67 y o  female patient who originally presented to the hospital on   Admission Orders (From admission, onward)     Ordered        08/17/22 0014  Place in Observation  Once                     due to peripheral vertigo  Neuroimaging was negative  Patient had non focal neurological exam  Patient improved with meclizine and wished to go home  Patient has no complaints at the time of discharge  Echo was ordered as part of the workup and can be completed as an outpatient if desired  Please see above list of diagnoses and related plan for additional information  Physical Exam:    GEN: No acute distress, comfortable  HEEENT: No JVD, PERRLA, no scleral icterus  RESP: Lungs clear to auscultation bilaterally  CV: RRR, +s1/s2   ABD: SOFT NON TENDER, POSITIVE BOWEL SOUNDS, NO DISTENTION  PSYCH: CALM  NEURO: A X O X 3, NO FOCAL DEFICITS  SKIN: NO RASH  EXTREM: NO EDEMA      Condition at Discharge:  good      Discharge instructions/Information to patient and family:   See after visit summary for information provided to patient and family        Provisions for Follow-Up Care:  See after visit summary for information related to follow-up care and any pertinent home health orders  Disposition:     Home       Discharge Statement:  I spent 33minutes discharging the patient  This time was spent on the day of discharge  I had direct contact with the patient on the day of discharge  Greater than 50% of the total time was spent examining patient, answering all patient questions, arranging and discussing plan of care with patient as well as directly providing post-discharge instructions  Additional time then spent on discharge activities  Discharge Medications:  See after visit summary for reconciled discharge medications provided to patient and family        ** Please Note: This note has been constructed using a voice recognition system **

## 2022-08-17 NOTE — ASSESSMENT & PLAN NOTE
· Ddx: vertigo vs CVA  · CTA head & neck no abnormality  · MRI brain ordered  · Permissive HTN to 200/120 mmHg  · Neuro consult  · FLP & HgbA1c ordered  · Defer echo to neuro  · Tele  · Alyson meclizine   PT/OT evals ordered

## 2022-08-17 NOTE — CONSULTS
Consultation - Neurology   Shantel Hannon 67 y o  female MRN: [id number]  Unit/Bed#: ED 01 Encounter: [id number]      Assessment/Plan     * Dizziness  Assessment & Plan  67 y o with with HTN, HLD, asthma, GERD and obesity who presents with transient dizziness  Patient reported she feels dizzy/vertgio sensation with head movement/position changes only  She does admit to improvement at rest  She denied nausea, vomiting, headache, tinnitus, weakness/numbness, facial asymmetry, ambulatory dysfunction  She recently had a sinus infection which she was on antibiotics/predisnoe for, and noted her PCP informed her there was "fluid behind my ears"  Upon arrival to ED, /86  CTH/CTA head/neck negative for acute intracranial abnormality  Labs unremarkable  Patient was admitted on the stroke pathway out of concern for acute ischemia  Patient does have mild LLE weakness, and she reports mild sensation of dizziness when she has head movement but it resolves at rest  Likely peripheral vertigo, however cannot rule out acute ischemia  MRI brain pending  Plan:  - Stroke pathway  - Loaded with 325 mg Aspirin  Continue with Aspirin 81 mg and Atorvastatin 40 mg  - MRI brain pending   - Echo pending   - Lipid Panel: Cholesterol 122, LDL 61  - Hemoglobin A1c pending   - Permissive HTN, labetalol if SBP >200   - Euglycemic, normothermic goal  - Continue telemetry  - PT/OT/ST  - Secondary risk factor modification    - Stroke education  - Frequent neuro checks  Continue to monitor and notify neurology with any changes   - STAT CT head for any acute change in neuro exam  - Medical management and supportive care per primary team  Correction of any metabolic or infectious disturbances  Plan discussed with Attending Neurologist, please see attestation for further input/recommendations  Recommendations for outpatient neurological follow up have yet to be determined      History of Present Illness     Reason for Consult / Principal Problem: Dizziness  Hx and PE limited by: N/A  HPI: Gail Pryor is a 67 y o  female with HTN, HLD, asthma, GERD and obesity who presents with transient dizziness  Patient reported she feels dizzy/vertgio sensation with head movement/position changes only  She does admit to improvement at rest  She denied nausea, vomiting, headache, tinnitus, weakness/numbness, facial asymmetry, ambulatory dysfunction  She recently had a sinus infection which she was on antibiotics/predisnoe for, and noted her PCP informed her there was "fluid behind my ears"  Upon arrival to ED, /86  CTH/CTA head/neck negative for acute intracranial abnormality  Labs unremarkable  Patient was admitted on the stroke pathway out of concern for acute ischemia  Inpatient consult to Neurology  Consult performed by: HANK Doll  Consult ordered by: Magalie Singleton PA-C          Review of Systems   Constitutional: Negative for fatigue and fever  Eyes: Negative for photophobia  Respiratory: Negative for cough and shortness of breath  Cardiovascular: Negative for chest pain and palpitations  Musculoskeletal: Negative for back pain, gait problem and neck pain  Skin: Negative for color change and pallor  Neurological: Positive for weakness and light-headedness (when sitting upright/standing )  Negative for dizziness, tremors, seizures, syncope, facial asymmetry, speech difficulty, numbness and headaches  Psychiatric/Behavioral: Negative for confusion  The patient is not nervous/anxious  All other systems reviewed and are negative        Historical Information   Past Medical History:   Diagnosis Date    Asthma     C  difficile enteritis     History of acute respiratory failure     Hypertension     Pneumonia     Respiratory disorder 2013    Tendinitis of right shoulder 4/9/2019     Past Surgical History:   Procedure Laterality Date    BRONCHOSCOPY      FRACTURE SURGERY      NASAL SINUS SURGERY Bilateral 09/2017    SINUS SURGERY      TUBAL LIGATION      WISDOM TOOTH EXTRACTION       Social History   Social History     Substance and Sexual Activity   Alcohol Use Not Currently    Comment: soically     Social History     Substance and Sexual Activity   Drug Use No     E-Cigarette/Vaping    E-Cigarette Use Never User      E-Cigarette/Vaping Substances    Nicotine No     THC No     CBD No     Flavoring No     Other No     Unknown No      Social History     Tobacco Use   Smoking Status Never Smoker   Smokeless Tobacco Never Used     Family History:   Family History   Problem Relation Age of Onset    Heart disease Father     Heart failure Father     Diabetes Father     Rectal cancer Father     Heart disease Sister     Heart disease Brother     Aortic aneurysm Brother     Sudden death Brother     Cirrhosis Mother     Colon cancer Paternal Grandmother     No Known Problems Daughter     Lung cancer Paternal Uncle     No Known Problems Daughter     No Known Problems Daughter     No Known Problems Maternal Aunt     No Known Problems Maternal Aunt     No Known Problems Maternal Aunt     No Known Problems Paternal Aunt     No Known Problems Paternal Aunt     No Known Problems Maternal Grandmother     No Known Problems Maternal Grandfather     No Known Problems Paternal Grandfather     Throat cancer Paternal Uncle     Hypertension Brother        Review of previous medical records was completed       Meds/Allergies   all current active meds have been reviewed, current meds:   Current Facility-Administered Medications   Medication Dose Route Frequency    acetaminophen (TYLENOL) tablet 650 mg  650 mg Oral Q6H PRN    albuterol (PROVENTIL HFA,VENTOLIN HFA) inhaler 2 puff  2 puff Inhalation Q6H PRN    aluminum-magnesium hydroxide-simethicone (MYLANTA) oral suspension 30 mL  30 mL Oral Q6H PRN    [START ON 8/18/2022] aspirin (ECOTRIN LOW STRENGTH) EC tablet 81 mg  81 mg Oral Once    escitalopram (LEXAPRO) tablet 10 mg  10 mg Oral Daily    famotidine (PEPCID) tablet 20 mg  20 mg Oral BID    heparin (porcine) subcutaneous injection 5,000 Units  5,000 Units Subcutaneous Q8H [address]    loperamide (IMODIUM) capsule 2 mg  2 mg Oral BID PRN    [START ON 8/18/2022] losartan (COZAAR) tablet 100 mg  100 mg Oral Daily    meclizine (ANTIVERT) tablet 25 mg  25 mg Oral Q8H [address]    melatonin tablet 6 mg  6 mg Oral HS PRN    ondansetron (ZOFRAN) injection 4 mg  4 mg Intravenous Q6H PRN    pravastatin (PRAVACHOL) tablet 40 mg  40 mg Oral Daily    senna (SENOKOT) tablet 8 6 mg  1 tablet Oral HS PRN    and PTA meds:   Prior to Admission Medications   Prescriptions Last Dose Informant Patient Reported? Taking? ALPRAZolam (XANAX) 0 5 mg tablet  Self Yes No   Diclofenac Sodium (VOLTAREN) 1 %  Self Yes No   Sig: Apply 2 g topically 4 (four) times a day   Mupirocin POWD   Yes No   Sig: Inhale 30 mg 2 (two) times a day   NAPROXEN  MG EC tablet  Self Yes No   Sodium Chloride 7 % NEBU  Self Yes No   albuterol (PROVENTIL HFA,VENTOLIN HFA) 90 mcg/act inhaler   No No   Sig: Inhale 2 puffs every 6 (six) hours as needed for wheezing   amoxicillin-clavulanate (AUGMENTIN) 875-125 mg per tablet   Yes No   Sig: TAKE 1 TABLET BY MOUTH EVERY 12 HOURS FOR 10 DAYS   aspirin 81 MG tablet  Self Yes No   Sig: Take 81 mg by mouth   escitalopram (LEXAPRO) 10 mg tablet   No No   Sig: TAKE 1 TABLET BY MOUTH EVERY DAY   famotidine (PEPCID) 20 mg tablet   No No   Sig: Take 1 tablet (20 mg total) by mouth 2 (two) times a day   fluticasone (Flovent HFA) 110 MCG/ACT inhaler   No No   Sig: Inhale 2 puffs 2 (two) times a day Rinse mouth after use  losartan (COZAAR) 100 MG tablet   No No   Sig: Take 100mg tablet by mouth daily     pravastatin (PRAVACHOL) 80 mg tablet   No No   Sig: Take 0 5 tablets (40 mg total) by mouth daily   predniSONE 10 mg tablet   Yes No   Sig: TAKE 3 TABLETS BY MOUTH FOR 3 DAYS, THEN 2 TAB FOR NEXT 3 DAYS, THEN 1 TAB FOR NEXT 3 DAYS      Facility-Administered Medications: None       Allergies   Allergen Reactions    Succinylcholine GI Intolerance     Prolonged apnea      Amoxicillin-Pot Clavulanate Rash and Hives    Azithromycin Rash and Hives    Clarithromycin Vomiting, Nausea Only, Rash and Hives       Objective   Vitals:Blood pressure 162/77, pulse 75, temperature 98 3 °F (36 8 °C), temperature source Oral, resp  rate 21, height 5' (1 524 m), weight 68 kg (150 lb), SpO2 96 %  ,Body mass index is 29 29 kg/m²  No intake or output data in the 24 hours ending 08/17/22 1105    Invasive Devices: Invasive Devices  Report    Peripheral Intravenous Line  Duration           Peripheral IV 08/16/22 Right Antecubital <1 day                Physical Exam  Vitals and nursing note reviewed  Constitutional:       General: She is not in acute distress  Appearance: Normal appearance  She is normal weight  She is not ill-appearing  HENT:      Head: Normocephalic and atraumatic  Right Ear: External ear normal       Left Ear: External ear normal       Nose: Nose normal       Mouth/Throat:      Mouth: Mucous membranes are moist    Eyes:      General:         Right eye: No discharge  Left eye: No discharge  Extraocular Movements: Extraocular movements intact and EOM normal       Conjunctiva/sclera: Conjunctivae normal       Pupils: Pupils are equal, round, and reactive to light  Cardiovascular:      Rate and Rhythm: Normal rate  Pulmonary:      Effort: Pulmonary effort is normal  No respiratory distress  Musculoskeletal:         General: Normal range of motion  Cervical back: Normal range of motion  No rigidity  Right lower leg: No edema  Left lower leg: No edema  Skin:     General: Skin is warm and dry  Coloration: Skin is not jaundiced or pale  Neurological:      Mental Status: She is alert and oriented to person, place, and time  Mental status is at baseline        Sensory: No sensory deficit  Motor: Weakness (LLE) present  Coordination: Coordination normal  Finger-Nose-Finger Test and Heel to [place] karolina Test normal       Gait: Gait is intact  Deep Tendon Reflexes:      Reflex Scores:       Bicep reflexes are 1+ on the right side and 1+ on the left side  Psychiatric:         Mood and Affect: Mood normal          Speech: Speech normal          Behavior: Behavior normal        Neurologic Exam     Mental Status   Oriented to person, place, and time  Follows 2 step commands  Attention: normal  Concentration: normal    Speech: speech is normal   Level of consciousness: alert  Knowledge: good  Able to name object  Normal comprehension  Cranial Nerves     CN II   Visual fields full to confrontation  CN III, IV, VI   Pupils are equal, round, and reactive to light  Extraocular motions are normal    Nystagmus: none   Diplopia: none  Conjugate gaze: present    CN V   Facial sensation intact  CN VII   Facial expression full, symmetric  CN VIII   CN VIII normal    Hearing: intact    CN IX, X   CN IX normal      CN XI   CN XI normal      CN XII   CN XII normal      Motor Exam   Muscle bulk: normal  Overall muscle tone: normal  Right arm pronator drift: absent  Left arm pronator drift: absent    Strength   Strength 5/5 except as noted  Left quadriceps: 4/5    Sensory Exam   Light touch normal    Vibration normal      Gait, Coordination, and Reflexes     Gait  Gait: normal (normal gait, however was focusing on walking and felt lightheaded)    Coordination   Finger to nose coordination: normal  Heel to shin coordination: normal    Tremor   Resting tremor: absent  Intention tremor: absent  Action tremor: absent    Reflexes   Reflexes 2+ except as noted  Right biceps: 1+  Left biceps: 1+  Romberg test positive   Negative ankle clonus   Negative babinski        Lab Results:   I have personally reviewed pertinent reports    , CBC:   Results from last 7 days   Lab Units 08/16/22  2030   WBC Thousand/uL 4 67   RBC Million/uL 4 67   HEMOGLOBIN g/dL 13 7   HEMATOCRIT % 42 7   MCV fL 91   PLATELETS Thousands/uL 237   , BMP/CMP:   Results from last 7 days   Lab Units 08/16/22 2030   SODIUM mmol/L 142   POTASSIUM mmol/L 4 6   CHLORIDE mmol/L 107   CO2 mmol/L 27   BUN mg/dL 20   CREATININE mg/dL 0 84   CALCIUM mg/dL 9 0   AST U/L 15   ALT U/L 16   ALK PHOS U/L 75   EGFR ml/min/1 73sq m 69   Coagulation:   Results from last 7 days   Lab Units 08/16/22  2106   INR  0 89   , Lipid Profile:   Results from last 7 days   Lab Units 08/17/22  0553   HDL mg/dL 48*   LDL CALC mg/dL 61   TRIGLYCERIDES mg/dL 63    Urinalysis:   Results from last 7 days   Lab Units 08/16/22  2348   COLOR UA  Yellow   CLARITY UA  Clear   SPEC GRAV UA  1 010   PH UA  6 0   LEUKOCYTES UA  Negative   NITRITE UA  Negative   GLUCOSE UA mg/dl Negative   KETONES UA mg/dl Negative   BILIRUBIN UA  Negative   BLOOD UA  Negative   Imaging Studies: I have personally reviewed pertinent reports  and I have personally reviewed pertinent films in PACS [facility] CTA   EKG, Pathology, and Other Studies: I have personally reviewed pertinent reports     and I have personally reviewed pertinent films in PACS  VTE Prophylaxis: Sequential compression device (Venodyne)  and Heparin    Code Status: Level 1 - Full Code

## 2022-08-17 NOTE — ASSESSMENT & PLAN NOTE
67 y o with with HTN, HLD, asthma, GERD and obesity who presents with transient dizziness  Patient reported she feels dizzy/vertgio sensation with head movement/position changes only  She does admit to improvement at rest  She denied nausea, vomiting, headache, tinnitus, weakness/numbness, facial asymmetry, ambulatory dysfunction  She recently had a sinus infection which she was on antibiotics/predisnoe for, and noted her PCP informed her there was "fluid behind my ears"  Upon arrival to ED, /86  CTH/CTA head/neck negative for acute intracranial abnormality  Labs unremarkable  Patient was admitted on the stroke pathway out of concern for acute ischemia  Patient does have mild LLE weakness, and she reports mild sensation of dizziness when she has head movement but it resolves at rest  Likely peripheral vertigo, however cannot rule out acute ischemia  MRI brain pending  Plan:  - Stroke pathway  - Loaded with 325 mg Aspirin  Continue with Aspirin 81 mg and Atorvastatin 40 mg  - MRI brain pending   - Echo pending   - Lipid Panel: Cholesterol 122, LDL 61  - Hemoglobin A1c pending   - Permissive HTN, labetalol if SBP >200   - Euglycemic, normothermic goal  - Continue telemetry  - PT/OT/ST  - Secondary risk factor modification    - Stroke education  - Frequent neuro checks  Continue to monitor and notify neurology with any changes   - STAT CT head for any acute change in neuro exam  - Medical management and supportive care per primary team  Correction of any metabolic or infectious disturbances  Plan discussed with Attending Neurologist, please see attestation for further input/recommendations

## 2022-08-17 NOTE — ED CARE HANDOFF
Emergency Department Sign Out Note        Sign out and transfer of care from Dr Manzano Hearing  See Separate Emergency Department note  The patient, Javier Hill, was evaluated by the previous provider for vertigo/ataxia  Workup Completed:  CTA head and neck with and without contrast   Final Result      1  No acute intracranial hemorrhage, midline shift, or mass effect  Chronic small vessel ischemic changes  2   Mucosal thickening within the paranasal sinuses, correlate for sinusitis  3   Dominant right and diminutive left vertebral artery  4    Mild stenosis of the proximal P2 segment of the right posterior cerebral artery              Workstation performed: [id number]         XR chest 1 view portable   ED Interpretation   nad         Labs Reviewed   CBC AND DIFFERENTIAL - Abnormal       Result Value Ref Range Status    WBC 4 67  4 31 - 10 16 Thousand/uL Final    RBC 4 67  3 81 - 5 12 Million/uL Final    Hemoglobin 13 7  11 5 - 15 4 g/dL Final    Hematocrit 42 7  34 8 - 46 1 % Final    MCV 91  82 - 98 fL Final    MCH 29 3  26 8 - 34 3 pg Final    MCHC 32 1  31 4 - 37 4 g/dL Final    RDW 14 0  11 6 - 15 1 % Final    MPV 8 9  8 9 - 12 7 fL Final    Platelets 171  781 - 390 Thousands/uL Final    nRBC 0  /100 WBCs Final    Neutrophils Relative 41 (*) 43 - 75 % Final    Immat GRANS % 0  0 - 2 % Final    Lymphocytes Relative 41  14 - 44 % Final    Monocytes Relative 12  4 - 12 % Final    Eosinophils Relative 5  0 - 6 % Final    Basophils Relative 1  0 - 1 % Final    Neutrophils Absolute 1 95  1 85 - 7 62 Thousands/µL Final    Immature Grans Absolute 0 00  0 00 - 0 20 Thousand/uL Final    Lymphocytes Absolute 1 91  0 60 - 4 47 Thousands/µL Final    Monocytes Absolute 0 56  0 17 - 1 22 Thousand/µL Final    Eosinophils Absolute 0 21  0 00 - 0 61 Thousand/µL Final    Basophils Absolute 0 04  0 00 - 0 10 Thousands/µL Final   PROTIME-INR - Normal    Protime 12 7  11 6 - 14 5 seconds Final    INR 0 89  0 84 - 1 19 Final   APTT - Normal    PTT 31  23 - 37 seconds Final    Comment: Therapeutic Heparin Range =  60-90 seconds   MAGNESIUM - Normal    Magnesium 2 3  1 6 - 2 6 mg/dL Final   HS TROPONIN I 0HR - Normal    hs TnI 0hr <2  "Refer to ACS Flowchart"- see link ng/L Final    Comment:                                              Initial (time 0) result  If >=50 ng/L, Myocardial injury suggested ;  Type of myocardial injury and treatment strategy  to be determined  If 5-49 ng/L, a delta result at 2 hours and or 4 hours will be needed to further evaluate  If <4 ng/L, and chest pain has been >3 hours since onset, patient may qualify for discharge based on the HEART score in the ED  If <5 ng/L and <3hours since onset of chest pain, a delta result at 2 hours will be needed to further evaluate  HS Troponin 99th Percentile URL of a Health Population=12 ng/L with a 95% Confidence Interval of 8-18 ng/L  Second Troponin (time 2 hours)  If calculated delta >= 20 ng/L,  Myocardial injury suggested ; Type of myocardial injury and treatment strategy to be determined  If 5-49 ng/L and the calculated delta is 5-19 ng/L, consult medical service for evaluation  Continue evaluation for ischemia on ecg and other possible etiology and repeat hs troponin at 4 hours  If delta is <5 ng/L at 2 hours, consider discharge based on risk stratification via the HEART score (if in ED), or ROCKY risk score in IP/Observation  HS Troponin 99th Percentile URL of a Health Population=12 ng/L with a 95% Confidence Interval of 8-18 ng/L     POCT GLUCOSE - Normal    POC Glucose 103  65 - 140 mg/dl Final   COMPREHENSIVE METABOLIC PANEL    Sodium 044  135 - 147 mmol/L Final    Potassium 4 6  3 5 - 5 3 mmol/L Final    Chloride 107  96 - 108 mmol/L Final    CO2 27  21 - 32 mmol/L Final    ANION GAP 8  4 - 13 mmol/L Final    BUN 20  5 - 25 mg/dL Final    Creatinine 0 84  0 60 - 1 30 mg/dL Final    Comment: Standardized to IDMS reference method    Glucose 107 65 - 140 mg/dL Final    Comment: If the patient is fasting, the ADA then defines impaired fasting glucose as > 100 mg/dL and diabetes as > or equal to 123 mg/dL  Specimen collection should occur prior to Sulfasalazine administration due to the potential for falsely depressed results  Specimen collection should occur prior to Sulfapyridine administration due to the potential for falsely elevated results  Calcium 9 0  8 3 - 10 1 mg/dL Final    AST 15  5 - 45 U/L Final    Comment: Specimen collection should occur prior to Sulfasalazine administration due to the potential for falsely depressed results  ALT 16  12 - 78 U/L Final    Comment: Specimen collection should occur prior to Sulfasalazine administration due to the potential for falsely depressed results  Alkaline Phosphatase 75  46 - 116 U/L Final    Total Protein 7 5  6 4 - 8 4 g/dL Final    Albumin 3 5  3 5 - 5 0 g/dL Final    Total Bilirubin 0 20  0 20 - 1 00 mg/dL Final    Comment: Use of this assay is not recommended for patients undergoing treatment with eltrombopag due to the potential for falsely elevated results      eGFR 69  ml/min/1 73sq m Final    Narrative:     [place] guidelines for Chronic Kidney Disease (CKD):     Stage 1 with normal or high GFR (GFR > 90 mL/min/1 73 square meters)    Stage 2 Mild CKD (GFR = 60-89 mL/min/1 73 square meters)    Stage 3A Moderate CKD (GFR = 45-59 mL/min/1 73 square meters)    Stage 3B Moderate CKD (GFR = 30-44 mL/min/1 73 square meters)    Stage 4 Severe CKD (GFR = 15-29 mL/min/1 73 square meters)    Stage 5 End Stage CKD (GFR <15 mL/min/1 73 square meters)  Note: GFR calculation is accurate only with a steady state creatinine   URINALYSIS WITH REFLEX TO SCOPE    Color, UA Yellow   Final    Clarity, UA Clear   Final    Specific [place], UA 1 010  1 003 - 1 030 Final    pH, UA 6 0  4 5, 5 0, 5 5, 6 0, 6 5, 7 0, 7 5, 8 0 Final    Leukocytes, UA Negative  Negative Final Nitrite, UA Negative  Negative Final    Protein, UA Negative  Negative mg/dl Final    Glucose, UA Negative  Negative mg/dl Final    Ketones, UA Negative  Negative mg/dl Final    Urobilinogen, UA 0 2  0 2, 1 0 E U /dl E U /dl Final    Bilirubin, UA Negative  Negative Final    Occult Blood, UA Negative  Negative Final   HS TROPONIN I 2HR    hs TnI 2hr <2  "Refer to ACS Flowchart"- see link ng/L Final    Comment:                                              Initial (time 0) result  If >=50 ng/L, Myocardial injury suggested ;  Type of myocardial injury and treatment strategy  to be determined  If 5-49 ng/L, a delta result at 2 hours and or 4 hours will be needed to further evaluate  If <4 ng/L, and chest pain has been >3 hours since onset, patient may qualify for discharge based on the HEART score in the ED  If <5 ng/L and <3hours since onset of chest pain, a delta result at 2 hours will be needed to further evaluate  HS Troponin 99th Percentile URL of a Health Population=12 ng/L with a 95% Confidence Interval of 8-18 ng/L  Second Troponin (time 2 hours)  If calculated delta >= 20 ng/L,  Myocardial injury suggested ; Type of myocardial injury and treatment strategy to be determined  If 5-49 ng/L and the calculated delta is 5-19 ng/L, consult medical service for evaluation  Continue evaluation for ischemia on ecg and other possible etiology and repeat hs troponin at 4 hours  If delta is <5 ng/L at 2 hours, consider discharge based on risk stratification via the HEART score (if in ED), or ROCKY risk score in IP/Observation  HS Troponin 99th Percentile URL of a Health Population=12 ng/L with a 95% Confidence Interval of 8-18 ng/L  Delta 2hr hsTnI     Final    Comment: Unable to Calculate        ED Course / Workup Pending (followup):                                    ED Course as of 08/17/22 0008   Tue Aug 16, 2022   2219 S/o from DR Goldstein  Awaiting CTA results, then admit for CVA workup   Sx ongoing since this AM  Ataxia and vertigo  No other focal deficits  Wed Aug 17, 2022   0006 CTA neg for acute findings  Will admit  BP improved 168/75     Procedures  MDM  Number of Diagnoses or Management Options  Ataxia: new and requires workup  Vertigo: new and requires workup     Amount and/or Complexity of Data Reviewed  Clinical lab tests: reviewed  Tests in the radiology section of CPT®: reviewed  Tests in the medicine section of CPT®: reviewed  Discussion of test results with the performing providers: yes  Review and summarize past medical records: yes  Discuss the patient with other providers: yes    Risk of Complications, Morbidity, and/or Mortality  Presenting problems: moderate  Diagnostic procedures: low  Management options: low    Patient Progress  Patient progress: stable          Disposition  Final diagnoses:   Vertigo   Ataxia     Time reflects when diagnosis was documented in both MDM as applicable and the Disposition within this note     Time User Action Codes Description Comment    8/17/2022 12:08 AM Donneta Levels S Add [R42] Vertigo     8/17/2022 12:08 AM Zara Ceja Add [R27 0] Ataxia       ED Disposition     ED Disposition   Admit    Condition   Stable    Date/Time   Wed Aug 17, 2022 12:08 AM    Comment   Case was discussed with [place] and the patient's admission status was agreed to be Admission Status: observation status to the service of Dr Andres Matos   Follow-up Information    None       Patient's Medications   Discharge Prescriptions    No medications on file     No discharge procedures on file         ED Provider  Electronically Signed by     Natasha Olsen MD  08/17/22 9934

## 2022-08-17 NOTE — ASSESSMENT & PLAN NOTE
· Ddx: peripheral vertigo vs CVA  · CTA head & neck no abnormality  · Loaded with ASA, cont daily ASA from home  · MRI brain ordered  · Permissive HTN to 200/120 mmHg  · Neuro consult appreciated  · FLP & HgbA1c ordered  · Ordered echo  · Tele  · Alyson meclizine   PT/OT evals ordered

## 2022-08-17 NOTE — PROGRESS NOTES
Dhruv Mejiaon  Progress Note Liseth Early 1950, 67 y o  female MRN: [id number]  Unit/Bed#: ED 01 Encounter: [id number]  Primary Care Provider: HANK Tran   Date and time admitted to hospital: 2022  8:17 PM    Generalized anxiety disorder  Assessment & Plan  · Stable, Cont home lexapro    Essential hypertension  Assessment & Plan  · Cont home losartan 100 mg qd after permissive HTN phase which would be 8/18 am    * Dizziness  Assessment & Plan  · Ddx: peripheral vertigo vs CVA  · CTA head & neck no abnormality  · Loaded with ASA, cont daily ASA from home  · MRI brain ordered  · Permissive HTN to 200/120 mmHg  · Neuro consult appreciated  · FLP & HgbA1c ordered  · Ordered echo  · Tele  · Alyson meclizine  PT/OT evals ordered         VTE  Prophylaxis:   Pharmacologic: in place    Patient Centered Rounds: I have performed bedside rounds with nursing staff today  Discussions with Specialists or Other Care Team Provider: case management    Education and Discussions with Family / Patient:pt      Current Length of Stay: 0 day(s)    Current Patient Status: Observation        Code Status: Level 1 - Full Code      Subjective:   States better although not yet at baseline still some dizziness    Patient is seen and examined at bedside  All other ROS are negative  Objective:     Vitals:   Temp (24hrs), Av 3 °F (36 8 °C), Min:98 3 °F (36 8 °C), Max:98 3 °F (36 8 °C)    Temp:  [98 3 °F (36 8 °C)] 98 3 °F (36 8 °C)  HR:  [63-80] 75  Resp:  [17-25] 20  BP: (141-188)/(71-98) 164/79  SpO2:  [93 %-98 %] 94 %  Body mass index is 29 29 kg/m²       Input and Output Summary (last 24 hours):     No intake or output data in the 24 hours ending 22 1346    Physical Exam:       GEN: No acute distress, comfortable  HEEENT: No JVD, PERRLA, no scleral icterus  RESP: Lungs clear to auscultation bilaterally  CV: RRR, +s1/s2   ABD: SOFT NON TENDER, POSITIVE BOWEL SOUNDS, NO DISTENTION  PSYCH: CALM  NEURO: A X O X 3, NO FOCAL DEFICITS  SKIN: NO RASH  EXTREM: NO EDEMA    Additional Data:     Labs:    Results from last 7 days   Lab Units 08/16/22  2030   WBC Thousand/uL 4 67   HEMOGLOBIN g/dL 13 7   HEMATOCRIT % 42 7   PLATELETS Thousands/uL 237   NEUTROS PCT % 41*   LYMPHS PCT % 41   MONOS PCT % 12   EOS PCT % 5     Results from last 7 days   Lab Units 08/16/22  2030   SODIUM mmol/L 142   POTASSIUM mmol/L 4 6   CHLORIDE mmol/L 107   CO2 mmol/L 27   BUN mg/dL 20   CREATININE mg/dL 0 84   ANION GAP mmol/L 8   CALCIUM mg/dL 9 0   ALBUMIN g/dL 3 5   TOTAL BILIRUBIN mg/dL 0 20   ALK PHOS U/L 75   ALT U/L 16   AST U/L 15   GLUCOSE RANDOM mg/dL 107     Results from last 7 days   Lab Units 08/16/22  2106   INR  0 89     Results from last 7 days   Lab Units 08/16/22  2030   POC GLUCOSE mg/dl 103     Results from last 7 days   Lab Units 08/17/22  0553   HEMOGLOBIN A1C % 5 5               * I Have Reviewed All Lab Data Listed Above  Imaging:     Results for orders placed during the hospital encounter of 08/16/22    XR chest 1 view portable    Narrative  CHEST    INDICATION:   vertigo/? CVA  COMPARISON:  None    EXAM PERFORMED/VIEWS:  XR CHEST PORTABLE      FINDINGS:    Cardiomediastinal silhouette appears unremarkable  The lungs are clear  No pneumothorax or pleural effusion  Small hiatal hernia noted    Osseous structures appear within normal limits for patient age  Impression  Small hiatal hernia    No acute pulmonary disease            Workstation performed: [id number]    Results for orders placed in visit on 12/12/18    XR chest pa & lateral      *I have reviewed all imaging reports listed above      Recent Cultures (last 7 days):           Last 24 Hours Medication List:   Current Facility-Administered Medications   Medication Dose Route Frequency Provider Last Rate    acetaminophen  650 mg Oral Q6H PRN Lianne Cline PA-C      albuterol  2 puff Inhalation Q6H PRN Haile Montoya Son, NEGRITA      aluminum-magnesium hydroxide-simethicone  30 mL Oral Q6H PRN Lianne NEGRITA Cline      [START ON 8/18/2022] aspirin  81 mg Oral Once Lianne Demo, NEGRITA      escitalopram  10 mg Oral Daily Lianne Demo, NEGRITA      famotidine  20 mg Oral BID Lianne Demo, NEGRITA      heparin (porcine)  5,000 Units Subcutaneous [facility] Lianne Demo, [facility]      loperamide  2 mg Oral BID PRN Gissellejovan Whipple PA-C      [START ON 8/18/2022] losartan  100 mg Oral Daily Lianne Demo, NEGRITA      meclizine  25 mg Oral Q8H [address] Lianne Demo, [facility]      melatonin  6 mg Oral HS PRN Lianne DemoNEGRITA      ondansetron  4 mg Intravenous Q6H PRN Lianne Demo, NEGRITA      pravastatin  40 mg Oral Daily Lianne Demo, NEGRITA      senna  1 tablet Oral HS PRN Gisselle NEGRITA Whipple          Today, Patient Was Seen By: Salome Peña MD    ** Please Note: Dictation voice to text software may have been used in the creation of this document   **

## 2022-08-17 NOTE — UTILIZATION REVIEW
Initial Clinical Review    Admission: Date/Time/Statement: 8/17/22 0014 observation   Admission Orders (From admission, onward)     Ordered        08/17/22 0014  Place in Observation  Once                      Orders Placed This Encounter   Procedures    Place in Observation     Standing Status:   Standing     Number of Occurrences:   1     Order Specific Question:   Level of Care     Answer:   Med Surg [16]     ED Arrival Information     Expected   -    Arrival   8/16/2022 20:09    Acuity   Emergent            Means of arrival   Walk-In    Escorted by   Spouse    Service   Hospitalist    Admission type   Emergency            Arrival complaint   dizziness nausea           Chief Complaint   Patient presents with    Dizziness     Pt arrived to ED with c/o dizziness and almost passing out since this morning, nausea and a headache denies vomiting and diarrhea  Initial Presentation: 67 y o  female from home to ED 8/16/22 and observation order placed 8/17/22 due to dizziness with ataxia with differential diagnosis of vertigo vs CVA  Presented due to sudden dizziness, ataxic gait and nausea starting morning of arrival   On exam: gait abnormal   Cta head and neck showed possible sinusitis  Mild stenosis of proximal P2 segment of right posterior cerebral artery  In the ED given asa and Antivert  Plan is permissive hypertension to 200/120, consult neurology, telemetry, scheduled Meclizine and PT/OT  8/17/22 Patient has persistent dizziness, is not at baseline  On exam mild LLE weakness  Mild sensation of dizziness with head movement  Working with PT, lightheaded with sitting up  8/17/22 per neurology - patient with dizziness  Recent treatment for sinus infection  Plan is rule out stroke:  Frequent neuro checks, continue asa and atorvastatin  ECHO and MRI  Permissive hypertension, Labetalol for SBP > 200    Telemetry       ED Triage Vitals [08/16/22 2020]   Temperature Pulse Respirations Blood Pressure SpO2   98 3 °F (36 8 °C) 78 18 (!) 182/86 96 %      Temp Source Heart Rate Source Patient Position - Orthostatic VS BP Location FiO2 (%)   Oral Monitor Sitting Left arm --      Pain Score       7          Wt Readings from Last 1 Encounters:   08/16/22 68 kg (150 lb)     Additional Vital Signs:   08/17/22 0600 -- 65 21 157/75 108 94 % -- --   08/17/22 0515 -- 70 -- -- -- 96 % -- --   08/17/22 0500 -- 71 20 167/79 114 95 % None (Room air) Lying   08/17/22 0400 -- 75 -- 177/98 Abnormal  129 97 % -- --   08/17/22 0315 -- 63 21 -- -- 94 % -- --   08/17/22 0312 -- 65 25 Abnormal  149/71 103 95 % -- --   08/17/22 0212 -- 65 17 178/82 Abnormal  118 93 % -- --   08/17/22 0112 -- 69 20 155/86 110 97 % -- --   08/17/22 0007 -- 71 21 168/75 108 95 % None (Room air) Lying   08/16/22 2252 -- 74 21 141/89 110 97 %         Pertinent Labs/Diagnostic Test Results:   CTA head and neck with and without contrast   Final Result by Lamon Curling, MD (08/17 0015)   Addendum 1 of 1 by Lamon Curling, MD (08/17 0015)   ADDENDUM:      There is a 2 cm right thyroid nodule for which ultrasound is recommended  Final      1  No acute intracranial hemorrhage, midline shift, or mass effect  Chronic small vessel ischemic changes  2   Mucosal thickening within the paranasal sinuses, correlate for sinusitis  3   Dominant right and diminutive left vertebral artery  4    Mild stenosis of the proximal P2 segment of the right posterior cerebral artery              Workstation performed: [id number]         XR chest 1 view portable   ED Interpretation by Claire Edge DO (08/16 2142)   nad      MRI inpatient order    (Results Pending)         Results from last 7 days   Lab Units 08/16/22 2030   WBC Thousand/uL 4 67   HEMOGLOBIN g/dL 13 7   HEMATOCRIT % 42 7   PLATELETS Thousands/uL 237   NEUTROS ABS Thousands/µL 1 95         Results from last 7 days   Lab Units 08/16/22 2106 08/16/22 2030   SODIUM mmol/L  --  142   POTASSIUM mmol/L  --  4 6   CHLORIDE mmol/L  --  107   CO2 mmol/L  --  27   ANION GAP mmol/L  --  8   BUN mg/dL  --  20   CREATININE mg/dL  --  0 84   EGFR ml/min/1 73sq m  --  69   CALCIUM mg/dL  --  9 0   MAGNESIUM mg/dL 2 3  --      Results from last 7 days   Lab Units 08/16/22  2030   AST U/L 15   ALT U/L 16   ALK PHOS U/L 75   TOTAL PROTEIN g/dL 7 5   ALBUMIN g/dL 3 5   TOTAL BILIRUBIN mg/dL 0 20     Results from last 7 days   Lab Units 08/16/22 2030   POC GLUCOSE mg/dl 103     Results from last 7 days   Lab Units 08/16/22  2030   GLUCOSE RANDOM mg/dL 107     Results from last 7 days   Lab Units 08/16/22  2320 08/16/22  2106   HS TNI 0HR ng/L  --  <2   HS TNI 2HR ng/L <2  --      Results from last 7 days   Lab Units 08/16/22  2106   PROTIME seconds 12 7   INR  0 89   PTT seconds 31     Results from last 7 days   Lab Units 08/16/22  2348   CLARITY UA  Clear   COLOR UA  Yellow   SPEC GRAV UA  1 010   PH UA  6 0   GLUCOSE UA mg/dl Negative   KETONES UA mg/dl Negative   BLOOD UA  Negative   PROTEIN UA mg/dl Negative   NITRITE UA  Negative   BILIRUBIN UA  Negative   UROBILINOGEN UA E U /dl 0 2   LEUKOCYTES UA  Negative       ED Treatment:   Medication Administration from 08/16/2022 2009 to 08/17/2022 0758       Date/Time Order Dose Route Action Comments     08/17/2022 0548 heparin (porcine) subcutaneous injection 5,000 Units 5,000 Units Subcutaneous Given      08/17/2022 0151 aspirin tablet 325 mg 325 mg Oral Given      08/17/2022 0547 meclizine (ANTIVERT) tablet 25 mg 25 mg Oral Given         Past Medical History:   Diagnosis Date    Asthma     C  difficile enteritis     History of acute respiratory failure     Hypertension     Pneumonia     Respiratory disorder 2013    Tendinitis of right shoulder 4/9/2019     Present on Admission:   Essential hypertension   Generalized anxiety disorder   Dizziness with ataxia      Admitting Diagnosis: Dizziness [R42]  Age/Sex: 67 y o  female  Admission Orders:  Scheduled Medications:  [START ON 8/18/2022] aspirin, 81 mg, Oral, Once  escitalopram, 10 mg, Oral, Daily  famotidine, 20 mg, Oral, BID  heparin (porcine), 5,000 Units, Subcutaneous, Q8H [facility]  [START ON 8/18/2022] losartan, 100 mg, Oral, Daily  meclizine, 25 mg, Oral, Q8H [facility]  pravastatin, 40 mg, Oral, Daily      Continuous IV Infusions:     PRN Meds: not used   acetaminophen, 650 mg, Oral, Q6H PRN  albuterol, 2 puff, Inhalation, Q6H PRN  aluminum-magnesium hydroxide-simethicone, 30 mL, Oral, Q6H PRN  loperamide, 2 mg, Oral, BID PRN  melatonin, 6 mg, Oral, HS PRN  ondansetron, 4 mg, Intravenous, Q6H PRN  senna, 1 tablet, Oral, HS PRN    Telemetry   Neuro checks Every 1 hour x 4 hours, then every 2 hours x 8 hours, then every 4 hours x 72 hours    IP CONSULT TO NEUROLOGY    Network Utilization Review Department  ATTENTION: Please call with any questions or concerns to [phone] and carefully listen to the prompts so that you are directed to the right person  All voicemails are confidential   Ellen Ashford all requests for admission clinical reviews, approved or denied determinations and any other requests to dedicated fax number below belonging to the campus where the patient is receiving treatment   List of dedicated fax numbers for the Facilities:  [address] DENIALS (Administrative/Medical Necessity) [phone]   [address] (Maternity/NICU/Pediatrics) [phone]   [address] [place] [address] [address] [place] [address] [id number] [address]  Enedina Summers [address]  [place] [id number] [place] [address] [phone]

## 2022-08-17 NOTE — ED PROVIDER NOTES
History  Chief Complaint   Patient presents with    Dizziness     Pt arrived to ED with c/o dizziness and almost passing out since this morning, nausea and a headache denies vomiting and diarrhea  Patient with a history of hypertension, hyperlipidemia and anxiety presents to the emergency department with complaint of sudden onset of dizziness, ataxic gait, headache and nausea that began this morning  She denies a history of similar symptoms  She denies vomiting, fevers or chills  History provided by:  Patient   used: No    Dizziness  Quality:  Room spinning, vertigo and imbalance  Severity:  Moderate  Onset quality:  Sudden  Timing:  Constant  Progression:  Worsening  Chronicity:  New  Context: bending over, head movement, physical activity and standing up    Context: not with loss of consciousness    Relieved by:  Nothing  Worsened by: Movement  Ineffective treatments:  None tried  Associated symptoms: headaches and nausea    Associated symptoms: no chest pain, no diarrhea, no shortness of breath, no syncope and no vomiting    Headaches:     Severity:  Mild    Onset quality:  Gradual    Timing:  Constant    Progression:  Worsening    Chronicity:  New  Nausea:     Severity:  Mild    Onset quality:  Sudden    Timing:  Constant    Progression:  Unchanged  Risk factors: no hx of stroke, no hx of vertigo, no Meniere's disease and no multiple medications        Prior to Admission Medications   Prescriptions Last Dose Informant Patient Reported? Taking?    ALPRAZolam (XANAX) 0 5 mg tablet  Self Yes No   Diclofenac Sodium (VOLTAREN) 1 %  Self Yes No   Sig: Apply 2 g topically 4 (four) times a day   Mupirocin POWD   Yes No   Sig: Inhale 30 mg 2 (two) times a day   NAPROXEN  MG EC tablet  Self Yes No   Sodium Chloride 7 % NEBU  Self Yes No   albuterol (PROVENTIL HFA,VENTOLIN HFA) 90 mcg/act inhaler   No No   Sig: Inhale 2 puffs every 6 (six) hours as needed for wheezing amoxicillin-clavulanate (AUGMENTIN) 875-125 mg per tablet   Yes No   Sig: TAKE 1 TABLET BY MOUTH EVERY 12 HOURS FOR 10 DAYS   aspirin 81 MG tablet  Self Yes No   Sig: Take 81 mg by mouth   escitalopram (LEXAPRO) 10 mg tablet   No No   Sig: TAKE 1 TABLET BY MOUTH EVERY DAY   famotidine (PEPCID) 20 mg tablet   No No   Sig: Take 1 tablet (20 mg total) by mouth 2 (two) times a day   fluticasone (Flovent HFA) 110 MCG/ACT inhaler   No No   Sig: Inhale 2 puffs 2 (two) times a day Rinse mouth after use  losartan (COZAAR) 100 MG tablet   No No   Sig: Take 100mg tablet by mouth daily     pravastatin (PRAVACHOL) 80 mg tablet   No No   Sig: Take 0 5 tablets (40 mg total) by mouth daily   predniSONE 10 mg tablet   Yes No   Sig: TAKE 3 TABLETS BY MOUTH FOR 3 DAYS, THEN 2 TAB FOR NEXT 3 DAYS, THEN 1 TAB FOR NEXT 3 DAYS      Facility-Administered Medications: None       Past Medical History:   Diagnosis Date    Asthma     C  difficile enteritis     History of acute respiratory failure     Hypertension     Pneumonia     Respiratory disorder 2013    Tendinitis of right shoulder 4/9/2019       Past Surgical History:   Procedure Laterality Date    BRONCHOSCOPY      FRACTURE SURGERY      NASAL SINUS SURGERY Bilateral 09/2017    SINUS SURGERY      TUBAL LIGATION      WISDOM TOOTH EXTRACTION         Family History   Problem Relation Age of Onset    Heart disease Father     Heart failure Father     Diabetes Father     Rectal cancer Father     Heart disease Sister     Heart disease Brother     Aortic aneurysm Brother     Sudden death Brother     Cirrhosis Mother     Colon cancer Paternal Grandmother     No Known Problems Daughter     Lung cancer Paternal Uncle     No Known Problems Daughter     No Known Problems Daughter     No Known Problems Maternal Aunt     No Known Problems Maternal Aunt     No Known Problems Maternal Aunt     No Known Problems Paternal Aunt     No Known Problems Paternal Aunt     No Known Problems Maternal Grandmother     No Known Problems Maternal Grandfather     No Known Problems Paternal Grandfather     Throat cancer Paternal Uncle     Hypertension Brother      I have reviewed and agree with the history as documented  E-Cigarette/Vaping    E-Cigarette Use Never User      E-Cigarette/Vaping Substances    Nicotine No     THC No     CBD No     Flavoring No     Other No     Unknown No      Social History     Tobacco Use    Smoking status: Never Smoker    Smokeless tobacco: Never Used   Vaping Use    Vaping Use: Never used   Substance Use Topics    Alcohol use: Not Currently     Comment: soically    Drug use: No       Review of Systems   Constitutional: Negative for chills and fever  Respiratory: Negative for cough, chest tightness and shortness of breath  Cardiovascular: Negative for chest pain and syncope  Gastrointestinal: Positive for nausea  Negative for abdominal pain, diarrhea and vomiting  Genitourinary: Negative for dysuria, frequency, hematuria and urgency  Musculoskeletal: Negative for back pain, neck pain and neck stiffness  Neurological: Positive for dizziness and headaches  All other systems reviewed and are negative  Physical Exam  Physical Exam  Vitals and nursing note reviewed  Constitutional:       General: She is not in acute distress  Appearance: She is well-developed  She is not diaphoretic  HENT:      Head: Normocephalic and atraumatic  Eyes:      Conjunctiva/sclera: Conjunctivae normal       Pupils: Pupils are equal, round, and reactive to light  Cardiovascular:      Rate and Rhythm: Normal rate and regular rhythm  Heart sounds: Normal heart sounds  No murmur heard  Pulmonary:      Effort: Pulmonary effort is normal  No respiratory distress  Breath sounds: Normal breath sounds  Abdominal:      General: Bowel sounds are normal  There is no distension  Palpations: Abdomen is soft  Tenderness:  There is no abdominal tenderness  Musculoskeletal:         General: No deformity  Normal range of motion  Cervical back: Normal range of motion and neck supple  Skin:     General: Skin is warm and dry  Capillary Refill: Capillary refill takes less than 2 seconds  Coloration: Skin is not pale  Findings: No rash  Neurological:      General: No focal deficit present  Mental Status: She is alert and oriented to person, place, and time  GCS: GCS eye subscore is 4  GCS verbal subscore is 5  GCS motor subscore is 6  Cranial Nerves: No cranial nerve deficit  Coordination: Romberg sign negative   Coordination normal  Finger-Nose-Finger Test and Heel to Sil Fiona Test normal       Gait: Gait abnormal    Psychiatric:         Behavior: Behavior normal          Vital Signs  ED Triage Vitals [08/16/22 2020]   Temperature Pulse Respirations Blood Pressure SpO2   98 3 °F (36 8 °C) 78 18 (!) 182/86 96 %      Temp Source Heart Rate Source Patient Position - Orthostatic VS BP Location FiO2 (%)   Oral Monitor Sitting Left arm --      Pain Score       7           Vitals:    08/17/22 0545 08/17/22 0600 08/17/22 0800 08/17/22 0900   BP:  157/75 164/90 162/77   Pulse: 68 65 72 75   Patient Position - Orthostatic VS:             Visual Acuity  Visual Acuity    Flowsheet Row Most Recent Value   L Pupil Size (mm) 3   R Pupil Size (mm) 3          ED Medications  Medications   albuterol (PROVENTIL HFA,VENTOLIN HFA) inhaler 2 puff (has no administration in time range)   aspirin (ECOTRIN LOW STRENGTH) EC tablet 81 mg (has no administration in time range)   escitalopram (LEXAPRO) tablet 10 mg (10 mg Oral Given 8/17/22 0828)   famotidine (PEPCID) tablet 20 mg (20 mg Oral Given 8/17/22 0828)   losartan (COZAAR) tablet 100 mg (has no administration in time range)   pravastatin (PRAVACHOL) tablet 40 mg (40 mg Oral Given 8/17/22 0829)   acetaminophen (TYLENOL) tablet 650 mg (has no administration in time range)   senna (SENOKOT) tablet 8 6 mg (has no administration in time range)   loperamide (IMODIUM) capsule 2 mg (has no administration in time range)   melatonin tablet 6 mg (has no administration in time range)   ondansetron (ZOFRAN) injection 4 mg (has no administration in time range)   aluminum-magnesium hydroxide-simethicone (MYLANTA) oral suspension 30 mL (has no administration in time range)   heparin (porcine) subcutaneous injection 5,000 Units (5,000 Units Subcutaneous Given 8/17/22 0548)   meclizine (ANTIVERT) tablet 25 mg (25 mg Oral Given 8/17/22 0547)   iohexol (OMNIPAQUE) 350 MG/ML injection (MULTI-DOSE) 65 mL (65 mL Intravenous Given 8/16/22 2217)   aspirin tablet 325 mg (325 mg Oral Given 8/17/22 0151)       Diagnostic Studies  Results Reviewed     Procedure Component Value Units Date/Time    Lipid Panel with Direct LDL reflex [[id number]]  (Abnormal) Collected: 08/17/22 0553    Lab Status: Final result Specimen: Blood from Arm, Right Updated: 08/17/22 0649     Cholesterol 122 mg/dL      Triglycerides 63 mg/dL      HDL, Direct 48 mg/dL      LDL Calculated 61 mg/dL     Hemoglobin A1c w/EAG Estimation [[id number]] Collected: 08/17/22 0553    Lab Status:  In process Specimen: Blood from Arm, Right Updated: 08/17/22 0556    UA (URINE) with reflex to Scope [[id number]] Collected: 08/16/22 5725    Lab Status: Final result Specimen: Urine, Clean Catch Updated: 08/16/22 2355     Color, UA Yellow     Clarity, UA Clear     Specific Gravity, UA 1 010     pH, UA 6 0     Leukocytes, UA Negative     Nitrite, UA Negative     Protein, UA Negative mg/dl      Glucose, UA Negative mg/dl      Ketones, UA Negative mg/dl      Urobilinogen, UA 0 2 E U /dl      Bilirubin, UA Negative     Occult Blood, UA Negative    HS Troponin I 2hr [[id number]] Collected: 08/16/22 2320    Lab Status: Final result Specimen: Blood from Arm, Right Updated: 08/16/22 2352     hs TnI 2hr <2 ng/L      Delta 2hr hsTnI --    HS Troponin 0hr (reflex protocol) [[id number]]  (Normal) Collected: 08/16/22 2106    Lab Status: Final result Specimen: Blood from Line, Venous Updated: 08/16/22 2158     hs TnI 0hr <2 ng/L     Magnesium [[id number]]  (Normal) Collected: 08/16/22 2106    Lab Status: Final result Specimen: Blood from Line, Venous Updated: 08/16/22 2150     Magnesium 2 3 mg/dL     Protime-INR [[id number]]  (Normal) Collected: 08/16/22 2106    Lab Status: Final result Specimen: Blood from Arm, Right Updated: 08/16/22 2145     Protime 12 7 seconds      INR 0 89    APTT [[id number]]  (Normal) Collected: 08/16/22 2106    Lab Status: Final result Specimen: Blood from Arm, Right Updated: 08/16/22 2145     PTT 31 seconds     Comprehensive metabolic panel [[id number]] Collected: 08/16/22 2030    Lab Status: Final result Specimen: Blood from Arm, Right Updated: 08/16/22 2054     Sodium 142 mmol/L      Potassium 4 6 mmol/L      Chloride 107 mmol/L      CO2 27 mmol/L      ANION GAP 8 mmol/L      BUN 20 mg/dL      Creatinine 0 84 mg/dL      Glucose 107 mg/dL      Calcium 9 0 mg/dL      AST 15 U/L      ALT 16 U/L      Alkaline Phosphatase 75 U/L      Total Protein 7 5 g/dL      Albumin 3 5 g/dL      Total Bilirubin 0 20 mg/dL      eGFR 69 ml/min/1 73sq m     Narrative:      [place] guidelines for Chronic Kidney Disease (CKD):     Stage 1 with normal or high GFR (GFR > 90 mL/min/1 73 square meters)    Stage 2 Mild CKD (GFR = 60-89 mL/min/1 73 square meters)    Stage 3A Moderate CKD (GFR = 45-59 mL/min/1 73 square meters)    Stage 3B Moderate CKD (GFR = 30-44 mL/min/1 73 square meters)    Stage 4 Severe CKD (GFR = 15-29 mL/min/1 73 square meters)    Stage 5 End Stage CKD (GFR <15 mL/min/1 73 square meters)  Note: GFR calculation is accurate only with a steady state creatinine    Fingerstick Glucose (POCT) [[id number]]  (Normal) Collected: 08/16/22 2030    Lab Status: Final result Updated: 08/16/22 2042     POC Glucose 103 mg/dl     CBC and differential [[id number]]  (Abnormal) Collected: 08/16/22 2030    Lab Status: Final result Specimen: Blood from Arm, Right Updated: 08/16/22 2035     WBC 4 67 Thousand/uL      RBC 4 67 Million/uL      Hemoglobin 13 7 g/dL      Hematocrit 42 7 %      MCV 91 fL      MCH 29 3 pg      MCHC 32 1 g/dL      RDW 14 0 %      MPV 8 9 fL      Platelets 444 Thousands/uL      nRBC 0 /100 WBCs      Neutrophils Relative 41 %      Immat GRANS % 0 %      Lymphocytes Relative 41 %      Monocytes Relative 12 %      Eosinophils Relative 5 %      Basophils Relative 1 %      Neutrophils Absolute 1 95 Thousands/µL      Immature Grans Absolute 0 00 Thousand/uL      Lymphocytes Absolute 1 91 Thousands/µL      Monocytes Absolute 0 56 Thousand/µL      Eosinophils Absolute 0 21 Thousand/µL      Basophils Absolute 0 04 Thousands/µL                  CTA head and neck with and without contrast   Final Result by Rodolfo Morales MD (08/17 0015)   Addendum 1 of 1 by Rodolfo Morales MD (08/17 0015)   ADDENDUM:      There is a 2 cm right thyroid nodule for which ultrasound is recommended  Final      1  No acute intracranial hemorrhage, midline shift, or mass effect  Chronic small vessel ischemic changes  2   Mucosal thickening within the paranasal sinuses, correlate for sinusitis  3   Dominant right and diminutive left vertebral artery  4    Mild stenosis of the proximal P2 segment of the right posterior cerebral artery  Workstation performed: [id number]         XR chest 1 view portable   ED Interpretation by Jaimie Zapien DO (08/16 2142)   nad      Final Result by Елена Vo MD (08/17 7682)      Small hiatal hernia    No acute pulmonary disease                  Workstation performed: [id number]         MRI inpatient order    (Results Pending)              Procedures  ECG 12 Lead Documentation Only    Date/Time: 8/16/2022 8:33 PM  Performed by: Jaimie Zapien DO  Authorized by: Jaimie Zapien DO     Indications / Diagnosis:  Dizziness  ECG reviewed by me, the ED Provider: yes    Patient location:  ED  Previous ECG:     Previous ECG:  Unavailable    Comparison to cardiac monitor: Yes    Interpretation:     Interpretation: normal    Rate:     ECG rate:  70bpm    ECG rate assessment: normal    Rhythm:     Rhythm: sinus rhythm    Ectopy:     Ectopy: none    QRS:     QRS axis:  Normal  Conduction:     Conduction: normal    ST segments:     ST segments:  Normal  T waves:     T waves: normal               ED Course                               SBIRT 22yo+    Flowsheet Row Most Recent Value   SBIRT (25 yo +)    In order to provide better care to our patients, we are screening all of our patients for alcohol and drug use  Would it be okay to ask you these screening questions? Unable to answer at this time  [Unable to assess, pt is currently sleeping and does not wish to answer SBIRT questions] Filed at: 08/17/2022 0348                    MDM    Disposition  Final diagnoses:   Vertigo   Ataxia     Time reflects when diagnosis was documented in both MDM as applicable and the Disposition within this note     Time User Action Codes Description Comment    8/17/2022 12:08 AM Bladimir DELGADO Add [R42] Vertigo     8/17/2022 12:08 AM Bladimir Hargrove S Add [R27 0] Ataxia     8/17/2022 12:36 AM Anand Solorio Add [R42] Dizziness with ataxia       ED Disposition     ED Disposition   Admit    Condition   Stable    Date/Time   Wed Aug 17, 2022 12:08 AM    Comment   Case was discussed with Anand Cuello and the patient's admission status was agreed to be Admission Status: observation status to the service of Dr Rei Moreno   Follow-up Information    None         Patient's Medications   Discharge Prescriptions    No medications on file       No discharge procedures on file      PDMP Review     None          ED Provider  Electronically Signed by           Fabiano Marcos DO  08/17/22 5556

## 2022-08-18 ENCOUNTER — RA CDI HCC (OUTPATIENT)
Dept: OTHER | Facility: HOSPITAL | Age: 72
End: 2022-08-18

## 2022-08-18 NOTE — PROGRESS NOTES
[facility]  coding opportunities       Chart reviewed, no opportunity found:   [address]        Patients Insurance     Medicare Insurance: [facility]

## 2022-08-19 LAB
ATRIAL RATE: 70 BPM
P AXIS: 77 DEGREES
PR INTERVAL: 132 MS
QRS AXIS: 73 DEGREES
QRSD INTERVAL: 80 MS
QT INTERVAL: 374 MS
QTC INTERVAL: 403 MS
T WAVE AXIS: 77 DEGREES
VENTRICULAR RATE: 70 BPM

## 2022-08-19 PROCEDURE — 93010 ELECTROCARDIOGRAM REPORT: CPT | Performed by: INTERNAL MEDICINE

## 2022-08-22 ENCOUNTER — OFFICE VISIT (OUTPATIENT)
Dept: FAMILY MEDICINE CLINIC | Facility: HOSPITAL | Age: 72
End: 2022-08-22
Payer: COMMERCIAL

## 2022-08-22 VITALS
HEART RATE: 68 BPM | BODY MASS INDEX: 29.96 KG/M2 | OXYGEN SATURATION: 98 % | DIASTOLIC BLOOD PRESSURE: 84 MMHG | TEMPERATURE: 97 F | SYSTOLIC BLOOD PRESSURE: 156 MMHG | HEIGHT: 60 IN | WEIGHT: 152.6 LBS

## 2022-08-22 DIAGNOSIS — Z09 HOSPITAL DISCHARGE FOLLOW-UP: Primary | ICD-10-CM

## 2022-08-22 DIAGNOSIS — E04.1 THYROID NODULE: ICD-10-CM

## 2022-08-22 DIAGNOSIS — I10 ESSENTIAL HYPERTENSION: ICD-10-CM

## 2022-08-22 DIAGNOSIS — R42 DIZZINESS: ICD-10-CM

## 2022-08-22 PROCEDURE — 99496 TRANSJ CARE MGMT HIGH F2F 7D: CPT | Performed by: NURSE PRACTITIONER

## 2022-08-22 PROCEDURE — 1111F DSCHRG MED/CURRENT MED MERGE: CPT | Performed by: NURSE PRACTITIONER

## 2022-08-22 NOTE — ASSESSMENT & PLAN NOTE
BP is elevated today but with mostly normal readings at home  Given orthostatic hypotension in hospital will hold off on any dose change or adding agent  I advise she keep track of her diet and correlation to higher home readings  Referral to cardiology given family history-may need nuclear stress  I did order an echo

## 2022-08-22 NOTE — PROGRESS NOTES
Assessment/Plan:    Dizziness  Consistent with vertigo  Neuroimaging was negative  Hold off on further meclizine unless she needs it for dizziness  Essential hypertension  BP is elevated today but with mostly normal readings at home  Given orthostatic hypotension in hospital will hold off on any dose change or adding agent  I advise she keep track of her diet and correlation to higher home readings  Referral to cardiology given family history-may need nuclear stress  I did order an echo  Thyroid nodule  Incidental finding on CT  Check thyroid US  Diagnoses and all orders for this visit:    Hospital discharge follow-up    Dizziness  -     Echo complete w/ contrast if indicated; Future  -     Ambulatory Referral to Cardiology; Future    Essential hypertension  -     Echo complete w/ contrast if indicated; Future  -     Ambulatory Referral to Cardiology; Future    Thyroid nodule  -     US thyroid; Future          Subjective:      Patient ID: Shantel Hannon is a 67 y o  female  Had sudden onset of dizziness, ataxic gait, HA and nausea on   Was seen in ER and held for observation  BP was elevated  Loaded with ASA  Had normal neuro exam  Neuroimaging was negative  Started on meclizine and symptoms improved  Was d/c'd with meclizine and need outpt echocardiogram    Has been taking meclizine every morning  Felt a little dizzy this morning  No further episodes since hospitalization  Here with daughter that mentioned she had issues with orthostatic hypotension while in hospital  Has been having issues with BP the last 6 months  Was on low dose lisinopril initially and needed to titrate up then developed cough so was switched to losartan  That was alsotitrated up  She has been checking BP at home and running 120s -130s mostly but does have some in 140-150 range   She does drink caffeine and admits that diet is not always the best  They are concerned because her brother  of aortic dissection and her other brother had MI in 46s and sister has cardiac stents  They are inquiring about seeing a cardiologist  They also brought up that on her CTA there was a thyroid nodule noted-right side about 2 cm  She does have h/o thyroid nodule from about 10 years ago but it was not that big  The following portions of the patient's history were reviewed and updated as appropriate: allergies, current medications, past family history, past medical history, past social history, past surgical history and problem list     Review of Systems   Constitutional: Positive for fatigue  Eyes: Negative for visual disturbance  Respiratory: Negative for shortness of breath  Cardiovascular: Negative for chest pain, palpitations and leg swelling  Gastrointestinal: Negative for nausea and vomiting  Neurological: Positive for dizziness and light-headedness  Negative for headaches  Psychiatric/Behavioral: The patient is not nervous/anxious  Objective:  Vitals:    08/22/22 1355   BP: 156/84   Pulse: 68   Temp: (!) 97 °F (36 1 °C)   SpO2: 98%      Physical Exam  Vitals reviewed  Constitutional:       Appearance: Normal appearance  Neck:      Vascular: No carotid bruit  Cardiovascular:      Rate and Rhythm: Normal rate and regular rhythm  Heart sounds: Normal heart sounds  No murmur heard  Pulmonary:      Effort: Pulmonary effort is normal       Breath sounds: Normal breath sounds  Skin:     General: Skin is warm and dry  Neurological:      Mental Status: She is alert and oriented to person, place, and time  Psychiatric:         Mood and Affect: Mood normal          Behavior: Behavior normal          Thought Content:  Thought content normal          Judgment: Judgment normal

## 2022-08-22 NOTE — ASSESSMENT & PLAN NOTE
Consistent with vertigo  Neuroimaging was negative  Hold off on further meclizine unless she needs it for dizziness

## 2022-08-25 ENCOUNTER — TELEPHONE (OUTPATIENT)
Dept: FAMILY MEDICINE CLINIC | Facility: HOSPITAL | Age: 72
End: 2022-08-25

## 2022-08-25 NOTE — TELEPHONE ENCOUNTER
SHE SAW  NASIR ON Monday AND WAS TOLD THE CARDIOLOGIST OFFICE WAS GOING TO CALL HER, BUT THEY   HAVEN'T SO FAR

## 2022-09-02 ENCOUNTER — CONSULT (OUTPATIENT)
Dept: CARDIOLOGY CLINIC | Facility: CLINIC | Age: 72
End: 2022-09-02
Payer: COMMERCIAL

## 2022-09-02 VITALS
BODY MASS INDEX: 30.43 KG/M2 | WEIGHT: 155 LBS | HEIGHT: 60 IN | SYSTOLIC BLOOD PRESSURE: 158 MMHG | HEART RATE: 84 BPM | DIASTOLIC BLOOD PRESSURE: 90 MMHG

## 2022-09-02 DIAGNOSIS — R42 DIZZINESS: ICD-10-CM

## 2022-09-02 DIAGNOSIS — Z82.49 FAMILY HISTORY OF EARLY CAD: ICD-10-CM

## 2022-09-02 DIAGNOSIS — I25.10 ASCVD (ARTERIOSCLEROTIC CARDIOVASCULAR DISEASE): ICD-10-CM

## 2022-09-02 DIAGNOSIS — I10 ESSENTIAL HYPERTENSION: Primary | ICD-10-CM

## 2022-09-02 PROCEDURE — 3077F SYST BP >= 140 MM HG: CPT | Performed by: INTERNAL MEDICINE

## 2022-09-02 PROCEDURE — 99205 OFFICE O/P NEW HI 60 MIN: CPT | Performed by: INTERNAL MEDICINE

## 2022-09-02 PROCEDURE — 3080F DIAST BP >= 90 MM HG: CPT | Performed by: INTERNAL MEDICINE

## 2022-09-02 NOTE — PROGRESS NOTES
[facility]    Name:Maritza Gold   DOS: 9/2/2022     Chief Complaint:   Chief Complaint   Patient presents with    Hypertension     Consult - PCP for HTN        HISTORY OF PRESENT ILLNESS:      HPI:  Tank Gilbert is a 67 y o  female  She  has a past medical history of Asthma, C  difficile enteritis, History of acute respiratory failure, Hypertension, Pneumonia, Respiratory disorder (2013), and Tendinitis of right shoulder (4/9/2019)  She presents for to establish care for evaluation and management of hypertension  The patient reports multiple ED visits for accelerated hypertension both locally and while visiting [place]  Most recently, she was seen in the ER at [facility] for dizziness and was found to have a BP of 182/86  She underwent a stroke evaluation at that time with a CTA head and MRI, both of which were unrevealing for evidence of a stroke  She describes that episode of dizziness as a sensation of the room spinning around her  She denies associated chest pain, shortness of breath, diaphoresis, change in visual acuity, palpitations, syncope with the event  This episode was a more severe version of mild dizziness that she apparently has felt on rare occasions  She admits to feeling temporary dizziness with particular head motions while laying in bed on 1 pillow  She was started on Meclizine for this, and has had zero recurrence of her dizziness since that time  The patient reports that she has had issue with blood pressure control for quite some time  Was previously on escalating doses of Lisinopril, which was switched to Losartan due to cough  She reports good compliance with Losartan, and take 1/2 in the AM and 1/2 PM  She presented to me a blood pressure log on her phone that demonstrates fairly normal blood pressure readings of 120-140/80-90s at various times  She checks her blood pressure with a wrist cuff, which she brought to today's office visit   Unfortunately, the cuff repeatedly resulted in an error message and a reading was unable to be obtained for comparison  She has been hypertensive for each of the last few blood pressure recordings noted in her chart  She recollects a strong family history of cardiovascular disease  Her younger sister (a smoker) required multiple stents for CAD at a young age (she does not recall the exact age)  Her brother who was exceptionally healthy required 4 vessel coronary artery bypass in his early 46s which was followed by sudden death 1 year later due to aortic rupture  Her father also had extensive heart disease  The patient is a non-smoker, and consumes ETOH only socially in small amounts  Denies recreational drug use  She is physically active taking care of a great grandson, but does not participate in a structured exercise program or regular physical activity for exercise  She tells me that she's able to climb stairs and walk on a flat surface at a brisk pace without chest pain or shortness of breath  ROS    ROS: Pertinent positives and negatives as described in History of Present Illness  Remainder of a 14 point review of systems was negative       Allergies   Allergen Reactions    Succinylcholine GI Intolerance     Prolonged apnea      Lisinopril Cough    Amoxicillin-Pot Clavulanate Rash and Hives    Azithromycin Rash and Hives    Clarithromycin Vomiting, Nausea Only, Rash and Hives        Current Outpatient Medications on File Prior to Visit   Medication Sig Dispense Refill    albuterol (PROVENTIL HFA,VENTOLIN HFA) 90 mcg/act inhaler Inhale 2 puffs every 6 (six) hours as needed for wheezing 9 g 1    ALPRAZolam (XANAX) 0 5 mg tablet Take 0 5 mg by mouth if needed      aspirin 81 MG tablet Take 81 mg by mouth      Diclofenac Sodium (VOLTAREN) 1 % Apply 2 g topically 4 (four) times a day      escitalopram (LEXAPRO) 10 mg tablet TAKE 1 TABLET BY MOUTH EVERY DAY 30 tablet 0    famotidine (PEPCID) 20 mg tablet Take 1 tablet (20 mg total) by mouth 2 (two) times a day 60 tablet 5    fluticasone (Flovent HFA) 110 MCG/ACT inhaler Inhale 2 puffs 2 (two) times a day Rinse mouth after use  36 g 0    losartan (COZAAR) 100 MG tablet Take 100mg tablet by mouth daily  30 tablet 2    meclizine (ANTIVERT) 25 mg tablet Take 1 tablet (25 mg total) by mouth every 8 (eight) hours as needed for dizziness 30 tablet 0    Mupirocin POWD Inhale 30 mg 2 (two) times a day      NAPROXEN  MG EC tablet Take 500 mg by mouth if needed      pravastatin (PRAVACHOL) 80 mg tablet Take 0 5 tablets (40 mg total) by mouth daily 90 tablet 1     No current facility-administered medications on file prior to visit         Past Medical History:   Diagnosis Date    Asthma     C  difficile enteritis     History of acute respiratory failure     Hypertension     Pneumonia     Respiratory disorder 2013    Tendinitis of right shoulder 4/9/2019       Past Surgical History:   Procedure Laterality Date    BRONCHOSCOPY      FRACTURE SURGERY      NASAL SINUS SURGERY Bilateral 09/2017    SINUS SURGERY      TUBAL LIGATION      WISDOM TOOTH EXTRACTION         Family History   Problem Relation Age of Onset    Heart disease Father     Heart failure Father     Diabetes Father     Rectal cancer Father     Heart disease Sister     Heart disease Brother     Aortic aneurysm Brother     Sudden death Brother     Cirrhosis Mother     Colon cancer Paternal Grandmother     No Known Problems Daughter     Lung cancer Paternal Uncle     No Known Problems Daughter     No Known Problems Daughter     No Known Problems Maternal Aunt     No Known Problems Maternal Aunt     No Known Problems Maternal Aunt     No Known Problems Paternal Aunt     No Known Problems Paternal Aunt     No Known Problems Maternal Grandmother     No Known Problems Maternal Grandfather     No Known Problems Paternal Grandfather     Throat cancer Paternal Uncle     Hypertension Brother        Social History     Socioeconomic History    Marital status: /Civil Union     Spouse name: Not on file    Number of children: Not on file    Years of education: Not on file    Highest education level: Not on file   Occupational History    Not on file   Tobacco Use    Smoking status: Never Smoker    Smokeless tobacco: Never Used   Vaping Use    Vaping Use: Never used   Substance and Sexual Activity    Alcohol use: Not Currently     Comment: soically    Drug use: No    Sexual activity: Not on file   Other Topics Concern    Not on file   Social History Narrative    Not on file     Social Determinants of Health     Financial Resource Strain: Not on file   Food Insecurity: Not on file   Transportation Needs: Not on file   Physical Activity: Not on file   Stress: Not on file   Social Connections: Not on file   Intimate Partner Violence: Not on file   Housing Stability: Not on file       OBJECTIVE:    /90 (BP Location: Left arm, Patient Position: Sitting, Cuff Size: Standard)   Pulse 84   Ht 5' (1 524 m)   Wt 70 3 kg (155 lb)   BMI 30 27 kg/m²      BP Readings from Last 3 Encounters:   09/02/22 158/90   08/22/22 156/84   08/17/22 146/82       Wt Readings from Last 3 Encounters:   09/02/22 70 3 kg (155 lb)   08/22/22 69 2 kg (152 lb 9 6 oz)   08/16/22 68 kg (150 lb)         Physical Exam  Vitals reviewed  Constitutional:       General: She is not in acute distress  Appearance: Normal appearance  She is not diaphoretic  HENT:      Head: Normocephalic and atraumatic  Eyes:      Conjunctiva/sclera: Conjunctivae normal    Neck:      Vascular: No carotid bruit or JVD  Cardiovascular:      Rate and Rhythm: Normal rate and regular rhythm  Pulses: Normal pulses  Heart sounds: Normal heart sounds  No murmur heard  No friction rub  No gallop  Pulmonary:      Effort: Pulmonary effort is normal       Breath sounds: Normal breath sounds   No wheezing, rhonchi or rales    Abdominal:      General: Abdomen is flat  Bowel sounds are normal  There is no distension  Palpations: Abdomen is soft  Musculoskeletal:      Right lower leg: No edema  Left lower leg: No edema  Skin:     General: Skin is warm and dry  Neurological:      General: No focal deficit present  Mental Status: She is alert and oriented to person, place, and time  Psychiatric:         Mood and Affect: Mood normal          Behavior: Behavior normal                                                          LABS:  Lab Results   Component Value Date    BUN 20 08/16/2022    CREATININE 0 84 08/16/2022    CALCIUM 9 0 08/16/2022    K 4 6 08/16/2022    CO2 27 08/16/2022     08/16/2022    ALKPHOS 75 08/16/2022    AST 15 08/16/2022    ALT 16 08/16/2022        Lab Results   Component Value Date    WBC 4 67 08/16/2022    HGB 13 7 08/16/2022    HCT 42 7 08/16/2022    MCV 91 08/16/2022     08/16/2022       Lab Results   Component Value Date    HDL 48 (L) 08/17/2022    LDLCALC 61 08/17/2022    TRIG 63 08/17/2022       Lab Results   Component Value Date    HGBA1C 5 5 08/17/2022       No results found for: TSH    IMAGING   ECG 8/16/22: Normal sinus rhythm  ASSESSMENT/PLAN:  Diagnoses and all orders for this visit:    Essential hypertension  BP Readings from Last 3 Encounters:   09/02/22 158/90   08/22/22 156/84   08/17/22 146/82   - Suspect that her hypertension is truly uncontrolled, and her home readings are erroneous due to wrist cuff measurements  - I have advised her to obtain an upper arm automatic blood pressure cuff from a reputable    Check her blood pressure 3 times daily and continue to log each recording    - Will likely need at least one additional antihypertensive, however I'd like to see a true trend of her pressures first as she recalls an episode of transient hypotension and is worried of recurrent hypotension    - We discussed a low salt DASH diet for blood pressure optimization  Dizziness  - The history she provides is consistent with vertigo  Additionally, she recalls feeling dizzy in the ER when the care team performed a head tilting maneuver on her  This is consistent with positional vertigo  - Have advised continued Meclizine for symptom control  She has had no recurrence of dizziness since she started Meclizine, and I've asked her to try not taking Meclizine for one day (in a safe and monitored setting at home) to see if her symptoms recur  She is not interested in PT referral for vestibular therapy at this time  - I agree with obtaining a transthoracic echocardiogram to assess for structural pathology  Family history of early CAD  - Very strong family history of early coronary disease  Will risk stratify further with a CT chest for coronary artery calcium scoring  No clear indication for stress testing at this time based upon her symptoms and history  -     CT coronary calcium score; Future    ASCVD (arteriosclerotic cardiovascular disease)  - Suspected based upon family history despite normal lipids    -     CT coronary calcium score;  Future                Judie Cowden, MD

## 2022-09-02 NOTE — PATIENT INSTRUCTIONS
You were seen today in the Cardiology office for hypertension and dizziness  Please continue your current cardiac medications as prescribed  Check your blood pressure as directed, and maintain a log of your readings  Thank you for choosing [facility]  Please call our office or use [facility] with any questions

## 2022-09-03 ENCOUNTER — HOSPITAL ENCOUNTER (OUTPATIENT)
Dept: ULTRASOUND IMAGING | Facility: HOSPITAL | Age: 72
Discharge: HOME | End: 2022-09-03
Payer: COMMERCIAL

## 2022-09-03 DIAGNOSIS — E04.1 THYROID NODULE: ICD-10-CM

## 2022-09-03 PROCEDURE — 76536 US EXAM OF HEAD AND NECK: CPT

## 2022-09-19 ENCOUNTER — HOSPITAL ENCOUNTER (OUTPATIENT)
Dept: NON INVASIVE DIAGNOSTICS | Age: 72
Discharge: HOME | End: 2022-09-19
Payer: COMMERCIAL

## 2022-09-19 VITALS
DIASTOLIC BLOOD PRESSURE: 90 MMHG | HEIGHT: 60 IN | BODY MASS INDEX: 30.43 KG/M2 | SYSTOLIC BLOOD PRESSURE: 158 MMHG | WEIGHT: 155 LBS

## 2022-09-19 DIAGNOSIS — R42 DIZZINESS: ICD-10-CM

## 2022-09-19 DIAGNOSIS — I10 ESSENTIAL HYPERTENSION: ICD-10-CM

## 2022-09-19 LAB
AORTIC ROOT: 2.6 CM
APICAL FOUR CHAMBER EJECTION FRACTION: 59 %
ASCENDING AORTA: 3 CM
E WAVE DECELERATION TIME: 183 MS
FRACTIONAL SHORTENING: 28 (ref 28–44)
INTERVENTRICULAR SEPTUM IN DIASTOLE (PARASTERNAL SHORT AXIS VIEW): 0.8 CM
INTERVENTRICULAR SEPTUM: 0.8 CM (ref 0.6–1.1)
LAAS-AP2: 14.4 CM2
LAAS-AP4: 13.8 CM2
LEFT ATRIUM AREA SYSTOLE SINGLE PLANE A4C: 13 CM2
LEFT ATRIUM SIZE: 3.3 CM
LEFT INTERNAL DIMENSION IN SYSTOLE: 3.3 CM (ref 2.1–4)
LEFT VENTRICLE DIASTOLIC VOLUME (MOD BIPLANE): 88 ML
LEFT VENTRICLE SYSTOLIC VOLUME (MOD BIPLANE): 32 ML
LEFT VENTRICULAR INTERNAL DIMENSION IN DIASTOLE: 4.6 CM (ref 3.5–6)
LEFT VENTRICULAR POSTERIOR WALL IN END DIASTOLE: 0.8 CM
LEFT VENTRICULAR STROKE VOLUME: 54 ML
LV EF: 64 %
LVSV (TEICH): 54 ML
MV E'TISSUE VEL-SEP: 6 CM/S
MV PEAK A VEL: 0.9 M/S
MV PEAK E VEL: 65 CM/S
MV STENOSIS PRESSURE HALF TIME: 53 MS
MV VALVE AREA P 1/2 METHOD: 4.15
RIGHT ATRIUM AREA SYSTOLE A4C: 11.2 CM2
RIGHT VENTRICLE ID DIMENSION: 2.7 CM
SL CV LEFT ATRIUM LENGTH A2C: 4.8 CM
SL CV LV EF: 60
SL CV PED ECHO LEFT VENTRICLE DIASTOLIC VOLUME (MOD BIPLANE) 2D: 97 ML
SL CV PED ECHO LEFT VENTRICLE SYSTOLIC VOLUME (MOD BIPLANE) 2D: 43 ML
TR MAX PG: 18 MMHG
TR PEAK VELOCITY: 2.1 M/S
TRICUSPID VALVE PEAK REGURGITATION VELOCITY: 2.14 M/S

## 2022-09-19 PROCEDURE — 93306 TTE W/DOPPLER COMPLETE: CPT | Performed by: INTERNAL MEDICINE

## 2022-09-19 PROCEDURE — 93306 TTE W/DOPPLER COMPLETE: CPT

## 2022-10-01 ENCOUNTER — HOSPITAL ENCOUNTER (OUTPATIENT)
Dept: RADIOLOGY | Facility: HOSPITAL | Age: 72
Discharge: HOME | End: 2022-10-01
Attending: INTERNAL MEDICINE
Payer: COMMERCIAL

## 2022-10-01 DIAGNOSIS — Z82.49 FAMILY HISTORY OF EARLY CAD: ICD-10-CM

## 2022-10-01 DIAGNOSIS — I25.10 ASCVD (ARTERIOSCLEROTIC CARDIOVASCULAR DISEASE): ICD-10-CM

## 2022-10-01 PROCEDURE — G1004 CDSM NDSC: HCPCS

## 2022-10-01 PROCEDURE — 75571 CT HRT W/O DYE W/CA TEST: CPT

## 2022-10-06 ENCOUNTER — TELEPHONE (OUTPATIENT)
Dept: CARDIOLOGY CLINIC | Facility: CLINIC | Age: 72
End: 2022-10-06

## 2022-10-06 NOTE — TELEPHONE ENCOUNTER
----- Message from Andrew Clement MD sent at 10/5/2022 11:19 PM EDT -----  Please call Quita Cinthia to notify her of her CT coronary artery calcium score results  She has a total coronary calcium score of 46, which correlates with the presence of mild coronary plaque  Her test shows that most of this plaque is located in one artery, the left anterior descending artery  The management of this is primarily lifestyle modification, and optimization of pre-existing risk factors such as high cholesterol and blood pressure  Her cholesterol is not high  She is scheduled to see me in the office on 10/13, and I'm happy to discuss further recommendations with her in person at that time

## 2022-10-10 DIAGNOSIS — F32.0 CURRENT MILD EPISODE OF MAJOR DEPRESSIVE DISORDER WITHOUT PRIOR EPISODE (HCC): ICD-10-CM

## 2022-10-10 RX ORDER — ESCITALOPRAM OXALATE 10 MG/1
TABLET ORAL
Qty: 30 TABLET | Refills: 0 | Status: SHIPPED | OUTPATIENT
Start: 2022-10-10

## 2022-10-13 ENCOUNTER — OFFICE VISIT (OUTPATIENT)
Dept: CARDIOLOGY CLINIC | Facility: CLINIC | Age: 72
End: 2022-10-13
Payer: COMMERCIAL

## 2022-10-13 VITALS
WEIGHT: 157.2 LBS | DIASTOLIC BLOOD PRESSURE: 90 MMHG | BODY MASS INDEX: 30.86 KG/M2 | SYSTOLIC BLOOD PRESSURE: 152 MMHG | HEIGHT: 60 IN | HEART RATE: 85 BPM

## 2022-10-13 DIAGNOSIS — R93.1 ELEVATED CORONARY ARTERY CALCIUM SCORE: ICD-10-CM

## 2022-10-13 DIAGNOSIS — Z82.49 FAMILY HISTORY OF CARDIOVASCULAR DISEASE: ICD-10-CM

## 2022-10-13 DIAGNOSIS — I10 PRIMARY HYPERTENSION: Primary | ICD-10-CM

## 2022-10-13 PROCEDURE — 99214 OFFICE O/P EST MOD 30 MIN: CPT | Performed by: INTERNAL MEDICINE

## 2022-10-13 RX ORDER — AMLODIPINE BESYLATE 10 MG/1
10 TABLET ORAL DAILY
Qty: 30 TABLET | Refills: 11 | Status: SHIPPED | OUTPATIENT
Start: 2022-10-13

## 2022-10-13 NOTE — PROGRESS NOTES
[facility]    Name:Maritza Gold   DOS: 10/13/2022     Chief Complaint:   Chief Complaint   Patient presents with   • Follow-up     No complaints  HISTORY OF PRESENT ILLNESS:      HPI:  Eduar Mancuso is a 67 y o  female  She  has a past medical history of Asthma, C  difficile enteritis, History of acute respiratory failure, Hypertension, Pneumonia, Respiratory disorder (2013), and Tendinitis of right shoulder (4/9/2019)  She presents for follow-up evaluation after last seeing me in the office on September 2, 2022 for evaluation of accelerated hypertension and also a family history of early-onset heart disease  I had referred her for a CT coronary calcium score, which was completed in demonstrated that she has a total coronary calcium score of 46, which correlates with the presence of mild coronary plaque  During her office visit today, we reviewed the images of her CT coronary calcium scoring study  I had also advised that she maintain a blood pressure log at home that we would review during today's follow-up visit  She brought that with her today on her cellphone  Her blood pressure log demonstrates that she continues to have varying blood pressure readings, with lower readings very early in the morning when she first checks at 6:30 a m (in the range of 125-135/70-80)  This is followed by elevated readings similar to her in office blood pressure today of 152/90 throughout the day until her evening check  She presently takes losartan 100 mg once daily, usually a few hours after waking up  She denies experiencing any new cardiopulmonary complaints since her last visit  Specifically she denies chest pain, dizziness, shortness of breath, orthopnea, lower extremity edema, syncope, lightheadedness with change in position  We discussed her current diet and level of exercise    She admits that she has been unable to successfully make significant changes to her diet since we last met in the office, but is definitely interested in utilizing a diet and exercise approach in addition to pharmacotherapy for control of her blood pressure  Has also not been exercising regularly  ROS    ROS: Pertinent positives and negatives as described in History of Present Illness  Remainder of a 14 point review of systems was negative  Allergies   Allergen Reactions   • Succinylcholine GI Intolerance     Prolonged apnea     • Lisinopril Cough   • Amoxicillin-Pot Clavulanate Rash and Hives   • Azithromycin Rash and Hives   • Clarithromycin Vomiting, Nausea Only, Rash and Hives        Current Outpatient Medications on File Prior to Visit   Medication Sig Dispense Refill   • albuterol (PROVENTIL HFA,VENTOLIN HFA) 90 mcg/act inhaler Inhale 2 puffs every 6 (six) hours as needed for wheezing 9 g 1   • ALPRAZolam (XANAX) 0 5 mg tablet Take 0 5 mg by mouth if needed     • aspirin 81 MG tablet Take 81 mg by mouth     • Diclofenac Sodium (VOLTAREN) 1 % Apply 2 g topically 4 (four) times a day     • escitalopram (LEXAPRO) 10 mg tablet TAKE 1 TABLET BY MOUTH EVERY DAY 30 tablet 0   • famotidine (PEPCID) 20 mg tablet Take 1 tablet (20 mg total) by mouth 2 (two) times a day 60 tablet 5   • fluticasone (Flovent HFA) 110 MCG/ACT inhaler Inhale 2 puffs 2 (two) times a day Rinse mouth after use  36 g 0   • losartan (COZAAR) 100 MG tablet Take 100mg tablet by mouth daily  30 tablet 2   • meclizine (ANTIVERT) 25 mg tablet Take 1 tablet (25 mg total) by mouth every 8 (eight) hours as needed for dizziness 30 tablet 0   • Mupirocin POWD Inhale 30 mg 2 (two) times a day     • NAPROXEN  MG EC tablet Take 500 mg by mouth if needed     • pravastatin (PRAVACHOL) 80 mg tablet Take 0 5 tablets (40 mg total) by mouth daily 90 tablet 1     No current facility-administered medications on file prior to visit         Past Medical History:   Diagnosis Date   • Asthma    • C  difficile enteritis    • History of acute respiratory failure    • Hypertension    • Pneumonia    • Respiratory disorder 2013   • Tendinitis of right shoulder 4/9/2019       Past Surgical History:   Procedure Laterality Date   • BRONCHOSCOPY     • FRACTURE SURGERY     • NASAL SINUS SURGERY Bilateral 09/2017   • SINUS SURGERY     • TUBAL LIGATION     • WISDOM TOOTH EXTRACTION         Family History   Problem Relation Age of Onset   • Heart disease Father    • Heart failure Father    • Diabetes Father    • Rectal cancer Father    • Heart disease Sister    • Heart disease Brother    • Aortic aneurysm Brother    • Sudden death Brother    • Cirrhosis Mother    • Colon cancer Paternal Grandmother    • No Known Problems Daughter    • Lung cancer Paternal Uncle    • No Known Problems Daughter    • No Known Problems Daughter    • No Known Problems Maternal Aunt    • No Known Problems Maternal Aunt    • No Known Problems Maternal Aunt    • No Known Problems Paternal Aunt    • No Known Problems Paternal Aunt    • No Known Problems Maternal Grandmother    • No Known Problems Maternal Grandfather    • No Known Problems Paternal Grandfather    • Throat cancer Paternal Uncle    • Hypertension Brother        Social History     Socioeconomic History   • Marital status: /Civil Union     Spouse name: Not on file   • Number of children: Not on file   • Years of education: Not on file   • Highest education level: Not on file   Occupational History   • Not on file   Tobacco Use   • Smoking status: Never Smoker   • Smokeless tobacco: Never Used   Vaping Use   • Vaping Use: Never used   Substance and Sexual Activity   • Alcohol use: Not Currently     Comment: soically   • Drug use: No   • Sexual activity: Not on file   Other Topics Concern   • Not on file   Social History Narrative   • Not on file     Social Determinants of Health     Financial Resource Strain: Not on file   Food Insecurity: Not on file   Transportation Needs: Not on file   Physical Activity: Not on file   Stress: Not on file   Social Connections: Not on file   Intimate Partner Violence: Not on file   Housing Stability: Not on file       OBJECTIVE:    /90 (BP Location: Right arm, Patient Position: Sitting, Cuff Size: Large)   Pulse 85   Ht 5' (1 524 m)   Wt 71 3 kg (157 lb 3 2 oz)   BMI 30 70 kg/m²      BP Readings from Last 3 Encounters:   10/13/22 152/90   09/19/22 158/90   09/02/22 158/90       Wt Readings from Last 3 Encounters:   10/13/22 71 3 kg (157 lb 3 2 oz)   09/19/22 70 3 kg (155 lb)   09/02/22 70 3 kg (155 lb)         Physical Exam  Vitals reviewed  Constitutional:       General: She is not in acute distress  Appearance: Normal appearance  She is not diaphoretic  HENT:      Head: Normocephalic and atraumatic  Eyes:      Conjunctiva/sclera: Conjunctivae normal    Neck:      Vascular: No carotid bruit or JVD  Cardiovascular:      Rate and Rhythm: Normal rate and regular rhythm  Pulses: Normal pulses  Heart sounds: Normal heart sounds  No murmur heard  No friction rub  No gallop  Pulmonary:      Effort: Pulmonary effort is normal  No respiratory distress  Breath sounds: Normal breath sounds  Musculoskeletal:      Right lower leg: No edema  Left lower leg: No edema  Skin:     General: Skin is warm and dry  Neurological:      General: No focal deficit present  Mental Status: She is alert and oriented to person, place, and time     Psychiatric:         Mood and Affect: Mood normal          Behavior: Behavior normal                                                            LABS:  Lab Results   Component Value Date    BUN 20 08/16/2022    CREATININE 0 84 08/16/2022    CALCIUM 9 0 08/16/2022    K 4 6 08/16/2022    CO2 27 08/16/2022     08/16/2022    ALKPHOS 75 08/16/2022    AST 15 08/16/2022    ALT 16 08/16/2022        Lab Results   Component Value Date    WBC 4 67 08/16/2022    HGB 13 7 08/16/2022    HCT 42 7 08/16/2022    MCV 91 08/16/2022     08/16/2022 Lab Results   Component Value Date    HDL 48 (L) 08/17/2022    LDLCALC 61 08/17/2022    TRIG 63 08/17/2022       Lab Results   Component Value Date    HGBA1C 5 5 08/17/2022       No results found for: TSH      ASSESSMENT/PLAN:  Diagnoses and all orders for this visit:    Primary hypertension  - Still suboptimally controlled  I advised her to continue taking losartan 100 mg once daily  If she prefers, she can split the dose to make it 50 mg b i d  Dara Soulier - I have added amlodipine 10 mg once daily, and I have recommended that she take this in the evening before bedtime  I suspect the synergistic effect between losartan and amlodipine we will yield a beneficial improvement in her overall blood pressure trajectory  I have advised her to continue checking her blood pressure twice daily she has been  - if her blood pressure becomes well controlled to a goal of less than 120/80 with this change alone, I will see her again in 6 months for follow-up evaluation  I have advised her to call for a sooner appointment if she notices that amlodipine does not positively affect her blood pressure  -     amLODIPine (NORVASC) 10 mg tablet; Take 1 tablet (10 mg total) by mouth daily    Elevated coronary artery calcium score  Family history of cardiovascular disease  - Total coronary calcium score noted to be 46  Most of this is in the proximal to mid LAD  This correlates with mild coronary disease  - I have advised the patient that the mainstay of treatment in this scenario in his aggressive risk factor modification and control of lipids  She is on a statin that she is presently tolerating, and already takes aspirin 81 mg daily prior to establishing care with me for primary prevention     - I counseled on diet, exercise, symptom monitoring  We discussed anginal chest pain, and what to look out for in regards to symptoms                Socorro Nixon MD

## 2022-10-20 DIAGNOSIS — J32.9 CHRONIC SINUSITIS, UNSPECIFIED LOCATION: Primary | ICD-10-CM

## 2022-10-20 RX ORDER — PREDNISONE 10 MG/1
TABLET ORAL
Qty: 32 TABLET | Refills: 0 | Status: SHIPPED | OUTPATIENT
Start: 2022-10-20 | End: 2022-11-21

## 2022-10-26 DIAGNOSIS — I10 ESSENTIAL HYPERTENSION: ICD-10-CM

## 2022-10-26 RX ORDER — LOSARTAN POTASSIUM 100 MG/1
TABLET ORAL
Qty: 30 TABLET | Refills: 2 | Status: SHIPPED | OUTPATIENT
Start: 2022-10-26

## 2022-11-01 ENCOUNTER — TELEPHONE (OUTPATIENT)
Dept: OTHER | Facility: OTHER | Age: 72
End: 2022-11-01

## 2022-11-01 ENCOUNTER — TELEPHONE (OUTPATIENT)
Dept: GASTROENTEROLOGY | Facility: CLINIC | Age: 72
End: 2022-11-01

## 2022-11-01 NOTE — TELEPHONE ENCOUNTER
Pt called in stating that she was on call with Sofiya and was disconnected  Pt has questions in regards to upcoming appt  Please give pt a call back

## 2022-11-10 DIAGNOSIS — F32.0 CURRENT MILD EPISODE OF MAJOR DEPRESSIVE DISORDER WITHOUT PRIOR EPISODE (HCC): ICD-10-CM

## 2022-11-10 RX ORDER — ESCITALOPRAM OXALATE 10 MG/1
TABLET ORAL
Qty: 30 TABLET | Refills: 0 | Status: SHIPPED | OUTPATIENT
Start: 2022-11-10

## 2022-11-21 ENCOUNTER — TELEPHONE (OUTPATIENT)
Dept: GASTROENTEROLOGY | Facility: CLINIC | Age: 72
End: 2022-11-21

## 2022-11-21 ENCOUNTER — OFFICE VISIT (OUTPATIENT)
Dept: GASTROENTEROLOGY | Facility: CLINIC | Age: 72
End: 2022-11-21

## 2022-11-21 VITALS
BODY MASS INDEX: 30.67 KG/M2 | DIASTOLIC BLOOD PRESSURE: 68 MMHG | HEIGHT: 60 IN | WEIGHT: 156.2 LBS | SYSTOLIC BLOOD PRESSURE: 118 MMHG

## 2022-11-21 DIAGNOSIS — Z80.0 FAMILY HISTORY OF COLON CANCER IN FATHER: ICD-10-CM

## 2022-11-21 DIAGNOSIS — K59.04 CHRONIC IDIOPATHIC CONSTIPATION: ICD-10-CM

## 2022-11-21 DIAGNOSIS — I10 ESSENTIAL HYPERTENSION: ICD-10-CM

## 2022-11-21 DIAGNOSIS — R11.14 BILIOUS VOMITING WITH NAUSEA: ICD-10-CM

## 2022-11-21 DIAGNOSIS — K21.9 GASTROESOPHAGEAL REFLUX DISEASE, UNSPECIFIED WHETHER ESOPHAGITIS PRESENT: Primary | ICD-10-CM

## 2022-11-21 RX ORDER — POLYETHYLENE GLYCOL 3350, SODIUM SULFATE ANHYDROUS, SODIUM BICARBONATE, SODIUM CHLORIDE, POTASSIUM CHLORIDE 236; 22.74; 6.74; 5.86; 2.97 G/4L; G/4L; G/4L; G/4L; G/4L
4000 POWDER, FOR SOLUTION ORAL ONCE
Qty: 4000 ML | Refills: 0 | Status: SHIPPED | OUTPATIENT
Start: 2022-11-21 | End: 2022-11-21

## 2022-11-21 RX ORDER — POLYETHYLENE GLYCOL 3350 17 G/17G
17 POWDER, FOR SOLUTION ORAL 2 TIMES DAILY
Qty: 289 G | Refills: 2 | Status: SHIPPED | OUTPATIENT
Start: 2022-11-21

## 2022-11-21 NOTE — TELEPHONE ENCOUNTER
Scheduled date of colonoscopy (as of today):12/19/22  Physician performing colonoscopy:Dr Gopal Lora  Location of colonoscopy:[facility]  Bowel prep reviewed with patient:Sandor and handed the patient the folder to take home and read  Instructions reviewed with patient by:Gave the folder the patient to take home and read  Clearances: No

## 2022-11-21 NOTE — PROGRESS NOTES
[id number] - Outpatient Consultation  Charisma Robertson 67 y o  female MRN: [id number]  Encounter: [id number]    ASSESSMENT AND PLAN:      1  Gastroesophageal reflux disease, unspecified whether esophagitis present  79-year-old female with 1 month of intermittent indigestion, reflux and nausea with vomiting  No clear triggers  No new meds  No recent sicknesses or travel  Does take intermittent NSAIDs  Still has her gallbladder  Will rule out peptic ulcer disease, GERD, biliary colic     - EGD; Future  - ultrasound    2  Bilious vomiting with nausea    - US abdomen complete; Future  - EGD; Future    3  Chronic idiopathic constipation  Chronic constipation, only moves her bowels every few days, hard, straining  No red flag symptoms of bleeding or weight loss  - start polyethylene glycol (GLYCOLAX) 17 GM/SCOOP powder; Take 17 g by mouth 2 (two) times a day  Dispense: 289 g; Refill: 2    4  Family history of colon cancer in father  No history of polyps, but has a family history of colon cancer in his father and grandfather  Last colonoscopy was about 5 6 years ago  Will need to obtain records  - Colonoscopy; Future  - polyethylene glycol (Golytely) 4000 mL solution; Take 4,000 mL by mouth once for 1 dose Take 4000 mL by mouth once for 1 dose  Use as directed  Dispense: 4000 mL; Refill: 0    Followup Appointment:  3 months  ______________________________________________________________________    Chief Complaint   Patient presents with   • Heartburn   • Vomiting       HPI:   Charisma Robertson is a 67y o  year old female who presents today at the request of HANK Carranza for indigestion, nausea  Patient states that for the past month, she is had intermittent issues with indigestion, nausea and even 6 episodes of vomiting  Sounds more like she is regurgitating a lot of her foods  When she is not having these issues, she is otherwise eating fine    Does not have baseline nausea  Pepcid did not help  No bleeding  No dysphagia  A lot of heartburn  No new meds, travel, recent illnesses  She was hospitalized about 3 months ago for dizziness  She also complains of constipation  This has been a lifelong issue  Hard stools, small amounts, difficult to evacuate  No bleeding or weight loss      Historical Information   Past Medical History:   Diagnosis Date   • Asthma    • C  difficile enteritis    • GERD (gastroesophageal reflux disease)    • History of acute respiratory failure    • Hyperlipidemia    • Hypertension    • Pneumonia    • Respiratory disorder 2013   • Tendinitis of right shoulder 04/09/2019     Past Surgical History:   Procedure Laterality Date   • BRONCHOSCOPY     • COLONOSCOPY  2017   • FRACTURE SURGERY     • NASAL SINUS SURGERY Bilateral 09/2017   • SINUS SURGERY     • TUBAL LIGATION     • WISDOM TOOTH EXTRACTION       Social History     Substance and Sexual Activity   Alcohol Use Not Currently    Comment: soically     Social History     Substance and Sexual Activity   Drug Use No     Social History     Tobacco Use   Smoking Status Never   Smokeless Tobacco Never     Family History   Problem Relation Age of Onset   • Cirrhosis Mother    • Colon cancer Father    • Heart disease Father    • Heart failure Father    • Diabetes Father    • Rectal cancer Father    • Heart disease Sister    • Heart disease Brother    • Aortic aneurysm Brother    • Sudden death Brother    • Hypertension Brother    • No Known Problems Maternal Grandmother    • No Known Problems Maternal Grandfather    • Colon cancer Paternal Grandmother    • No Known Problems Paternal Grandfather    • No Known Problems Maternal Aunt    • No Known Problems Maternal Aunt    • No Known Problems Maternal Aunt    • No Known Problems Paternal Aunt    • No Known Problems Paternal Aunt    • Lung cancer Paternal Uncle    • Throat cancer Paternal Uncle    • No Known Problems Daughter    • No Known Problems Daughter    • Colon polyps Neg Hx        Meds/Allergies     Current Outpatient Medications:   •  albuterol (PROVENTIL HFA,VENTOLIN HFA) 90 mcg/act inhaler  •  ALPRAZolam (XANAX) 0 5 mg tablet  •  amLODIPine (NORVASC) 10 mg tablet  •  aspirin 81 MG tablet  •  Diclofenac Sodium (VOLTAREN) 1 %  •  escitalopram (LEXAPRO) 10 mg tablet  •  famotidine (PEPCID) 20 mg tablet  •  fluticasone (Flovent HFA) 110 MCG/ACT inhaler  •  losartan (COZAAR) 100 MG tablet  •  meclizine (ANTIVERT) 25 mg tablet  •  Mupirocin POWD  •  NAPROXEN  MG EC tablet  •  polyethylene glycol (GLYCOLAX) 17 GM/SCOOP powder  •  polyethylene glycol (Golytely) 4000 mL solution  •  pravastatin (PRAVACHOL) 80 mg tablet    Allergies   Allergen Reactions   • Succinylcholine GI Intolerance     Prolonged apnea     • Lisinopril Cough   • Amoxicillin-Pot Clavulanate Rash and Hives   • Azithromycin Rash and Hives   • Clarithromycin Vomiting, Nausea Only, Rash and Hives       PHYSICAL EXAM:    Blood pressure 118/68, height 5' (1 524 m), weight 70 9 kg (156 lb 3 2 oz)  Body mass index is 30 51 kg/m²  General Appearance: NAD, cooperative, alert  Eyes: Anicteric, PERRLA, EOMI  ENT:  Normocephalic, atraumatic, normal mucosa  Neck:  Supple, symmetrical, trachea midline,   Resp:  Clear to auscultation bilaterally; no rales, rhonchi or wheezing; respirations unlabored   CV:  S1 S2, Regular rate and rhythm; no murmur, rub, or gallop  GI:  Soft, non-tender, non-distended; normal bowel sounds; no masses, no organomegaly   Rectal: Deferred  Musculoskeletal: No cyanosis, clubbing or edema  Normal ROM    Skin:  No jaundice, rashes, or lesions   Heme/Lymph: No palpable cervical lymphadenopathy  Psych: Normal affect, good eye contact  Neuro: No gross deficits, AAOx3    Lab Results:   Lab Results   Component Value Date    WBC 4 67 08/16/2022    HGB 13 7 08/16/2022    HCT 42 7 08/16/2022    MCV 91 08/16/2022     08/16/2022     Lab Results   Component Value Date    K 4 6 08/16/2022     08/16/2022    CO2 27 08/16/2022    BUN 20 08/16/2022    CREATININE 0 84 08/16/2022    CALCIUM 9 0 08/16/2022    AST 15 08/16/2022    ALT 16 08/16/2022    ALKPHOS 75 08/16/2022    EGFR 69 08/16/2022     No results found for: IRON, TIBC, FERRITIN  No results found for: LIPASE    Radiology Results:   No results found  REVIEW OF SYSTEMS:    CONSTITUTIONAL: Denies any fever, chills, rigors, and weight loss  HEENT: No earache or tinnitus  Denies hearing loss or visual disturbances  CARDIOVASCULAR: No chest pain or palpitations  RESPIRATORY: Denies any cough, hemoptysis, shortness of breath or dyspnea on exertion  GASTROINTESTINAL: As noted in the History of Present Illness  GENITOURINARY: No problems with urination  Denies any hematuria or dysuria  NEUROLOGIC: No dizziness or vertigo, denies headaches  MUSCULOSKELETAL: Denies any muscle or joint pain  SKIN: Denies skin rashes or itching  ENDOCRINE: Denies excessive thirst  Denies intolerance to heat or cold  PSYCHOSOCIAL: Denies depression or anxiety  Denies any recent memory loss     Answers for HPI/ROS submitted by the patient on 11/16/2022  Chronicity: new  Onset: more than 1 month ago  Onset quality: gradual  Frequency: daily  Progression since onset: gradually worsening  Pain location: periumbilical region  Pain - numeric: 5/10  Pain quality: aching, burning  Radiates to: periumbilical region  anorexia: No  arthralgias: No  belching: No  constipation: Yes  diarrhea: No  dysuria: No  fever: No  flatus: Yes  frequency: No  headaches: No  hematochezia: No  hematuria: No  melena: No  myalgias: No  nausea: Yes  weight loss: No  vomiting: Yes  Aggravated by: eating  Relieved by: nothing  Diagnostic workup: CT scan, ultrasound

## 2022-12-02 ENCOUNTER — TELEPHONE (OUTPATIENT)
Dept: GASTROENTEROLOGY | Facility: CLINIC | Age: 72
End: 2022-12-02

## 2022-12-02 NOTE — TELEPHONE ENCOUNTER
Left message for patient to call back and reschedule her 12/19/2022 procedure from [facility] to [facility] due to her insurance

## 2022-12-07 ENCOUNTER — HOSPITAL ENCOUNTER (OUTPATIENT)
Dept: ULTRASOUND IMAGING | Facility: HOSPITAL | Age: 72
Discharge: HOME | End: 2022-12-07
Attending: INTERNAL MEDICINE

## 2022-12-07 DIAGNOSIS — R11.14 BILIOUS VOMITING WITH NAUSEA: ICD-10-CM

## 2022-12-09 DIAGNOSIS — F32.0 CURRENT MILD EPISODE OF MAJOR DEPRESSIVE DISORDER WITHOUT PRIOR EPISODE (HCC): ICD-10-CM

## 2022-12-09 RX ORDER — ESCITALOPRAM OXALATE 10 MG/1
TABLET ORAL
Qty: 30 TABLET | Refills: 0 | Status: SHIPPED | OUTPATIENT
Start: 2022-12-09

## 2022-12-22 ENCOUNTER — TELEMEDICINE (OUTPATIENT)
Dept: FAMILY MEDICINE CLINIC | Facility: HOSPITAL | Age: 72
End: 2022-12-22

## 2022-12-22 VITALS — BODY MASS INDEX: 30.63 KG/M2 | HEIGHT: 60 IN | WEIGHT: 156 LBS

## 2022-12-22 DIAGNOSIS — U07.1 COVID-19: Primary | ICD-10-CM

## 2022-12-22 RX ORDER — NIRMATRELVIR AND RITONAVIR 300-100 MG
3 KIT ORAL 2 TIMES DAILY
Qty: 30 TABLET | Refills: 0 | Status: SHIPPED | OUTPATIENT
Start: 2022-12-22 | End: 2022-12-27

## 2022-12-22 NOTE — PROGRESS NOTES
COVID-19 Outpatient Progress Note    Assessment/Plan:    Problem List Items Addressed This Visit    None  Visit Diagnoses     COVID-19    -  Primary    Relevant Medications    nirmatrelvir & ritonavir (Paxlovid, 300/100,) tablet therapy pack         Disposition:     Patient has asymptomatic or mild COVID-19 infection  Based off CDC guidelines, they were recommended to isolate for 5 days  If they are asymptomatic or symptoms are improving with no fevers in the past 24 hours, isolation may be ended followed by 5 days of wearing a mask when around othes to minimize risk of infecting others  If still have a fever or other symptoms have not improved, continue to isolate until they improve  Regardless of when they end isolation, avoid being around people who are more likely to get very sick from COVID-19 until at least day 11  Agreeable to Paxlovid  Aware of potential med interactions  Will stop statin for 10 days  Will monitor BP  Will not take xanax  Call with any worsening or no improvement  Patient meets criteria for PAXLOVID and they have been counseled appropriately according to EUA documentation released by the FDA  After discussion, patient agrees to treatment  Moses Oleg is an investigational medicine used to treat mild-to-moderate COVID-19 in adults and children (15years of age and older weighing at least 80 pounds (40 kg)) with positive results of direct SARS-CoV-2 viral testing, and who are at high risk for progression to severe COVID-19, including hospitalization or death  PAXLOVID is investigational because it is still being studied  There is limited information about the safety and effectiveness of using PAXLOVID to treat people with mild-to-moderate COVID-19      The FDA has authorized the emergency use of PAXLOVID for the treatment of mild-tomoderate COVID-19 in adults and children (15years of age and older weighing at least 80 pounds (40 kg)) with a positive test for the virus that causes COVID-19, and who are at high risk for progression to severe COVID-19, including hospitalization or death, under an EUA  What should I tell my healthcare provider before I take PAXLOVID? Tell your healthcare provider if you:  - Have any allergies  - Have liver or kidney disease  - Are pregnant or plan to become pregnant  - Are breastfeeding a child  - Have any serious illnesses    Tell your healthcare provider about all the medicines you take, including prescription and over-the-counter medicines, vitamins, and herbal supplements  Some medicines may interact with PAXLOVID and may cause serious side effects  Keep a list of your medicines to show your healthcare provider and pharmacist when you get a new medicine  You can ask your healthcare provider or pharmacist for a list of medicines that interact with PAXLOVID  Do not start taking a new medicine without telling your healthcare provider  Your healthcare provider can tell you if it is safe to take PAXLOVID with other medicines  Tell your healthcare provider if you are taking combined hormonal contraceptive  PAXLOVID may affect how your birth control pills work  Females who are able to become pregnant should use another effective alternative form of contraception or an additional barrier method of contraception  Talk to your healthcare provider if you have any questions about contraceptive methods that might be right for you  How do I take PAXLOVID? PAXLOVID consists of 2 medicines: nirmatrelvir and ritonavir  - Take 2 pink tablets of nirmatrelvir with 1 white tablet of ritonavir by mouth 2 times each day (in the morning and in the evening) for 5 days  For each dose, take all 3 tablets at the same time  - If you have kidney disease, talk to your healthcare provider  You may need a different dose  - Swallow the tablets whole  Do not chew, break, or crush the tablets  - Take PAXLOVID with or without food    - Do not stop taking PAXLOVID without talking to your healthcare provider, even if you feel better  - If you miss a dose of PAXLOVID within 8 hours of the time it is usually taken, take it as soon as you remember  If you miss a dose by more than 8 hours, skip the missed dose and take the next dose at your regular time  Do not take 2 doses of PAXLOVID at the same time  - If you take too much PAXLOVID, call your healthcare provider or go to the nearest hospital emergency room right away  - If you are taking a ritonavir- or cobicistat-containing medicine to treat hepatitis C or Human Immunodeficiency Virus (HIV), you should continue to take your medicine as prescribed by your healthcare provider   - Talk to your healthcare provider if you do not feel better or if you feel worse after 5 days  Who should generally not take PAXLOVID? Do not take PAXLOVID if:  You are allergic to nirmatrelvir, ritonavir, or any of the ingredients in PAXLOVID  You are taking any of the following medicines:  - Alfuzosin  - Pethidine, piroxicam, propoxyphene  - Ranolazine  - Amiodarone, dronedarone, flecainide, propafenone, quinidine  - Colchicine  - Lurasidone, pimozide, clozapine  - Dihydroergotamine, ergotamine, methylergonovine  - Lovastatin, simvastatin  - Sildenafil (Revatio®) for pulmonary arterial hypertension (PAH)  - Triazolam, oral midazolam  - Apalutamide  - Carbamazepine, phenobarbital, phenytoin  - Rifampin  - St  Jeffery’s Wort (hypericum perforatum)    What are the important possible side effects of PAXLOVID? Possible side effects of PAXLOVID are:  - Liver Problems  Tell your healthcare provider right away if you have any of these signs and symptoms of liver problems: loss of appetite, yellowing of your skin and the whites of eyes (jaundice), dark-colored urine, pale colored stools and itchy skin, stomach area (abdominal) pain  - Resistance to HIV Medicines   If you have untreated HIV infection, PAXLOVID may lead to some HIV medicines not working as well in the future  - Other possible side effects include: altered sense of taste, diarrhea, high blood pressure, or muscle aches    These are not all the possible side effects of PAXLOVID  Not many people have taken PAXLOVID  Serious and unexpected side effects may happen  Stovall Stalling is still being studied, so it is possible that all of the risks are not known at this time  What other treatment choices are there? Like Adrianna Fee may allow for the emergency use of other medicines to treat people with COVID-19  Go to https://Besstech/ for information on the emergency use of other medicines that are authorized by FDA to treat people with COVID-19  Your healthcare provider may talk with you about clinical trials for which you may be eligible  It is your choice to be treated or not to be treated with PAXLOVID  Should you decide not to receive it or for your child not to receive it, it will not change your standard medical care  What if I am pregnant or breastfeeding? There is no experience treating pregnant women or breastfeeding mothers with PAXLOVID  For a mother and unborn baby, the benefit of taking PAXLOVID may be greater than the risk from the treatment  If you are pregnant, discuss your options and specific situation with your healthcare provider  It is recommended that you use effective barrier contraception or do not have sexual activity while taking PAXLOVID  If you are breastfeeding, discuss your options and specific situation with your healthcare provider  How do I report side effects with PAXLOVID? Contact your healthcare provider if you have any side effects that bother you or do not go away      Report side effects to FDA MedWatch at www fda gov/medwatch or call 2-330-MWI3025 or you can report side effects to [facility]  at the contact information provided below  Website Fax number Telephone number   [facility] [phone]     How should I store Crystal Hernandez? Store PAXLOVID tablets at room temperature between 68°F to 77°F (20°C to 25°C)  Full fact sheet for patients, parents, and caregivers can be found at: [employer] stewart    I have spent 10 minutes directly with the patient  Greater than 50% of this time was spent in counseling/coordination of care regarding: risks and benefits of treatment options, instructions for management and impressions  Encounter provider: HANK Valdez     Provider located at: [facility] [id number]  [facility] [id number]     Recent Visits  No visits were found meeting these conditions  Showing recent visits within past 7 days and meeting all other requirements  Today's Visits  Date Type Provider Dept   12/22/22 Telemedicine HANK Valdez [facility] [address]   Showing today's visits and meeting all other requirements  Future Appointments  No visits were found meeting these conditions  Showing future appointments within next 150 days and meeting all other requirements     This virtual check-in was done via [employer] and patient was informed that this is a secure, HIPAA-compliant platform  She agrees to proceed  Patient agrees to participate in a virtual check in via telephone or video visit instead of presenting to the office to address urgent/immediate medical needs  Patient is aware this is a billable service  She acknowledged consent and understanding of privacy and security of the video platform  The patient has agreed to participate and understands they can discontinue the visit at any time  After connecting through [facility], the patient was identified by name and date of birth   Olya Hewitt was informed that this was a telemedicine visit and that the exam was being conducted confidentially over secure lines  My office door was closed  No one else was in the room  Olya Hewitt acknowledged consent and understanding of privacy and security of the telemedicine visit  I informed the patient that I have reviewed her record in Epic and presented the opportunity for her to ask any questions regarding the visit today  The patient agreed to participate  Verification of patient location:  Patient is located in the following state in which I hold an active license: [place]    Subjective:   Olya Hewitt is a 67 y o  female who has been screened for COVID-19  Symptom change since last report: unchanged  Patient's symptoms include fatigue, malaise, nasal congestion, rhinorrhea, sore throat, cough, myalgias and headache  Patient denies fever, chills, anosmia, loss of taste, shortness of breath, chest tightness, abdominal pain, nausea, vomiting and diarrhea  - Date of symptom onset: 12/20/2022  - Date of positive COVID-19 test: 12/22/2022  Type of test: Home antigen  Patient with typical symptoms of COVID-19 and they attest that they were positive on home rapid antigen testing  Image of positive result is not able to be uploaded into their chart  COVID-19 vaccination status: Fully vaccinated (primary series)    Lisa Lang has been staying home and has isolated themselves in her home  She is taking care to not share personal items and is cleaning all surfaces that are touched often, like counters, tabletops, and doorknobs using household cleaning sprays or wipes  She is wearing a mask when she leaves her room  Lab Results   Component Value Date    SARSCOV2 Negative 10/07/2021       Review of Systems   Constitutional: Positive for fatigue  Negative for chills and fever  HENT: Positive for congestion, rhinorrhea and sore throat  Respiratory: Positive for cough  Negative for chest tightness and shortness of breath      Gastrointestinal: Negative for abdominal pain, diarrhea, nausea and vomiting  Musculoskeletal: Positive for myalgias  Neurological: Positive for headaches  Current Outpatient Medications on File Prior to Visit   Medication Sig   • albuterol (PROVENTIL HFA,VENTOLIN HFA) 90 mcg/act inhaler Inhale 2 puffs every 6 (six) hours as needed for wheezing   • ALPRAZolam (XANAX) 0 5 mg tablet Take 0 5 mg by mouth if needed   • amLODIPine (NORVASC) 10 mg tablet Take 1 tablet (10 mg total) by mouth daily   • aspirin 81 MG tablet Take 81 mg by mouth   • Diclofenac Sodium (VOLTAREN) 1 % Apply 2 g topically 4 (four) times a day   • escitalopram (LEXAPRO) 10 mg tablet TAKE 1 TABLET BY MOUTH EVERY DAY   • famotidine (PEPCID) 20 mg tablet Take 1 tablet (20 mg total) by mouth 2 (two) times a day   • fluticasone (Flovent HFA) 110 MCG/ACT inhaler Inhale 2 puffs 2 (two) times a day Rinse mouth after use  • losartan (COZAAR) 100 MG tablet TAKE 1 TABLET BY MOUTH DAILY   • Mupirocin POWD Inhale 30 mg 2 (two) times a day   • NAPROXEN  MG EC tablet Take 500 mg by mouth if needed   • polyethylene glycol (GLYCOLAX) 17 GM/SCOOP powder Take 17 g by mouth 2 (two) times a day   • pravastatin (PRAVACHOL) 80 mg tablet Take 0 5 tablets (40 mg total) by mouth daily   • meclizine (ANTIVERT) 25 mg tablet Take 1 tablet (25 mg total) by mouth every 8 (eight) hours as needed for dizziness (Patient not taking: Reported on 12/22/2022)   • polyethylene glycol (Golytely) 4000 mL solution Take 4,000 mL by mouth once for 1 dose Take 4000 mL by mouth once for 1 dose  Use as directed       Objective:    Ht 5' (1 524 m)   Wt 70 8 kg (156 lb)   BMI 30 47 kg/m²      Physical Exam  Vitals reviewed  Constitutional:       General: She is not in acute distress  Appearance: Normal appearance  She is not ill-appearing  Pulmonary:      Effort: Pulmonary effort is normal    Neurological:      Mental Status: She is alert and oriented to person, place, and time     Psychiatric:         Mood and Affect: Mood normal          Behavior: Behavior normal          Thought Content:  Thought content normal          Judgment: Judgment normal        HANK Smith

## 2023-01-05 DIAGNOSIS — F32.0 CURRENT MILD EPISODE OF MAJOR DEPRESSIVE DISORDER WITHOUT PRIOR EPISODE (HCC): ICD-10-CM

## 2023-01-05 RX ORDER — ESCITALOPRAM OXALATE 10 MG/1
TABLET ORAL
Qty: 30 TABLET | Refills: 0 | Status: SHIPPED | OUTPATIENT
Start: 2023-01-05

## 2023-01-23 DIAGNOSIS — I10 ESSENTIAL HYPERTENSION: ICD-10-CM

## 2023-01-23 RX ORDER — LOSARTAN POTASSIUM 100 MG/1
TABLET ORAL
Qty: 30 TABLET | Refills: 2 | Status: SHIPPED | OUTPATIENT
Start: 2023-01-23

## 2023-01-24 ENCOUNTER — TELEPHONE (OUTPATIENT)
Dept: GASTROENTEROLOGY | Facility: CLINIC | Age: 73
End: 2023-01-24

## 2023-01-24 NOTE — TELEPHONE ENCOUNTER
Left voicemail confirming Colonoscopy/Endoscopy at [facility] 2/8/2023  Rx and instructions previously sent because of re schedule  Call the office if there are any questions

## 2023-02-03 ENCOUNTER — TELEPHONE (OUTPATIENT)
Dept: GASTROENTEROLOGY | Facility: CLINIC | Age: 73
End: 2023-02-03

## 2023-03-02 ENCOUNTER — ANESTHESIA (OUTPATIENT)
Dept: GASTROENTEROLOGY | Facility: HOSPITAL | Age: 73
End: 2023-03-02

## 2023-03-02 ENCOUNTER — HOSPITAL ENCOUNTER (OUTPATIENT)
Dept: GASTROENTEROLOGY | Facility: HOSPITAL | Age: 73
Setting detail: OUTPATIENT SURGERY
End: 2023-03-02
Attending: INTERNAL MEDICINE

## 2023-03-02 ENCOUNTER — ANESTHESIA EVENT (OUTPATIENT)
Dept: GASTROENTEROLOGY | Facility: HOSPITAL | Age: 73
End: 2023-03-02

## 2023-03-02 VITALS
SYSTOLIC BLOOD PRESSURE: 132 MMHG | HEART RATE: 72 BPM | RESPIRATION RATE: 16 BRPM | OXYGEN SATURATION: 97 % | TEMPERATURE: 98 F | DIASTOLIC BLOOD PRESSURE: 72 MMHG

## 2023-03-02 DIAGNOSIS — K21.9 GASTROESOPHAGEAL REFLUX DISEASE, UNSPECIFIED WHETHER ESOPHAGITIS PRESENT: ICD-10-CM

## 2023-03-02 DIAGNOSIS — Z12.11 SCREENING FOR COLON CANCER: ICD-10-CM

## 2023-03-02 DIAGNOSIS — Z80.0 FAMILY HISTORY OF COLON CANCER IN FATHER: ICD-10-CM

## 2023-03-02 DIAGNOSIS — R11.14 BILIOUS VOMITING WITH NAUSEA: ICD-10-CM

## 2023-03-02 RX ORDER — SODIUM CHLORIDE 9 MG/ML
INJECTION, SOLUTION INTRAVENOUS CONTINUOUS PRN
Status: DISCONTINUED | OUTPATIENT
Start: 2023-03-02 | End: 2023-03-02

## 2023-03-02 RX ORDER — PROPOFOL 10 MG/ML
INJECTION, EMULSION INTRAVENOUS AS NEEDED
Status: DISCONTINUED | OUTPATIENT
Start: 2023-03-02 | End: 2023-03-02

## 2023-03-02 RX ORDER — LIDOCAINE HYDROCHLORIDE 10 MG/ML
INJECTION, SOLUTION EPIDURAL; INFILTRATION; INTRACAUDAL; PERINEURAL AS NEEDED
Status: DISCONTINUED | OUTPATIENT
Start: 2023-03-02 | End: 2023-03-02

## 2023-03-02 RX ADMIN — PROPOFOL 50 MG: 10 INJECTION, EMULSION INTRAVENOUS at 11:17

## 2023-03-02 RX ADMIN — PROPOFOL 50 MG: 10 INJECTION, EMULSION INTRAVENOUS at 11:06

## 2023-03-02 RX ADMIN — PROPOFOL 100 MG: 10 INJECTION, EMULSION INTRAVENOUS at 11:01

## 2023-03-02 RX ADMIN — LIDOCAINE HYDROCHLORIDE 50 MG: 10 INJECTION, SOLUTION EPIDURAL; INFILTRATION; INTRACAUDAL; PERINEURAL at 10:59

## 2023-03-02 RX ADMIN — PROPOFOL 50 MG: 10 INJECTION, EMULSION INTRAVENOUS at 11:11

## 2023-03-02 RX ADMIN — SODIUM CHLORIDE: 0.9 INJECTION, SOLUTION INTRAVENOUS at 10:50

## 2023-03-02 NOTE — ANESTHESIA POSTPROCEDURE EVALUATION
Post-Op Assessment Note    CV Status:  Stable  Pain Score: 0    Pain management: adequate     Mental Status:  Alert and awake   Hydration Status:  Euvolemic   PONV Controlled:  Controlled   Airway Patency:  Patent      Post Op Vitals Reviewed: Yes      Staff: CRNA         No notable events documented      BP      Temp      Pulse     Resp      SpO2

## 2023-03-02 NOTE — ANESTHESIA PREPROCEDURE EVALUATION
Procedure:  COLONOSCOPY  EGD    Relevant Problems   CARDIO   (+) Essential hypertension   (+) Mixed hyperlipidemia      GI/HEPATIC   (+) Gastroesophageal reflux disease      MUSCULOSKELETAL   (+) Pes anserinus bursitis of left knee      NEURO/PSYCH   (+) Current mild episode of major depressive disorder without prior episode (HCC)   (+) Generalized anxiety disorder    Left Ventricle: Left ventricular cavity size is normal  Wall thickness    is normal  The left ventricular ejection fraction is 60%  Systolic   function is normal  Wall motion is normal  Diastolic function is normal    Asthma  Physical Exam    Airway    Mallampati score: II  TM Distance: >3 FB  Neck ROM: full     Dental       Cardiovascular      Pulmonary      Other Findings        Anesthesia Plan  ASA Score- 2     Anesthesia Type- IV sedation with anesthesia with ASA Monitors  Additional Monitors:   Airway Plan:           Plan Factors-    Chart reviewed  Induction- intravenous  Postoperative Plan-     Informed Consent- Anesthetic plan and risks discussed with patient  I personally reviewed this patient with the CRNA  Discussed and agreed on the Anesthesia Plan with the CRNA  Dara Soulier

## 2023-03-02 NOTE — H&P
History and Physical - [facility]  Tate Goddard 67 y o  female MRN: [id number]    HPI: Tate Goddard is a 67y o  year old female who presents for EGD and colonoscopy secondary to GERD with nausea/vomiting and a father and grandfather with colon cancer    REVIEW OF SYSTEMS: Per the HPI, and otherwise unremarkable      Historical Information   Past Medical History:   Diagnosis Date   • Asthma    • C  difficile enteritis    • GERD (gastroesophageal reflux disease)    • History of acute respiratory failure    • Hyperlipidemia    • Hypertension    • Pneumonia    • Respiratory disorder 2013   • Tendinitis of right shoulder 04/09/2019     Past Surgical History:   Procedure Laterality Date   • BRONCHOSCOPY     • COLONOSCOPY  2017   • FRACTURE SURGERY     • NASAL SINUS SURGERY Bilateral 09/2017   • SINUS SURGERY     • TUBAL LIGATION     • WISDOM TOOTH EXTRACTION       Social History   Social History     Substance and Sexual Activity   Alcohol Use Not Currently    Comment: soically     Social History     Substance and Sexual Activity   Drug Use No     Social History     Tobacco Use   Smoking Status Never   Smokeless Tobacco Never     Family History   Problem Relation Age of Onset   • Cirrhosis Mother    • Colon cancer Father    • Heart disease Father    • Heart failure Father    • Diabetes Father    • Rectal cancer Father    • Heart disease Sister    • Heart disease Brother    • Aortic aneurysm Brother    • Sudden death Brother    • Hypertension Brother    • No Known Problems Maternal Grandmother    • No Known Problems Maternal Grandfather    • Colon cancer Paternal Grandmother    • No Known Problems Paternal Grandfather    • No Known Problems Maternal Aunt    • No Known Problems Maternal Aunt    • No Known Problems Maternal Aunt    • No Known Problems Paternal Aunt    • No Known Problems Paternal Aunt    • Lung cancer Paternal Uncle    • Throat cancer Paternal Uncle    • No Known Problems Daughter    • No Known Problems Daughter    • Colon polyps Neg Hx        Meds/Allergies       Current Outpatient Medications:   •  albuterol (PROVENTIL HFA,VENTOLIN HFA) 90 mcg/act inhaler  •  ALPRAZolam (XANAX) 0 5 mg tablet  •  amLODIPine (NORVASC) 10 mg tablet  •  aspirin 81 MG tablet  •  Diclofenac Sodium (VOLTAREN) 1 %  •  escitalopram (LEXAPRO) 10 mg tablet  •  famotidine (PEPCID) 20 mg tablet  •  losartan (COZAAR) 100 MG tablet  •  Mupirocin POWD  •  NAPROXEN  MG EC tablet  •  omeprazole (PriLOSEC) 20 mg delayed release capsule  •  pravastatin (PRAVACHOL) 80 mg tablet  •  fluticasone (Flovent HFA) 110 MCG/ACT inhaler  •  meclizine (ANTIVERT) 25 mg tablet  •  polyethylene glycol (GLYCOLAX) 17 GM/SCOOP powder  •  polyethylene glycol (Golytely) 4000 mL solution    Allergies   Allergen Reactions   • Succinylcholine GI Intolerance     Prolonged apnea     • Lisinopril Cough   • Amoxicillin-Pot Clavulanate Rash and Hives   • Azithromycin Rash and Hives   • Clarithromycin Vomiting, Nausea Only, Rash and Hives       Objective     /77   Pulse 88   Temp 98 °F (36 7 °C) (Temporal)   Resp 18   SpO2 98%     PHYSICAL EXAM    Gen: NAD AAOx3  Head: Normocephalic, Atraumatic  CV: S1S2 RRR no m/r/g  CHEST: Clear b/l no c/r/w  ABD: soft, +BS NT/ND  EXT: no edema    ASSESSMENT/PLAN:  This is a 67y o  year old female here for EGD and colonoscopy secondary to GERD with nausea/vomiting and a family history of colon cancer, and she is stable and optimized for her procedure

## 2023-03-03 DIAGNOSIS — I10 ESSENTIAL HYPERTENSION: ICD-10-CM

## 2023-03-03 RX ORDER — PRAVASTATIN SODIUM 80 MG/1
TABLET ORAL
Qty: 45 TABLET | Refills: 3 | Status: SHIPPED | OUTPATIENT
Start: 2023-03-03

## 2023-03-07 ENCOUNTER — TELEPHONE (OUTPATIENT)
Dept: GASTROENTEROLOGY | Facility: CLINIC | Age: 73
End: 2023-03-07

## 2023-03-07 NOTE — TELEPHONE ENCOUNTER
Discussed with patient biopsy results from EGD  No evidence of H  pylori    Follow-up in the office as scheduled

## 2023-03-25 DIAGNOSIS — I10 ESSENTIAL HYPERTENSION: ICD-10-CM

## 2023-03-25 RX ORDER — LOSARTAN POTASSIUM 100 MG/1
TABLET ORAL
Qty: 30 TABLET | Refills: 2 | Status: SHIPPED | OUTPATIENT
Start: 2023-03-25

## 2023-04-01 DIAGNOSIS — F32.0 CURRENT MILD EPISODE OF MAJOR DEPRESSIVE DISORDER WITHOUT PRIOR EPISODE (HCC): ICD-10-CM

## 2023-04-01 RX ORDER — ESCITALOPRAM OXALATE 10 MG/1
TABLET ORAL
Qty: 30 TABLET | Refills: 0 | Status: SHIPPED | OUTPATIENT
Start: 2023-04-01

## 2023-04-27 DIAGNOSIS — K21.9 GASTROESOPHAGEAL REFLUX DISEASE WITHOUT ESOPHAGITIS: ICD-10-CM

## 2023-04-27 DIAGNOSIS — F32.0 CURRENT MILD EPISODE OF MAJOR DEPRESSIVE DISORDER WITHOUT PRIOR EPISODE (HCC): ICD-10-CM

## 2023-04-27 RX ORDER — ESCITALOPRAM OXALATE 10 MG/1
TABLET ORAL
Qty: 30 TABLET | Refills: 0 | Status: SHIPPED | OUTPATIENT
Start: 2023-04-27

## 2023-04-28 RX ORDER — OMEPRAZOLE 20 MG/1
40 CAPSULE, DELAYED RELEASE ORAL
Qty: 60 CAPSULE | Refills: 1 | Status: SHIPPED | OUTPATIENT
Start: 2023-04-28 | End: 2023-05-05

## 2023-05-05 ENCOUNTER — OFFICE VISIT (OUTPATIENT)
Dept: GASTROENTEROLOGY | Facility: CLINIC | Age: 73
End: 2023-05-05

## 2023-05-05 VITALS
SYSTOLIC BLOOD PRESSURE: 126 MMHG | BODY MASS INDEX: 31.41 KG/M2 | WEIGHT: 160 LBS | DIASTOLIC BLOOD PRESSURE: 82 MMHG | HEIGHT: 60 IN

## 2023-05-05 DIAGNOSIS — K21.9 GASTROESOPHAGEAL REFLUX DISEASE WITHOUT ESOPHAGITIS: ICD-10-CM

## 2023-05-05 DIAGNOSIS — R15.1 FECAL SMEARING: Primary | ICD-10-CM

## 2023-05-05 DIAGNOSIS — Z80.0 FAMILY HISTORY OF COLON CANCER IN FATHER: ICD-10-CM

## 2023-05-05 DIAGNOSIS — K59.04 CHRONIC IDIOPATHIC CONSTIPATION: ICD-10-CM

## 2023-05-05 RX ORDER — OMEPRAZOLE 40 MG/1
40 CAPSULE, DELAYED RELEASE ORAL DAILY
Qty: 30 CAPSULE | Refills: 12 | Status: SHIPPED | OUTPATIENT
Start: 2023-06-01

## 2023-05-05 NOTE — PATIENT INSTRUCTIONS
Pelvic Floor Physical Therapy Locations      Community / Private Physical Therapists    This link allows patients to look up regional referral centers in their local area that may be able to perform pelvic floor physical therapy and biofeedback   There may be additional resources in your local area, but this is one reliable source: Maggie it    [address]  [phone]    [address]  [address]  [address] ([place]) [address]  [address], [address] [facility], [address]   Phone: [phone]   Fax: [phone]  [employer] is    [address]  [address] [facility]  Phone: [phone]  Fax: [phone]    [address] [facility]  [address] [facility], [address] [place], [place], [address]  Phone: [phone]  Fax: [phone]    [address] [place] [address]  [phone] x [phone]    [facility]  [phone] in [address] in [facility]  [address] [facility] Pelvic [address] [place], [place], DTP  Etta Mcdaniels, PT, [address], PT, BS  Susan Dobson, PT, DTP  Shira Cisneros, PT  Sudha Patel, [address], [address] [facility], [facility] [address] [address]  [address] [facility] [address]  [address], [address] [facility] [address]  [address], [address], [facility], 24[address] [facility], [address], 29 [facility], [address] [facility]  [phone]  [facility] org/[email or web address]    Dr Alice Sen / [place] / [address] [place] [phone]    Steve Guzman ([facility])  [address] [facility], [address]  [phone] ([place]) PT - [facility]   Phone: [phone]   Fax: [phone]  [employer] is    Klaus Desai, PT, DPT  Also does visceral manipulation        [email or web address]  [email or web address]    [place]  [address] [facility], [address], [address]  Phone [address]  [address], [address], [facility]  Phone: [phone] [facility], PT, DTaP  Tiffany Bowles, PT, DTaP  Armani Ross, PT, DPT, [facility], CLT  Saroj Doherty, PT, DPT, [facility]  Blaine Mejia , PT, DPT     [facility] [address] [address] [facility] 59, [address] [facility], [address] [facility]  Phone [phone] Fax [phone]  Lucinda Garcia, PT, DPT, [facility], Pelvic CAPP     [facility] [place]  [address] [facility], [address] [place] [address] Ave, [place], [address] [facility]  Phone: [phone] Fax: 309 [facility], PT, GAMAL, [address], PT, [address] [facility], PT, [address] [place] [address], [address] [place] [address]  Phone [phone] Fax [phone]    [facility] [address],  4624 [facility], [employer]  Phone [phone] Fax [phone]

## 2023-05-05 NOTE — PROGRESS NOTES
[facility] - Outpatient Follow-up Note  Bandar Almaraz 67 y o  female MRN: [id number]  Encounter: [id number]    ASSESSMENT AND PLAN:      1  Fecal smearing  79-year-old female here today for follow-up  After the EGD and colonoscopy, her constipation transition to normal bowel movements, but now having some fecal smearing     -Discussed exercises she can do along with some improved toiletry habits like elevating her knees  - Ambulatory Referral to Physical Therapy; Future  -This may be partially due to her recent colonoscopy prep so we will continue to monitor  Can consider doing like little bit of Imodium if physical therapy does not help  2  Chronic idiopathic constipation  No longer having this issue after the colonoscopy  3  Gastroesophageal reflux disease without esophagitis  Significantly improved on the omeprazole  EGD was otherwise negative for Cespedes's and H  Pylori  - omeprazole (PriLOSEC) 40 MG capsule; Take 1 capsule (40 mg total) by mouth daily Do not start before June 1, 2023  Dispense: 30 capsule; Refill: 12    4  Family history of colon cancer in father  Recall colonoscopy in 3/2028      Followup Appointment: 3 to 4 months  ______________________________________________________________________    Chief Complaint   Patient presents with    Follow up to colonoscopy     HPI: 79-year-old female here today for follow-up  Last seen in the office in December for reflux and abdominal pain  Ultrasound was otherwise unremarkable  Was having significant constipation so started some MiraLAX at the time  She got COVID and then had a colonoscopy and EGD  Unclear what triggered all the changes, but since her procedure, she has had normal bowel movements except now she follows each bowel movement with some episodes of incontinence and fecal smearing  Denies any bleeding  EGD and colonoscopy were otherwise unremarkable  Weight is stable    Very embarrassing and having trouble leaving the house because of the post defecation incontinence      Historical Information   Past Medical History:   Diagnosis Date    Asthma     C  difficile enteritis     GERD (gastroesophageal reflux disease)     History of acute respiratory failure     Hyperlipidemia     Hypertension     Pneumonia     Respiratory disorder 2013    Tendinitis of right shoulder 04/09/2019     Past Surgical History:   Procedure Laterality Date    BRONCHOSCOPY      COLONOSCOPY  2017    FRACTURE SURGERY      NASAL SINUS SURGERY Bilateral 09/2017    SINUS SURGERY      TUBAL LIGATION      WISDOM TOOTH EXTRACTION       Social History     Substance and Sexual Activity   Alcohol Use Not Currently    Comment: soically     Social History     Substance and Sexual Activity   Drug Use No     Social History     Tobacco Use   Smoking Status Never   Smokeless Tobacco Never     Family History   Problem Relation Age of Onset    Cirrhosis Mother     Colon cancer Father     Heart disease Father     Heart failure Father     Diabetes Father     Rectal cancer Father     Heart disease Sister     Heart disease Brother     Aortic aneurysm Brother     Sudden death Brother     Hypertension Brother         Driss Lopez Micki    No Known Problems Maternal Grandmother     No Known Problems Maternal Grandfather     Colon cancer Paternal Grandmother     No Known Problems Paternal Grandfather     No Known Problems Maternal Aunt     No Known Problems Maternal Aunt     No Known Problems Maternal Aunt     No Known Problems Paternal Aunt     No Known Problems Paternal Aunt     Lung cancer Paternal Uncle     Throat cancer Paternal Uncle     No Known Problems Daughter     No Known Problems Daughter     Colon polyps Neg Hx          Current Outpatient Medications:     albuterol (PROVENTIL HFA,VENTOLIN HFA) 90 mcg/act inhaler    ALPRAZolam (XANAX) 0 5 mg tablet    amLODIPine (NORVASC) 10 mg tablet    aspirin 81 MG tablet    Diclofenac Sodium (VOLTAREN) 1 %    escitalopram (LEXAPRO) 10 mg tablet    fluticasone (Flovent HFA) 110 MCG/ACT inhaler    losartan (COZAAR) 100 MG tablet    Mupirocin POWD    NAPROXEN  MG EC tablet    [START ON 6/1/2023] omeprazole (PriLOSEC) 40 MG capsule    pravastatin (PRAVACHOL) 80 mg tablet  Allergies   Allergen Reactions    Succinylcholine GI Intolerance     Prolonged apnea      Lisinopril Cough    Amoxicillin-Pot Clavulanate Rash and Hives    Azithromycin Rash and Hives    Clarithromycin Vomiting, Nausea Only, Rash and Hives     Reviewed medications and allergies and updated as indicated    PHYSICAL EXAM:    Blood pressure 126/82, height 5' (1 524 m), weight 72 6 kg (160 lb)  Body mass index is 31 25 kg/m²  General Appearance: NAD, cooperative, alert  Eyes: Anicteric, PERRLA, EOMI  ENT:  Normocephalic, atraumatic, normal mucosa  Neck:  Supple, symmetrical, trachea midline  Resp:  Clear to auscultation bilaterally; no rales, rhonchi or wheezing; respirations unlabored   CV:  S1 S2, Regular rate and rhythm; no murmur, rub, or gallop  GI:  Soft, non-tender, non-distended; normal bowel sounds; no masses, no organomegaly   Rectal: Deferred  Musculoskeletal: No cyanosis, clubbing or edema  Normal ROM    Skin:  No jaundice, rashes, or lesions   Heme/Lymph: No palpable cervical lymphadenopathy  Psych: Normal affect, good eye contact  Neuro: No gross deficits, AAOx3    Lab Results:   Lab Results   Component Value Date    WBC 4 67 08/16/2022    HGB 13 7 08/16/2022    HCT 42 7 08/16/2022    MCV 91 08/16/2022     08/16/2022     Lab Results   Component Value Date    K 4 6 08/16/2022     08/16/2022    CO2 27 08/16/2022    BUN 20 08/16/2022    CREATININE 0 84 08/16/2022    CALCIUM 9 0 08/16/2022    AST 15 08/16/2022    ALT 16 08/16/2022    ALKPHOS 75 08/16/2022    EGFR 69 08/16/2022     No results found for: IRON, TIBC, FERRITIN  No results found for: LIPASE    Radiology Results:   No results found  Answers for HPI/ROS submitted by the patient on 4/28/2023  Chronicity: recurrent  Onset: more than 1 month ago  Onset quality: sudden  Frequency: intermittently  Progression since onset: waxing and waning  Pain location: epigastric region  Pain - numeric: 6/10  Pain quality: cramping  Radiates to: chest  anorexia: No  arthralgias: No  belching: Yes  constipation: Yes  diarrhea: No  dysuria: No  fever: No  flatus: No  frequency: No  headaches: No  hematochezia: No  hematuria: No  melena: No  myalgias: No  nausea:  No  weight loss: No  vomiting: Yes  Aggravated by: eating  Relieved by: vomiting  Diagnostic workup: lower endoscopy, upper endoscopy

## 2023-05-12 DIAGNOSIS — R11.2 NAUSEA AND VOMITING, UNSPECIFIED VOMITING TYPE: Primary | ICD-10-CM

## 2023-05-12 RX ORDER — ONDANSETRON 8 MG/1
8 TABLET, ORALLY DISINTEGRATING ORAL EVERY 8 HOURS PRN
Qty: 10 TABLET | Refills: 0 | Status: SHIPPED | OUTPATIENT
Start: 2023-05-12

## 2023-05-25 DIAGNOSIS — F32.0 CURRENT MILD EPISODE OF MAJOR DEPRESSIVE DISORDER WITHOUT PRIOR EPISODE (HCC): ICD-10-CM

## 2023-05-25 RX ORDER — ESCITALOPRAM OXALATE 10 MG/1
TABLET ORAL
Qty: 30 TABLET | Refills: 0 | Status: SHIPPED | OUTPATIENT
Start: 2023-05-25

## 2023-05-30 DIAGNOSIS — K21.9 GASTROESOPHAGEAL REFLUX DISEASE WITHOUT ESOPHAGITIS: ICD-10-CM

## 2023-05-31 RX ORDER — OMEPRAZOLE 40 MG/1
40 CAPSULE, DELAYED RELEASE ORAL DAILY
Qty: 30 CAPSULE | Refills: 12 | Status: SHIPPED | OUTPATIENT
Start: 2023-06-01

## 2023-06-07 ENCOUNTER — VBI (OUTPATIENT)
Dept: ADMINISTRATIVE | Facility: OTHER | Age: 73
End: 2023-06-07

## 2023-07-14 DIAGNOSIS — I10 ESSENTIAL HYPERTENSION: ICD-10-CM

## 2023-07-16 DIAGNOSIS — I10 ESSENTIAL HYPERTENSION: Primary | ICD-10-CM

## 2023-07-16 DIAGNOSIS — E78.2 MIXED HYPERLIPIDEMIA: ICD-10-CM

## 2023-07-16 RX ORDER — LOSARTAN POTASSIUM 100 MG/1
TABLET ORAL
Qty: 30 TABLET | Refills: 0 | Status: SHIPPED | OUTPATIENT
Start: 2023-07-16 | End: 2023-08-12

## 2023-07-16 NOTE — TELEPHONE ENCOUNTER
I refilled losartan for 1 month. I have no seen her in almost 1 year. I ordered blood work for her to do and please schedule a f/u before 30 days.

## 2023-07-24 ENCOUNTER — RA CDI HCC (OUTPATIENT)
Dept: OTHER | Facility: HOSPITAL | Age: 73
End: 2023-07-24

## 2023-07-24 NOTE — PROGRESS NOTES
[address] [facility] coding opportunities       Chart reviewed, no opportunity found: [address]        Patients Insurance     Medicare Insurance: [facility]

## 2023-07-25 DIAGNOSIS — F32.0 CURRENT MILD EPISODE OF MAJOR DEPRESSIVE DISORDER WITHOUT PRIOR EPISODE (HCC): ICD-10-CM

## 2023-07-25 RX ORDER — ESCITALOPRAM OXALATE 10 MG/1
TABLET ORAL
Qty: 30 TABLET | Refills: 0 | Status: SHIPPED | OUTPATIENT
Start: 2023-07-25

## 2023-08-01 LAB
ALBUMIN SERPL-MCNC: 4.4 G/DL (ref 3.8–4.8)
ALBUMIN/GLOB SERPL: 1.6 {RATIO} (ref 1.2–2.2)
ALP SERPL-CCNC: 79 IU/L (ref 44–121)
ALT SERPL-CCNC: 16 IU/L (ref 0–32)
AST SERPL-CCNC: 21 IU/L (ref 0–40)
BASOPHILS # BLD AUTO: 0.1 X10E3/UL (ref 0–0.2)
BASOPHILS NFR BLD AUTO: 1 %
BILIRUB SERPL-MCNC: 0.2 MG/DL (ref 0–1.2)
BUN SERPL-MCNC: 22 MG/DL (ref 8–27)
BUN/CREAT SERPL: 25 (ref 12–28)
CALCIUM SERPL-MCNC: 9.5 MG/DL (ref 8.7–10.3)
CHLORIDE SERPL-SCNC: 106 MMOL/L (ref 96–106)
CHOLEST SERPL-MCNC: 191 MG/DL (ref 100–199)
CHOLEST/HDLC SERPL: 3.1 RATIO (ref 0–4.4)
CO2 SERPL-SCNC: 22 MMOL/L (ref 20–29)
CREAT SERPL-MCNC: 0.89 MG/DL (ref 0.57–1)
EGFR: 68 ML/MIN/1.73
EOSINOPHIL # BLD AUTO: 0.5 X10E3/UL (ref 0–0.4)
EOSINOPHIL NFR BLD AUTO: 8 %
ERYTHROCYTE [DISTWIDTH] IN BLOOD BY AUTOMATED COUNT: 14 % (ref 11.7–15.4)
GLOBULIN SER-MCNC: 2.7 G/DL (ref 1.5–4.5)
GLUCOSE SERPL-MCNC: 95 MG/DL (ref 70–99)
HCT VFR BLD AUTO: 39.7 % (ref 34–46.6)
HDLC SERPL-MCNC: 62 MG/DL
HGB BLD-MCNC: 13 G/DL (ref 11.1–15.9)
IMM GRANULOCYTES # BLD: 0 X10E3/UL (ref 0–0.1)
IMM GRANULOCYTES NFR BLD: 0 %
LDLC SERPL CALC-MCNC: 109 MG/DL (ref 0–99)
LYMPHOCYTES # BLD AUTO: 1.7 X10E3/UL (ref 0.7–3.1)
LYMPHOCYTES NFR BLD AUTO: 27 %
MCH RBC QN AUTO: 28.6 PG (ref 26.6–33)
MCHC RBC AUTO-ENTMCNC: 32.7 G/DL (ref 31.5–35.7)
MCV RBC AUTO: 87 FL (ref 79–97)
MONOCYTES # BLD AUTO: 0.6 X10E3/UL (ref 0.1–0.9)
MONOCYTES NFR BLD AUTO: 9 %
NEUTROPHILS # BLD AUTO: 3.3 X10E3/UL (ref 1.4–7)
NEUTROPHILS NFR BLD AUTO: 55 %
PLATELET # BLD AUTO: 281 X10E3/UL (ref 150–450)
POTASSIUM SERPL-SCNC: 4.4 MMOL/L (ref 3.5–5.2)
PROT SERPL-MCNC: 7.1 G/DL (ref 6–8.5)
RBC # BLD AUTO: 4.54 X10E6/UL (ref 3.77–5.28)
SL AMB VLDL CHOLESTEROL CALC: 20 MG/DL (ref 5–40)
SODIUM SERPL-SCNC: 142 MMOL/L (ref 134–144)
TRIGL SERPL-MCNC: 114 MG/DL (ref 0–149)
WBC # BLD AUTO: 6.1 X10E3/UL (ref 3.4–10.8)

## 2023-08-03 ENCOUNTER — OFFICE VISIT (OUTPATIENT)
Dept: FAMILY MEDICINE CLINIC | Facility: HOSPITAL | Age: 73
End: 2023-08-03
Payer: COMMERCIAL

## 2023-08-03 VITALS
HEART RATE: 102 BPM | SYSTOLIC BLOOD PRESSURE: 138 MMHG | DIASTOLIC BLOOD PRESSURE: 82 MMHG | OXYGEN SATURATION: 98 % | WEIGHT: 156 LBS | HEIGHT: 60 IN | TEMPERATURE: 97.7 F | BODY MASS INDEX: 30.63 KG/M2

## 2023-08-03 DIAGNOSIS — Z00.00 MEDICARE ANNUAL WELLNESS VISIT, SUBSEQUENT: ICD-10-CM

## 2023-08-03 DIAGNOSIS — F32.0 CURRENT MILD EPISODE OF MAJOR DEPRESSIVE DISORDER WITHOUT PRIOR EPISODE (HCC): ICD-10-CM

## 2023-08-03 DIAGNOSIS — M85.80 OSTEOPENIA, UNSPECIFIED LOCATION: ICD-10-CM

## 2023-08-03 DIAGNOSIS — F41.1 GENERALIZED ANXIETY DISORDER: ICD-10-CM

## 2023-08-03 DIAGNOSIS — E04.1 THYROID NODULE: ICD-10-CM

## 2023-08-03 DIAGNOSIS — I83.93 SPIDER VEINS OF BOTH LOWER EXTREMITIES: ICD-10-CM

## 2023-08-03 DIAGNOSIS — I10 ESSENTIAL HYPERTENSION: ICD-10-CM

## 2023-08-03 DIAGNOSIS — Z12.31 ENCOUNTER FOR SCREENING MAMMOGRAM FOR BREAST CANCER: Primary | ICD-10-CM

## 2023-08-03 DIAGNOSIS — E78.2 MIXED HYPERLIPIDEMIA: ICD-10-CM

## 2023-08-03 DIAGNOSIS — J45.20 MILD INTERMITTENT ASTHMA WITHOUT COMPLICATION: ICD-10-CM

## 2023-08-03 PROCEDURE — G0439 PPPS, SUBSEQ VISIT: HCPCS | Performed by: NURSE PRACTITIONER

## 2023-08-03 PROCEDURE — 99214 OFFICE O/P EST MOD 30 MIN: CPT | Performed by: NURSE PRACTITIONER

## 2023-08-03 RX ORDER — HYDROCHLOROTHIAZIDE 25 MG/1
25 TABLET ORAL DAILY
Qty: 90 TABLET | Refills: 1 | Status: SHIPPED | OUTPATIENT
Start: 2023-08-03

## 2023-08-03 NOTE — PROGRESS NOTES
Assessment and Plan:     Problem List Items Addressed This Visit    None  Visit Diagnoses     Encounter for screening mammogram for breast cancer    -  Primary    Relevant Orders    Mammo screening bilateral w 3d & cad        BMI Counseling: Body mass index is 30.47 kg/m². The BMI is above normal. Nutrition recommendations include decreasing portion sizes, encouraging healthy choices of fruits and vegetables, decreasing fast food intake, consuming healthier snacks, limiting drinks that contain sugar, moderation in carbohydrate intake and increasing intake of lean protein. Exercise recommendations include moderate physical activity 150 minutes/week. No pharmacotherapy was ordered. Rationale for BMI follow-up plan is due to patient being overweight or obese. Preventive health issues were discussed with patient, and age appropriate screening tests were ordered as noted in patient's After Visit Summary. Personalized health advice and appropriate referrals for health education or preventive services given if needed, as noted in patient's After Visit Summary.      History of Present Illness:     Patient presents for a Medicare Wellness Visit    HPI   Patient Care Team:  Dada Boston as PCP - General (Family Medicine)     Review of Systems:     Review of Systems     Problem List:     Patient Active Problem List   Diagnosis   • Essential hypertension   • Generalized anxiety disorder   • Mixed hyperlipidemia   • Osteopenia   • Current mild episode of major depressive disorder without prior episode (HCC)   • Pes anserinus bursitis of left knee   • Chronic sinusitis   • Dizziness   • Thyroid nodule   • Family history of cardiovascular disease   • Elevated coronary artery calcium score   • Gastroesophageal reflux disease   • Chronic idiopathic constipation   • Family history of colon cancer in father      Past Medical and Surgical History:     Past Medical History:   Diagnosis Date   • Asthma    • C. difficile enteritis    • GERD (gastroesophageal reflux disease)    • History of acute respiratory failure    • Hyperlipidemia    • Hypertension    • Pneumonia    • Respiratory disorder 2013   • Tendinitis of right shoulder 04/09/2019     Past Surgical History:   Procedure Laterality Date   • BRONCHOSCOPY     • COLONOSCOPY  2017   • FRACTURE SURGERY     • NASAL SINUS SURGERY Bilateral 09/2017   • SINUS SURGERY     • TUBAL LIGATION     • WISDOM TOOTH EXTRACTION        Family History:     Family History   Problem Relation Age of Onset   • Cirrhosis Mother    • Colon cancer Father    • Heart disease Father    • Heart failure Father    • Diabetes Father    • Rectal cancer Father    • Heart disease Sister    • Heart disease Brother    • Aortic aneurysm Brother    • Sudden death Brother    • Hypertension Brother         Aiden Sweet   • No Known Problems Maternal Grandmother    • No Known Problems Maternal Grandfather    • Colon cancer Paternal Grandmother    • No Known Problems Paternal Grandfather    • No Known Problems Maternal Aunt    • No Known Problems Maternal Aunt    • No Known Problems Maternal Aunt    • No Known Problems Paternal Aunt    • No Known Problems Paternal Aunt    • Lung cancer Paternal Uncle    • Throat cancer Paternal Uncle    • No Known Problems Daughter    • No Known Problems Daughter    • Colon polyps Neg Hx       Social History:     Social History     Socioeconomic History   • Marital status: /Civil Union     Spouse name: None   • Number of children: None   • Years of education: None   • Highest education level: None   Occupational History   • None   Tobacco Use   • Smoking status: Never   • Smokeless tobacco: Never   Vaping Use   • Vaping Use: Never used   Substance and Sexual Activity   • Alcohol use: Not Currently     Comment: soically   • Drug use: No   • Sexual activity: None   Other Topics Concern   • None   Social History Narrative   • None     Social Determinants of Health     Financial Resource Strain: Not on file   Food Insecurity: Not on file   Transportation Needs: Not on file   Physical Activity: Not on file   Stress: Not on file   Social Connections: Not on file   Intimate Partner Violence: Not on file   Housing Stability: Not on file      Medications and Allergies:     Current Outpatient Medications   Medication Sig Dispense Refill   • albuterol (PROVENTIL HFA,VENTOLIN HFA) 90 mcg/act inhaler Inhale 2 puffs every 6 (six) hours as needed for wheezing 9 g 1   • ALPRAZolam (XANAX) 0.5 mg tablet Take 0.5 mg by mouth if needed     • amLODIPine (NORVASC) 10 mg tablet Take 1 tablet (10 mg total) by mouth daily 30 tablet 11   • aspirin 81 MG tablet Take 81 mg by mouth     • Diclofenac Sodium (VOLTAREN) 1 % Apply 2 g topically 4 (four) times a day     • escitalopram (LEXAPRO) 10 mg tablet TAKE 1 TABLET BY MOUTH EVERY DAY 30 tablet 0   • fluticasone (Flovent HFA) 110 MCG/ACT inhaler Inhale 2 puffs 2 (two) times a day Rinse mouth after use. 36 g 0   • losartan (COZAAR) 100 MG tablet TAKE 1 TABLET BY MOUTH EVERY DAY 30 tablet 0   • Mupirocin POWD Inhale 30 mg 2 (two) times a day     • NAPROXEN  MG EC tablet Take 500 mg by mouth if needed     • omeprazole (PriLOSEC) 40 MG capsule Take 1 capsule (40 mg total) by mouth daily Do not start before June 1, 2023. 30 capsule 12   • ondansetron (Zofran ODT) 8 mg disintegrating tablet Take 1 tablet (8 mg total) by mouth every 8 (eight) hours as needed for nausea or vomiting 10 tablet 0   • pravastatin (PRAVACHOL) 80 mg tablet TAKE 0.5 TABLETS BY MOUTH DAILY. 45 tablet 3     No current facility-administered medications for this visit.      Allergies   Allergen Reactions   • Succinylcholine GI Intolerance     Prolonged apnea     • Lisinopril Cough   • Amoxicillin-Pot Clavulanate Rash and Hives   • Azithromycin Rash and Hives   • Clarithromycin Vomiting, Nausea Only, Rash and Hives      Immunizations:     Immunization History   Administered Date(s) Administered • COVID-19 PFIZER VACCINE 0.3 ML IM 02/24/2021, 03/17/2021   • H1N1, All Formulations 11/21/2009   • INFLUENZA 08/01/2014, 10/21/2021, 10/21/2021   • Influenza, high dose seasonal 0.7 mL 01/06/2020, 10/26/2020   • Influenza, seasonal, injectable 11/27/2017   • Pneumococcal Conjugate 13-Valent 10/15/2015   • Pneumococcal Polysaccharide PPV23 08/01/2014, 08/27/2014   • Tuberculin Skin Test-PPD Intradermal 10/23/2020      Health Maintenance:         Topic Date Due   • Breast Cancer Screening: Mammogram  09/30/2022   • Colorectal Cancer Screening  02/29/2028   • Hepatitis C Screening  Completed         Topic Date Due   • COVID-19 Vaccine (3 - Pfizer series) 05/12/2021   • Influenza Vaccine (1) 09/01/2023      Medicare Screening Tests and Risk Assessments:     Daya Hanna is here for her Subsequent Wellness visit. Health Risk Assessment:   Patient rates overall health as good. Patient feels that their physical health rating is slightly worse. Patient is very satisfied with their life. Eyesight was rated as same. Hearing was rated as same. Patient feels that their emotional and mental health rating is much better. Patients states they are never, rarely angry. Patient states they are sometimes unusually tired/fatigued. Pain experienced in the last 7 days has been none. Patient states that she has experienced weight loss or gain in last 6 months. Depression Screening:   PHQ-9 Score: 2      Fall Risk Screening: In the past year, patient has experienced: no history of falling in past year      Urinary Incontinence Screening:   Patient has not leaked urine accidently in the last six months. Home Safety:  Patient does not have trouble with stairs inside or outside of their home. Patient has working smoke alarms and has working carbon monoxide detector. Home safety hazards include: none. Nutrition:   Current diet is Regular. Medications:   Patient is currently taking over-the-counter supplements.  OTC medications include: see medication list. Patient is able to manage medications. Activities of Daily Living (ADLs)/Instrumental Activities of Daily Living (IADLs):   Walk and transfer into and out of bed and chair?: Yes  Dress and groom yourself?: Yes    Bathe or shower yourself?: Yes    Feed yourself? Yes  Do your laundry/housekeeping?: Yes  Manage your money, pay your bills and track your expenses?: Yes  Make your own meals?: Yes    Do your own shopping?: Yes    Previous Hospitalizations:   Any hospitalizations or ED visits within the last 12 months?: No      Advance Care Planning:   Living will: Yes    Advanced directive: Yes      Cognitive Screening:   Provider or family/friend/caregiver concerned regarding cognition?: No    PREVENTIVE SCREENINGS      Cardiovascular Screening:    General: Screening Not Indicated and History Lipid Disorder      Diabetes Screening:     General: Screening Current      Colorectal Cancer Screening:     General: Screening Current      Breast Cancer Screening:     General: Screening Current      Cervical Cancer Screening:    General: Screening Not Indicated      Osteoporosis Screening:      Due for: DXA Axial      Abdominal Aortic Aneurysm (AAA) Screening:        General: Screening Not Indicated      Lung Cancer Screening:     General: Screening Not Indicated      Hepatitis C Screening:    General: Screening Current    Screening, Brief Intervention, and Referral to Treatment (SBIRT)    Screening      AUDIT-C Screenin) How often did you have a drink containing alcohol in the past year? monthly or less  2) How many drinks did you have on a typical day when you were drinking in the past year?  1 to 2  3) How often did you have 6 or more drinks on one occasion in the past year? never    AUDIT-C Score: 1  Interpretation: Score 0-2 (female): Negative screen for alcohol misuse    Single Item Drug Screening:  How often have you used an illegal drug (including marijuana) or a prescription medication for non-medical reasons in the past year? never    Single Item Drug Screen Score: 0  Interpretation: Negative screen for possible drug use disorder    No results found.      Physical Exam:     /82 (BP Location: Left arm, Patient Position: Sitting, Cuff Size: Standard)   Pulse 102   Temp 97.7 °F (36.5 °C) (Tympanic)   Ht 5' (1.524 m)   Wt 70.8 kg (156 lb)   SpO2 98%   BMI 30.47 kg/m²     Physical Exam     HANK Hawkins

## 2023-08-03 NOTE — PROGRESS NOTES
Assessment/Plan:    Essential hypertension  Bp is controlled. With c/o intermittent leg swelling and on high dose amlodipine. Will d/c amlodipine and start HCTZ. Advised to check BP daily and call if consistently > 150/90 or if leg swelling persists. F/U in 6 months. Mixed hyperlipidemia  LDL is mildly elevated. On 40 mg pravastatin. Diet has been poor. Discussed improved diet and will recheck in 6 months. Current mild episode of major depressive disorder without prior episode ([address])  Mood is well controlled. Continue on current Lexapro dose. F/U in 6 months. Generalized anxiety disorder  Well controlled on current Lexapro dos.e   F/U in 6 months. Mild intermittent asthma without complication  Well controlled. Not using Flovent. No albuterol use. Thyroid nodule  Thyroid US ordered to recheck. Diagnoses and all orders for this visit:    Encounter for screening mammogram for breast cancer  -     Mammo screening bilateral w 3d & cad; Future    Medicare annual wellness visit, subsequent    Essential hypertension  -     hydrochlorothiazide (HYDRODIURIL) 25 mg tablet; Take 1 tablet (25 mg total) by mouth daily    Thyroid nodule  -     US thyroid; Future    Current mild episode of major depressive disorder without prior episode (HCC)    Generalized anxiety disorder    Mixed hyperlipidemia  -     Lipid panel; Future  -     Lipid panel    Spider veins of both lower extremities  -     Ambulatory Referral to Vascular Surgery; Future    Mild intermittent asthma without complication          Subjective:      Patient ID: Jennifer Thayer is a 75 y.o. female. Mood is good. Noticed leg swelling in jnauary while on cruise. Went away but came back the other day. Both legs but mostly left. Worse in heat and if on her feet a lot. She reports she has gained weight. Has not been eating right. Not using Flovent. No albuterol use.        The following portions of the patient's history were reviewed and updated as appropriate: allergies, current medications, past family history, past medical history, past social history, past surgical history and problem list.    Review of Systems   Constitutional: Positive for fatigue. Negative for unexpected weight change. Eyes: Negative for visual disturbance. Respiratory: Negative for cough and shortness of breath. Cardiovascular: Positive for leg swelling. Negative for chest pain and palpitations. Musculoskeletal: Negative for myalgias. Neurological: Negative for dizziness, syncope, light-headedness and headaches. Psychiatric/Behavioral: Negative for decreased concentration, dysphoric mood, sleep disturbance and suicidal ideas. The patient is not nervous/anxious. Objective:  Vitals:    08/03/23 0902   BP: 138/82   Pulse: 102   Temp: 97.7 °F (36.5 °C)   SpO2: 98%      Physical Exam  Vitals reviewed. Constitutional:       Appearance: Normal appearance. She is well-developed. Neck:      Vascular: No carotid bruit. Cardiovascular:      Rate and Rhythm: Normal rate and regular rhythm. Heart sounds: Normal heart sounds. No murmur heard. Pulmonary:      Effort: Pulmonary effort is normal.      Breath sounds: Normal breath sounds. Musculoskeletal:      Right lower leg: No edema. Left lower leg: No edema. Skin:     General: Skin is warm and dry. Neurological:      Mental Status: She is alert and oriented to person, place, and time. Psychiatric:         Mood and Affect: Mood normal.         Behavior: Behavior normal.         Thought Content:  Thought content normal.         Judgment: Judgment normal.

## 2023-08-03 NOTE — ASSESSMENT & PLAN NOTE
Bp is controlled. With c/o intermittent leg swelling and on high dose amlodipine. Will d/c amlodipine and start HCTZ. Advised to check BP daily and call if consistently > 150/90 or if leg swelling persists. F/U in 6 months.

## 2023-08-03 NOTE — PATIENT INSTRUCTIONS
Medicare Preventive Visit Patient Instructions  Thank you for completing your Welcome to Medicare Visit or Medicare Annual Wellness Visit today. Your next wellness visit will be due in one year (8/3/2024). The screening/preventive services that you may require over the next 5-10 years are detailed below. Some tests may not apply to you based off risk factors and/or age. Screening tests ordered at today's visit but not completed yet may show as past due. Also, please note that scanned in results may not display below. Preventive Screenings:  Service Recommendations Previous Testing/Comments   Colorectal Cancer Screening  * Colonoscopy    * Fecal Occult Blood Test (FOBT)/Fecal Immunochemical Test (FIT)  * Fecal DNA/Cologuard Test  * Flexible Sigmoidoscopy Age: 43-73 years old   Colonoscopy: every 10 years (may be performed more frequently if at higher risk)  OR  FOBT/FIT: every 1 year  OR  Cologuard: every 3 years  OR  Sigmoidoscopy: every 5 years  Screening may be recommended earlier than age 39 if at higher risk for colorectal cancer. Also, an individualized decision between you and your healthcare provider will decide whether screening between the ages of 77-80 would be appropriate. Colonoscopy: 03/02/2023  FOBT/FIT: Not on file  Cologuard: Not on file  Sigmoidoscopy: Not on file    Screening Current     Breast Cancer Screening Age: 36 years old  Frequency: every 1-2 years  Not required if history of left and right mastectomy Mammogram: 09/30/2021    Screening Current   Cervical Cancer Screening Between the ages of 21-29, pap smear recommended once every 3 years. Between the ages of 32-69, can perform pap smear with HPV co-testing every 5 years.    Recommendations may differ for women with a history of total hysterectomy, cervical cancer, or abnormal pap smears in past. Pap Smear: Not on file    Screening Not Indicated   Hepatitis C Screening Once for adults born between 1945 and 1965  More frequently in patients at high risk for Hepatitis C Hep C Antibody: 05/14/2019    Screening Current   Diabetes Screening 1-2 times per year if you're at risk for diabetes or have pre-diabetes Fasting glucose: No results in last 5 years (No results in last 5 years)  A1C: 5.5 % (8/17/2022)  Screening Current   Cholesterol Screening Once every 5 years if you don't have a lipid disorder. May order more often based on risk factors. Lipid panel: 07/31/2023    Screening Not Indicated  History Lipid Disorder     Other Preventive Screenings Covered by Medicare:  1. Abdominal Aortic Aneurysm (AAA) Screening: covered once if your at risk. You're considered to be at risk if you have a family history of AAA. 2. Lung Cancer Screening: covers low dose CT scan once per year if you meet all of the following conditions: (1) Age 48-67; (2) No signs or symptoms of lung cancer; (3) Current smoker or have quit smoking within the last 15 years; (4) You have a tobacco smoking history of at least 20 pack years (packs per day multiplied by number of years you smoked); (5) You get a written order from a healthcare provider. 3. Glaucoma Screening: covered annually if you're considered high risk: (1) You have diabetes OR (2) Family history of glaucoma OR (3)  aged 48 and older OR (3)  American aged 72 and older  3. Osteoporosis Screening: covered every 2 years if you meet one of the following conditions: (1) You're estrogen deficient and at risk for osteoporosis based off medical history and other findings; (2) Have a vertebral abnormality; (3) On glucocorticoid therapy for more than 3 months; (4) Have primary hyperparathyroidism; (5) On osteoporosis medications and need to assess response to drug therapy. · Last bone density test (DXA Scan): 05/14/2019.  5. HIV Screening: covered annually if you're between the age of 15-65. Also covered annually if you are younger than 13 and older than 72 with risk factors for HIV infection.  For pregnant patients, it is covered up to 3 times per pregnancy. Immunizations:  Immunization Recommendations   Influenza Vaccine Annual influenza vaccination during flu season is recommended for all persons aged >= 6 months who do not have contraindications   Pneumococcal Vaccine   * Pneumococcal conjugate vaccine = PCV13 (Prevnar 13), PCV15 (Vaxneuvance), PCV20 (Prevnar 20)  * Pneumococcal polysaccharide vaccine = PPSV23 (Pneumovax) Adults 20-63 years old: 1-3 doses may be recommended based on certain risk factors  Adults 72 years old: 1-2 doses may be recommended based off what pneumonia vaccine you previously received   Hepatitis B Vaccine 3 dose series if at intermediate or high risk (ex: diabetes, end stage renal disease, liver disease)   Tetanus (Td) Vaccine - COST NOT COVERED BY MEDICARE PART B Following completion of primary series, a booster dose should be given every 10 years to maintain immunity against tetanus. Td may also be given as tetanus wound prophylaxis. Tdap Vaccine - COST NOT COVERED BY MEDICARE PART B Recommended at least once for all adults. For pregnant patients, recommended with each pregnancy. Shingles Vaccine (Shingrix) - COST NOT COVERED BY MEDICARE PART B  2 shot series recommended in those aged 48 and above     Health Maintenance Due:      Topic Date Due   • Breast Cancer Screening: Mammogram  09/30/2022   • Colorectal Cancer Screening  02/29/2028   • Hepatitis C Screening  Completed     Immunizations Due:      Topic Date Due   • COVID-19 Vaccine (3 - Pfizer series) 05/12/2021   • Influenza Vaccine (1) 09/01/2023     Advance Directives   What are advance directives? Advance directives are legal documents that state your wishes and plans for medical care. These plans are made ahead of time in case you lose your ability to make decisions for yourself. Advance directives can apply to any medical decision, such as the treatments you want, and if you want to donate organs.    What are the types of advance directives? There are many types of advance directives, and each state has rules about how to use them. You may choose a combination of any of the following:  · Living will: This is a written record of the treatment you want. You can also choose which treatments you do not want, which to limit, and which to stop at a certain time. This includes surgery, medicine, IV fluid, and tube feedings. · Durable power of  for [facility]): This is a written record that states who you want to make healthcare choices for you when you are unable to make them for yourself. This person, called a proxy, is usually a family member or a friend. You may choose more than 1 proxy. · Do not resuscitate (DNR) order:  A DNR order is used in case your heart stops beating or you stop breathing. It is a request not to have certain forms of treatment, such as CPR. A DNR order may be included in other types of advance directives. · Medical directive: This covers the care that you want if you are in a coma, near death, or unable to make decisions for yourself. You can list the treatments you want for each condition. Treatment may include pain medicine, surgery, blood transfusions, dialysis, IV or tube feedings, and a ventilator (breathing machine). · Values history: This document has questions about your views, beliefs, and how you feel and think about life. This information can help others choose the care that you would choose. Why are advance directives important? An advance directive helps you control your care. Although spoken wishes may be used, it is better to have your wishes written down. Spoken wishes can be misunderstood, or not followed. Treatments may be given even if you do not want them. An advance directive may make it easier for your family to make difficult choices about your care.    Weight Management   Why it is important to manage your weight:  Being overweight increases your risk of health conditions such as heart disease, high blood pressure, type 2 diabetes, and certain types of cancer. It can also increase your risk for osteoarthritis, sleep apnea, and other respiratory problems. Aim for a slow, steady weight loss. Even a small amount of weight loss can lower your risk of health problems. How to lose weight safely:  A safe and healthy way to lose weight is to eat fewer calories and get regular exercise. You can lose up about 1 pound a week by decreasing the number of calories you eat by 500 calories each day. Healthy meal plan for weight management:  A healthy meal plan includes a variety of foods, contains fewer calories, and helps you stay healthy. A healthy meal plan includes the following:  · Eat whole-grain foods more often. A healthy meal plan should contain fiber. Fiber is the part of grains, fruits, and vegetables that is not broken down by your body. Whole-grain foods are healthy and provide extra fiber in your diet. Some examples of whole-grain foods are whole-wheat breads and pastas, oatmeal, brown rice, and bulgur. · Eat a variety of vegetables every day. Include dark, leafy greens such as spinach, kale, marva greens, and mustard greens. Eat yellow and orange vegetables such as carrots, sweet potatoes, and winter squash. · Eat a variety of fruits every day. Choose fresh or canned fruit (canned in its own juice or light syrup) instead of juice. Fruit juice has very little or no fiber. · Eat low-fat dairy foods. Drink fat-free (skim) milk or 1% milk. Eat fat-free yogurt and low-fat cottage cheese. Try low-fat cheeses such as mozzarella and other reduced-fat cheeses. · Choose meat and other protein foods that are low in fat. Choose beans or other legumes such as split peas or lentils. Choose fish, skinless poultry (chicken or turkey), or lean cuts of red meat (beef or pork). Before you cook meat or poultry, cut off any visible fat. · Use less fat and oil.   Try baking foods instead of frying them. Add less fat, such as margarine, sour cream, regular salad dressing and mayonnaise to foods. Eat fewer high-fat foods. Some examples of high-fat foods include french fries, doughnuts, ice cream, and cakes. · Eat fewer sweets. Limit foods and drinks that are high in sugar. This includes candy, cookies, regular soda, and sweetened drinks. Exercise:  Exercise at least 30 minutes per day on most days of the week. Some examples of exercise include walking, biking, dancing, and swimming. You can also fit in more physical activity by taking the stairs instead of the elevator or parking farther away from stores. Ask your healthcare provider about the best exercise plan for you. © Copyright [employer] 2018 Information is for End User's use only and may not be sold, redistributed or otherwise used for commercial purposes. All illustrations and images included in CareNotes® are the copyrighted property of [employer] or [facility] Preventive Visit Patient Instructions  Thank you for completing your Welcome to Medicare Visit or Medicare Annual Wellness Visit today. Your next wellness visit will be due in one year (8/3/2024). The screening/preventive services that you may require over the next 5-10 years are detailed below. Some tests may not apply to you based off risk factors and/or age. Screening tests ordered at today's visit but not completed yet may show as past due. Also, please note that scanned in results may not display below.   Preventive Screenings:  Service Recommendations Previous Testing/Comments   Colorectal Cancer Screening  * Colonoscopy    * Fecal Occult Blood Test (FOBT)/Fecal Immunochemical Test (FIT)  * Fecal DNA/Cologuard Test  * Flexible Sigmoidoscopy Age: 43-73 years old   Colonoscopy: every 10 years (may be performed more frequently if at higher risk)  OR  FOBT/FIT: every 1 year  OR  Cologuard: every 3 years  OR  Sigmoidoscopy: every 5 years  Screening may be recommended earlier than age 39 if at higher risk for colorectal cancer. Also, an individualized decision between you and your healthcare provider will decide whether screening between the ages of 77-80 would be appropriate. Colonoscopy: 03/02/2023  FOBT/FIT: Not on file  Cologuard: Not on file  Sigmoidoscopy: Not on file    Screening Current     Breast Cancer Screening Age: 36 years old  Frequency: every 1-2 years  Not required if history of left and right mastectomy Mammogram: 09/30/2021    Screening Current   Cervical Cancer Screening Between the ages of 21-29, pap smear recommended once every 3 years. Between the ages of 32-69, can perform pap smear with HPV co-testing every 5 years. Recommendations may differ for women with a history of total hysterectomy, cervical cancer, or abnormal pap smears in past. Pap Smear: Not on file    Screening Not Indicated   Hepatitis C Screening Once for adults born between 1945 and 1965  More frequently in patients at high risk for Hepatitis C Hep C Antibody: 05/14/2019    Screening Current   Diabetes Screening 1-2 times per year if you're at risk for diabetes or have pre-diabetes Fasting glucose: No results in last 5 years (No results in last 5 years)  A1C: 5.5 % (8/17/2022)  Screening Current   Cholesterol Screening Once every 5 years if you don't have a lipid disorder. May order more often based on risk factors. Lipid panel: 07/31/2023    Screening Not Indicated  History Lipid Disorder     Other Preventive Screenings Covered by Medicare:  6. Abdominal Aortic Aneurysm (AAA) Screening: covered once if your at risk. You're considered to be at risk if you have a family history of AAA.   7. Lung Cancer Screening: covers low dose CT scan once per year if you meet all of the following conditions: (1) Age 48-67; (2) No signs or symptoms of lung cancer; (3) Current smoker or have quit smoking within the last 15 years; (4) You have a tobacco smoking history of at least 20 pack years (packs per day multiplied by number of years you smoked); (5) You get a written order from a healthcare provider. 8. Glaucoma Screening: covered annually if you're considered high risk: (1) You have diabetes OR (2) Family history of glaucoma OR (3)  aged 48 and older OR (3)  American aged 72 and older  5. Osteoporosis Screening: covered every 2 years if you meet one of the following conditions: (1) You're estrogen deficient and at risk for osteoporosis based off medical history and other findings; (2) Have a vertebral abnormality; (3) On glucocorticoid therapy for more than 3 months; (4) Have primary hyperparathyroidism; (5) On osteoporosis medications and need to assess response to drug therapy. · Last bone density test (DXA Scan): 05/14/2019.  10. HIV Screening: covered annually if you're between the age of 15-65. Also covered annually if you are younger than 13 and older than 72 with risk factors for HIV infection. For pregnant patients, it is covered up to 3 times per pregnancy. Immunizations:  Immunization Recommendations   Influenza Vaccine Annual influenza vaccination during flu season is recommended for all persons aged >= 6 months who do not have contraindications   Pneumococcal Vaccine   * Pneumococcal conjugate vaccine = PCV13 (Prevnar 13), PCV15 (Vaxneuvance), PCV20 (Prevnar 20)  * Pneumococcal polysaccharide vaccine = PPSV23 (Pneumovax) Adults 20-63 years old: 1-3 doses may be recommended based on certain risk factors  Adults 72 years old: 1-2 doses may be recommended based off what pneumonia vaccine you previously received   Hepatitis B Vaccine 3 dose series if at intermediate or high risk (ex: diabetes, end stage renal disease, liver disease)   Tetanus (Td) Vaccine - COST NOT COVERED BY MEDICARE PART B Following completion of primary series, a booster dose should be given every 10 years to maintain immunity against tetanus.  Td may also be given as tetanus wound prophylaxis. Tdap Vaccine - COST NOT COVERED BY MEDICARE PART B Recommended at least once for all adults. For pregnant patients, recommended with each pregnancy. Shingles Vaccine (Shingrix) - COST NOT COVERED BY MEDICARE PART B  2 shot series recommended in those aged 48 and above     Health Maintenance Due:      Topic Date Due   • Breast Cancer Screening: Mammogram  09/30/2022   • Colorectal Cancer Screening  02/29/2028   • Hepatitis C Screening  Completed     Immunizations Due:      Topic Date Due   • COVID-19 Vaccine (3 - Pfizer series) 05/12/2021   • Influenza Vaccine (1) 09/01/2023     Advance Directives   What are advance directives? Advance directives are legal documents that state your wishes and plans for medical care. These plans are made ahead of time in case you lose your ability to make decisions for yourself. Advance directives can apply to any medical decision, such as the treatments you want, and if you want to donate organs. What are the types of advance directives? There are many types of advance directives, and each state has rules about how to use them. You may choose a combination of any of the following:  · Living will: This is a written record of the treatment you want. You can also choose which treatments you do not want, which to limit, and which to stop at a certain time. This includes surgery, medicine, IV fluid, and tube feedings. · Durable power of  for healthcare [facility]): This is a written record that states who you want to make healthcare choices for you when you are unable to make them for yourself. This person, called a proxy, is usually a family member or a friend. You may choose more than 1 proxy. · Do not resuscitate (DNR) order:  A DNR order is used in case your heart stops beating or you stop breathing. It is a request not to have certain forms of treatment, such as CPR.  A DNR order may be included in other types of advance directives. · Medical directive: This covers the care that you want if you are in a coma, near death, or unable to make decisions for yourself. You can list the treatments you want for each condition. Treatment may include pain medicine, surgery, blood transfusions, dialysis, IV or tube feedings, and a ventilator (breathing machine). · Values history: This document has questions about your views, beliefs, and how you feel and think about life. This information can help others choose the care that you would choose. Why are advance directives important? An advance directive helps you control your care. Although spoken wishes may be used, it is better to have your wishes written down. Spoken wishes can be misunderstood, or not followed. Treatments may be given even if you do not want them. An advance directive may make it easier for your family to make difficult choices about your care. Weight Management   Why it is important to manage your weight:  Being overweight increases your risk of health conditions such as heart disease, high blood pressure, type 2 diabetes, and certain types of cancer. It can also increase your risk for osteoarthritis, sleep apnea, and other respiratory problems. Aim for a slow, steady weight loss. Even a small amount of weight loss can lower your risk of health problems. How to lose weight safely:  A safe and healthy way to lose weight is to eat fewer calories and get regular exercise. You can lose up about 1 pound a week by decreasing the number of calories you eat by 500 calories each day. Healthy meal plan for weight management:  A healthy meal plan includes a variety of foods, contains fewer calories, and helps you stay healthy. A healthy meal plan includes the following:  · Eat whole-grain foods more often. A healthy meal plan should contain fiber. Fiber is the part of grains, fruits, and vegetables that is not broken down by your body.  Whole-grain foods are healthy and provide extra fiber in your diet. Some examples of whole-grain foods are whole-wheat breads and pastas, oatmeal, brown rice, and bulgur. · Eat a variety of vegetables every day. Include dark, leafy greens such as spinach, kale, marva greens, and mustard greens. Eat yellow and orange vegetables such as carrots, sweet potatoes, and winter squash. · Eat a variety of fruits every day. Choose fresh or canned fruit (canned in its own juice or light syrup) instead of juice. Fruit juice has very little or no fiber. · Eat low-fat dairy foods. Drink fat-free (skim) milk or 1% milk. Eat fat-free yogurt and low-fat cottage cheese. Try low-fat cheeses such as mozzarella and other reduced-fat cheeses. · Choose meat and other protein foods that are low in fat. Choose beans or other legumes such as split peas or lentils. Choose fish, skinless poultry (chicken or turkey), or lean cuts of red meat (beef or pork). Before you cook meat or poultry, cut off any visible fat. · Use less fat and oil. Try baking foods instead of frying them. Add less fat, such as margarine, sour cream, regular salad dressing and mayonnaise to foods. Eat fewer high-fat foods. Some examples of high-fat foods include french fries, doughnuts, ice cream, and cakes. · Eat fewer sweets. Limit foods and drinks that are high in sugar. This includes candy, cookies, regular soda, and sweetened drinks. Exercise:  Exercise at least 30 minutes per day on most days of the week. Some examples of exercise include walking, biking, dancing, and swimming. You can also fit in more physical activity by taking the stairs instead of the elevator or parking farther away from stores. Ask your healthcare provider about the best exercise plan for you. © Copyright [address] 2018 Information is for End User's use only and may not be sold, redistributed or otherwise used for commercial purposes.  All illustrations and images included in CareNotes® are the copyrighted property of [email or web address] or [address] [place] [address]

## 2023-08-03 NOTE — ASSESSMENT & PLAN NOTE
LDL is mildly elevated. On 40 mg pravastatin. Diet has been poor. Discussed improved diet and will recheck in 6 months.

## 2023-08-12 DIAGNOSIS — I10 ESSENTIAL HYPERTENSION: ICD-10-CM

## 2023-08-12 RX ORDER — LOSARTAN POTASSIUM 100 MG/1
TABLET ORAL
Qty: 30 TABLET | Refills: 0 | Status: SHIPPED | OUTPATIENT
Start: 2023-08-12

## 2023-08-22 DIAGNOSIS — F32.0 CURRENT MILD EPISODE OF MAJOR DEPRESSIVE DISORDER WITHOUT PRIOR EPISODE (HCC): ICD-10-CM

## 2023-08-22 RX ORDER — ESCITALOPRAM OXALATE 10 MG/1
TABLET ORAL
Qty: 30 TABLET | Refills: 0 | Status: SHIPPED | OUTPATIENT
Start: 2023-08-22

## 2023-09-10 DIAGNOSIS — I10 ESSENTIAL HYPERTENSION: ICD-10-CM

## 2023-09-10 RX ORDER — LOSARTAN POTASSIUM 100 MG/1
TABLET ORAL
Qty: 30 TABLET | Refills: 0 | Status: SHIPPED | OUTPATIENT
Start: 2023-09-10

## 2023-09-25 DIAGNOSIS — F32.0 CURRENT MILD EPISODE OF MAJOR DEPRESSIVE DISORDER WITHOUT PRIOR EPISODE (HCC): ICD-10-CM

## 2023-09-25 RX ORDER — ESCITALOPRAM OXALATE 10 MG/1
TABLET ORAL
Qty: 30 TABLET | Refills: 0 | Status: SHIPPED | OUTPATIENT
Start: 2023-09-25

## 2023-10-09 DIAGNOSIS — I10 ESSENTIAL HYPERTENSION: ICD-10-CM

## 2023-10-09 RX ORDER — LOSARTAN POTASSIUM 100 MG/1
TABLET ORAL
Qty: 30 TABLET | Refills: 0 | Status: SHIPPED | OUTPATIENT
Start: 2023-10-09

## 2023-10-24 DIAGNOSIS — F32.0 CURRENT MILD EPISODE OF MAJOR DEPRESSIVE DISORDER WITHOUT PRIOR EPISODE (HCC): ICD-10-CM

## 2023-10-24 RX ORDER — ESCITALOPRAM OXALATE 10 MG/1
TABLET ORAL
Qty: 30 TABLET | Refills: 0 | Status: SHIPPED | OUTPATIENT
Start: 2023-10-24

## 2023-11-13 DIAGNOSIS — I10 ESSENTIAL HYPERTENSION: ICD-10-CM

## 2023-11-13 RX ORDER — LOSARTAN POTASSIUM 100 MG/1
TABLET ORAL
Qty: 30 TABLET | Refills: 0 | Status: SHIPPED | OUTPATIENT
Start: 2023-11-13

## 2023-11-20 DIAGNOSIS — F32.0 CURRENT MILD EPISODE OF MAJOR DEPRESSIVE DISORDER WITHOUT PRIOR EPISODE (HCC): ICD-10-CM

## 2023-11-20 RX ORDER — ESCITALOPRAM OXALATE 10 MG/1
TABLET ORAL
Qty: 30 TABLET | Refills: 0 | Status: SHIPPED | OUTPATIENT
Start: 2023-11-20

## 2023-11-29 ENCOUNTER — VBI (OUTPATIENT)
Dept: ADMINISTRATIVE | Facility: OTHER | Age: 73
End: 2023-11-29

## 2023-12-11 DIAGNOSIS — I10 ESSENTIAL HYPERTENSION: ICD-10-CM

## 2023-12-11 RX ORDER — LOSARTAN POTASSIUM 100 MG/1
TABLET ORAL
Qty: 30 TABLET | Refills: 0 | Status: SHIPPED | OUTPATIENT
Start: 2023-12-11

## 2023-12-21 DIAGNOSIS — F32.0 CURRENT MILD EPISODE OF MAJOR DEPRESSIVE DISORDER WITHOUT PRIOR EPISODE (HCC): ICD-10-CM

## 2023-12-21 RX ORDER — ESCITALOPRAM OXALATE 10 MG/1
TABLET ORAL
Qty: 30 TABLET | Refills: 0 | Status: SHIPPED | OUTPATIENT
Start: 2023-12-21

## 2024-01-09 DIAGNOSIS — I10 ESSENTIAL HYPERTENSION: ICD-10-CM

## 2024-01-09 RX ORDER — LOSARTAN POTASSIUM 100 MG/1
TABLET ORAL
Qty: 30 TABLET | Refills: 0 | Status: SHIPPED | OUTPATIENT
Start: 2024-01-09

## 2024-01-18 DIAGNOSIS — F32.0 CURRENT MILD EPISODE OF MAJOR DEPRESSIVE DISORDER WITHOUT PRIOR EPISODE (HCC): ICD-10-CM

## 2024-01-18 RX ORDER — ESCITALOPRAM OXALATE 10 MG/1
TABLET ORAL
Qty: 30 TABLET | Refills: 0 | Status: SHIPPED | OUTPATIENT
Start: 2024-01-18

## 2024-01-23 ENCOUNTER — HOSPITAL ENCOUNTER (OUTPATIENT)
Dept: MAMMOGRAPHY | Facility: CLINIC | Age: 74
Discharge: HOME | End: 2024-01-23
Payer: COMMERCIAL

## 2024-01-23 ENCOUNTER — HOSPITAL ENCOUNTER (OUTPATIENT)
Dept: ULTRASOUND IMAGING | Facility: HOSPITAL | Age: 74
Discharge: HOME | End: 2024-01-23
Payer: COMMERCIAL

## 2024-01-23 ENCOUNTER — HOSPITAL ENCOUNTER (OUTPATIENT)
Dept: BONE DENSITY | Facility: CLINIC | Age: 74
Discharge: HOME | End: 2024-01-23
Payer: COMMERCIAL

## 2024-01-23 VITALS — BODY MASS INDEX: 29.45 KG/M2 | HEIGHT: 60 IN | WEIGHT: 150 LBS

## 2024-01-23 VITALS — HEIGHT: 60 IN | BODY MASS INDEX: 29.45 KG/M2 | WEIGHT: 150 LBS

## 2024-01-23 DIAGNOSIS — Z12.31 ENCOUNTER FOR SCREENING MAMMOGRAM FOR BREAST CANCER: ICD-10-CM

## 2024-01-23 DIAGNOSIS — M85.80 OSTEOPENIA, UNSPECIFIED LOCATION: ICD-10-CM

## 2024-01-23 DIAGNOSIS — E04.1 THYROID NODULE: ICD-10-CM

## 2024-01-23 PROCEDURE — 76536 US EXAM OF HEAD AND NECK: CPT

## 2024-01-23 PROCEDURE — 77063 BREAST TOMOSYNTHESIS BI: CPT

## 2024-01-23 PROCEDURE — 77080 DXA BONE DENSITY AXIAL: CPT

## 2024-01-23 PROCEDURE — 77067 SCR MAMMO BI INCL CAD: CPT

## 2024-01-26 ENCOUNTER — OFFICE VISIT (OUTPATIENT)
Dept: FAMILY MEDICINE CLINIC | Facility: HOSPITAL | Age: 74
End: 2024-01-26
Payer: COMMERCIAL

## 2024-01-26 VITALS
DIASTOLIC BLOOD PRESSURE: 78 MMHG | SYSTOLIC BLOOD PRESSURE: 110 MMHG | WEIGHT: 163 LBS | BODY MASS INDEX: 32 KG/M2 | HEART RATE: 91 BPM | TEMPERATURE: 99 F | HEIGHT: 60 IN | OXYGEN SATURATION: 99 %

## 2024-01-26 DIAGNOSIS — F32.0 CURRENT MILD EPISODE OF MAJOR DEPRESSIVE DISORDER WITHOUT PRIOR EPISODE (HCC): ICD-10-CM

## 2024-01-26 DIAGNOSIS — J01.40 ACUTE NON-RECURRENT PANSINUSITIS: Primary | ICD-10-CM

## 2024-01-26 PROCEDURE — 99214 OFFICE O/P EST MOD 30 MIN: CPT | Performed by: NURSE PRACTITIONER

## 2024-01-26 RX ORDER — DOXYCYCLINE 100 MG/1
100 TABLET ORAL 2 TIMES DAILY
Qty: 20 TABLET | Refills: 0 | Status: SHIPPED | OUTPATIENT
Start: 2024-01-26 | End: 2024-02-05

## 2024-01-26 NOTE — PROGRESS NOTES
Name: Maritza Gold      : 1950      MRN: [id number]  Encounter Provider: HANK Solis  Encounter Date: 2024   Encounter department: [facility] [id number]     Assessment & Plan     1. Acute non-recurrent pansinusitis  Comments:  will treat persistent/worse sx's w/antibiotic & advise OTC nasal steroid in addition; call w/worse or persistent sx's & consider need for prednisone  Orders:  -     doxycycline (ADOXA) 100 MG tablet; Take 1 tablet (100 mg total) by mouth 2 (two) times a day for 10 days    2. Current mild episode of major depressive disorder without prior episode ([facility])  Assessment & Plan:  Mood well controlled w/PHQ score of 0  Continue f/u with PCP          Depression Screening and Follow-up Plan: Patient was screened for depression during today's encounter. They screened negative with a PHQ-9 score of 0.        Subjective        Has had a lot of morning congestion for past 2 weeks with itchy eyes and throat. She attributed to allergies initially and was taking an allergy med without relief. Has had worse congestion and sinus pressure this week. Has had 2 sinus surgeries and is prone to sinus infections. States she normally needs a steroid. Using nasal rinses but no steroid spray.         Review of Systems   Constitutional:  Negative for chills and fever.   HENT:  Positive for congestion, ear pain (itchy), sinus pressure and sore throat (itchy).    Eyes:  Positive for itching.   Respiratory:  Positive for cough. Negative for shortness of breath and wheezing.        Current Outpatient Medications on File Prior to Visit   Medication Sig    albuterol (PROVENTIL HFA,VENTOLIN HFA) 90 mcg/act inhaler Inhale 2 puffs every 6 (six) hours as needed for wheezing    ALPRAZolam (XANAX) 0.5 mg tablet Take 0.5 mg by mouth if needed    aspirin 81 MG tablet Take 81 mg by mouth    escitalopram (LEXAPRO) 10 mg tablet TAKE 1 TABLET BY MOUTH EVERY DAY    hydrochlorothiazide  (HYDRODIURIL) 25 mg tablet Take 1 tablet (25 mg total) by mouth daily    losartan (COZAAR) 100 MG tablet TAKE 1 TABLET BY MOUTH EVERY DAY    omeprazole (PriLOSEC) 40 MG capsule Take 1 capsule (40 mg total) by mouth daily Do not start before June 1, 2023.    pravastatin (PRAVACHOL) 80 mg tablet TAKE 0.5 TABLETS BY MOUTH DAILY.    Diclofenac Sodium (VOLTAREN) 1 % Apply 2 g topically 4 (four) times a day (Patient not taking: Reported on 1/26/2024)    fluticasone (Flovent HFA) 110 MCG/ACT inhaler Inhale 2 puffs 2 (two) times a day Rinse mouth after use. (Patient not taking: Reported on 1/26/2024)    Mupirocin POWD Inhale 30 mg 2 (two) times a day (Patient not taking: Reported on 1/26/2024)    NAPROXEN  MG EC tablet Take 500 mg by mouth if needed (Patient not taking: Reported on 1/26/2024)    ondansetron (Zofran ODT) 8 mg disintegrating tablet Take 1 tablet (8 mg total) by mouth every 8 (eight) hours as needed for nausea or vomiting (Patient not taking: Reported on 1/26/2024)       Objective     /78   Pulse 91   Temp 99 °F (37.2 °C)   Ht 5' (1.524 m)   Wt 73.9 kg (163 lb)   SpO2 99%   BMI 31.83 kg/m²       Physical Exam  Vitals reviewed.   Constitutional:       General: She is not in acute distress.     Appearance: Normal appearance.   HENT:      Head: Normocephalic.      Right Ear: A middle ear effusion is present. Tympanic membrane is not erythematous.      Left Ear: A middle ear effusion is present. Tympanic membrane is not erythematous.      Ears:      Comments: Non occlusive cerumen pieces bilat canals     Nose: Congestion present.      Right Sinus: Maxillary sinus tenderness and frontal sinus tenderness present.      Left Sinus: Maxillary sinus tenderness and frontal sinus tenderness present.      Mouth/Throat:      Mouth: Mucous membranes are moist.      Pharynx: Oropharynx is clear.   Eyes:      General:         Right eye: No discharge.         Left eye: No discharge.   Cardiovascular:      Rate  and Rhythm: Normal rate and regular rhythm.      Heart sounds: No murmur heard.  Pulmonary:      Effort: Pulmonary effort is normal. No respiratory distress.      Breath sounds: Normal breath sounds.      Comments: Moist cough  Musculoskeletal:      Cervical back: Normal range of motion.   Lymphadenopathy:      Cervical: No cervical adenopathy.   Skin:     General: Skin is warm and dry.   Neurological:      General: No focal deficit present.      Mental Status: She is alert and oriented to person, place, and time.   Psychiatric:         Mood and Affect: Mood normal.         Behavior: Behavior normal.         HANK Solis

## 2024-01-31 ENCOUNTER — VBI (OUTPATIENT)
Dept: ADMINISTRATIVE | Facility: OTHER | Age: 74
End: 2024-01-31

## 2024-01-31 DIAGNOSIS — I10 ESSENTIAL HYPERTENSION: ICD-10-CM

## 2024-01-31 RX ORDER — HYDROCHLOROTHIAZIDE 25 MG/1
25 TABLET ORAL DAILY
Qty: 90 TABLET | Refills: 1 | Status: SHIPPED | OUTPATIENT
Start: 2024-01-31

## 2024-02-03 LAB
CHOLEST SERPL-MCNC: 174 MG/DL (ref 100–199)
CHOLEST/HDLC SERPL: 3.6 RATIO (ref 0–4.4)
HDLC SERPL-MCNC: 49 MG/DL
LDLC SERPL CALC-MCNC: 101 MG/DL (ref 0–99)
SL AMB VLDL CHOLESTEROL CALC: 24 MG/DL (ref 5–40)
TRIGL SERPL-MCNC: 133 MG/DL (ref 0–149)

## 2024-02-05 ENCOUNTER — OFFICE VISIT (OUTPATIENT)
Dept: FAMILY MEDICINE CLINIC | Facility: HOSPITAL | Age: 74
End: 2024-02-05
Payer: COMMERCIAL

## 2024-02-05 VITALS
TEMPERATURE: 97.2 F | HEART RATE: 92 BPM | OXYGEN SATURATION: 98 % | DIASTOLIC BLOOD PRESSURE: 74 MMHG | WEIGHT: 165 LBS | BODY MASS INDEX: 32.39 KG/M2 | SYSTOLIC BLOOD PRESSURE: 126 MMHG | HEIGHT: 60 IN

## 2024-02-05 DIAGNOSIS — I10 ESSENTIAL HYPERTENSION: Primary | ICD-10-CM

## 2024-02-05 DIAGNOSIS — F32.0 CURRENT MILD EPISODE OF MAJOR DEPRESSIVE DISORDER WITHOUT PRIOR EPISODE (HCC): ICD-10-CM

## 2024-02-05 DIAGNOSIS — J45.20 MILD INTERMITTENT ASTHMA WITHOUT COMPLICATION: ICD-10-CM

## 2024-02-05 DIAGNOSIS — E04.1 THYROID NODULE: ICD-10-CM

## 2024-02-05 DIAGNOSIS — E78.2 MIXED HYPERLIPIDEMIA: ICD-10-CM

## 2024-02-05 PROCEDURE — 99214 OFFICE O/P EST MOD 30 MIN: CPT | Performed by: NURSE PRACTITIONER

## 2024-02-05 RX ORDER — ESCITALOPRAM OXALATE 10 MG/1
10 TABLET ORAL DAILY
Qty: 90 TABLET | Refills: 1 | Status: SHIPPED | OUTPATIENT
Start: 2024-02-05

## 2024-02-05 RX ORDER — LOSARTAN POTASSIUM 100 MG/1
TABLET ORAL
Qty: 90 TABLET | Refills: 1 | Status: SHIPPED | OUTPATIENT
Start: 2024-02-05

## 2024-02-05 NOTE — PROGRESS NOTES
Assessment/Plan:    Essential hypertension  BP well controlled.   Continue on current regimen.   F/U in 6 months.      Mild intermittent asthma without complication  Well controlled.   Continue on current regimen.     Thyroid nodule  Thyroid US shows stability.   Plan to recheck in 2 years.     Mixed hyperlipidemia  Good lipid control.   Continue on current statin dose.   Recheck in 6 months.     Current mild episode of major depressive disorder without prior episode (HCC)  Mood is well controlled.   Continue on current regimen.   F/U in 6 months.        Diagnoses and all orders for this visit:    Essential hypertension  -     CBC and differential; Future  -     Comprehensive metabolic panel; Future  -     CBC and differential  -     Comprehensive metabolic panel  -     losartan (COZAAR) 100 MG tablet; TAKE 1 TABLET BY MOUTH EVERY DAY    Mild intermittent asthma without complication    Thyroid nodule  -     TSH, 3rd generation with Free T4 reflex; Future  -     TSH, 3rd generation with Free T4 reflex    Current mild episode of major depressive disorder without prior episode (HCC)  -     escitalopram (LEXAPRO) 10 mg tablet; Take 1 tablet (10 mg total) by mouth daily    Mixed hyperlipidemia  -     CBC and differential; Future  -     Comprehensive metabolic panel; Future  -     Lipid panel; Future  -     CBC and differential  -     Comprehensive metabolic panel  -     Lipid panel          Subjective:      Patient ID: Maritza Gold is a 73 y.o. female.    Recent sinus infection. On doxycycline.   Rare use of albuterol. Using Flovent daily.   Mood is good.   Diet is not great. No exercise.           The following portions of the patient's history were reviewed and updated as appropriate: allergies, current medications, past family history, past medical history, past social history, past surgical history and problem list.    Review of Systems   Constitutional:  Negative for fatigue and unexpected weight change.   Eyes:   Negative for visual disturbance.   Respiratory:  Negative for shortness of breath.    Cardiovascular:  Negative for chest pain, palpitations and leg swelling.   Neurological:  Negative for dizziness, syncope, light-headedness and headaches.   Psychiatric/Behavioral:  Negative for dysphoric mood.          Objective:  Vitals:    02/05/24 1030   BP: 126/74   Pulse: 92   Temp: (!) 97.2 °F (36.2 °C)   SpO2: 98%      Physical Exam  Vitals reviewed.   Constitutional:       Appearance: Normal appearance. She is well-developed.   Cardiovascular:      Rate and Rhythm: Normal rate and regular rhythm.      Heart sounds: Normal heart sounds. No murmur heard.  Pulmonary:      Effort: Pulmonary effort is normal.      Breath sounds: Normal breath sounds.   Skin:     General: Skin is warm and dry.   Neurological:      Mental Status: She is alert and oriented to person, place, and time.   Psychiatric:         Mood and Affect: Mood normal.         Behavior: Behavior normal.         Thought Content: Thought content normal.         Judgment: Judgment normal.

## 2024-02-14 ENCOUNTER — OFFICE VISIT (OUTPATIENT)
Dept: FAMILY MEDICINE CLINIC | Facility: HOSPITAL | Age: 74
End: 2024-02-14
Payer: COMMERCIAL

## 2024-02-14 ENCOUNTER — HOSPITAL ENCOUNTER (OUTPATIENT)
Dept: RADIOLOGY | Facility: HOSPITAL | Age: 74
Discharge: HOME | End: 2024-02-14
Payer: COMMERCIAL

## 2024-02-14 VITALS
TEMPERATURE: 98.4 F | HEART RATE: 92 BPM | DIASTOLIC BLOOD PRESSURE: 84 MMHG | RESPIRATION RATE: 16 BRPM | HEIGHT: 60 IN | WEIGHT: 160 LBS | SYSTOLIC BLOOD PRESSURE: 140 MMHG | BODY MASS INDEX: 31.41 KG/M2 | OXYGEN SATURATION: 99 %

## 2024-02-14 DIAGNOSIS — J45.21 MILD INTERMITTENT ASTHMA WITH EXACERBATION: ICD-10-CM

## 2024-02-14 DIAGNOSIS — J45.30 MILD PERSISTENT ASTHMA WITHOUT COMPLICATION: ICD-10-CM

## 2024-02-14 DIAGNOSIS — J45.21 MILD INTERMITTENT ASTHMA WITH EXACERBATION: Primary | ICD-10-CM

## 2024-02-14 PROBLEM — J45.901 ASTHMA EXACERBATION: Status: ACTIVE | Noted: 2024-02-14

## 2024-02-14 PROCEDURE — 99214 OFFICE O/P EST MOD 30 MIN: CPT | Performed by: INTERNAL MEDICINE

## 2024-02-14 PROCEDURE — 71046 X-RAY EXAM CHEST 2 VIEWS: CPT

## 2024-02-14 RX ORDER — BENZONATATE 100 MG/1
100 CAPSULE ORAL 3 TIMES DAILY
Qty: 30 CAPSULE | Refills: 0 | Status: SHIPPED | OUTPATIENT
Start: 2024-02-14

## 2024-02-14 RX ORDER — FLUTICASONE PROPIONATE 110 UG/1
2 AEROSOL, METERED RESPIRATORY (INHALATION) 2 TIMES DAILY
Qty: 36 G | Refills: 0 | Status: SHIPPED | OUTPATIENT
Start: 2024-02-14 | End: 2024-02-22

## 2024-02-14 RX ORDER — PREDNISONE 10 MG/1
TABLET ORAL
Qty: 18 TABLET | Refills: 0 | Status: SHIPPED | OUTPATIENT
Start: 2024-02-14

## 2024-02-14 NOTE — PROGRESS NOTES
Assessment/Plan:    Asthma exacerbation  Patient developed runny nose, sore throat, cough, postnasal dripping and January 31.  She was treated with doxycycline for 7 days.    She presents today because she still has postnasal drip, cough with clear phlegm production that has not improved.  She denies fevers, sore throat, chest pain, pleurisy, hemoptysis, shortness of breath or wheezing with her actives of daily living although she does hear the wheezing in her chest.    She has been using Nasacort and Mucinex without help    She had clear nasal discharge in her nose, throat was clear.  She had mild bilateral expiratory wheezing without crackles  Heart was regular without gallop.  She had no lower EXTR edema    Suspect she has asthma exacerbation precipitated by a viral respiratory infection.  I ordered chest x-ray stat to evaluate for infiltrates.  I doubt acute CHF.  If she does have infiltrate I will give her more doxycycline as patient gets hives and rash with amoxicillin and azithromycin as well.    I will treat her with a tapering course of prednisone and I refilled her Flovent inhaler that she ran out of.     Diagnoses and all orders for this visit:    Mild intermittent asthma with exacerbation  -     XR chest pa & lateral; Future  -     predniSONE 10 mg tablet; 30mg qdx3d then 20mg qdx3d then 10mg qdx3d  -     benzonatate (TESSALON PERLES) 100 mg capsule; Take 1 capsule (100 mg total) by mouth 3 (three) times a day    Mild persistent asthma without complication  -     fluticasone (Flovent HFA) 110 MCG/ACT inhaler; Inhale 2 puffs 2 (two) times a day Rinse mouth after use.          Subjective:      Patient ID: Maritza Gold is a 73 y.o. female.      HPI    Patient presents for follow-up of chronic conditions as detailed in the assessment and plan.      The following portions of the patient's history were reviewed and updated as appropriate: current medications, past family history, past medical history, past  social history, past surgical history and problem list.    Review of Systems      Objective:    /84   Pulse 92   Temp 98.4 °F (36.9 °C) (Tympanic)   Resp 16   Ht 5' (1.524 m)   Wt 72.6 kg (160 lb)   SpO2 99%   BMI 31.25 kg/m²      Physical Exam  Constitutional:       General: She is not in acute distress.     Appearance: She is normal weight. She is not toxic-appearing.   HENT:      Head: Normocephalic.      Right Ear: There is impacted cerumen.      Left Ear: There is impacted cerumen.      Nose: Congestion and rhinorrhea (Clear discharge is present) present.      Mouth/Throat:      Mouth: Mucous membranes are moist.      Pharynx: No oropharyngeal exudate or posterior oropharyngeal erythema.   Eyes:      Conjunctiva/sclera: Conjunctivae normal.   Cardiovascular:      Rate and Rhythm: Normal rate and regular rhythm.   Pulmonary:      Effort: No respiratory distress.      Breath sounds: Wheezing present. No rhonchi or rales.   Musculoskeletal:      Cervical back: Neck supple.   Skin:     General: Skin is warm and dry.   Neurological:      Mental Status: She is alert.             Cecil Cota MD

## 2024-02-14 NOTE — ASSESSMENT & PLAN NOTE
Patient developed runny nose, sore throat, cough, postnasal dripping and January 31.  She was treated with doxycycline for 7 days.    She presents today because she still has postnasal drip, cough with clear phlegm production that has not improved.  She denies fevers, sore throat, chest pain, pleurisy, hemoptysis, shortness of breath or wheezing with her actives of daily living although she does hear the wheezing in her chest.    She has been using Nasacort and Mucinex without help    She had clear nasal discharge in her nose, throat was clear.  She had mild bilateral expiratory wheezing without crackles  Heart was regular without gallop.  She had no lower EXTR edema    Suspect she has asthma exacerbation precipitated by a viral respiratory infection.  I ordered chest x-ray stat to evaluate for infiltrates.  I doubt acute CHF.  If she does have infiltrate I will give her more doxycycline as patient gets hives and rash with amoxicillin and azithromycin as well.    I will treat her with a tapering course of prednisone and I refilled her Flovent inhaler that she ran out of.

## 2024-02-22 ENCOUNTER — TELEPHONE (OUTPATIENT)
Dept: FAMILY MEDICINE CLINIC | Facility: HOSPITAL | Age: 74
End: 2024-02-22

## 2024-02-22 DIAGNOSIS — J45.20 MILD INTERMITTENT ASTHMA WITHOUT COMPLICATION: Primary | ICD-10-CM

## 2024-02-22 RX ORDER — BECLOMETHASONE DIPROPIONATE HFA 40 UG/1
2 AEROSOL, METERED RESPIRATORY (INHALATION) 2 TIMES DAILY
Qty: 10.6 G | Refills: 1 | Status: SHIPPED | OUTPATIENT
Start: 2024-02-22 | End: 2024-02-22

## 2024-02-22 RX ORDER — FLUTICASONE FUROATE 50 UG/1
2 POWDER RESPIRATORY (INHALATION) DAILY
Qty: 60 BLISTER | Refills: 0 | Status: SHIPPED | OUTPATIENT
Start: 2024-02-22 | End: 2024-03-23

## 2024-02-22 NOTE — TELEPHONE ENCOUNTER
[facility] CALLED, QVAR INHALER IN NOT COVERED BY HER INS, THEY PREFER ANNUITY ELIPTA INHALER  AS AN ALTERNATIVE.    PLEASE SET TO PRINT, I WILL FAX IT TO THE PHARM.

## 2024-02-23 DIAGNOSIS — J45.20 MILD INTERMITTENT ASTHMA WITHOUT COMPLICATION: Primary | ICD-10-CM

## 2024-02-23 RX ORDER — FLUTICASONE FUROATE AND VILANTEROL 100; 25 UG/1; UG/1
1 POWDER RESPIRATORY (INHALATION) DAILY
Qty: 180 BLISTER | Refills: 0 | Status: SHIPPED | OUTPATIENT
Start: 2024-02-23 | End: 2024-05-23

## 2024-02-24 ENCOUNTER — OFFICE VISIT (OUTPATIENT)
Dept: URGENT CARE | Facility: CLINIC | Age: 74
End: 2024-02-24
Payer: COMMERCIAL

## 2024-02-24 VITALS
TEMPERATURE: 98.4 F | DIASTOLIC BLOOD PRESSURE: 78 MMHG | SYSTOLIC BLOOD PRESSURE: 130 MMHG | HEART RATE: 91 BPM | OXYGEN SATURATION: 97 % | RESPIRATION RATE: 18 BRPM

## 2024-02-24 DIAGNOSIS — I10 ESSENTIAL HYPERTENSION: ICD-10-CM

## 2024-02-24 DIAGNOSIS — N30.01 ACUTE CYSTITIS WITH HEMATURIA: Primary | ICD-10-CM

## 2024-02-24 DIAGNOSIS — R30.0 DYSURIA: ICD-10-CM

## 2024-02-24 LAB
SL AMB  POCT GLUCOSE, UA: ABNORMAL
SL AMB LEUKOCYTE ESTERASE,UA: ABNORMAL
SL AMB POCT BILIRUBIN,UA: ABNORMAL
SL AMB POCT BLOOD,UA: ABNORMAL
SL AMB POCT CLARITY,UA: ABNORMAL
SL AMB POCT COLOR,UA: YELLOW
SL AMB POCT KETONES,UA: ABNORMAL
SL AMB POCT NITRITE,UA: ABNORMAL
SL AMB POCT PH,UA: 7.5
SL AMB POCT SPECIFIC GRAVITY,UA: 1
SL AMB POCT URINE PROTEIN: ABNORMAL
SL AMB POCT UROBILINOGEN: 0.2

## 2024-02-24 PROCEDURE — G0463 HOSPITAL OUTPT CLINIC VISIT: HCPCS

## 2024-02-24 PROCEDURE — 99213 OFFICE O/P EST LOW 20 MIN: CPT

## 2024-02-24 PROCEDURE — 81002 URINALYSIS NONAUTO W/O SCOPE: CPT

## 2024-02-24 RX ORDER — NITROFURANTOIN 25; 75 MG/1; MG/1
100 CAPSULE ORAL 2 TIMES DAILY
Qty: 10 CAPSULE | Refills: 0 | Status: SHIPPED | OUTPATIENT
Start: 2024-02-24 | End: 2024-02-29

## 2024-02-24 RX ORDER — PRAVASTATIN SODIUM 80 MG/1
40 TABLET ORAL DAILY
Qty: 45 TABLET | Refills: 3 | Status: SHIPPED | OUTPATIENT
Start: 2024-02-24

## 2024-02-24 NOTE — PATIENT INSTRUCTIONS
Macrobid prescribed - take as directed.   Take the entire course of medication even if you start feeling better. Future infections can be difficult to treat if you do not take all of the pills.    Urine culture sent, we will notify you if any changes need to be made to your medication.    You may have been prescribed phenazopyridine (Pyridium) to take for relief of UTI symptoms such as pain, burning, irritation, and urinary frequency. It should only be taken for the first 48 hours of treatment. This drug causes orange/yellow discoloration of the urine, which is a normal side effect.    You are encouraged to try over-the-counter Azo for symptom relief.     Stay hydrated with water to help flush out bacteria. Avoid bladder irritants such as citrus, caffeine, chocolate, and coffee.     Follow up with your PCP in 3-5 days.    Go to the ER if symptoms worsen.

## 2024-02-24 NOTE — PROGRESS NOTES
[facility]        NAME: Maritza Gold is a 73 y.o. female  : 1950    MRN: [id number]  DATE: 2024  TIME: 1:36 PM    Assessment and Plan   Acute cystitis with hematuria [N30.01]  1. Acute cystitis with hematuria  nitrofurantoin (MACROBID) 100 mg capsule      2. Dysuria  POCT urine dip    Urine culture            Urine dip completed showing moderate leuks, moderate blood, and nitrites. Will be sent for culture. Antibiotics prescribed. Will follow up on urine culture results and change antibiotic as needed.      Patient Instructions     Macrobid prescribed - take as directed.   Take the entire course of medication even if you start feeling better. Future infections can be difficult to treat if you do not take all of the pills.    Urine culture sent, we will notify you if any changes need to be made to your medication.    You may have been prescribed phenazopyridine (Pyridium) to take for relief of UTI symptoms such as pain, burning, irritation, and urinary frequency. It should only be taken for the first 48 hours of treatment. This drug causes orange/yellow discoloration of the urine, which is a normal side effect.    You are encouraged to try over-the-counter Azo for symptom relief.     Stay hydrated with water to help flush out bacteria. Avoid bladder irritants such as citrus, caffeine, chocolate, and coffee.     Follow up with your PCP in 3-5 days.    Go to the ER if symptoms worsen.      Chief Complaint     Chief Complaint   Patient presents with    Possible UTI     Pt reports frequency and burning since yesterday.          History of Present Illness       Difficulty Urinating   This is a new problem. The current episode started today. The problem occurs every urination. The problem has been gradually worsening. The quality of the pain is described as aching and burning. The pain is at a severity of 9/10. There has been no fever. She is Not sexually active. There is No history of  pyelonephritis. Associated symptoms include frequency, hesitancy and urgency. Pertinent negatives include no chills, discharge, flank pain, hematuria, nausea, possible pregnancy, sweats or vomiting.     Denies history of frequent UTIs.     Review of Systems   Review of Systems   Constitutional:  Negative for chills and fever.   Respiratory:  Negative for chest tightness and shortness of breath.    Cardiovascular:  Negative for chest pain and palpitations.   Gastrointestinal:  Negative for abdominal pain, diarrhea, nausea and vomiting.   Genitourinary:  Positive for dysuria, frequency, hesitancy and urgency. Negative for flank pain and hematuria.   Musculoskeletal:  Negative for back pain and myalgias.   Neurological:  Negative for dizziness, light-headedness and headaches.         Current Medications       Current Outpatient Medications:     albuterol (PROVENTIL HFA,VENTOLIN HFA) 90 mcg/act inhaler, Inhale 2 puffs every 6 (six) hours as needed for wheezing, Disp: 9 g, Rfl: 1    ALPRAZolam (XANAX) 0.5 mg tablet, Take 0.5 mg by mouth if needed, Disp: , Rfl:     aspirin 81 MG tablet, Take 81 mg by mouth, Disp: , Rfl:     escitalopram (LEXAPRO) 10 mg tablet, Take 1 tablet (10 mg total) by mouth daily, Disp: 90 tablet, Rfl: 1    Fluticasone Furoate (Arnuity Ellipta) 50 MCG/ACT AEPB, Inhale 2 puffs in the morning Rinse mouth after use., Disp: 60 blister, Rfl: 0    Fluticasone Furoate-Vilanterol (Breo Ellipta) 100-25 mcg/actuation inhaler, Inhale 1 puff daily Rinse mouth after use., Disp: 180 blister, Rfl: 0    hydroCHLOROthiazide 25 mg tablet, TAKE 1 TABLET (25 MG TOTAL) BY MOUTH DAILY., Disp: 90 tablet, Rfl: 1    losartan (COZAAR) 100 MG tablet, TAKE 1 TABLET BY MOUTH EVERY DAY, Disp: 90 tablet, Rfl: 1    nitrofurantoin (MACROBID) 100 mg capsule, Take 1 capsule (100 mg total) by mouth 2 (two) times a day for 5 days, Disp: 10 capsule, Rfl: 0    omeprazole (PriLOSEC) 40 MG capsule, Take 1 capsule (40 mg total) by mouth  daily Do not start before June 1, 2023., Disp: 30 capsule, Rfl: 12    benzonatate (TESSALON PERLES) 100 mg capsule, Take 1 capsule (100 mg total) by mouth 3 (three) times a day, Disp: 30 capsule, Rfl: 0    Diclofenac Sodium (VOLTAREN) 1 %, Apply 2 g topically 4 (four) times a day (Patient not taking: Reported on 1/26/2024), Disp: , Rfl:     Mupirocin POWD, Inhale 30 mg 2 (two) times a day (Patient not taking: Reported on 1/26/2024), Disp: , Rfl:     NAPROXEN  MG EC tablet, Take 500 mg by mouth if needed (Patient not taking: Reported on 1/26/2024), Disp: , Rfl:     pravastatin (PRAVACHOL) 80 mg tablet, TAKE 1/2 TABLET BY MOUTH EVERY DAY, Disp: 45 tablet, Rfl: 3    predniSONE 10 mg tablet, 30mg qdx3d then 20mg qdx3d then 10mg qdx3d, Disp: 18 tablet, Rfl: 0    Current Allergies     Allergies as of 02/24/2024 - Reviewed 02/24/2024   Allergen Reaction Noted    Succinylcholine GI Intolerance 09/07/2011    Amoxicillin-pot clavulanate Hives, Rash, and Other (See Comments) 02/09/2018    Azithromycin Hives, Rash, and Other (See Comments) 09/07/2011    Clarithromycin Hives, Nausea Only, Rash, Vomiting, and Other (See Comments) 09/07/2011            The following portions of the patient's history were reviewed and updated as appropriate: allergies, current medications, past family history, past medical history, past social history, past surgical history and problem list.     Past Medical History:   Diagnosis Date    Asthma     C. difficile enteritis     GERD (gastroesophageal reflux disease)     History of acute respiratory failure     Hyperlipidemia     Hypertension     Pneumonia     Respiratory disorder 2013    Tendinitis of right shoulder 04/09/2019       Past Surgical History:   Procedure Laterality Date    BRONCHOSCOPY      COLONOSCOPY  2017    FRACTURE SURGERY      NASAL SINUS SURGERY Bilateral 09/2017    SINUS SURGERY      TUBAL LIGATION      WISDOM TOOTH EXTRACTION         Family History   Problem Relation Age of  Onset    Cirrhosis Mother     Colon cancer Father         age dx unknown    Heart disease Father     Heart failure Father     Diabetes Father     Rectal cancer Father     Heart disease Sister     No Known Problems Daughter     No Known Problems Daughter     No Known Problems Daughter     No Known Problems Maternal Grandmother     No Known Problems Maternal Grandfather     Colon cancer Paternal Grandmother         age dx unknown    No Known Problems Paternal Grandfather     Heart disease Brother     Aortic aneurysm Brother     Sudden death Brother     Hypertension Brother         Everett Sweet    No Known Problems Maternal Aunt     No Known Problems Maternal Aunt     No Known Problems Maternal Aunt     No Known Problems Paternal Aunt     No Known Problems Paternal Aunt     Lung cancer Paternal Uncle         age dx unknown    Throat cancer Paternal Uncle         age dx unknown    Colon polyps Neg Hx          Medications have been verified.        Objective   /78   Pulse 91   Temp 98.4 °F (36.9 °C) (Temporal)   Resp 18   SpO2 97%        Physical Exam     Physical Exam  Vitals and nursing note reviewed.   Constitutional:       General: She is not in acute distress.     Appearance: She is not ill-appearing or diaphoretic.   HENT:      Head: Normocephalic.      Mouth/Throat:      Mouth: Mucous membranes are moist.   Cardiovascular:      Rate and Rhythm: Normal rate and regular rhythm.      Pulses: Normal pulses.      Heart sounds: Normal heart sounds.   Pulmonary:      Effort: Pulmonary effort is normal.      Breath sounds: Normal breath sounds.   Abdominal:      Tenderness: There is no right CVA tenderness or left CVA tenderness.   Musculoskeletal:         General: Normal range of motion.      Cervical back: Normal range of motion and neck supple.   Skin:     General: Skin is warm and dry.      Capillary Refill: Capillary refill takes less than 2 seconds.   Neurological:      Mental Status: She is alert and  oriented to person, place, and time.

## 2024-02-28 LAB
BACTERIA UR CULT: ABNORMAL
Lab: ABNORMAL
SL AMB ANTIMICROBIAL SUSCEPTIBILITY: ABNORMAL

## 2024-03-03 ENCOUNTER — OFFICE VISIT (OUTPATIENT)
Dept: URGENT CARE | Facility: CLINIC | Age: 74
End: 2024-03-03
Payer: COMMERCIAL

## 2024-03-03 ENCOUNTER — NURSE TRIAGE (OUTPATIENT)
Dept: OTHER | Facility: OTHER | Age: 74
End: 2024-03-03

## 2024-03-03 VITALS
OXYGEN SATURATION: 98 % | TEMPERATURE: 98 F | SYSTOLIC BLOOD PRESSURE: 130 MMHG | DIASTOLIC BLOOD PRESSURE: 70 MMHG | HEART RATE: 95 BPM | RESPIRATION RATE: 18 BRPM

## 2024-03-03 DIAGNOSIS — N39.0 ACUTE UTI: Primary | ICD-10-CM

## 2024-03-03 DIAGNOSIS — R39.9 UTI SYMPTOMS: ICD-10-CM

## 2024-03-03 LAB
SL AMB  POCT GLUCOSE, UA: NEGATIVE
SL AMB LEUKOCYTE ESTERASE,UA: ABNORMAL
SL AMB POCT BILIRUBIN,UA: NEGATIVE
SL AMB POCT BLOOD,UA: ABNORMAL
SL AMB POCT CLARITY,UA: ABNORMAL
SL AMB POCT COLOR,UA: ABNORMAL
SL AMB POCT KETONES,UA: NEGATIVE
SL AMB POCT NITRITE,UA: NEGATIVE
SL AMB POCT PH,UA: 6
SL AMB POCT SPECIFIC GRAVITY,UA: 1.01
SL AMB POCT URINE PROTEIN: NEGATIVE
SL AMB POCT UROBILINOGEN: 0.2

## 2024-03-03 PROCEDURE — 81002 URINALYSIS NONAUTO W/O SCOPE: CPT | Performed by: PHYSICIAN ASSISTANT

## 2024-03-03 PROCEDURE — 99213 OFFICE O/P EST LOW 20 MIN: CPT | Performed by: PHYSICIAN ASSISTANT

## 2024-03-03 PROCEDURE — G0463 HOSPITAL OUTPT CLINIC VISIT: HCPCS | Performed by: PHYSICIAN ASSISTANT

## 2024-03-03 RX ORDER — SULFAMETHOXAZOLE AND TRIMETHOPRIM 800; 160 MG/1; MG/1
1 TABLET ORAL EVERY 12 HOURS SCHEDULED
Qty: 14 TABLET | Refills: 0 | Status: SHIPPED | OUTPATIENT
Start: 2024-03-03 | End: 2024-03-10

## 2024-03-03 NOTE — PROGRESS NOTES
[facility]        NAME: Maritza Gold is a 73 y.o. female  : 1950    MRN: [id number]  DATE: March 3, 2024  TIME: 1:17 PM    Assessment and Plan   Acute UTI [N39.0]  1. Acute UTI              Patient Instructions       Follow up with PCP in 3-5 days.  Proceed to  ER if symptoms worsen.    If tests have been performed at [facility], our office will contact you with results if changes need to be made to the care plan discussed with you at the visit.  You can review your full results on [facility] My[facility].    Chief Complaint     Chief Complaint   Patient presents with    Possible UTI     Pt reports burning and frequency, which returned this AM. Pt recently completed course of abx and had felt better; however, sx returned this AM.          History of Present Illness       Patient is a 72 y/o/f c/o urinary burning and frequency.  Patient reports symptoms began this morning.  Patient recently finished an antibiotic a few days ago for an E-coli urinary tract infection.  Patient denies fever, flank pain or severe abdominal pain.  Lower abdominal pressure/discomfort is reported.  No N/V.    Urinary Tract Infection   This is a new problem. The current episode started today. The problem occurs intermittently. The problem has been gradually worsening. The quality of the pain is described as burning. Associated symptoms include frequency. Pertinent negatives include no chills, hematuria or vomiting.       Review of Systems   Review of Systems   Constitutional:  Negative for chills and fever.   HENT:  Negative for ear pain and sore throat.    Eyes:  Negative for pain and visual disturbance.   Respiratory:  Negative for cough and shortness of breath.    Cardiovascular:  Negative for chest pain and palpitations.   Gastrointestinal:  Negative for abdominal pain and vomiting.   Genitourinary:  Positive for dysuria and frequency. Negative for hematuria.   Musculoskeletal:  Negative for arthralgias and back pain.    Skin:  Negative for color change and rash.   Neurological:  Negative for seizures and syncope.   All other systems reviewed and are negative.        Current Medications       Current Outpatient Medications:     albuterol (PROVENTIL HFA,VENTOLIN HFA) 90 mcg/act inhaler, Inhale 2 puffs every 6 (six) hours as needed for wheezing, Disp: 9 g, Rfl: 1    ALPRAZolam (XANAX) 0.5 mg tablet, Take 0.5 mg by mouth if needed, Disp: , Rfl:     aspirin 81 MG tablet, Take 81 mg by mouth, Disp: , Rfl:     benzonatate (TESSALON PERLES) 100 mg capsule, Take 1 capsule (100 mg total) by mouth 3 (three) times a day, Disp: 30 capsule, Rfl: 0    Diclofenac Sodium (VOLTAREN) 1 %, Apply 2 g topically 4 (four) times a day (Patient not taking: Reported on 1/26/2024), Disp: , Rfl:     escitalopram (LEXAPRO) 10 mg tablet, Take 1 tablet (10 mg total) by mouth daily, Disp: 90 tablet, Rfl: 1    Fluticasone Furoate (Arnuity Ellipta) 50 MCG/ACT AEPB, Inhale 2 puffs in the morning Rinse mouth after use., Disp: 60 blister, Rfl: 0    Fluticasone Furoate-Vilanterol (Breo Ellipta) 100-25 mcg/actuation inhaler, Inhale 1 puff daily Rinse mouth after use., Disp: 180 blister, Rfl: 0    hydroCHLOROthiazide 25 mg tablet, TAKE 1 TABLET (25 MG TOTAL) BY MOUTH DAILY., Disp: 90 tablet, Rfl: 1    losartan (COZAAR) 100 MG tablet, TAKE 1 TABLET BY MOUTH EVERY DAY, Disp: 90 tablet, Rfl: 1    Mupirocin POWD, Inhale 30 mg 2 (two) times a day (Patient not taking: Reported on 1/26/2024), Disp: , Rfl:     NAPROXEN  MG EC tablet, Take 500 mg by mouth if needed (Patient not taking: Reported on 1/26/2024), Disp: , Rfl:     omeprazole (PriLOSEC) 40 MG capsule, Take 1 capsule (40 mg total) by mouth daily Do not start before June 1, 2023., Disp: 30 capsule, Rfl: 12    pravastatin (PRAVACHOL) 80 mg tablet, TAKE 1/2 TABLET BY MOUTH EVERY DAY, Disp: 45 tablet, Rfl: 3    predniSONE 10 mg tablet, 30mg qdx3d then 20mg qdx3d then 10mg qdx3d, Disp: 18 tablet, Rfl: 0    Current  Allergies     Allergies as of 03/03/2024 - Reviewed 03/03/2024   Allergen Reaction Noted    Succinylcholine GI Intolerance 09/07/2011    Amoxicillin-pot clavulanate Hives, Rash, and Other (See Comments) 02/09/2018    Azithromycin Hives, Rash, and Other (See Comments) 09/07/2011    Clarithromycin Hives, Nausea Only, Rash, Vomiting, and Other (See Comments) 09/07/2011            The following portions of the patient's history were reviewed and updated as appropriate: allergies, current medications, past family history, past medical history, past social history, past surgical history and problem list.     Past Medical History:   Diagnosis Date    Asthma     C. difficile enteritis     GERD (gastroesophageal reflux disease)     History of acute respiratory failure     Hyperlipidemia     Hypertension     Pneumonia     Respiratory disorder 2013    Tendinitis of right shoulder 04/09/2019       Past Surgical History:   Procedure Laterality Date    BRONCHOSCOPY      COLONOSCOPY  2017    FRACTURE SURGERY      NASAL SINUS SURGERY Bilateral 09/2017    SINUS SURGERY      TUBAL LIGATION      WISDOM TOOTH EXTRACTION         Family History   Problem Relation Age of Onset    Cirrhosis Mother     Colon cancer Father         age dx unknown    Heart disease Father     Heart failure Father     Diabetes Father     Rectal cancer Father     Heart disease Sister     No Known Problems Daughter     No Known Problems Daughter     No Known Problems Daughter     No Known Problems Maternal Grandmother     No Known Problems Maternal Grandfather     Colon cancer Paternal Grandmother         age dx unknown    No Known Problems Paternal Grandfather     Heart disease Brother     Aortic aneurysm Brother     Sudden death Brother     Hypertension Brother         Everett Sweet    No Known Problems Maternal Aunt     No Known Problems Maternal Aunt     No Known Problems Maternal Aunt     No Known Problems Paternal Aunt     No Known Problems Paternal Aunt      Lung cancer Paternal Uncle         age dx unknown    Throat cancer Paternal Uncle         age dx unknown    Colon polyps Neg Hx          Medications have been verified.        Objective   /70   Pulse 95   Temp 98 °F (36.7 °C) (Tympanic)   Resp 18   SpO2 98%   No LMP recorded. Patient is postmenopausal.       Physical Exam     Physical Exam  Constitutional:       Appearance: Normal appearance.   HENT:      Head: Normocephalic and atraumatic.      Nose: Nose normal.      Mouth/Throat:      Mouth: Mucous membranes are moist.   Eyes:      Extraocular Movements: Extraocular movements intact.      Conjunctiva/sclera: Conjunctivae normal.      Pupils: Pupils are equal, round, and reactive to light.   Cardiovascular:      Rate and Rhythm: Normal rate and regular rhythm.      Heart sounds: No murmur heard.     No friction rub. No gallop.   Pulmonary:      Effort: Pulmonary effort is normal.      Breath sounds: No wheezing, rhonchi or rales.   Abdominal:      Tenderness: There is abdominal tenderness (Suprapubic tenderness upon palpation.).   Musculoskeletal:         General: Normal range of motion.      Cervical back: Normal range of motion and neck supple.   Skin:     General: Skin is warm and dry.      Capillary Refill: Capillary refill takes less than 2 seconds.   Neurological:      General: No focal deficit present.      Mental Status: She is alert and oriented to person, place, and time.   Psychiatric:         Mood and Affect: Mood normal.         Behavior: Behavior normal.

## 2024-03-03 NOTE — TELEPHONE ENCOUNTER
"Answer Assessment - Initial Assessment Questions  1. INFECTION: \"What infection is the antibiotic being given for?\"      UTI    2. ANTIBIOTIC: \"What antibiotic are you taking\" \"How many times per day?\"      Macrobid 100mg twice a day for 5 days    3. DURATION: \"When was the antibiotic started?\"      Started on 2/24 finished on 2/29    4. MAIN CONCERN OR SYMPTOM:  \"What is your main concern right now?\"      Urinary frequency with minimal urination       Burning pain with urination 8/10      Lower abdominal cramping and pressure     5. BETTER-SAME-WORSE: \"Are you getting better, staying the same, or getting worse compared to when you first started the antibiotics?\" If getting worse, ask: \"In what way?\"       Was better but now this AM symptoms came back and are worse than previously     6. FEVER: \"Do you have a fever?\" If Yes, ask: \"What is your temperature, how was it measured, and when did it start?\"      Denies     7. SYMPTOMS: \"Are there any other symptoms you're concerned about?\" If Yes, ask: \"When did it start?\"      Denies blood in urine         8. FOLLOW-UP APPOINTMENT: \"Do you have a follow-up appointment with your doctor?\"      Denies    Protocols used: Infection on Antibiotic Follow-up Call-ADULT-    "

## 2024-03-03 NOTE — TELEPHONE ENCOUNTER
On call provider contacted, stated due to the antibiotic that was given appropriately covering the bacteria shown in the urine, no new antibiotics can be prescribed without further urine testing. Stated patient should call the office tomorrow and drop off a urine specimen. Patient made aware. Informed patient that if her pain with urination is too severe to wait until tomorrow she could certainly go to a local urgent care for retesting today. Patient verbalized understanding.  Reason for Disposition  • [1] Caller has URGENT question AND [2] triager unable to answer question    Protocols used: Infection on Antibiotic Follow-up Call-ADULT-

## 2024-03-03 NOTE — TELEPHONE ENCOUNTER
"Regarding: uti symptoms  ----- Message from Ashanti Pierson sent at 3/3/2024  9:49 AM EST -----  \"I went to urgent care about last week for a UTI and I finished the round of medication given to me. Today it has started again, I have frequency to urinate and I am doubling over in pain.\"    "

## 2024-03-04 ENCOUNTER — CONSULT (OUTPATIENT)
Dept: VASCULAR SURGERY | Facility: CLINIC | Age: 74
End: 2024-03-04
Payer: COMMERCIAL

## 2024-03-04 VITALS
BODY MASS INDEX: 32 KG/M2 | SYSTOLIC BLOOD PRESSURE: 116 MMHG | HEIGHT: 60 IN | DIASTOLIC BLOOD PRESSURE: 72 MMHG | HEART RATE: 81 BPM | OXYGEN SATURATION: 96 % | WEIGHT: 163 LBS

## 2024-03-04 DIAGNOSIS — I83.891 SYMPTOMATIC VARICOSE VEINS OF RIGHT LOWER EXTREMITY: Primary | ICD-10-CM

## 2024-03-04 DIAGNOSIS — I83.93 SPIDER VEINS OF BOTH LOWER EXTREMITIES: ICD-10-CM

## 2024-03-04 PROCEDURE — 99203 OFFICE O/P NEW LOW 30 MIN: CPT | Performed by: NURSE PRACTITIONER

## 2024-03-04 NOTE — ASSESSMENT & PLAN NOTE
73 year old female HTN, HLD, GERD, asthma, right lower extremity varicosities, bilateral lower extremity spider/reticular veins referred for vascular evaluation    -Right posterior calf truncal varicosities with associated itching, intermittent right lower extremity swelling. BLE spider/reticular veins   -We discussed physiology of venous disease, available treatment options and indications for treatment  -Recommended trial of conservative measures which includes a daily use of gradient compression hose, periodic leg elevation, regular exercise  -Rx 20-30 mmHg compression stockings given  -Follow-up in the office in 3 months to check symptoms with the use of compression

## 2024-03-04 NOTE — PROGRESS NOTES
Assessment/Plan:    Symptomatic varicose veins of right lower extremity  73 year old female HTN, HLD, GERD, asthma, right lower extremity varicosities, bilateral lower extremity spider/reticular veins referred for vascular evaluation    -Right posterior calf truncal varicosities with associated itching, intermittent right lower extremity swelling. BLE spider/reticular veins   -We discussed physiology of venous disease, available treatment options and indications for treatment  -Recommended trial of conservative measures which includes a daily use of gradient compression hose, periodic leg elevation, regular exercise  -Rx 20-30 mmHg compression stockings given  -Follow-up in the office in 3 months to check symptoms with the use of compression       Diagnoses and all orders for this visit:    Symptomatic varicose veins of right lower extremity  -     Compression Stocking    Spider veins of both lower extremities  -     Ambulatory Referral to Vascular Surgery      Subjective:      Patient ID: Maritza Gold is a 73 y.o. female.    Patient is new to our office. She was referred here by HANK Anderson. Patient c/o bulging/painful veins on her right legs that causes her leg to itch, burn and swell. She also c/o spider veins on both of her legs. Patient is taking ASA 81 mg and Pravastatin. She is a non-smoker.     HPI  73 year old female HTN, HLD, GERD, asthma, right lower extremity varicosities, bilateral lower extremity spider/reticular veins referred for vascular evaluation  Longstanding history of bilateral lower extremity veins started after third and fourth pregnancies.  She has itching at the right posterior calf.  Occasional lower extremity swelling.  She denies any history of deep or superficial venous thrombosis.  Maternal history of varicosities  Previously she has not tried compression stockings.  She is active and without any limitations in her daily activities  The following portions of the patient's  history were reviewed and updated as appropriate: allergies, current medications, past family history, past medical history, past social history, past surgical history, and problem list.  ROS reviewed     Review of Systems   Constitutional: Negative.    HENT: Negative.     Eyes: Negative.    Respiratory: Negative.     Cardiovascular:  Positive for leg swelling.        Painful veins   Gastrointestinal: Negative.    Endocrine: Negative.    Genitourinary: Negative.    Musculoskeletal: Negative.    Skin: Negative.    Allergic/Immunologic: Negative.    Neurological: Negative.    Hematological: Negative.    Psychiatric/Behavioral: Negative.           Objective:  I have reviewed and made appropriate changes to the review of systems input by the medical assistant.    Vitals:    03/04/24 1516   BP: 116/72   BP Location: Right arm   Patient Position: Sitting   Cuff Size: Standard   Pulse: 81   SpO2: 96%   Weight: 73.9 kg (163 lb)   Height: 5' (1.524 m)       Patient Active Problem List   Diagnosis    Essential hypertension    Generalized anxiety disorder    Mixed hyperlipidemia    Osteopenia    Current mild episode of major depressive disorder without prior episode (HCC)    Pes anserinus bursitis of left knee    Chronic sinusitis    Dizziness    Thyroid nodule    Family history of cardiovascular disease    Elevated coronary artery calcium score    Gastroesophageal reflux disease    Chronic idiopathic constipation    Family history of colon cancer in father    Mild intermittent asthma without complication    Asthma exacerbation    Symptomatic varicose veins of right lower extremity    Spider veins of both lower extremities       Past Surgical History:   Procedure Laterality Date    BRONCHOSCOPY      COLONOSCOPY  2017    FRACTURE SURGERY      NASAL SINUS SURGERY Bilateral 09/2017    SINUS SURGERY      TUBAL LIGATION      WISDOM TOOTH EXTRACTION         Family History   Problem Relation Age of Onset    Cirrhosis Mother     Colon  cancer Father         age dx unknown    Heart disease Father     Heart failure Father     Diabetes Father     Rectal cancer Father     Heart disease Sister     No Known Problems Daughter     No Known Problems Daughter     No Known Problems Daughter     No Known Problems Maternal Grandmother     No Known Problems Maternal Grandfather     Colon cancer Paternal Grandmother         age dx unknown    No Known Problems Paternal Grandfather     Heart disease Brother     Aortic aneurysm Brother     Sudden death Brother     Hypertension Brother         Everett Sweet    No Known Problems Maternal Aunt     No Known Problems Maternal Aunt     No Known Problems Maternal Aunt     No Known Problems Paternal Aunt     No Known Problems Paternal Aunt     Lung cancer Paternal Uncle         age dx unknown    Throat cancer Paternal Uncle         age dx unknown    Colon polyps Neg Hx        Social History     Socioeconomic History    Marital status: /Civil Union     Spouse name: Not on file    Number of children: Not on file    Years of education: Not on file    Highest education level: Not on file   Occupational History    Not on file   Tobacco Use    Smoking status: Never    Smokeless tobacco: Never   Vaping Use    Vaping status: Never Used   Substance and Sexual Activity    Alcohol use: Not Currently     Comment: soically    Drug use: No    Sexual activity: Not on file   Other Topics Concern    Not on file   Social History Narrative    Not on file     Social Determinants of Health     Financial Resource Strain: Low Risk  (8/3/2023)    Overall Financial Resource Strain (CARDIA)     Difficulty of Paying Living Expenses: Not hard at all   Food Insecurity: Not on file   Transportation Needs: No Transportation Needs (8/3/2023)    PRAPARE - Transportation     Lack of Transportation (Medical): No     Lack of Transportation (Non-Medical): No   Physical Activity: Not on file   Stress: Not on file   Social Connections: Not on file    Intimate Partner Violence: Not on file   Housing Stability: Not on file       Allergies   Allergen Reactions    Succinylcholine GI Intolerance     Prolonged apnea      Amoxicillin-Pot Clavulanate Hives, Rash and Other (See Comments)    Azithromycin Hives, Rash and Other (See Comments)    Clarithromycin Hives, Nausea Only, Rash, Vomiting and Other (See Comments)         Current Outpatient Medications:     albuterol (PROVENTIL HFA,VENTOLIN HFA) 90 mcg/act inhaler, Inhale 2 puffs every 6 (six) hours as needed for wheezing, Disp: 9 g, Rfl: 1    ALPRAZolam (XANAX) 0.5 mg tablet, Take 0.5 mg by mouth if needed, Disp: , Rfl:     aspirin 81 MG tablet, Take 81 mg by mouth, Disp: , Rfl:     benzonatate (TESSALON PERLES) 100 mg capsule, Take 1 capsule (100 mg total) by mouth 3 (three) times a day, Disp: 30 capsule, Rfl: 0    escitalopram (LEXAPRO) 10 mg tablet, Take 1 tablet (10 mg total) by mouth daily, Disp: 90 tablet, Rfl: 1    Fluticasone Furoate (Arnuity Ellipta) 50 MCG/ACT AEPB, Inhale 2 puffs in the morning Rinse mouth after use., Disp: 60 blister, Rfl: 0    Fluticasone Furoate-Vilanterol (Breo Ellipta) 100-25 mcg/actuation inhaler, Inhale 1 puff daily Rinse mouth after use., Disp: 180 blister, Rfl: 0    hydroCHLOROthiazide 25 mg tablet, TAKE 1 TABLET (25 MG TOTAL) BY MOUTH DAILY., Disp: 90 tablet, Rfl: 1    losartan (COZAAR) 100 MG tablet, TAKE 1 TABLET BY MOUTH EVERY DAY, Disp: 90 tablet, Rfl: 1    omeprazole (PriLOSEC) 40 MG capsule, Take 1 capsule (40 mg total) by mouth daily Do not start before June 1, 2023., Disp: 30 capsule, Rfl: 12    pravastatin (PRAVACHOL) 80 mg tablet, TAKE 1/2 TABLET BY MOUTH EVERY DAY, Disp: 45 tablet, Rfl: 3    sulfamethoxazole-trimethoprim (BACTRIM DS) 800-160 mg per tablet, Take 1 tablet by mouth every 12 (twelve) hours for 7 days, Disp: 14 tablet, Rfl: 0    Diclofenac Sodium (VOLTAREN) 1 %, Apply 2 g topically 4 (four) times a day (Patient not taking: Reported on 1/26/2024), Disp: ,  Rfl:     Mupirocin POWD, Inhale 30 mg 2 (two) times a day (Patient not taking: Reported on 1/26/2024), Disp: , Rfl:     NAPROXEN  MG EC tablet, Take 500 mg by mouth if needed (Patient not taking: Reported on 1/26/2024), Disp: , Rfl:     predniSONE 10 mg tablet, 30mg qdx3d then 20mg qdx3d then 10mg qdx3d (Patient not taking: Reported on 3/4/2024), Disp: 18 tablet, Rfl: 0     /72 (BP Location: Right arm, Patient Position: Sitting, Cuff Size: Standard)   Pulse 81   Ht 5' (1.524 m)   Wt 73.9 kg (163 lb)   SpO2 96%   BMI 31.83 kg/m²          Physical Exam  Vitals and nursing note reviewed.   Constitutional:       Appearance: Normal appearance.   HENT:      Head: Normocephalic and atraumatic.   Eyes:      Extraocular Movements: Extraocular movements intact.   Cardiovascular:      Pulses:           Femoral pulses are 2+ on the right side and 2+ on the left side.       Dorsalis pedis pulses are 2+ on the right side and 2+ on the left side.      Heart sounds: Normal heart sounds.   Pulmonary:      Effort: Pulmonary effort is normal.      Breath sounds: Normal breath sounds.   Abdominal:      General: Bowel sounds are normal.      Palpations: Abdomen is soft.   Musculoskeletal:         General: Normal range of motion.      Right lower leg: No edema.      Left lower leg: No edema.      Comments: Right distal anterolateral thigh varicosity branches to posterior calf. Scattered BLE spider/reticular veins    Neurological:      General: No focal deficit present.      Mental Status: She is alert and oriented to person, place, and time.   Psychiatric:         Mood and Affect: Mood normal.         Behavior: Behavior normal.

## 2024-03-06 LAB
BACTERIA UR CULT: ABNORMAL
Lab: ABNORMAL
SL AMB ANTIMICROBIAL SUSCEPTIBILITY: ABNORMAL

## 2024-04-19 ENCOUNTER — OFFICE VISIT (OUTPATIENT)
Dept: FAMILY MEDICINE CLINIC | Facility: HOSPITAL | Age: 74
End: 2024-04-19
Payer: COMMERCIAL

## 2024-04-19 ENCOUNTER — TELEPHONE (OUTPATIENT)
Dept: FAMILY MEDICINE CLINIC | Facility: HOSPITAL | Age: 74
End: 2024-04-19

## 2024-04-19 ENCOUNTER — HOSPITAL ENCOUNTER (OUTPATIENT)
Dept: RADIOLOGY | Facility: HOSPITAL | Age: 74
Discharge: HOME | End: 2024-04-19
Payer: COMMERCIAL

## 2024-04-19 VITALS
HEIGHT: 60 IN | WEIGHT: 164 LBS | BODY MASS INDEX: 32.2 KG/M2 | HEART RATE: 79 BPM | DIASTOLIC BLOOD PRESSURE: 70 MMHG | SYSTOLIC BLOOD PRESSURE: 128 MMHG | OXYGEN SATURATION: 97 %

## 2024-04-19 DIAGNOSIS — M25.561 ACUTE PAIN OF RIGHT KNEE: Primary | ICD-10-CM

## 2024-04-19 DIAGNOSIS — M25.561 ACUTE PAIN OF RIGHT KNEE: ICD-10-CM

## 2024-04-19 PROCEDURE — 99213 OFFICE O/P EST LOW 20 MIN: CPT | Performed by: INTERNAL MEDICINE

## 2024-04-19 PROCEDURE — 73562 X-RAY EXAM OF KNEE 3: CPT

## 2024-04-19 PROCEDURE — G2211 COMPLEX E/M VISIT ADD ON: HCPCS | Performed by: INTERNAL MEDICINE

## 2024-04-19 RX ORDER — MELOXICAM 15 MG/1
15 TABLET ORAL DAILY PRN
Qty: 30 TABLET | Refills: 3 | Status: SHIPPED | OUTPATIENT
Start: 2024-04-19

## 2024-04-19 NOTE — TELEPHONE ENCOUNTER
----- Message from Maritza Gold sent at 4/19/2024  8:23 AM EDT -----  Regarding: Knee Pain  Contact: [phone]  John Meza,    I have been experiencing really bad knee pain in my right knee.  It started a few days ago and appears to be getting worse.  Today I can hardly walk.  The inside pain seems to be on the inside of my knee that is painful when I push in on it.  I need to be seen by either you or perhaps maybe referred to an orthopedic doctor.  I left a voice message this morning.  Hoping to hear from you.    Thank you for your time.    Maritza Gold

## 2024-04-19 NOTE — PROGRESS NOTES
Assessment/Plan:     Diagnosis ICD-10-CM Associated Orders   1. Acute pain of right knee  M25.561 XR knee 3 vw right non injury     meloxicam (MOBIC) 15 mg tablet     Ambulatory referral to Orthopedic Surgery          Problem List Items Addressed This Visit    None  Visit Diagnoses     Acute pain of right knee    -  Primary    Relevant Medications    meloxicam (MOBIC) 15 mg tablet    Other Relevant Orders    XR knee 3 vw right non injury    Ambulatory referral to Orthopedic Surgery            No follow-ups on file.      Subjective:    Patient ID: Maritza Gold is a 73 y.o. 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 There was no injury mechanism. Associated symptoms include an inability to bear weight and a loss of motion. Pertinent negatives include no numbness or tingling. She has tried NSAIDs and acetaminophen for the symptoms. The treatment provided no relief.       The following portions of the patient's history were reviewed and updated as appropriate: allergies, current medications and problem list.     Review of Systems   Neurological:  Negative for tingling and numbness.   All other systems reviewed and are negative.        Objective:      Current Outpatient Medications:   •  albuterol (PROVENTIL HFA,VENTOLIN HFA) 90 mcg/act inhaler, Inhale 2 puffs every 6 (six) hours as needed for wheezing, Disp: 9 g, Rfl: 1  •  ALPRAZolam (XANAX) 0.5 mg tablet, Take 0.5 mg by mouth if needed, Disp: , Rfl:   •  aspirin 81 MG tablet, Take 81 mg by mouth, Disp: , Rfl:   •  escitalopram (LEXAPRO) 10 mg tablet, Take 1 tablet (10 mg total) by mouth daily, Disp: 90 tablet, Rfl: 1  •  Fluticasone Furoate-Vilanterol (Breo Ellipta) 100-25 mcg/actuation inhaler, Inhale 1 puff daily Rinse mouth after use., Disp: 180 blister, Rfl: 0  •   hydroCHLOROthiazide 25 mg tablet, TAKE 1 TABLET (25 MG TOTAL) BY MOUTH DAILY., Disp: 90 tablet, Rfl: 1  •  losartan (COZAAR) 100 MG tablet, TAKE 1 TABLET BY MOUTH EVERY DAY, Disp: 90 tablet, Rfl: 1  •  meloxicam (MOBIC) 15 mg tablet, Take 1 tablet (15 mg total) by mouth daily as needed for moderate pain, Disp: 30 tablet, Rfl: 3  •  omeprazole (PriLOSEC) 40 MG capsule, Take 1 capsule (40 mg total) by mouth daily Do not start before June 1, 2023., Disp: 30 capsule, Rfl: 12  •  pravastatin (PRAVACHOL) 80 mg tablet, TAKE 1/2 TABLET BY MOUTH EVERY DAY, Disp: 45 tablet, Rfl: 3  •  benzonatate (TESSALON PERLES) 100 mg capsule, Take 1 capsule (100 mg total) by mouth 3 (three) times a day (Patient not taking: Reported on 4/19/2024), Disp: 30 capsule, Rfl: 0  •  Diclofenac Sodium (VOLTAREN) 1 %, Apply 2 g topically 4 (four) times a day (Patient not taking: Reported on 1/26/2024), Disp: , Rfl:   •  Mupirocin POWD, Inhale 30 mg 2 (two) times a day (Patient not taking: Reported on 1/26/2024), Disp: , Rfl:   •  NAPROXEN  MG EC tablet, Take 500 mg by mouth if needed (Patient not taking: Reported on 1/26/2024), Disp: , Rfl:   •  predniSONE 10 mg tablet, 30mg qdx3d then 20mg qdx3d then 10mg qdx3d, Disp: 18 tablet, Rfl: 0    Blood pressure 128/70, pulse 79, height 5' (1.524 m), weight 74.4 kg (164 lb), SpO2 97%.     Physical Exam  Vitals and nursing note reviewed.   Constitutional:       General: She is not in acute distress.     Appearance: She is not ill-appearing.   HENT:      Head: Normocephalic.   Abdominal:      Tenderness: There is no abdominal tenderness.   Musculoskeletal:         General: Tenderness present.      Comments: Right knee with anterior  tenderness and medial tenderness over plateau   Neurological:      Mental Status: She is alert.

## 2024-04-24 ENCOUNTER — APPOINTMENT (OUTPATIENT)
Dept: RADIOLOGY | Facility: CLINIC | Age: 74
End: 2024-04-24
Payer: COMMERCIAL

## 2024-04-24 ENCOUNTER — OFFICE VISIT (OUTPATIENT)
Dept: OBGYN CLINIC | Facility: CLINIC | Age: 74
End: 2024-04-24
Payer: COMMERCIAL

## 2024-04-24 VITALS
DIASTOLIC BLOOD PRESSURE: 82 MMHG | HEIGHT: 60 IN | SYSTOLIC BLOOD PRESSURE: 122 MMHG | BODY MASS INDEX: 32.39 KG/M2 | WEIGHT: 165 LBS

## 2024-04-24 DIAGNOSIS — M70.50 PES ANSERINE BURSITIS: Primary | ICD-10-CM

## 2024-04-24 DIAGNOSIS — M25.561 RIGHT KNEE PAIN, UNSPECIFIED CHRONICITY: ICD-10-CM

## 2024-04-24 DIAGNOSIS — M17.11 PRIMARY OSTEOARTHRITIS OF RIGHT KNEE: ICD-10-CM

## 2024-04-24 PROCEDURE — 99213 OFFICE O/P EST LOW 20 MIN: CPT | Performed by: ORTHOPAEDIC SURGERY

## 2024-04-24 PROCEDURE — 73560 X-RAY EXAM OF KNEE 1 OR 2: CPT

## 2024-04-24 PROCEDURE — 20610 DRAIN/INJ JOINT/BURSA W/O US: CPT

## 2024-04-24 RX ORDER — LIDOCAINE HYDROCHLORIDE 10 MG/ML
0.5 INJECTION, SOLUTION INFILTRATION; PERINEURAL
Status: COMPLETED | OUTPATIENT
Start: 2024-04-24 | End: 2024-04-24

## 2024-04-24 RX ORDER — BETAMETHASONE SODIUM PHOSPHATE AND BETAMETHASONE ACETATE 3; 3 MG/ML; MG/ML
3 INJECTION, SUSPENSION INTRA-ARTICULAR; INTRALESIONAL; INTRAMUSCULAR; SOFT TISSUE
Status: COMPLETED | OUTPATIENT
Start: 2024-04-24 | End: 2024-04-24

## 2024-04-24 RX ADMIN — LIDOCAINE HYDROCHLORIDE 0.5 ML: 10 INJECTION, SOLUTION INFILTRATION; PERINEURAL at 08:45

## 2024-04-24 RX ADMIN — BETAMETHASONE SODIUM PHOSPHATE AND BETAMETHASONE ACETATE 3 MG: 3; 3 INJECTION, SUSPENSION INTRA-ARTICULAR; INTRALESIONAL; INTRAMUSCULAR; SOFT TISSUE at 08:45

## 2024-04-24 NOTE — PROGRESS NOTES
Assessment:     1. Pes anserine bursitis    2. Primary osteoarthritis of right knee        Plan:     Problem List Items Addressed This Visit          Musculoskeletal and Integument    Pes anserine bursitis - Primary    Relevant Medications    lidocaine (XYLOCAINE) 1 % injection 0.5 mL (Completed)    betamethasone acetate-betamethasone sodium phosphate (CELESTONE) injection 3 mg (Completed)    Other Relevant Orders    Large joint arthrocentesis: R pes anserine bursa (Completed)    Primary osteoarthritis of right knee    Relevant Orders    XR knee 1 or 2 vw right        Findings are consistent with right knee osteoarthritis and pes anserine bursitis.  I reviewed patient's right knee x-ray with her.  I discussed prognosis of her knee condition.  Her symptom is mostly coming from the pes bursitis.  Her arthritis does not seem to be symptomatic at this point.  Conservative treatment options were discussed with patient's today's visit such as OTC medication, topical gels, steroid injection, physical therapy.  I instructed home stretching exercises for her to do.  After a discussion of risks and benefits the patient elected to proceed with a right knee pes anserine steroid injection today.  Patient should ice and avoid strenuous activity for 1-2 days if needed. If patient is diabetic should also monitor glucose over the next 7 to 10 days.  Patient will continue to apply topical gel such as Aspercreme or Voltaren gel over any for comfort.  I discussed with patient that she may continue to take Mobic as needed for pain control.  Patient may follow-up on an as-needed basis if her symptoms worsen.  All patient's questions were answered to her satisfaction.  This note is created using dictation transcription.  It may contain typographical errors, grammatical errors, improperly dictated words, background noise and other errors.     Subjective:     Patient ID: Maritza Gold is a 73 y.o. female.  Chief Complaint:  Patient is a  73-year-old female here for initial evaluation of right knee being referred to us by Dr. Quick.  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 Patient states her pain is along the medial aspect of her knee with sitting to standing and prolonged standing.  Patient states occasional instability within the right knee at times.  Patient states she has been taking anti-inflammatories and Tylenol with minimal relief.  Patient states she was able to see Dr. Quick on 4/19/2024 and states she was given Mobic which helped alleviate her symptoms after taking it 2 times.  Patient states she has had no previous bracing done to her right knee.  Patient states she has not had any recent physical therapy, steroid injection, gel injection, surgical interventions onto her right knee.    Allergy:  Allergies   Allergen Reactions    Succinylcholine GI Intolerance     Prolonged apnea      Amoxicillin-Pot Clavulanate Hives, Rash and Other (See Comments)    Azithromycin Hives, Rash and Other (See Comments)    Clarithromycin Hives, Nausea Only, Rash, Vomiting and Other (See Comments)     Medications:  all current active meds have been reviewed  Past Medical History:  Past Medical History:   Diagnosis Date    Asthma     C. difficile enteritis     GERD (gastroesophageal reflux disease)     History of acute respiratory failure     Hyperlipidemia     Hypertension     Pneumonia     Respiratory disorder 2013    Tendinitis of right shoulder 04/09/2019     Past Surgical History:  Past Surgical History:   Procedure Laterality Date    BRONCHOSCOPY      COLONOSCOPY  2017    FRACTURE SURGERY      NASAL SINUS SURGERY Bilateral 09/2017    SINUS SURGERY      TUBAL LIGATION      WISDOM TOOTH EXTRACTION       Family History:  Family History   Problem Relation Age of Onset    Cirrhosis Mother     Colon cancer Father          age dx unknown    Heart disease Father     Heart failure Father     Diabetes Father     Rectal cancer Father     Heart disease Sister     No Known Problems Daughter     No Known Problems Daughter     No Known Problems Daughter     No Known Problems Maternal Grandmother     No Known Problems Maternal Grandfather     Colon cancer Paternal Grandmother         age dx unknown    No Known Problems Paternal Grandfather     Heart disease Brother     Aortic aneurysm Brother     Sudden death Brother     Hypertension Brother         Everett Sweet    No Known Problems Maternal Aunt     No Known Problems Maternal Aunt     No Known Problems Maternal Aunt     No Known Problems Paternal Aunt     No Known Problems Paternal Aunt     Lung cancer Paternal Uncle         age dx unknown    Throat cancer Paternal Uncle         age dx unknown    Colon polyps Neg Hx      Social History:  Social History     Substance and Sexual Activity   Alcohol Use Not Currently    Comment: soically     Social History     Substance and Sexual Activity   Drug Use No     Social History     Tobacco Use   Smoking Status Never   Smokeless Tobacco Never     Review of Systems   Constitutional:  Positive for activity change. Negative for chills, fever and unexpected weight change.   HENT:  Negative for hearing loss, nosebleeds and sore throat.    Eyes:  Negative for pain, redness and visual disturbance.   Respiratory:  Negative for cough, shortness of breath and wheezing.    Cardiovascular:  Negative for chest pain, palpitations and leg swelling.   Gastrointestinal:  Negative for abdominal pain, nausea and vomiting.   Endocrine: Negative for polydipsia and polyuria.   Genitourinary:  Negative for dysuria and hematuria.   Musculoskeletal:  Positive for arthralgias (Right knee). Negative for gait problem and joint swelling.        See HPI   Skin:  Negative for rash and wound.   Neurological:  Negative for dizziness, numbness and headaches.    Psychiatric/Behavioral:  Negative for decreased concentration and suicidal ideas. The patient is not nervous/anxious.          Objective:  BP Readings from Last 1 Encounters:   04/24/24 122/82      Wt Readings from Last 1 Encounters:   04/24/24 74.8 kg (165 lb)      BMI:   Estimated body mass index is 32.22 kg/m² as calculated from the following:    Height as of this encounter: 5' (1.524 m).    Weight as of this encounter: 74.8 kg (165 lb).  BSA:   Estimated body surface area is 1.72 meters squared as calculated from the following:    Height as of this encounter: 5' (1.524 m).    Weight as of this encounter: 74.8 kg (165 lb).   Physical Exam  Vitals and nursing note reviewed.   Constitutional:       Appearance: Normal appearance. She is well-developed.   HENT:      Head: Normocephalic and atraumatic.      Right Ear: External ear normal.      Left Ear: External ear normal.   Eyes:      General: No scleral icterus.     Extraocular Movements: Extraocular movements intact.      Conjunctiva/sclera: Conjunctivae normal.   Pulmonary:      Effort: Pulmonary effort is normal. No respiratory distress.   Musculoskeletal:      Cervical back: Neck supple.      Right knee: No effusion.      Instability Tests: Medial Kian test negative and lateral Kian test negative.      Comments: See Ortho exam   Skin:     General: Skin is warm and dry.   Neurological:      General: No focal deficit present.      Mental Status: She is alert and oriented to person, place, and time.      Deep Tendon Reflexes: Reflexes are normal and symmetric.   Psychiatric:         Mood and Affect: Mood normal.         Behavior: Behavior normal.       Right Knee Exam     Muscle Strength   The patient has normal right knee strength.    Tenderness   The patient is experiencing tenderness in the pes anserinus.    Range of Motion   Extension:  0   Flexion:  120     Tests   Kian:  Medial - negative Lateral - negative  Varus: negative Valgus:  negative  Patellar apprehension: negative    Other   Erythema: absent  Scars: absent  Sensation: normal  Pulse: present  Swelling: none  Effusion: no effusion present            I have personally reviewed pertinent films in PACS and my interpretation is XR right knee: Right knee osteoarthritis with medial joint narrowing and small marginal osteophyte.  No soft tissue calcification.    Large joint arthrocentesis: R pes anserine bursa  Universal Protocol:  Consent: Verbal consent obtained.  Risks and benefits: risks, benefits and alternatives were discussed  Consent given by: patient  Patient understanding: patient states understanding of the procedure being performed  Site marked: the operative site was marked  Patient identity confirmed: verbally with patient  Supporting Documentation  Indications: pain   Procedure Details  Location: knee - R pes anserine bursa  Needle size: 22 G  Ultrasound guidance: no  Approach: anteromedial  Medications administered: 0.5 mL lidocaine 1 %; 3 mg betamethasone acetate-betamethasone sodium phosphate 6 (3-3) mg/mL    Patient tolerance: patient tolerated the procedure well with no immediate complications  Dressing:  Sterile dressing applied        Scribe Attestation      I,:  Mukesh Kinney am acting as a scribe while in the presence of the attending physician.:       I,:  Ximena Cronin MD personally performed the services described in this documentation    as scribed in my presence.:

## 2024-05-15 ENCOUNTER — HOSPITAL ENCOUNTER (EMERGENCY)
Facility: HOSPITAL | Age: 74
Discharge: HOME | End: 2024-05-15
Attending: EMERGENCY MEDICINE
Payer: COMMERCIAL

## 2024-05-15 ENCOUNTER — APPOINTMENT (OUTPATIENT)
Dept: RADIOLOGY | Facility: HOSPITAL | Age: 74
End: 2024-05-15
Payer: COMMERCIAL

## 2024-05-15 VITALS
SYSTOLIC BLOOD PRESSURE: 138 MMHG | HEART RATE: 80 BPM | RESPIRATION RATE: 18 BRPM | TEMPERATURE: 97.6 F | DIASTOLIC BLOOD PRESSURE: 74 MMHG | OXYGEN SATURATION: 95 %

## 2024-05-15 DIAGNOSIS — R07.9 CHEST PAIN: Primary | ICD-10-CM

## 2024-05-15 LAB
ALBUMIN SERPL BCP-MCNC: 4.4 G/DL (ref 3.5–5)
ALP SERPL-CCNC: 57 U/L (ref 34–104)
ALT SERPL W P-5'-P-CCNC: 10 U/L (ref 7–52)
ANION GAP SERPL CALCULATED.3IONS-SCNC: 7 MMOL/L (ref 4–13)
AST SERPL W P-5'-P-CCNC: 19 U/L (ref 13–39)
BASOPHILS # BLD AUTO: 0.06 THOUSANDS/ÂΜL (ref 0–0.1)
BASOPHILS NFR BLD AUTO: 1 % (ref 0–1)
BILIRUB SERPL-MCNC: 0.34 MG/DL (ref 0.2–1)
BUN SERPL-MCNC: 26 MG/DL (ref 5–25)
CALCIUM SERPL-MCNC: 9.6 MG/DL (ref 8.4–10.2)
CARDIAC TROPONIN I PNL SERPL HS: 3 NG/L
CHLORIDE SERPL-SCNC: 102 MMOL/L (ref 96–108)
CO2 SERPL-SCNC: 28 MMOL/L (ref 21–32)
CREAT SERPL-MCNC: 0.96 MG/DL (ref 0.6–1.3)
EOSINOPHIL # BLD AUTO: 0.27 THOUSAND/ÂΜL (ref 0–0.61)
EOSINOPHIL NFR BLD AUTO: 4 % (ref 0–6)
ERYTHROCYTE [DISTWIDTH] IN BLOOD BY AUTOMATED COUNT: 15.3 % (ref 11.6–15.1)
GFR SERPL CREATININE-BSD FRML MDRD: 58 ML/MIN/1.73SQ M
GLUCOSE SERPL-MCNC: 95 MG/DL (ref 65–140)
HCT VFR BLD AUTO: 40.8 % (ref 34.8–46.1)
HGB BLD-MCNC: 13 G/DL (ref 11.5–15.4)
IMM GRANULOCYTES # BLD AUTO: 0.02 THOUSAND/UL (ref 0–0.2)
IMM GRANULOCYTES NFR BLD AUTO: 0 % (ref 0–2)
LYMPHOCYTES # BLD AUTO: 2.19 THOUSANDS/ÂΜL (ref 0.6–4.47)
LYMPHOCYTES NFR BLD AUTO: 29 % (ref 14–44)
MCH RBC QN AUTO: 27.9 PG (ref 26.8–34.3)
MCHC RBC AUTO-ENTMCNC: 31.9 G/DL (ref 31.4–37.4)
MCV RBC AUTO: 88 FL (ref 82–98)
MONOCYTES # BLD AUTO: 0.56 THOUSAND/ÂΜL (ref 0.17–1.22)
MONOCYTES NFR BLD AUTO: 7 % (ref 4–12)
NEUTROPHILS # BLD AUTO: 4.53 THOUSANDS/ÂΜL (ref 1.85–7.62)
NEUTS SEG NFR BLD AUTO: 59 % (ref 43–75)
NRBC BLD AUTO-RTO: 0 /100 WBCS
PLATELET # BLD AUTO: 278 THOUSANDS/UL (ref 149–390)
PMV BLD AUTO: 9.2 FL (ref 8.9–12.7)
POTASSIUM SERPL-SCNC: 3.6 MMOL/L (ref 3.5–5.3)
PROT SERPL-MCNC: 8.1 G/DL (ref 6.4–8.4)
RBC # BLD AUTO: 4.66 MILLION/UL (ref 3.81–5.12)
SODIUM SERPL-SCNC: 137 MMOL/L (ref 135–147)
WBC # BLD AUTO: 7.63 THOUSAND/UL (ref 4.31–10.16)

## 2024-05-15 PROCEDURE — 36415 COLL VENOUS BLD VENIPUNCTURE: CPT

## 2024-05-15 PROCEDURE — 99285 EMERGENCY DEPT VISIT HI MDM: CPT | Performed by: EMERGENCY MEDICINE

## 2024-05-15 PROCEDURE — 71046 X-RAY EXAM CHEST 2 VIEWS: CPT

## 2024-05-15 PROCEDURE — 80053 COMPREHEN METABOLIC PANEL: CPT | Performed by: EMERGENCY MEDICINE

## 2024-05-15 PROCEDURE — 85025 COMPLETE CBC W/AUTO DIFF WBC: CPT | Performed by: EMERGENCY MEDICINE

## 2024-05-15 PROCEDURE — 84484 ASSAY OF TROPONIN QUANT: CPT | Performed by: EMERGENCY MEDICINE

## 2024-05-15 PROCEDURE — 93005 ELECTROCARDIOGRAM TRACING: CPT

## 2024-05-15 PROCEDURE — 99285 EMERGENCY DEPT VISIT HI MDM: CPT

## 2024-05-16 LAB
ATRIAL RATE: 89 BPM
P AXIS: 55 DEGREES
PR INTERVAL: 130 MS
QRS AXIS: 66 DEGREES
QRSD INTERVAL: 82 MS
QT INTERVAL: 362 MS
QTC INTERVAL: 440 MS
T WAVE AXIS: 40 DEGREES
VENTRICULAR RATE: 89 BPM

## 2024-05-16 PROCEDURE — 93010 ELECTROCARDIOGRAM REPORT: CPT | Performed by: INTERNAL MEDICINE

## 2024-05-16 NOTE — ED PROVIDER NOTES
History  Chief Complaint   Patient presents with    Chest Pain     Pt reports around 0530 this am she noticed some chest pain and upper mid back pain - lasted 15 minutes. Contacted her doctor and was told to come to the er. Has been fine since the episode.      73 year old female presents for evaluation of chest pain which began in her mid back and then wrapped around to her chest this morning around 5:30 am.  Pain was pressure like and seemed to change in location.  Symptoms resolved after 15 minutes and have not recurred.  No associated nausea, vomiting, shortness of breath or diaphoresis.  Patient denies any recent heavy lifting, twisting or bending.      Chest Pain      Prior to Admission Medications   Prescriptions Last Dose Informant Patient Reported? Taking?   ALPRAZolam (XANAX) 0.5 mg tablet  Self Yes No   Sig: Take 0.5 mg by mouth if needed   Diclofenac Sodium (VOLTAREN) 1 %  Self Yes No   Sig: Apply 2 g topically 4 (four) times a day   Patient not taking: Reported on 1/26/2024   Fluticasone Furoate-Vilanterol (Breo Ellipta) 100-25 mcg/actuation inhaler  Self No No   Sig: Inhale 1 puff daily Rinse mouth after use.   Mupirocin POWD  Self Yes No   Sig: Inhale 30 mg 2 (two) times a day   Patient not taking: Reported on 1/26/2024   NAPROXEN  MG EC tablet  Self Yes No   Sig: Take 500 mg by mouth if needed   Patient not taking: Reported on 1/26/2024   albuterol (PROVENTIL HFA,VENTOLIN HFA) 90 mcg/act inhaler  Self No No   Sig: Inhale 2 puffs every 6 (six) hours as needed for wheezing   aspirin 81 MG tablet  Self Yes No   Sig: Take 81 mg by mouth   benzonatate (TESSALON PERLES) 100 mg capsule  Self No No   Sig: Take 1 capsule (100 mg total) by mouth 3 (three) times a day   Patient not taking: Reported on 4/19/2024   escitalopram (LEXAPRO) 10 mg tablet  Self No No   Sig: Take 1 tablet (10 mg total) by mouth daily   hydroCHLOROthiazide 25 mg tablet  Self No No   Sig: TAKE 1 TABLET (25 MG TOTAL) BY MOUTH DAILY.    losartan (COZAAR) 100 MG tablet  Self No No   Sig: TAKE 1 TABLET BY MOUTH EVERY DAY   meloxicam (MOBIC) 15 mg tablet   No No   Sig: Take 1 tablet (15 mg total) by mouth daily as needed for moderate pain   omeprazole (PriLOSEC) 40 MG capsule  Self No No   Sig: Take 1 capsule (40 mg total) by mouth daily Do not start before 2023.   pravastatin (PRAVACHOL) 80 mg tablet  Self No No   Sig: TAKE 1/2 TABLET BY MOUTH EVERY DAY   predniSONE 10 mg tablet  Self No No   Simg qdx3d then 20mg qdx3d then 10mg qdx3d      Facility-Administered Medications: None       Past Medical History:   Diagnosis Date    Asthma     C. difficile enteritis     GERD (gastroesophageal reflux disease)     History of acute respiratory failure     Hyperlipidemia     Hypertension     Pneumonia     Respiratory disorder     Tendinitis of right shoulder 2019       Past Surgical History:   Procedure Laterality Date    BRONCHOSCOPY      COLONOSCOPY      FRACTURE SURGERY      NASAL SINUS SURGERY Bilateral 2017    SINUS SURGERY      TUBAL LIGATION      WISDOM TOOTH EXTRACTION         Family History   Problem Relation Age of Onset    Cirrhosis Mother     Colon cancer Father         age dx unknown    Heart disease Father     Heart failure Father     Diabetes Father     Rectal cancer Father     Heart disease Sister     No Known Problems Daughter     No Known Problems Daughter     No Known Problems Daughter     No Known Problems Maternal Grandmother     No Known Problems Maternal Grandfather     Colon cancer Paternal Grandmother         age dx unknown    No Known Problems Paternal Grandfather     Heart disease Brother     Aortic aneurysm Brother     Sudden death Brother     Hypertension Brother         Everett Micki    No Known Problems Maternal Aunt     No Known Problems Maternal Aunt     No Known Problems Maternal Aunt     No Known Problems Paternal Aunt     No Known Problems Paternal Aunt     Lung cancer Paternal Uncle          age dx unknown    Throat cancer Paternal Uncle         age dx unknown    Colon polyps Neg Hx      I have reviewed and agree with the history as documented.    E-Cigarette/Vaping    E-Cigarette Use Never User      E-Cigarette/Vaping Substances    Nicotine No     THC No     CBD No     Flavoring No     Other No     Unknown No      Social History     Tobacco Use    Smoking status: Never    Smokeless tobacco: Never   Vaping Use    Vaping status: Never Used   Substance Use Topics    Alcohol use: Not Currently     Comment: soically    Drug use: No       Review of Systems   Cardiovascular:  Positive for chest pain.       Physical Exam  Physical Exam  Vitals and nursing note reviewed.   HENT:      Head: Normocephalic and atraumatic.   Cardiovascular:      Rate and Rhythm: Normal rate and regular rhythm.      Pulses: Normal pulses.      Heart sounds: Normal heart sounds.   Pulmonary:      Effort: Pulmonary effort is normal. No respiratory distress.      Breath sounds: Normal breath sounds.   Abdominal:      General: There is no distension.      Palpations: Abdomen is soft.      Tenderness: There is no abdominal tenderness. Negative signs include Hurst's sign.   Skin:     General: Skin is warm and dry.   Neurological:      Mental Status: She is alert.         Vital Signs  ED Triage Vitals [05/15/24 1735]   Temperature Pulse Respirations Blood Pressure SpO2   97.6 °F (36.4 °C) 83 18 148/84 95 %      Temp Source Heart Rate Source Patient Position - Orthostatic VS BP Location FiO2 (%)   Temporal Monitor Sitting Right arm --      Pain Score       --           Vitals:    05/15/24 1735   BP: 148/84   Pulse: 83   Patient Position - Orthostatic VS: Sitting         Visual Acuity      ED Medications  Medications - No data to display    Diagnostic Studies  Results Reviewed       Procedure Component Value Units Date/Time    HS Troponin 0hr (reflex protocol) [[id number]]  (Normal) Collected: 05/15/24 1741    Lab Status: Final result  Specimen: Blood from Arm, Left Updated: 05/15/24 1821     hs TnI 0hr 3 ng/L     Comprehensive metabolic panel [[id number]]  (Abnormal) Collected: 05/15/24 1741    Lab Status: Final result Specimen: Blood from Arm, Left Updated: 05/15/24 1813     Sodium 137 mmol/L      Potassium 3.6 mmol/L      Chloride 102 mmol/L      CO2 28 mmol/L      ANION GAP 7 mmol/L      BUN 26 mg/dL      Creatinine 0.96 mg/dL      Glucose 95 mg/dL      Calcium 9.6 mg/dL      AST 19 U/L      ALT 10 U/L      Alkaline Phosphatase 57 U/L      Total Protein 8.1 g/dL      Albumin 4.4 g/dL      Total Bilirubin 0.34 mg/dL      eGFR 58 ml/min/1.73sq m     Narrative:      National Kidney Disease Foundation guidelines for Chronic Kidney Disease (CKD):     Stage 1 with normal or high GFR (GFR > 90 mL/min/1.73 square meters)    Stage 2 Mild CKD (GFR = 60-89 mL/min/1.73 square meters)    Stage 3A Moderate CKD (GFR = 45-59 mL/min/1.73 square meters)    Stage 3B Moderate CKD (GFR = 30-44 mL/min/1.73 square meters)    Stage 4 Severe CKD (GFR = 15-29 mL/min/1.73 square meters)    Stage 5 End Stage CKD (GFR <15 mL/min/1.73 square meters)  Note: GFR calculation is accurate only with a steady state creatinine    CBC and differential [[id number]]  (Abnormal) Collected: 05/15/24 1741    Lab Status: Final result Specimen: Blood from Arm, Left Updated: 05/15/24 1759     WBC 7.63 Thousand/uL      RBC 4.66 Million/uL      Hemoglobin 13.0 g/dL      Hematocrit 40.8 %      MCV 88 fL      MCH 27.9 pg      MCHC 31.9 g/dL      RDW 15.3 %      MPV 9.2 fL      Platelets 278 Thousands/uL      nRBC 0 /100 WBCs      Segmented % 59 %      Immature Grans % 0 %      Lymphocytes % 29 %      Monocytes % 7 %      Eosinophils Relative 4 %      Basophils Relative 1 %      Absolute Neutrophils 4.53 Thousands/µL      Absolute Immature Grans 0.02 Thousand/uL      Absolute Lymphocytes 2.19 Thousands/µL      Absolute Monocytes 0.56 Thousand/µL      Eosinophils Absolute 0.27 Thousand/µL       Basophils Absolute 0.06 Thousands/µL                    XR chest 2 views   ED Interpretation by Nayely Sylvester MD (05/15 2036)   No acute pulmonary pathology                 Procedures  ECG 12 Lead Documentation Only    Date/Time: 5/15/2024 5:44 PM    Performed by: Nayely Sylvester MD  Authorized by: Nayely Sylvester MD    Indications / Diagnosis:  Chest pain  ECG reviewed by me, the ED Provider: yes    Patient location:  ED  Previous ECG:     Previous ECG:  Unavailable  Interpretation:     Interpretation: normal    Rate:     ECG rate:  89    ECG rate assessment: normal    Rhythm:     Rhythm: sinus rhythm    Ectopy:     Ectopy: none    QRS:     QRS axis:  Normal    QRS intervals:  Normal  Conduction:     Conduction: normal    ST segments:     ST segments:  Normal  T waves:     T waves: normal             ED Course             HEART Risk Score      Flowsheet Row Most Recent Value   Heart Score Risk Calculator    History 0 Filed at: 05/15/2024 2044   ECG 0 Filed at: 05/15/2024 2044   Age 2 Filed at: 05/15/2024 2044   Risk Factors 2 Filed at: 05/15/2024 2044   Troponin 0 Filed at: 05/15/2024 2044   HEART Score 4 Filed at: 05/15/2024 2044                          SBIRT 20yo+      Flowsheet Row Most Recent Value   Initial Alcohol Screen: US AUDIT-C     1. How often do you have a drink containing alcohol? 0 Filed at: 05/15/2024 1736   2. How many drinks containing alcohol do you have on a typical day you are drinking?  0 Filed at: 05/15/2024 1736   3a. Male UNDER 65: How often do you have five or more drinks on one occasion? 0 Filed at: 05/15/2024 1736   3b. FEMALE Any Age, or MALE 65+: How often do you have 4 or more drinks on one occassion? 0 Filed at: 05/15/2024 1736   Audit-C Score 0 Filed at: 05/15/2024 1736   SHEY: How many times in the past year have you...    Used an illegal drug or used a prescription medication for non-medical reasons? Never Filed at: 05/15/2024 1736                       Medical Decision Making  73 year old female presents for evaluation of chest pain lasting 15 minutes this morning after waking.  Currently asymptomatic.  Exam unremarkable.  EKG without acute ischemic changes or arrhythmia on my independent interpretation.  CXR without acute pathology.  HEART score 4.  Cardiology follow up.  Return precautions provided.    Amount and/or Complexity of Data Reviewed  Labs: ordered.  Radiology: ordered and independent interpretation performed.             Disposition  Final diagnoses:   Chest pain     Time reflects when diagnosis was documented in both MDM as applicable and the Disposition within this note       Time User Action Codes Description Comment    5/15/2024  8:53 PM Nayely Sylvester Add [R07.9] Chest pain           ED Disposition       ED Disposition   Discharge    Condition   Stable    Date/Time   Wed May 15, 2024 2052    Comment   Maritza Bowenss discharge to home/self care.                   Follow-up Information       Follow up With Specialties Details Why Contact Info Additional Information    [facility] Cardiology Schedule an appointment as soon as possible for a visit in 1 week for re-evaluation [address]  [facility] [address]  [phone] [facility], [address] [place] [address], [id number]   [phone]     [facility] Emergency Department Emergency Medicine Go to  If symptoms worsen [address] [facility] [address]  [phone] [facility] Emergency Department, [address], [place] [address]            Patient's Medications   Discharge Prescriptions    No medications on file           PDMP Review       None            ED Provider  Electronically Signed by             Nayely Sylvester MD  05/15/24 2059

## 2024-05-20 ENCOUNTER — OFFICE VISIT (OUTPATIENT)
Dept: CARDIOLOGY CLINIC | Facility: CLINIC | Age: 74
End: 2024-05-20
Payer: COMMERCIAL

## 2024-05-20 VITALS
DIASTOLIC BLOOD PRESSURE: 80 MMHG | HEIGHT: 60 IN | HEART RATE: 86 BPM | SYSTOLIC BLOOD PRESSURE: 122 MMHG | BODY MASS INDEX: 32.2 KG/M2 | WEIGHT: 164 LBS

## 2024-05-20 DIAGNOSIS — R93.1 ELEVATED CORONARY ARTERY CALCIUM SCORE: Primary | ICD-10-CM

## 2024-05-20 DIAGNOSIS — I10 ESSENTIAL (PRIMARY) HYPERTENSION: ICD-10-CM

## 2024-05-20 DIAGNOSIS — R07.9 CHEST PAIN: ICD-10-CM

## 2024-05-20 PROCEDURE — 99214 OFFICE O/P EST MOD 30 MIN: CPT | Performed by: PHYSICIAN ASSISTANT

## 2024-05-20 NOTE — PROGRESS NOTES
Cardiology Office Follow Up  Maritza Gold  1950  [id number]      ASSESSMENT:  Atypical chest pain   Hypertension   Hyperlipidemia - last   Family Hx of premature CAD    Prior cardiac testing:  Echo 9/19/2022: EF 60%  Coronary CT calcium score 10/1/2022 = 46. Mild nonobstructive coronary plaques noted in the LAD territory. Lifestyle modifications advised well is close monitoring of her BP and cholesterol.    PLAN:  ED workup reviewed in detail today including EKG which shows decreased T wave amplitude, this is likely due to lead placement. However, given her family history and risk factors additional cardiac workup is indicated.     Order echo stress test for functional assessment  Continue HCTZ, losartan (ACEI intolerant due to cough) and pravastatin at current doses  Recheck lipid panel in 6 months  Maintain BPs at home daily: report readings >140 systolic  Lifestyle modifications recommended:  Maintaining sodium restricted diet with <2GM/ daily  Avoid foods high in animal or trans fat  Placed nutrition consult to help assist with better diet  Exercise as tolerated; 30minutes moderate exertion recommended    RTO in 6 months with Dr Martinez or sooner if needed.    Interval History/ HPI:   Maritza Jeffery is a 73-year-old female with history of hypertension and family history of premature CAD. Saw Dr. Martinez in our office 9/2/2022 and again 10/13/2022. She presents for follow-up from the ED where she was evaluated for chest pain.    Reports of chest pain radiating to her mid-back this was after waking up and using the bathroom. Lasted 15 minutes and resolved on its own. No other associated symptoms. Called her PCP who advised coming to the ED. No reoccurrence while in the ED. Workup included EKG which showed normal sinus rhythm, possible nonspecific T wave abnormality in inferior leads. Initial troponin 3. CBC/BMP grossly normal. CXR without acute cardiopulmonary disease. /84. She was discharged same  day with cardiology follow up today.     Overall feels well today. No CP or discomfort since her previous episode. No exertional symptoms although she does not have a formal exercise routine. She watches her 2 grandchildren who are very active but no issues.     She reports she has gained about 20 lbs over the past 2-3 years due to caloric intake. Denies any LE edema, orthopnea, PND symptoms associated with volume overload. Avoids pre-packaged foods and eats out rarely. Otherwise does not follow a specific diet.     Vitals:  122/80  86  164lbs    Past Medical History:   Diagnosis Date    Asthma     C. difficile enteritis     GERD (gastroesophageal reflux disease)     History of acute respiratory failure     Hyperlipidemia     Hypertension     Pneumonia     Respiratory disorder 2013    Tendinitis of right shoulder 04/09/2019     Social History     Socioeconomic History    Marital status: /Civil Union     Spouse name: Not on file    Number of children: Not on file    Years of education: Not on file    Highest education level: Not on file   Occupational History    Not on file   Tobacco Use    Smoking status: Never    Smokeless tobacco: Never   Vaping Use    Vaping status: Never Used   Substance and Sexual Activity    Alcohol use: Not Currently     Comment: soically    Drug use: No    Sexual activity: Not on file   Other Topics Concern    Not on file   Social History Narrative    Not on file     Social Determinants of Health     Financial Resource Strain: Low Risk  (8/3/2023)    Overall Financial Resource Strain (CARDIA)     Difficulty of Paying Living Expenses: Not hard at all   Food Insecurity: Not on file   Transportation Needs: No Transportation Needs (8/3/2023)    PRAPARE - Transportation     Lack of Transportation (Medical): No     Lack of Transportation (Non-Medical): No   Physical Activity: Not on file   Stress: Not on file   Social Connections: Not on file   Intimate Partner Violence: Not on file    Housing Stability: Not on file      Family History   Problem Relation Age of Onset    Cirrhosis Mother     Colon cancer Father         age dx unknown    Heart disease Father     Heart failure Father     Diabetes Father     Rectal cancer Father     Heart disease Sister     No Known Problems Daughter     No Known Problems Daughter     No Known Problems Daughter     No Known Problems Maternal Grandmother     No Known Problems Maternal Grandfather     Colon cancer Paternal Grandmother         age dx unknown    No Known Problems Paternal Grandfather     Heart disease Brother     Aortic aneurysm Brother     Sudden death Brother     Hypertension Brother         Everett Sweet    No Known Problems Maternal Aunt     No Known Problems Maternal Aunt     No Known Problems Maternal Aunt     No Known Problems Paternal Aunt     No Known Problems Paternal Aunt     Lung cancer Paternal Uncle         age dx unknown    Throat cancer Paternal Uncle         age dx unknown    Colon polyps Neg Hx      Past Surgical History:   Procedure Laterality Date    BRONCHOSCOPY      COLONOSCOPY  2017    FRACTURE SURGERY      NASAL SINUS SURGERY Bilateral 09/2017    SINUS SURGERY      TUBAL LIGATION      WISDOM TOOTH EXTRACTION         Current Outpatient Medications:     albuterol (PROVENTIL HFA,VENTOLIN HFA) 90 mcg/act inhaler, Inhale 2 puffs every 6 (six) hours as needed for wheezing, Disp: 9 g, Rfl: 1    ALPRAZolam (XANAX) 0.5 mg tablet, Take 0.5 mg by mouth if needed, Disp: , Rfl:     aspirin 81 MG tablet, Take 81 mg by mouth, Disp: , Rfl:     escitalopram (LEXAPRO) 10 mg tablet, Take 1 tablet (10 mg total) by mouth daily, Disp: 90 tablet, Rfl: 1    Fluticasone Furoate-Vilanterol (Breo Ellipta) 100-25 mcg/actuation inhaler, Inhale 1 puff daily Rinse mouth after use., Disp: 180 blister, Rfl: 0    hydroCHLOROthiazide 25 mg tablet, TAKE 1 TABLET (25 MG TOTAL) BY MOUTH DAILY., Disp: 90 tablet, Rfl: 1    losartan (COZAAR) 100 MG tablet, TAKE 1  TABLET BY MOUTH EVERY DAY, Disp: 90 tablet, Rfl: 1    meloxicam (MOBIC) 15 mg tablet, Take 1 tablet (15 mg total) by mouth daily as needed for moderate pain, Disp: 30 tablet, Rfl: 3    NAPROXEN  MG EC tablet, Take 500 mg by mouth if needed, Disp: , Rfl:     omeprazole (PriLOSEC) 40 MG capsule, Take 1 capsule (40 mg total) by mouth daily Do not start before June 1, 2023., Disp: 30 capsule, Rfl: 12    pravastatin (PRAVACHOL) 80 mg tablet, TAKE 1/2 TABLET BY MOUTH EVERY DAY, Disp: 45 tablet, Rfl: 3    predniSONE 10 mg tablet, 30mg qdx3d then 20mg qdx3d then 10mg qdx3d, Disp: 18 tablet, Rfl: 0    benzonatate (TESSALON PERLES) 100 mg capsule, Take 1 capsule (100 mg total) by mouth 3 (three) times a day (Patient not taking: Reported on 4/19/2024), Disp: 30 capsule, Rfl: 0    Diclofenac Sodium (VOLTAREN) 1 %, Apply 2 g topically 4 (four) times a day (Patient not taking: Reported on 1/26/2024), Disp: , Rfl:     Mupirocin POWD, Inhale 30 mg 2 (two) times a day (Patient not taking: Reported on 1/26/2024), Disp: , Rfl:     Review of Systems:  Review of Systems   Constitutional:  Negative for appetite change, chills, diaphoresis, fatigue and fever.   Respiratory:  Negative for cough, chest tightness and shortness of breath.    Cardiovascular:  Negative for chest pain, palpitations and leg swelling.   Gastrointestinal:  Negative for diarrhea, nausea and vomiting.   Endocrine: Negative for cold intolerance and heat intolerance.   Genitourinary:  Negative for difficulty urinating, dysuria and enuresis.   Musculoskeletal:  Negative for arthralgias, back pain and gait problem.   Allergic/Immunologic: Negative for environmental allergies and food allergies.   Neurological:  Negative for dizziness, facial asymmetry and headaches.   Hematological:  Negative for adenopathy. Does not bruise/bleed easily.   Psychiatric/Behavioral:  Negative for agitation, behavioral problems and confusion.      Physical Exam:  Physical  Exam  Constitutional:       Appearance: She is well-developed.   HENT:      Right Ear: External ear normal.      Left Ear: External ear normal.   Eyes:      Extraocular Movements: EOM normal.      Pupils: Pupils are equal, round, and reactive to light.   Cardiovascular:      Rate and Rhythm: Normal rate and regular rhythm.      Heart sounds: Normal heart sounds. No murmur heard.     No friction rub. No gallop.   Pulmonary:      Effort: Pulmonary effort is normal.      Breath sounds: Normal breath sounds.   Abdominal:      Palpations: Abdomen is soft.   Musculoskeletal:         General: Normal range of motion.      Cervical back: Normal range of motion.   Skin:     General: Skin is warm and dry.   Neurological:      Mental Status: She is alert and oriented to person, place, and time.      Deep Tendon Reflexes: Reflexes are normal and symmetric.   Psychiatric:         Mood and Affect: Mood and affect normal.         Behavior: Behavior normal.         Thought Content: Thought content normal.         Judgment: Judgment normal.         This note was completed in part utilizing M-Modal Fluency Direct Software.  Grammatical errors, random word insertions, spelling mistakes, and incomplete sentences can be an occasional consequence of this system secondary to software limitations, ambient noise, and hardware issues.  If you have any questions or concerns about the content, text, or information contained within the body of this dictation, please contact the provider for clarification.

## 2024-06-05 ENCOUNTER — OFFICE VISIT (OUTPATIENT)
Dept: URGENT CARE | Facility: CLINIC | Age: 74
End: 2024-06-05
Payer: COMMERCIAL

## 2024-06-05 ENCOUNTER — NURSE TRIAGE (OUTPATIENT)
Age: 74
End: 2024-06-05

## 2024-06-05 ENCOUNTER — APPOINTMENT (OUTPATIENT)
Dept: RADIOLOGY | Facility: CLINIC | Age: 74
End: 2024-06-05
Payer: COMMERCIAL

## 2024-06-05 VITALS
BODY MASS INDEX: 32.2 KG/M2 | SYSTOLIC BLOOD PRESSURE: 128 MMHG | HEIGHT: 60 IN | HEART RATE: 78 BPM | DIASTOLIC BLOOD PRESSURE: 94 MMHG | WEIGHT: 164 LBS | TEMPERATURE: 98.7 F | OXYGEN SATURATION: 97 %

## 2024-06-05 DIAGNOSIS — M25.461 EFFUSION OF RIGHT KNEE: Primary | ICD-10-CM
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
## 2024-06-05 PROCEDURE — 99213 OFFICE O/P EST LOW 20 MIN: CPT | Performed by: FAMILY MEDICINE

## 2024-06-05 PROCEDURE — 72100 X-RAY EXAM L-S SPINE 2/3 VWS: CPT

## 2024-06-05 PROCEDURE — 73564 X-RAY EXAM KNEE 4 OR MORE: CPT

## 2024-06-05 PROCEDURE — G0463 HOSPITAL OUTPT CLINIC VISIT: HCPCS | Performed by: FAMILY MEDICINE

## 2024-06-05 PROCEDURE — 73562 X-RAY EXAM OF KNEE 3: CPT

## 2024-06-05 RX ORDER — MELOXICAM 7.5 MG/1
7.5 TABLET ORAL 2 TIMES DAILY PRN
Qty: 60 TABLET | Refills: 0 | Status: SHIPPED | OUTPATIENT
Start: 2024-06-05

## 2024-06-05 NOTE — PROGRESS NOTES
[facility]        NAME: Maritza Gold is a 73 y.o. female  : 1950    MRN: [id number]  DATE: 2024  TIME: 5:14 PM    Assessment and Plan   Effusion of right knee [M25.461]  1. Effusion of right knee  meloxicam (MOBIC) 7.5 mg tablet      2. Fall, initial encounter  XR knee 3 vw right non injury    XR spine lumbar 2 or 3 views injury    XR knee 4+ vw left injury    meloxicam (MOBIC) 7.5 mg tablet            Patient Instructions       Follow up with PCP in 3-5 days.  Proceed to  ER if symptoms worsen.    If tests have been performed at [facility], our office will contact you with results if changes need to be made to the care plan discussed with you at the visit.  You can review your full results on [facility].    Chief Complaint     Chief Complaint   Patient presents with    Fall     Patient comes in with pain in both legs.  On Friday she tripped over a rock and fell mainly on her right side, and the pain is getting worse with bruising.          History of Present Illness       73-year-old female presenting for evaluation of knee pain and lower back pain.  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  She has continued with pain and swelling in her right knee and is having difficulty with ambulation.  She states that she has also having extreme difficulty with attempting to climb stairs due to weakness that she is noticing in both of her legs.    Fall        Review of Systems   Review of Systems   Constitutional: Negative.    HENT: Negative.     Eyes: Negative.    Respiratory: Negative.     Cardiovascular: Negative.    Gastrointestinal: Negative.    Genitourinary: Negative.    Musculoskeletal:  Positive for arthralgias and myalgias.   Skin: Negative.    Allergic/Immunologic: Negative.    Neurological: Negative.    Hematological: Negative.    Psychiatric/Behavioral: Negative.           Current Medications       Current Outpatient Medications:     meloxicam  (MOBIC) 7.5 mg tablet, Take 1 tablet (7.5 mg total) by mouth 2 (two) times a day as needed for mild pain, Disp: 60 tablet, Rfl: 0    albuterol (PROVENTIL HFA,VENTOLIN HFA) 90 mcg/act inhaler, Inhale 2 puffs every 6 (six) hours as needed for wheezing, Disp: 9 g, Rfl: 1    ALPRAZolam (XANAX) 0.5 mg tablet, Take 0.5 mg by mouth if needed, Disp: , Rfl:     aspirin 81 MG tablet, Take 81 mg by mouth, Disp: , Rfl:     benzonatate (TESSALON PERLES) 100 mg capsule, Take 1 capsule (100 mg total) by mouth 3 (three) times a day (Patient not taking: Reported on 4/19/2024), Disp: 30 capsule, Rfl: 0    Diclofenac Sodium (VOLTAREN) 1 %, Apply 2 g topically 4 (four) times a day (Patient not taking: Reported on 1/26/2024), Disp: , Rfl:     escitalopram (LEXAPRO) 10 mg tablet, Take 1 tablet (10 mg total) by mouth daily, Disp: 90 tablet, Rfl: 1    Fluticasone Furoate-Vilanterol (Breo Ellipta) 100-25 mcg/actuation inhaler, Inhale 1 puff daily Rinse mouth after use., Disp: 180 blister, Rfl: 0    hydroCHLOROthiazide 25 mg tablet, TAKE 1 TABLET (25 MG TOTAL) BY MOUTH DAILY., Disp: 90 tablet, Rfl: 1    losartan (COZAAR) 100 MG tablet, TAKE 1 TABLET BY MOUTH EVERY DAY, Disp: 90 tablet, Rfl: 1    meloxicam (MOBIC) 15 mg tablet, Take 1 tablet (15 mg total) by mouth daily as needed for moderate pain, Disp: 30 tablet, Rfl: 3    Mupirocin POWD, Inhale 30 mg 2 (two) times a day (Patient not taking: Reported on 1/26/2024), Disp: , Rfl:     NAPROXEN  MG EC tablet, Take 500 mg by mouth if needed, Disp: , Rfl:     omeprazole (PriLOSEC) 40 MG capsule, Take 1 capsule (40 mg total) by mouth daily Do not start before June 1, 2023., Disp: 30 capsule, Rfl: 12    pravastatin (PRAVACHOL) 80 mg tablet, TAKE 1/2 TABLET BY MOUTH EVERY DAY, Disp: 45 tablet, Rfl: 3    predniSONE 10 mg tablet, 30mg qdx3d then 20mg qdx3d then 10mg qdx3d, Disp: 18 tablet, Rfl: 0    Current Allergies     Allergies as of 06/05/2024 - Reviewed 06/05/2024   Allergen Reaction Noted     Succinylcholine GI Intolerance 09/07/2011    Amoxicillin-pot clavulanate Hives, Rash, and Other (See Comments) 02/09/2018    Azithromycin Hives, Rash, and Other (See Comments) 09/07/2011    Clarithromycin Hives, Nausea Only, Rash, Vomiting, and Other (See Comments) 09/07/2011            The following portions of the patient's history were reviewed and updated as appropriate: allergies, current medications, past family history, past medical history, past social history, past surgical history and problem list.     Past Medical History:   Diagnosis Date    Asthma     C. difficile enteritis     GERD (gastroesophageal reflux disease)     History of acute respiratory failure     Hyperlipidemia     Hypertension     Pneumonia     Respiratory disorder 2013    Tendinitis of right shoulder 04/09/2019       Past Surgical History:   Procedure Laterality Date    BRONCHOSCOPY      COLONOSCOPY  2017    FRACTURE SURGERY      NASAL SINUS SURGERY Bilateral 09/2017    SINUS SURGERY      TUBAL LIGATION      WISDOM TOOTH EXTRACTION         Family History   Problem Relation Age of Onset    Cirrhosis Mother     Colon cancer Father         age dx unknown    Heart disease Father     Heart failure Father     Diabetes Father     Rectal cancer Father     Heart disease Sister     No Known Problems Daughter     No Known Problems Daughter     No Known Problems Daughter     No Known Problems Maternal Grandmother     No Known Problems Maternal Grandfather     Colon cancer Paternal Grandmother         age dx unknown    No Known Problems Paternal Grandfather     Heart disease Brother     Aortic aneurysm Brother     Sudden death Brother     Hypertension Brother         Everett Sweet    No Known Problems Maternal Aunt     No Known Problems Maternal Aunt     No Known Problems Maternal Aunt     No Known Problems Paternal Aunt     No Known Problems Paternal Aunt     Lung cancer Paternal Uncle         age dx unknown    Throat cancer Paternal Uncle          age dx unknown    Colon polyps Neg Hx          Medications have been verified.        Objective   /94   Pulse 78   Temp 98.7 °F (37.1 °C) (Tympanic)   Ht 5' (1.524 m)   Wt 74.4 kg (164 lb)   SpO2 97%   BMI 32.03 kg/m²   No LMP recorded. Patient is postmenopausal.       Physical Exam     Physical Exam  Vitals and nursing note reviewed.   Constitutional:       Appearance: She is well-developed.   HENT:      Head: Normocephalic.      Nose: Nose normal.   Eyes:      Pupils: Pupils are equal, round, and reactive to light.   Cardiovascular:      Rate and Rhythm: Normal rate.   Pulmonary:      Effort: Pulmonary effort is normal.   Abdominal:      General: Abdomen is flat.   Musculoskeletal:         General: Swelling, tenderness and signs of injury present. Normal range of motion.      Cervical back: Normal range of motion.      Right knee: Swelling and bony tenderness present. Normal range of motion.      Left knee: Swelling and bony tenderness present. Normal range of motion.   Skin:     General: Skin is warm and dry.   Neurological:      Mental Status: She is alert and oriented to person, place, and time.

## 2024-06-05 NOTE — TELEPHONE ENCOUNTER
----- Message from Barbara DELGADO sent at 6/5/2024 10:59 AM EDT -----  Hi Susana, Last Friday, May 31st I took a fall outside. I tripped over a rock and went down on my right side.  I have a large bruise above my right knee. I've been taking tylenol since my whole body aches especially my legs.  They are throbbing and I am having a really hard time getting up, walking and using the stairs.  I thought I would be ok but it seems to be getting worse.  Should I come in to see you or should I see an orthopedist?

## 2024-06-05 NOTE — TELEPHONE ENCOUNTER
"Called patient. Patient tripped over a rock and fell on Friday 5/31. She landed on her right side and her knee has been bothering her. States before that her knee had been feeling sore. She feels like the muscles are stretching. Denies hitting head. Not on blood thinners. Denies any other symptoms other then bruise on knee and feeling sore all over body. Not appointments this week. Advised patient to go to  and patient scheduled for appointment on 6/19 with NP. Patient verbalized understanding.         Reason for Disposition   MODERATE weakness (i.e., interferes with work, school, normal activities) and new-onset or worsening    Answer Assessment - Initial Assessment Questions  1. MECHANISM: \"How did the fall happen?\"      Tripped over a rock  2. DOMESTIC VIOLENCE AND ELDER ABUSE SCREENING: \"Did you fall because someone pushed you or tried to hurt you?\" If Yes, ask: \"Are you safe now?\"      denies  3. ONSET: \"When did the fall happen?\" (e.g., minutes, hours, or days ago)      Friday 5/31  4. LOCATION: \"What part of the body hit the ground?\" (e.g., back, buttocks, head, hips, knees, hands, head, stomach)      Right side   5. INJURY: \"Did you hurt (injure) yourself when you fell?\" If Yes, ask: \"What did you injure? Tell me more about this?\" (e.g., body area; type of injury; pain severity)\"      Unsure  6. PAIN: \"Is there any pain?\" If Yes, ask: \"How bad is the pain?\" (e.g., Scale 1-10; or mild,   moderate, severe)    - NONE (0): no pain    - MILD (1-3): doesn't interfere with normal activities     - MODERATE (4-7): interferes with normal activities or awakens from sleep     - SEVERE (8-10): excruciating pain, unable to do any normal activities       Mild to moderate  7. SIZE: For cuts, bruises, or swelling, ask: \"How large is it?\" (e.g., inches or centimeters)       denies  8. PREGNANCY: \"Is there any chance you are pregnant?\" \"When was your last menstrual period?\"      N.a  9. OTHER SYMPTOMS: \"Do you have any other " "symptoms?\" (e.g., dizziness, fever, weakness; new onset or worsening).       denies  10. CAUSE: \"What do you think caused the fall (or falling)?\" (e.g., tripped, dizzy spell)        tripped    Protocols used: Falls and Falling-ADULT-[place]    "

## 2024-06-12 ENCOUNTER — RA CDI HCC (OUTPATIENT)
Dept: OTHER | Facility: HOSPITAL | Age: 74
End: 2024-06-12

## 2024-06-17 DIAGNOSIS — K21.9 GASTROESOPHAGEAL REFLUX DISEASE WITHOUT ESOPHAGITIS: ICD-10-CM

## 2024-06-17 RX ORDER — OMEPRAZOLE 40 MG/1
CAPSULE, DELAYED RELEASE ORAL
Qty: 30 CAPSULE | Refills: 5 | Status: SHIPPED | OUTPATIENT
Start: 2024-06-17

## 2024-06-19 ENCOUNTER — OFFICE VISIT (OUTPATIENT)
Dept: FAMILY MEDICINE CLINIC | Facility: HOSPITAL | Age: 74
End: 2024-06-19
Payer: COMMERCIAL

## 2024-06-19 VITALS
HEART RATE: 104 BPM | SYSTOLIC BLOOD PRESSURE: 128 MMHG | OXYGEN SATURATION: 97 % | TEMPERATURE: 97.3 F | BODY MASS INDEX: 32.39 KG/M2 | WEIGHT: 165 LBS | DIASTOLIC BLOOD PRESSURE: 76 MMHG | HEIGHT: 60 IN

## 2024-06-19 DIAGNOSIS — M79.605 PAIN IN BOTH LOWER EXTREMITIES: Primary | ICD-10-CM

## 2024-06-19 DIAGNOSIS — M79.604 PAIN IN BOTH LOWER EXTREMITIES: Primary | ICD-10-CM

## 2024-06-19 DIAGNOSIS — M51.36 LUMBAR DEGENERATIVE DISC DISEASE: ICD-10-CM

## 2024-06-19 PROCEDURE — 99214 OFFICE O/P EST MOD 30 MIN: CPT | Performed by: NURSE PRACTITIONER

## 2024-06-19 PROCEDURE — G2211 COMPLEX E/M VISIT ADD ON: HCPCS | Performed by: NURSE PRACTITIONER

## 2024-06-19 RX ORDER — GABAPENTIN 300 MG/1
300 CAPSULE ORAL
Qty: 30 CAPSULE | Refills: 1 | Status: SHIPPED | OUTPATIENT
Start: 2024-06-19

## 2024-06-19 NOTE — PROGRESS NOTES
Assessment/Plan:     I feel symptoms are coming from low spine.   Will treat leg pain with gabapentin. She will call if pain is not relieved.   Referral to PT.   If minimal to no improvement may need referral to pain management.      Diagnoses and all orders for this visit:    Pain in both lower extremities  -     Ambulatory Referral to Physical Therapy; Future  -     gabapentin (Neurontin) 300 mg capsule; Take 1 capsule (300 mg total) by mouth daily at bedtime    Lumbar degenerative disc disease  -     Ambulatory Referral to Physical Therapy; Future          Subjective:     Patient ID: Maritza Gold is a 73 y.o. female.    Fell on May 31st. Tripped on rock and fell on right side. Fell hard. Was seen in urgent care a few days later. Xrays were negative for any kind of fracture. Mild knee OA B/L and moderate to severe degeneration noted in thoracic and lumbar spine.. Since fall has aching in both legs R > L. Constant ache. Causes her to moan and has woke her up from sleep. The last few days both feet with N/T. Numbness in bottom of feet. Painful to push on both legs. Did see ortho for right knee prior to fall and given cortisone shot. No back pain. After the fall she could barely walk. This has improved. Has difficulty getting up from sitting to standing-pain in low back, hips and legs. No issues with bowel or bladder.         Review of Systems   Constitutional:  Negative for chills and fever.   Musculoskeletal:  Positive for arthralgias. Negative for back pain.   Neurological:  Positive for numbness. Negative for weakness.         The following portions of the patient's history were reviewed and updated as appropriate: allergies, current medications, past family history, past medical history, past social history, past surgical history and problem list.    Objective:  Vitals:    06/19/24 0834   BP: 128/76   Pulse: 104   Temp: (!) 97.3 °F (36.3 °C)   SpO2: 97%      Physical Exam  Vitals reviewed.   Constitutional:        General: She is not in acute distress.     Appearance: Normal appearance.   Cardiovascular:      Rate and Rhythm: Normal rate and regular rhythm.      Heart sounds: Normal heart sounds. No murmur heard.  Pulmonary:      Effort: Pulmonary effort is normal.      Breath sounds: Normal breath sounds.   Musculoskeletal:      Lumbar back: No tenderness or bony tenderness. Normal range of motion.      Comments: Reports pain in right leg with right lateral bending.   Reports pain in both legs with forward flexion.    Skin:     General: Skin is warm and dry.   Neurological:      Mental Status: She is alert and oriented to person, place, and time.      Motor: No weakness.      Deep Tendon Reflexes:      Reflex Scores:       Patellar reflexes are 1+ on the right side and 2+ on the left side.       Achilles reflexes are 1+ on the right side and 1+ on the left side.  Psychiatric:         Mood and Affect: Mood normal.         Behavior: Behavior normal.         Thought Content: Thought content normal.         Judgment: Judgment normal.

## 2024-06-24 ENCOUNTER — EVALUATION (OUTPATIENT)
Dept: PHYSICAL THERAPY | Facility: CLINIC | Age: 74
End: 2024-06-24
Payer: COMMERCIAL

## 2024-06-24 DIAGNOSIS — M51.36 LUMBAR DEGENERATIVE DISC DISEASE: ICD-10-CM

## 2024-06-24 DIAGNOSIS — M79.605 PAIN IN BOTH LOWER EXTREMITIES: ICD-10-CM

## 2024-06-24 DIAGNOSIS — M79.604 PAIN IN BOTH LOWER EXTREMITIES: ICD-10-CM

## 2024-06-24 PROCEDURE — 97162 PT EVAL MOD COMPLEX 30 MIN: CPT | Performed by: PHYSICAL THERAPIST

## 2024-06-24 PROCEDURE — 97110 THERAPEUTIC EXERCISES: CPT | Performed by: PHYSICAL THERAPIST

## 2024-06-24 NOTE — PROGRESS NOTES
PT Evaluation     Today's date: 2024  Patient name: Maritza Gold  : 1950  MRN: [id number]  Referring provider: Susana Nance CRNP  Dx:   Encounter Diagnosis     ICD-10-CM    1. Pain in both lower extremities  M79.604 Ambulatory Referral to Physical Therapy    M79.605       2. Lumbar degenerative disc disease  M51.36 Ambulatory Referral to Physical Therapy                     Assessment  Impairments: abnormal gait, abnormal or restricted ROM, activity intolerance, impaired physical strength, lacks appropriate home exercise program and pain with function  Symptom irritability: high    Assessment details: Maritza Gold is a 73 y.o. female presenting to outpatient physical therapy with chief complaints of lower back pain with (R) LE symptoms. Patient describes onset of symptoms in early spring with progression of pain after falling at the end of May. Patient displays with abnormal gait, restricted lumbar AROM, (B) LE strength deficits, (+) SLR, flexion bias with mechanical testing, and functional restrictions.  Patient's symptoms are multifactorial in nature with a primary movement diagnosis of lumbar hypomobility resulting in pathoanatomical signs and symptoms consistent with Pain in both lower extremities, Lumbar degenerative disc disease resulting in limitations in her ability to perform transfers without pain and walk over 1.5 miles. No further referral appears necessary at this time based upon examination results.       Please contact me if you have any questions ([phone]). Thank you for the opportunity to share in the care of this patient.      Understanding of Dx/Px/POC: good     Prognosis: good  Prognosis details: Positive prognostic indicators include positive attitude toward recovery, motivated to improve, good understanding of condition, realistic expectations.  Negative prognostic indicators include chronicity of symptoms, high symptom irritability.    Goals  STG to be met in 4 weeks:  -  Increase Lumbar AROM: minimal restriction all planes.  - Increase (B) LE strength by 1/2 MMT grade.  - I with HEP.  - Patient will be able to return to walking 3/4 mile without pain.   LTG to be met in 10 weeks:  - Increase Lumbar AROM: minimal restrictions all planes.  - Increase (B) LE strength to 4+/5 all planes.  - I with updated HEP.  - Patient will be able to return to walking 1.5 miles without pain.   - Patient will be able to perform floor transfer to care for grandkids without pain.      Plan  Patient would benefit from: skilled physical therapy  Planned modality interventions: cryotherapy and thermotherapy: hydrocollator packs    Planned therapy interventions: activity modification, joint mobilization, manual therapy, neuromuscular re-education, patient education, postural training, strengthening, stretching, therapeutic exercise, therapeutic activities, functional ROM exercises, home exercise program, gait training and body mechanics training    Plan of Care beginning date: 6/24/2024  Plan of Care expiration date: 9/2/2024  Treatment plan discussed with: patient  Plan details: Prognosis above is given PT services 2x/week tapering to 1x/week over the next 10 weeks and home program adherence.           Subjective Evaluation    History of Present Illness  Mechanism of injury: At Evaluation (June 24, 2024): Patient reports gradual progression of (R)> (L) leg pain and numbness over the spring.  She reports on 5/31 she fell on her (R) side.  She was seen at [facility] - recommended following up with her PCP.  She saw her PCP - prescribed gabapentin and mobic and suggested PT.     Red Flag Screen:  Patient denies recent fever, changes in bowel and bladder function, nausea and vomiting, weakness, unexplained weight loss, tingling, numbness, or pain with coughing.  Patient reports traumatic fall at the end of May.      Greatest concern: lack of mobility and pain limiting her function    Quality of life:  good    Patient Goals  Patient goal: Patient Specific Functional Scale: return to walking over 1.5 miles without pain 0/10, perform floor transfer without difficulty 0/10, learn ways to manage symptoms and perform regular exercise program 0/10; Total 0/30  Pain  Current pain ratin  At best pain ratin  At worst pain ratin  Location: Lower lumbar spine - (R) thigh to shin  Quality: dull ache and radiating  Alleviating factors: Activity modification, gabapentin, compression.  Exacerbated by: Walking longer distances, floor transfers, sit to stand, sitting for extended time, kneeling, stair negotiation.    Social Support  Steps to enter house: yes  Stairs in house: yes   Lives in: multiple-level home  Lives with: spouse and adult children    Employment status: not working    Diagnostic Tests  X-ray: abnormal  Treatments  Previous treatment: medication        Objective     Static Posture   General Observations  Symmetrical weight bearing.     Lumbar Spine   Flattened.     Postural Observations  Seated posture: fair  Standing posture: good      Palpation     Additional Palpation Details  TTP (B) PSIS region - worse on (R)    Active Range of Motion     Lumbar   Flexion:  with pain Restriction level: moderate  Extension:  Restriction level: minimal    Additional Active Range of Motion Details  (B) SG: moderate restriction - increased pain both directions    Strength/Myotome Testing     Left Hip   Planes of Motion   Flexion: 4-  Extension: 3+  Abduction: 3+    Right Hip   Planes of Motion   Flexion: 4-  Extension: 3+  Abduction: 3+    Left Knee   Flexion: 4  Extension: 4    Right Knee   Flexion: 4  Extension: 4    Left Ankle/Foot   Dorsiflexion: 4  Plantar flexion: 4  Great toe extension: 4    Right Ankle/Foot   Dorsiflexion: 4  Plantar flexion: 4  Great toe extension: 4    Tests     Lumbar     Left   Negative crossed SLR, passive SLR and slump test.     Right   Positive passive SLR and slump test.   Negative  crossed SLR.     Left Hip   Negative RANI and FADIR.     Right Hip   Positive FADIR.   Negative RANI.     Additional Tests Details  Repeated Movement Testing:   Repeated Extension in Standing: Increased, Worse   Repeated Flexion in Lying: Decreased, Better    Ambulation     Observational Gait   Gait: antalgic   Decreased walking speed, right stance time, left step length and right step length.              Daily Treatment Diary     DX: LBP - (B) LE pain  EPOC: 9/2/24  Follow Up with Referring Provider:   Precautions:   CO-MORBIDITIES: HTN  HEP ACCESS CODE: [id number]     Stage: chronic   Etiology: traumatic fall  Stability of Symptoms:  At Evaluation: stable- unchanged  Global Rating of Change:   Symptom Irritability Level: high  Primary Movement Diagnosis: lumbar hypomobility  Primary Pain Phenotype: nociceptive  Patient Specific Functional Scale:   6/24/24: return to walking over 1.5 miles without pain 0/10, perform floor transfer without difficulty 0/10, learn ways to manage symptoms and perform regular exercise program 0/10; Total 0/30   Patient Acceptable Symptom State: unacceptable   FOTO Prediction: 65 by visit 11  FOTO Progress: 50 at evaluation   Greatest Concern: lack of mobility and pain limiting her function  Current Activity Plan: added to HEP (6/24)  Current Educational Needs: progressions      Treatment Diary          Manual Therapy                                                                        Therapeutic Exercise  HEP         Recumbent Bike                    DKTC Stretch 6/24                   Abdominal Brace 6/24         LFS: Alt LE 6/24         H/L Hip ADD Isometric  6/24         LAQ          SLR          S/L Hip ABD          Bridge          Neuromuscular Reeducation          FWD Wt Shift           LAT Wt Shift          Mini Squat                    FWD Step Up          LAT Step Up                                                            Therapeutic Activity          Floor Transfer  Training                    Gait Training                                        Modalities

## 2024-06-28 ENCOUNTER — OFFICE VISIT (OUTPATIENT)
Dept: PHYSICAL THERAPY | Facility: CLINIC | Age: 74
End: 2024-06-28
Payer: COMMERCIAL

## 2024-06-28 DIAGNOSIS — M51.36 LUMBAR DEGENERATIVE DISC DISEASE: ICD-10-CM

## 2024-06-28 DIAGNOSIS — M79.604 PAIN IN BOTH LOWER EXTREMITIES: Primary | ICD-10-CM

## 2024-06-28 DIAGNOSIS — M79.605 PAIN IN BOTH LOWER EXTREMITIES: Primary | ICD-10-CM

## 2024-06-28 PROCEDURE — 97110 THERAPEUTIC EXERCISES: CPT | Performed by: PHYSICAL THERAPIST

## 2024-06-28 PROCEDURE — 97112 NEUROMUSCULAR REEDUCATION: CPT | Performed by: PHYSICAL THERAPIST

## 2024-06-28 NOTE — PROGRESS NOTES
Daily Note     Today's date: 2024  Patient name: Maritza Gold  : 1950  MRN: [id number]  Referring provider: Susana Nance CRNP  Dx:   Encounter Diagnosis     ICD-10-CM    1. Pain in both lower extremities  M79.604     M79.605       2. Lumbar degenerative disc disease  M51.36                      Subjective: Patient reports good tolerance to her IE and initial HEP.  She notes about 50% improvement in pain levels.  She has not increased her walking distance yet.        Objective: See treatment diary below      Assessment:   Stage: chronic   Etiology: traumatic fall  Stability of Symptoms:  At Evaluation: stable- unchanged  Global Rating of Change:   Symptom Irritability Level: high  Primary Movement Diagnosis: lumbar hypomobility  Primary Pain Phenotype: nociceptive  Patient Specific Functional Scale:   24: return to walking over 1.5 miles without pain 0/10, perform floor transfer without difficulty 0/10, learn ways to manage symptoms and perform regular exercise program 0/10; Total 0/30   Patient Acceptable Symptom State: unacceptable   FOTO Prediction: 65 by visit 11  FOTO Progress: 50 at evaluation   Greatest Concern: lack of mobility and pain limiting her function  Current Activity Plan: added to HEP (); added to HEP ()  Current Educational Needs: progressions    Patient had good tolerance to exercise progressions.  She noted no increased pain throughout treatment.  HEP was updated with good understanding.  Skilled PT continues to be required to guide progression of lumbar mobility and strength to allow her to return to walking longer distances and performing a floor transfer to help out with her grandkids.        Plan: Continue with POC - monitor tolerance to treatment - progress as able NV       Daily Treatment Diary     DX: LBP - (B) LE pain  EPOC: 24  Follow Up with Referring Provider:   Precautions:   CO-MORBIDITIES: HTN  HEP ACCESS CODE: [id number]         Treatment Diary    "       Manual Therapy                                                                        Therapeutic Exercise  HEP         Recumbent Bike  5 min                  DKTC Stretch 6/24 30\"x3                  Abdominal Brace 6/24 5\"x10        LFS: Alt LE 6/24 20x ea        H/L Hip ADD Isometric  6/24 5\"x20        LAQ          SLR 6/28 20x ea        S/L Hip ABD 6/28 15x ea        Bridge 6/28 5\"x15        Neuromuscular Reeducation          FWD Mini Lunge  10x ea        LAT Mini Lunge  10x ea        Mini Squat                    FWD Step Up          LAT Step Up                                                            Therapeutic Activity          Floor Transfer Training                    Gait Training                                        Modalities                                      "

## 2024-07-01 ENCOUNTER — OFFICE VISIT (OUTPATIENT)
Dept: PHYSICAL THERAPY | Facility: CLINIC | Age: 74
End: 2024-07-01
Payer: COMMERCIAL

## 2024-07-01 DIAGNOSIS — M79.605 PAIN IN BOTH LOWER EXTREMITIES: Primary | ICD-10-CM

## 2024-07-01 DIAGNOSIS — M79.604 PAIN IN BOTH LOWER EXTREMITIES: Primary | ICD-10-CM

## 2024-07-01 DIAGNOSIS — M51.36 LUMBAR DEGENERATIVE DISC DISEASE: ICD-10-CM

## 2024-07-01 PROCEDURE — 97112 NEUROMUSCULAR REEDUCATION: CPT | Performed by: PHYSICAL THERAPIST

## 2024-07-01 PROCEDURE — 97110 THERAPEUTIC EXERCISES: CPT | Performed by: PHYSICAL THERAPIST

## 2024-07-01 NOTE — PROGRESS NOTES
Daily Note     Today's date: 2024  Patient name: Maritza Gold  : 1950  MRN: [id number]  Referring provider: Susana Nance CRNP  Dx:   Encounter Diagnosis     ICD-10-CM    1. Pain in both lower extremities  M79.604     M79.605       2. Lumbar degenerative disc disease  M51.36                      Subjective: Patient reports no adverse reaction to her LV.  She notes her back is feeling a little better - able to walk over 1.3 mph on the treadmill over the weekend.            Objective: See treatment diary below      Assessment:   Stage: chronic   Etiology: traumatic fall  Stability of Symptoms:  At Evaluation: stable- unchanged  Global Rating of Change:   Symptom Irritability Level: high  Primary Movement Diagnosis: lumbar hypomobility  Primary Pain Phenotype: nociceptive  Patient Specific Functional Scale:   24: return to walking over 1.5 miles without pain 0/10, perform floor transfer without difficulty 0/10, learn ways to manage symptoms and perform regular exercise program 0/10; Total 0/30   Patient Acceptable Symptom State: unacceptable   FOTO Prediction: 65 by visit 11  FOTO Progress: 50 at evaluation   Greatest Concern: lack of mobility and pain limiting her function  Current Activity Plan: added to HEP (); added to HEP ()  Current Educational Needs: progressions    Patient had good tolerance to progressions with exercises.  She is progressing well with WB exercise tolerance and walking on the treadmill. Skilled PT continues to be required to guide progression of lumbar mobility and strength to allow her to return to walking longer distances and performing a floor transfer to help out with her grandkids.        Plan: Continue with POC - monitor tolerance to treatment - progress as able NV       Daily Treatment Diary     DX: LBP - (B) LE pain  EPOC: 24  Follow Up with Referring Provider:   Precautions:   CO-MORBIDITIES: HTN  HEP ACCESS CODE: [id number]         Treatment Diary    "7/1       Manual Therapy                                                                        Therapeutic Exercise  HEP         Recumbent Bike  5 min 6 min                 DKTC Stretch 6/24 30\"x3 30\"x3                 Abdominal Brace 6/24 5\"x10        LFS: Alt LE 6/24 20x ea 1.5#   20x ea       H/L Hip ADD Isometric  6/24 5\"x20        LAQ          SLR 6/28 20x ea 20x ea       S/L Hip ABD 6/28 15x ea 20x ea       Bridge 6/28 5\"x15 5\"x20       Neuromuscular Reeducation          FWD Mini Lunge  10x ea 15x ea       LAT Mini Lunge  10x ea 15x ea       Mini Squat   10x                  FWD Step Up          LAT Step Up                                                            Therapeutic Activity          Floor Transfer Training                    Gait Training                                        Modalities                                      "

## 2024-07-02 ENCOUNTER — HOSPITAL ENCOUNTER (OUTPATIENT)
Dept: NON INVASIVE DIAGNOSTICS | Age: 74
Discharge: HOME | End: 2024-07-02

## 2024-07-09 ENCOUNTER — OFFICE VISIT (OUTPATIENT)
Dept: PHYSICAL THERAPY | Facility: CLINIC | Age: 74
End: 2024-07-09
Payer: COMMERCIAL

## 2024-07-09 DIAGNOSIS — M79.604 PAIN IN BOTH LOWER EXTREMITIES: Primary | ICD-10-CM

## 2024-07-09 DIAGNOSIS — M51.36 LUMBAR DEGENERATIVE DISC DISEASE: ICD-10-CM

## 2024-07-09 DIAGNOSIS — M79.605 PAIN IN BOTH LOWER EXTREMITIES: Primary | ICD-10-CM

## 2024-07-09 PROCEDURE — 97112 NEUROMUSCULAR REEDUCATION: CPT | Performed by: PHYSICAL THERAPIST

## 2024-07-09 PROCEDURE — 97110 THERAPEUTIC EXERCISES: CPT | Performed by: PHYSICAL THERAPIST

## 2024-07-09 NOTE — PROGRESS NOTES
Daily Note     Today's date: 2024  Patient name: Maritza Gold  : 1950  MRN: [id number]  Referring provider: Susana Nance CRNP  Dx:   Encounter Diagnosis     ICD-10-CM    1. Pain in both lower extremities  M79.604     M79.605       2. Lumbar degenerative disc disease  M51.36           Start Time: 743  Stop Time: 829  Total time in clinic (min): 46 minutes    Subjective: Patient stated she is doing well. Her back has felt good over the last week, still having difficulty shopping due to the extensive amount of walking.          Objective: See treatment diary below      Assessment:   Stage: chronic   Etiology: traumatic fall  Stability of Symptoms:  At Evaluation: stable- unchanged  Global Rating of Change:   Symptom Irritability Level: moderate  Primary Movement System Diagnosis: poor motor control  Primary Pain Phenotype: nociceptive  Patient Specific Functional Scale:   24: return to walking over 1.5 miles without pain 0/10, perform floor transfer without difficulty 0/10, learn ways to manage symptoms and perform regular exercise program 0/10; Total 0/30   Patient Acceptable Symptom State: unacceptable   FOTO Prediction: 65 by visit 11  FOTO Progress: 50 at evaluation   Greatest Concern: lack of mobility and pain limiting her function  Current Activity Plan: added to HEP (); added to HEP ()  Current Educational Needs: progressions    Able to tolerate 6 min treadmill warmup. Continues to progress well with WB exercises. Tolerated well incorporation of fwd step ups on a 6in step. Skilled PT is required to continue to progress lumbar control and strength to allow patient to return to pain free walking and floor to stand transfers to assist with grand kids.       Plan: Continue with POC - monitor tolerance to treatment - progress as able NV       Daily Treatment Diary     DX: LBP - (B) LE pain  EPOC: 24  Follow Up with Referring Provider:   Precautions:   CO-MORBIDITIES: HTN  HEP ACCESS  "CODE: [id number]         Treatment Diary  6/28 7/1 7/9      Manual Therapy                                                                        Therapeutic Exercise  HEP         Recumbent Bike  5 min 6 min       TM    6 min      DKTC Stretch 6/24 30\"x3 30\"x3 30\"x3                Abdominal Brace 6/24 5\"x10        LFS: Alt LE 6/24 20x ea 1.5#   20x ea 2# 20x ea      H/L Hip ADD Isometric  6/24 5\"x20        LAQ          SLR 6/28 20x ea 20x ea 20x ea      S/L Hip ABD 6/28 15x ea 20x ea 20x ea      Bridge 6/28 5\"x15 5\"x20 5\"x20      Neuromuscular Reeducation          FWD Mini Lunge  10x ea 15x ea 15x ea      LAT Mini Lunge  10x ea 15x ea 15x ea      Mini Squat   10x  15x                FWD Step Up    6\" 15x ea      LAT Step Up                                                            Therapeutic Activity          Floor Transfer Training                    Gait Training                                        Modalities                                      "

## 2024-07-12 ENCOUNTER — APPOINTMENT (OUTPATIENT)
Dept: PHYSICAL THERAPY | Facility: CLINIC | Age: 74
End: 2024-07-12
Payer: COMMERCIAL

## 2024-07-15 ENCOUNTER — APPOINTMENT (OUTPATIENT)
Dept: PHYSICAL THERAPY | Facility: CLINIC | Age: 74
End: 2024-07-15
Payer: COMMERCIAL

## 2024-07-15 DIAGNOSIS — M79.604 PAIN IN BOTH LOWER EXTREMITIES: Primary | ICD-10-CM

## 2024-07-15 DIAGNOSIS — M51.36 LUMBAR DEGENERATIVE DISC DISEASE: ICD-10-CM

## 2024-07-15 DIAGNOSIS — M79.605 PAIN IN BOTH LOWER EXTREMITIES: Primary | ICD-10-CM

## 2024-07-15 NOTE — PROGRESS NOTES
Daily Note     Today's date: 7/15/2024  Patient name: Maritza Gold  : 1950  MRN: [id number]  Referring provider: Susana Nance CRNP  Dx:   Encounter Diagnosis     ICD-10-CM    1. Pain in both lower extremities  M79.604     M79.605       2. Lumbar degenerative disc disease  M51.36                      Subjective: Patient stated she is doing well. Her back has felt good over the last week, still having difficulty shopping due to the extensive amount of walking.          Objective: See treatment diary below      Assessment:   Stage: chronic   Etiology: traumatic fall  Stability of Symptoms:  At Evaluation: stable- unchanged  Global Rating of Change:   Symptom Irritability Level: moderate  Primary Movement System Diagnosis: poor motor control  Primary Pain Phenotype: nociceptive  Patient Specific Functional Scale:   24: return to walking over 1.5 miles without pain 0/10, perform floor transfer without difficulty 0/10, learn ways to manage symptoms and perform regular exercise program 0/10; Total 0/30   Patient Acceptable Symptom State: unacceptable   FOTO Prediction: 65 by visit 11  FOTO Progress: 50 at evaluation   Greatest Concern: lack of mobility and pain limiting her function  Current Activity Plan: added to HEP (); added to HEP ()  Current Educational Needs: progressions    Able to tolerate 6 min treadmill warmup. Continues to progress well with WB exercises. Tolerated well incorporation of fwd step ups on a 6in step. Skilled PT is required to continue to progress lumbar control and strength to allow patient to return to pain free walking and floor to stand transfers to assist with grand kids.       Plan: Continue with POC - monitor tolerance to treatment - progress as able NV       Daily Treatment Diary     DX: LBP - (B) LE pain  EPOC: 24  Follow Up with Referring Provider:   Precautions:   CO-MORBIDITIES: HTN  HEP ACCESS CODE: [id number]         Treatment Diary  6/28 7/1 7/9 7/15    "  Manual Therapy                                                                        Therapeutic Exercise  HEP         Recumbent Bike  5 min 6 min       TM    6 min ***     DKTC Stretch 6/24 30\"x3 30\"x3 30\"x3 ***               Abdominal Brace 6/24 5\"x10        LFS: Alt LE 6/24 20x ea 1.5#   20x ea 2# 20x ea ***     H/L Hip ADD Isometric  6/24 5\"x20        LAQ          SLR 6/28 20x ea 20x ea 20x ea ***     S/L Hip ABD 6/28 15x ea 20x ea 20x ea ***     Bridge 6/28 5\"x15 5\"x20 5\"x20 ***     Neuromuscular Reeducation          FWD Mini Lunge  10x ea 15x ea 15x ea ***     LAT Mini Lunge  10x ea 15x ea 15x ea ***     Mini Squat   10x  15x ***               FWD Step Up    6\" 15x ea ***     LAT Step Up     ***                                                       Therapeutic Activity          Floor Transfer Training                    Gait Training                                        Modalities                                      "

## 2024-07-19 ENCOUNTER — APPOINTMENT (OUTPATIENT)
Dept: PHYSICAL THERAPY | Facility: CLINIC | Age: 74
End: 2024-07-19
Payer: COMMERCIAL

## 2024-07-19 DIAGNOSIS — M79.604 PAIN IN BOTH LOWER EXTREMITIES: ICD-10-CM

## 2024-07-19 DIAGNOSIS — M79.605 PAIN IN BOTH LOWER EXTREMITIES: ICD-10-CM

## 2024-07-19 RX ORDER — GABAPENTIN 300 MG/1
300 CAPSULE ORAL
Qty: 30 CAPSULE | Refills: 5 | Status: SHIPPED | OUTPATIENT
Start: 2024-07-19

## 2024-07-22 ENCOUNTER — APPOINTMENT (OUTPATIENT)
Dept: PHYSICAL THERAPY | Facility: CLINIC | Age: 74
End: 2024-07-22
Payer: COMMERCIAL

## 2024-07-24 ENCOUNTER — APPOINTMENT (OUTPATIENT)
Dept: PHYSICAL THERAPY | Facility: CLINIC | Age: 74
End: 2024-07-24
Payer: COMMERCIAL

## 2024-07-24 DIAGNOSIS — I10 ESSENTIAL HYPERTENSION: ICD-10-CM

## 2024-07-24 RX ORDER — HYDROCHLOROTHIAZIDE 25 MG/1
25 TABLET ORAL DAILY
Qty: 100 TABLET | Refills: 1 | Status: SHIPPED | OUTPATIENT
Start: 2024-07-24

## 2024-07-27 DIAGNOSIS — J45.20 MILD INTERMITTENT ASTHMA WITHOUT COMPLICATION: ICD-10-CM

## 2024-07-27 DIAGNOSIS — I10 ESSENTIAL HYPERTENSION: ICD-10-CM

## 2024-07-28 RX ORDER — FLUTICASONE FUROATE AND VILANTEROL 100; 25 UG/1; UG/1
1 POWDER RESPIRATORY (INHALATION) DAILY
Qty: 180 BLISTER | Refills: 0 | Status: SHIPPED | OUTPATIENT
Start: 2024-07-28 | End: 2024-10-26

## 2024-07-28 RX ORDER — LOSARTAN POTASSIUM 100 MG/1
TABLET ORAL
Qty: 90 TABLET | Refills: 1 | Status: SHIPPED | OUTPATIENT
Start: 2024-07-28

## 2024-08-12 DIAGNOSIS — M25.561 ACUTE PAIN OF RIGHT KNEE: ICD-10-CM

## 2024-08-12 RX ORDER — MELOXICAM 15 MG/1
15 TABLET ORAL DAILY PRN
Qty: 30 TABLET | Refills: 5 | Status: SHIPPED | OUTPATIENT
Start: 2024-08-12

## 2024-08-15 DIAGNOSIS — F32.0 CURRENT MILD EPISODE OF MAJOR DEPRESSIVE DISORDER WITHOUT PRIOR EPISODE (HCC): ICD-10-CM

## 2024-08-15 RX ORDER — ESCITALOPRAM OXALATE 10 MG/1
10 TABLET ORAL DAILY
Qty: 90 TABLET | Refills: 1 | Status: SHIPPED | OUTPATIENT
Start: 2024-08-15

## 2024-08-20 ENCOUNTER — HOSPITAL ENCOUNTER (OUTPATIENT)
Dept: NON INVASIVE DIAGNOSTICS | Age: 74
Discharge: HOME | End: 2024-08-20
Payer: COMMERCIAL

## 2024-08-20 DIAGNOSIS — R07.9 CHEST PAIN: ICD-10-CM

## 2024-08-20 LAB
CHEST PAIN STATEMENT: NORMAL
MAX DIASTOLIC BP: 74 MMHG
MAX HR PERCENT: 111 %
MAX HR: 162 BPM
MAX PREDICTED HEART RATE: 145 BPM
PROTOCOL NAME: NORMAL
RATE PRESSURE PRODUCT: NORMAL
REASON FOR TERMINATION: NORMAL
SL CV STRESS STAGE REACHED: 2
STRESS ANGINA INDEX: 0
STRESS BASELINE HR: 90 BPM
STRESS PEAK HR: 162 BPM
STRESS POST ESTIMATED WORKLOAD: 7 METS
STRESS POST EXERCISE DUR MIN: 6 MIN
STRESS POST EXERCISE DUR MIN: 6 MIN
STRESS POST EXERCISE DUR SEC: 1 SEC
STRESS POST EXERCISE DUR SEC: 1 SEC
STRESS POST PEAK BP: 152 MMHG
STRESS POST PEAK HR: 162 BPM
STRESS POST PEAK SYSTOLIC BP: 152 MMHG
TARGET HR FORMULA: NORMAL
TEST INDICATION: NORMAL

## 2024-08-20 PROCEDURE — 93350 STRESS TTE ONLY: CPT

## 2024-08-20 PROCEDURE — 93350 STRESS TTE ONLY: CPT | Performed by: INTERNAL MEDICINE

## 2024-08-24 DIAGNOSIS — M79.605 PAIN IN BOTH LOWER EXTREMITIES: ICD-10-CM

## 2024-08-24 DIAGNOSIS — M79.604 PAIN IN BOTH LOWER EXTREMITIES: ICD-10-CM

## 2024-08-24 LAB
ALBUMIN SERPL-MCNC: 4.2 G/DL (ref 3.8–4.8)
ALP SERPL-CCNC: 81 IU/L (ref 44–121)
ALT SERPL-CCNC: 11 IU/L (ref 0–32)
AST SERPL-CCNC: 21 IU/L (ref 0–40)
BASOPHILS # BLD AUTO: 0.1 X10E3/UL (ref 0–0.2)
BASOPHILS NFR BLD AUTO: 1 %
BILIRUB SERPL-MCNC: 0.3 MG/DL (ref 0–1.2)
BUN SERPL-MCNC: 23 MG/DL (ref 8–27)
BUN/CREAT SERPL: 24 (ref 12–28)
CALCIUM SERPL-MCNC: 9.5 MG/DL (ref 8.7–10.3)
CHLORIDE SERPL-SCNC: 101 MMOL/L (ref 96–106)
CHOLEST SERPL-MCNC: 170 MG/DL (ref 100–199)
CHOLEST/HDLC SERPL: 3.3 RATIO (ref 0–4.4)
CO2 SERPL-SCNC: 25 MMOL/L (ref 20–29)
CREAT SERPL-MCNC: 0.95 MG/DL (ref 0.57–1)
EGFR: 62 ML/MIN/1.73
EOSINOPHIL # BLD AUTO: 0.4 X10E3/UL (ref 0–0.4)
EOSINOPHIL NFR BLD AUTO: 3 %
ERYTHROCYTE [DISTWIDTH] IN BLOOD BY AUTOMATED COUNT: 13.9 % (ref 11.7–15.4)
GLOBULIN SER-MCNC: 3.5 G/DL (ref 1.5–4.5)
GLUCOSE SERPL-MCNC: 103 MG/DL (ref 70–99)
HCT VFR BLD AUTO: 39.1 % (ref 34–46.6)
HDLC SERPL-MCNC: 52 MG/DL
HGB BLD-MCNC: 12.7 G/DL (ref 11.1–15.9)
IMM GRANULOCYTES # BLD: 0 X10E3/UL (ref 0–0.1)
IMM GRANULOCYTES NFR BLD: 0 %
LDLC SERPL CALC-MCNC: 99 MG/DL (ref 0–99)
LYMPHOCYTES # BLD AUTO: 1.3 X10E3/UL (ref 0.7–3.1)
LYMPHOCYTES NFR BLD AUTO: 11 %
MCH RBC QN AUTO: 28 PG (ref 26.6–33)
MCHC RBC AUTO-ENTMCNC: 32.5 G/DL (ref 31.5–35.7)
MCV RBC AUTO: 86 FL (ref 79–97)
MONOCYTES # BLD AUTO: 0.6 X10E3/UL (ref 0.1–0.9)
MONOCYTES NFR BLD AUTO: 5 %
NEUTROPHILS # BLD AUTO: 9.1 X10E3/UL (ref 1.4–7)
NEUTROPHILS NFR BLD AUTO: 80 %
PLATELET # BLD AUTO: 287 X10E3/UL (ref 150–450)
POTASSIUM SERPL-SCNC: 3.7 MMOL/L (ref 3.5–5.2)
PROT SERPL-MCNC: 7.7 G/DL (ref 6–8.5)
RBC # BLD AUTO: 4.53 X10E6/UL (ref 3.77–5.28)
SL AMB VLDL CHOLESTEROL CALC: 19 MG/DL (ref 5–40)
SODIUM SERPL-SCNC: 142 MMOL/L (ref 134–144)
TRIGL SERPL-MCNC: 105 MG/DL (ref 0–149)
TSH SERPL DL<=0.005 MIU/L-ACNC: 2.11 UIU/ML (ref 0.45–4.5)
WBC # BLD AUTO: 11.5 X10E3/UL (ref 3.4–10.8)

## 2024-08-26 RX ORDER — GABAPENTIN 300 MG/1
300 CAPSULE ORAL
Qty: 30 CAPSULE | Refills: 0 | Status: SHIPPED | OUTPATIENT
Start: 2024-08-26

## 2024-08-28 ENCOUNTER — OFFICE VISIT (OUTPATIENT)
Dept: FAMILY MEDICINE CLINIC | Facility: HOSPITAL | Age: 74
End: 2024-08-28
Payer: COMMERCIAL

## 2024-08-28 VITALS
DIASTOLIC BLOOD PRESSURE: 80 MMHG | BODY MASS INDEX: 31.96 KG/M2 | WEIGHT: 162.8 LBS | TEMPERATURE: 97 F | HEIGHT: 60 IN | HEART RATE: 94 BPM | SYSTOLIC BLOOD PRESSURE: 132 MMHG | OXYGEN SATURATION: 98 %

## 2024-08-28 DIAGNOSIS — F32.0 CURRENT MILD EPISODE OF MAJOR DEPRESSIVE DISORDER WITHOUT PRIOR EPISODE (HCC): ICD-10-CM

## 2024-08-28 DIAGNOSIS — R32 URINARY INCONTINENCE IN FEMALE: ICD-10-CM

## 2024-08-28 DIAGNOSIS — I10 ESSENTIAL HYPERTENSION: Primary | ICD-10-CM

## 2024-08-28 DIAGNOSIS — J06.9 VIRAL UPPER RESPIRATORY TRACT INFECTION: ICD-10-CM

## 2024-08-28 DIAGNOSIS — Z00.00 MEDICARE ANNUAL WELLNESS VISIT, SUBSEQUENT: ICD-10-CM

## 2024-08-28 DIAGNOSIS — E78.2 MIXED HYPERLIPIDEMIA: ICD-10-CM

## 2024-08-28 DIAGNOSIS — J45.20 MILD INTERMITTENT ASTHMA WITHOUT COMPLICATION: ICD-10-CM

## 2024-08-28 DIAGNOSIS — R73.01 IFG (IMPAIRED FASTING GLUCOSE): ICD-10-CM

## 2024-08-28 DIAGNOSIS — R73.9 HYPERGLYCEMIA: ICD-10-CM

## 2024-08-28 PROBLEM — R42 DIZZINESS: Status: RESOLVED | Noted: 2022-01-18 | Resolved: 2024-08-28

## 2024-08-28 PROBLEM — J45.901 ASTHMA EXACERBATION: Status: RESOLVED | Noted: 2024-02-14 | Resolved: 2024-08-28

## 2024-08-28 LAB — SL AMB POCT HEMOGLOBIN AIC: 5.6 (ref ?–6.5)

## 2024-08-28 PROCEDURE — 99214 OFFICE O/P EST MOD 30 MIN: CPT | Performed by: NURSE PRACTITIONER

## 2024-08-28 PROCEDURE — G0439 PPPS, SUBSEQ VISIT: HCPCS | Performed by: NURSE PRACTITIONER

## 2024-08-28 PROCEDURE — 83036 HEMOGLOBIN GLYCOSYLATED A1C: CPT | Performed by: NURSE PRACTITIONER

## 2024-08-28 NOTE — ASSESSMENT & PLAN NOTE
Mood is good.   Has Xanax (old prescription) which she does not use.   Continue on current Lexapro dose.

## 2024-08-28 NOTE — PROGRESS NOTES
Ambulatory Visit  Name: Maritza Gold      : 1950      MRN: [id number]  Encounter Provider: HANK Anderson  Encounter Date: 2024   Encounter department: [facility] [address]     Assessment & Plan   1. Essential hypertension  Assessment & Plan:  BP well controlled.   Continue on current regimen.   F/U in 6 months.    2. Mild intermittent asthma without complication  Assessment & Plan:  Well controlled of Breo.   She is aware to restart breo should she increase use of albuterol.   3. Mixed hyperlipidemia  Assessment & Plan:  FLP is excellent.   Continue on current statin dose.   Check yearly.   4. Current mild episode of major depressive disorder without prior episode (HCC)  Assessment & Plan:  Mood is good.   Has Xanax (old prescription) which she does not use.   Continue on current Lexapro dose.   5. IFG (impaired fasting glucose)  Assessment & Plan:  A1C in office is 5.6  She has started Weight Watcher's.   6. Medicare annual wellness visit, subsequent  7. Hyperglycemia  -     POCT hemoglobin A1c  8. Urinary incontinence in female  Comments:  She is willing to try pelvic floor PT  Orders:  -     Ambulatory Referral to Physical Therapy; Future  9. Viral upper respiratory tract infection  Comments:  Symptoms are improving.   Exam normal.   No s/s asthma exacerbation. Call with worsening.      Depression Screening and Follow-up Plan: Patient was screened for depression during today's encounter. They screened negative with a PHQ-9 score of 2.    Falls Plan of Care: balance, strength, and gait training instructions were provided.     Urinary Incontinence Plan of Care: Referral was placed for Physical therapy.       Preventive health issues were discussed with patient, and age appropriate screening tests were ordered as noted in patient's After Visit Summary. Personalized health advice and appropriate referrals for health education or preventive services given if needed, as  noted in patient's After Visit Summary.    History of Present Illness     Was sick starting about 6 days ago. Chest congestion. Was in bed for a few days. Grandchild was sick. Feeling much better.   Had normal stress test.   Has gained weight. Started weight watcher's 2.5 weeks ago.   Has trouble exercising because she is always babysitting her grandchildren and is tired.   Having leg pain.   Asthma has been controlled. Did not have to use inhalers with recent illness. Not using Elipta.   Mood has been ok. Has Xanax which is old and rarely uses.   Has started with urinary dribbling. Has to wear a pad.          Patient Care Team:  HANK Anderson as PCP - General (Family Medicine)    Review of Systems   Constitutional:  Positive for fatigue. Negative for appetite change and unexpected weight change.   HENT:  Positive for congestion.    Eyes:  Negative for visual disturbance.   Respiratory:  Positive for cough. Negative for shortness of breath and wheezing.    Cardiovascular:  Negative for chest pain, palpitations and leg swelling.   Neurological:  Negative for dizziness, syncope, light-headedness and headaches.   Psychiatric/Behavioral:  Positive for sleep disturbance. Negative for decreased concentration, dysphoric mood and suicidal ideas.      Medical History Reviewed by provider this encounter:       Annual Wellness Visit Questionnaire   Maritza is here for her Subsequent Wellness visit.     Health Risk Assessment:   Patient rates overall health as good. Patient feels that their physical health rating is slightly worse. Patient is very satisfied with their life. Eyesight was rated as same. Hearing was rated as same. Patient feels that their emotional and mental health rating is same. Patients states they are never, rarely angry. Patient states they are often unusually tired/fatigued. Pain experienced in the last 7 days has been a lot. Patient's pain rating has been 7/10. Patient states that she has experienced  weight loss or gain in last 6 months.     Depression Screening:   PHQ-9 Score: 2      Fall Risk Screening:   In the past year, patient has experienced: history of falling in past year    Number of falls: 1  Injured during fall?: Yes    Feels unsteady when standing or walking?: No    Worried about falling?: No      Urinary Incontinence Screening:   Patient has leaked urine accidently in the last six months.     Home Safety:  Patient has trouble with stairs inside or outside of their home. Patient has working smoke alarms and has working carbon monoxide detector. Home safety hazards include: none.     Nutrition:   Current diet is Low Saturated Fat and Regular.     Medications:   Patient is not currently taking any over-the-counter supplements. Patient is able to manage medications.     Activities of Daily Living (ADLs)/Instrumental Activities of Daily Living (IADLs):   Walk and transfer into and out of bed and chair?: Yes  Dress and groom yourself?: Yes    Bathe or shower yourself?: Yes    Feed yourself? Yes  Do your laundry/housekeeping?: Yes  Manage your money, pay your bills and track your expenses?: Yes  Make your own meals?: Yes    Do your own shopping?: Yes    Previous Hospitalizations:   Any hospitalizations or ED visits within the last 12 months?: Yes    How many hospitalizations have you had in the last year?: 1-2    Advance Care Planning:   Living will: No    ACP document given: Yes      Cognitive Screening:   Provider or family/friend/caregiver concerned regarding cognition?: No    PREVENTIVE SCREENINGS      Cardiovascular Screening:    General: Screening Not Indicated and History Lipid Disorder      Diabetes Screening:     General: Screening Current      Colorectal Cancer Screening:     General: Screening Current      Breast Cancer Screening:     General: Screening Current      Cervical Cancer Screening:    General: Screening Not Indicated      Osteoporosis Screening:    General: Screening Current       Abdominal Aortic Aneurysm (AAA) Screening:        General: Screening Not Indicated      Lung Cancer Screening:     General: Screening Not Indicated      Hepatitis C Screening:    General: Screening Current    Screening, Brief Intervention, and Referral to Treatment (SBIRT)    Screening      AUDIT-C Screenin) How often did you have a drink containing alcohol in the past year? monthly or less  2) How many drinks did you have on a typical day when you were drinking in the past year? 1 to 2  3) How often did you have 6 or more drinks on one occasion in the past year? never    AUDIT-C Score: 1  Interpretation: Score 0-2 (female): Negative screen for alcohol misuse    Single Item Drug Screening:  How often have you used an illegal drug (including marijuana) or a prescription medication for non-medical reasons in the past year? never    Single Item Drug Screen Score: 0  Interpretation: Negative screen for possible drug use disorder    Social Determinants of Health     Financial Resource Strain: Low Risk  (8/3/2023)    Overall Financial Resource Strain (CARDIA)     Difficulty of Paying Living Expenses: Not hard at all   Food Insecurity: No Food Insecurity (2024)    Hunger Vital Sign     Worried About Running Out of Food in the Last Year: Never true     Ran Out of Food in the Last Year: Never true   Transportation Needs: No Transportation Needs (2024)    PRAPARE - Transportation     Lack of Transportation (Medical): No     Lack of Transportation (Non-Medical): No   Housing Stability: Low Risk  (2024)    Housing Stability Vital Sign     Unable to Pay for Housing in the Last Year: No     Number of Times Moved in the Last Year: 1     Homeless in the Last Year: No   Utilities: Not At Risk (2024)    [facility] Utilities     Threatened with loss of utilities: No     Vision Screening    Right eye Left eye Both eyes   Without correction 20/25 20/25 20/20   With correction          Objective     /80 (BP  Location: Left arm, Patient Position: Sitting, Cuff Size: Standard)   Pulse 94   Temp (!) 97 °F (36.1 °C) (Tympanic)   Ht 5' (1.524 m)   Wt 73.8 kg (162 lb 12.8 oz)   SpO2 98%   BMI 31.79 kg/m²     Physical Exam  Vitals reviewed.   Constitutional:       Appearance: Normal appearance. She is well-developed.   HENT:      Right Ear: There is impacted cerumen.      Left Ear: Tympanic membrane, ear canal and external ear normal.      Mouth/Throat:      Mouth: Mucous membranes are moist.      Pharynx: Oropharynx is clear.   Cardiovascular:      Rate and Rhythm: Normal rate and regular rhythm.      Heart sounds: Normal heart sounds. No murmur heard.  Pulmonary:      Effort: Pulmonary effort is normal.      Breath sounds: Normal breath sounds.   Lymphadenopathy:      Cervical: No cervical adenopathy.   Skin:     General: Skin is warm and dry.   Neurological:      Mental Status: She is alert and oriented to person, place, and time.   Psychiatric:         Mood and Affect: Mood normal.         Behavior: Behavior normal.         Thought Content: Thought content normal.         Judgment: Judgment normal.       Administrative Statements   I have spent a total time of 25 minutes in caring for this patient on the day of the visit/encounter including Instructions for management, Risk factor reductions, Impressions, Documenting in the medical record, Reviewing / ordering tests, medicine, procedures  , and Obtaining or reviewing history  .

## 2024-09-04 DIAGNOSIS — M25.551 RIGHT HIP PAIN: Primary | ICD-10-CM

## 2024-09-09 ENCOUNTER — OFFICE VISIT (OUTPATIENT)
Dept: FAMILY MEDICINE CLINIC | Facility: HOSPITAL | Age: 74
End: 2024-09-09
Payer: COMMERCIAL

## 2024-09-09 VITALS
TEMPERATURE: 97.3 F | SYSTOLIC BLOOD PRESSURE: 126 MMHG | HEIGHT: 60 IN | DIASTOLIC BLOOD PRESSURE: 72 MMHG | HEART RATE: 94 BPM | WEIGHT: 165 LBS | OXYGEN SATURATION: 96 % | BODY MASS INDEX: 32.39 KG/M2

## 2024-09-09 DIAGNOSIS — M79.604 RIGHT LEG PAIN: ICD-10-CM

## 2024-09-09 DIAGNOSIS — J01.00 ACUTE NON-RECURRENT MAXILLARY SINUSITIS: Primary | ICD-10-CM

## 2024-09-09 DIAGNOSIS — M25.551 RIGHT HIP PAIN: ICD-10-CM

## 2024-09-09 PROCEDURE — 99214 OFFICE O/P EST MOD 30 MIN: CPT | Performed by: NURSE PRACTITIONER

## 2024-09-09 PROCEDURE — G2211 COMPLEX E/M VISIT ADD ON: HCPCS | Performed by: NURSE PRACTITIONER

## 2024-09-09 RX ORDER — DOXYCYCLINE 100 MG/1
100 CAPSULE ORAL 2 TIMES DAILY
Qty: 20 CAPSULE | Refills: 0 | Status: SHIPPED | OUTPATIENT
Start: 2024-09-09 | End: 2024-09-19

## 2024-09-09 NOTE — PROGRESS NOTES
Assessment/Plan:     Given persistence of symptoms with worsening will issue antibiotic.   No s/s asthma exacerbation at this time.     Unclear if pain is coming from hip vs low spine.   She does have ortho appointment tomorrow so will defer to them.          Diagnoses and all orders for this visit:    Acute non-recurrent maxillary sinusitis  -     doxycycline monohydrate (MONODOX) 100 mg capsule; Take 1 capsule (100 mg total) by mouth 2 (two) times a day for 10 days    Right hip pain    Right leg pain          Subjective:     Patient ID: Maritza Gold is a 74 y.o. female.    Has had congestion and cough for about 3 weeks. She did have initial improvement. Symptoms started to worsen last week and more so over the last 24 hours. Chest feels tight. No wheezing. Very congested. Blowing out mucus plugs from nose. Dark green. No sinus pain or pressure.   On gabapentin for B/L leg pain. Right > left. Having difficulty going up and down steps. Pian located right hip toward buttock. Pain will radiate anterior thigh to ankle. She has appointment with ortho tomorrow.         Review of Systems   Constitutional:  Positive for fatigue. Negative for chills and fever.   HENT:  Positive for congestion, ear pain and sore throat. Negative for sinus pressure and sinus pain.    Respiratory:  Positive for cough and chest tightness. Negative for shortness of breath and wheezing.    Musculoskeletal:  Positive for arthralgias.   Neurological:  Positive for weakness. Negative for numbness.         The following portions of the patient's history were reviewed and updated as appropriate: allergies, current medications, past family history, past medical history, past social history, past surgical history and problem list.    Objective:  Vitals:    09/09/24 1133   BP: 126/72   Pulse: 94   Temp: (!) 97.3 °F (36.3 °C)   SpO2: 96%      Physical Exam  Vitals reviewed.   Constitutional:       Appearance: Normal appearance. She is ill-appearing.    HENT:      Right Ear: Tympanic membrane, ear canal and external ear normal.      Left Ear: Tympanic membrane, ear canal and external ear normal.      Mouth/Throat:      Mouth: Mucous membranes are moist.      Pharynx: Oropharynx is clear.   Cardiovascular:      Rate and Rhythm: Normal rate and regular rhythm.      Heart sounds: Normal heart sounds. No murmur heard.  Pulmonary:      Effort: Pulmonary effort is normal.      Breath sounds: Normal breath sounds.   Lymphadenopathy:      Cervical: No cervical adenopathy.   Skin:     General: Skin is warm and dry.   Neurological:      Mental Status: She is alert and oriented to person, place, and time.   Psychiatric:         Mood and Affect: Mood normal.         Behavior: Behavior normal.         Thought Content: Thought content normal.         Judgment: Judgment normal.

## 2024-09-10 ENCOUNTER — APPOINTMENT (OUTPATIENT)
Dept: RADIOLOGY | Facility: CLINIC | Age: 74
End: 2024-09-10
Payer: COMMERCIAL

## 2024-09-10 ENCOUNTER — OFFICE VISIT (OUTPATIENT)
Dept: OBGYN CLINIC | Facility: CLINIC | Age: 74
End: 2024-09-10
Payer: COMMERCIAL

## 2024-09-10 VITALS
DIASTOLIC BLOOD PRESSURE: 69 MMHG | HEIGHT: 60 IN | SYSTOLIC BLOOD PRESSURE: 129 MMHG | WEIGHT: 165 LBS | BODY MASS INDEX: 32.39 KG/M2

## 2024-09-10 DIAGNOSIS — M51.36 LUMBAR DEGENERATIVE DISC DISEASE: ICD-10-CM

## 2024-09-10 DIAGNOSIS — M54.41 CHRONIC RIGHT-SIDED LOW BACK PAIN WITH RIGHT-SIDED SCIATICA: Primary | ICD-10-CM

## 2024-09-10 DIAGNOSIS — M79.604 RIGHT LEG PAIN: ICD-10-CM

## 2024-09-10 DIAGNOSIS — M25.551 RIGHT HIP PAIN: ICD-10-CM

## 2024-09-10 DIAGNOSIS — G89.29 CHRONIC RIGHT-SIDED LOW BACK PAIN WITH RIGHT-SIDED SCIATICA: Primary | ICD-10-CM

## 2024-09-10 DIAGNOSIS — M53.3 SI (SACROILIAC) PAIN: ICD-10-CM

## 2024-09-10 PROCEDURE — 73502 X-RAY EXAM HIP UNI 2-3 VIEWS: CPT

## 2024-09-10 PROCEDURE — 99213 OFFICE O/P EST LOW 20 MIN: CPT | Performed by: PHYSICIAN ASSISTANT

## 2024-09-10 RX ORDER — METHYLPREDNISOLONE 4 MG
TABLET, DOSE PACK ORAL
Qty: 21 TABLET | Refills: 0 | Status: SHIPPED | OUTPATIENT
Start: 2024-09-10

## 2024-09-10 NOTE — PATIENT INSTRUCTIONS
"Patient Education     Degenerative disc disease   The Basics   Written by the doctors and editors at [facility]   What is degenerative disc disease? -- Degenerative disc disease is a condition that affects discs in the spine (backbone).  The back is made up of (figure 1):   Vertebrae - These are the bones of the spine. Each has a hole in the center. The vertebrae are stacked to form a hollow tube called the \"spinal canal.\" The spinal cord passes through this tube and is protected by the vertebrae.   Spinal cord and nerves - The spinal cord is the bundle of nerves that connects the brain to the rest of the body. It runs through the vertebrae. Nerves branch from the spinal cord and pass in between the vertebrae. From there, they connect to the arms, the legs, and the organs.   Muscles, tendons, and ligaments - These support the vertebrae. They are used to move the head and neck, stand upright, and bend and flex the body. They are also called the \"soft tissues\" of the neck and back.   Discs - Rubbery discs sit in between each of the vertebrae. These add cushion and allow movement.  Over time, the discs between the vertebrae wear down. This is called \"disc degeneration.\" It is a normal part of aging. Most people have some amount of wear on their discs after age 40.  Not everyone has problems because of disc degeneration. But in some cases, the disc wears down enough that the vertebrae start to rub together. The nerves of the spinal cord can also become pinched. These problems can cause pain or other symptoms. Doctors use the term \"degenerative disc disease\" when disc degeneration causes symptoms.  Certain things can make degenerative disc disease more likely to happen. These include:   Older age   A previous injury to the back or neck, such as from falling or a car accident   Being overweight or obese   Working a job that is very physical  What are the symptoms of degenerative disc disease? -- The most common symptoms are " "neck and back pain. The pain might also:   Get worse with movement, or when you have to be in 1 position for a long time   Come and go   Shoot into your arms or legs  In addition to pain, you might also have:   Tingling or numbness in 1 or both arms or legs   Weakness or stiffness in 1 or both arms or legs  Will I need tests? -- Maybe. Your doctor or nurse will start by learning about your symptoms and doing an exam. Possible tests include:   Imaging tests - Imaging tests create pictures of the inside of the body. You might have an X-ray, CT scan, or MRI, or a test called a \"discogram.\" A discogram uses special dye and imaging to look at the discs in your spine.   Tests to check how your nerves are working, such as electromyography (\"EMG\")   Lab tests  How is degenerative disc disease treated? -- The main goal of treatment is to manage your pain and other symptoms. Treatment can include:   Physical therapy - This involves working with a physical therapist (exercise expert). They will show you exercises to strengthen your back and reduce your pain.   Medicines to relieve pain - These can include over-the-counter pain relievers, muscle relaxers, or steroids that you take by mouth.   Steroid injections - This is when steroid medicines are injected near your discs to reduce pain.   Radiofrequency neurotomy - This is a procedure to \"burn\" some of the nerves near your damaged disc. This prevents the pain signals from the nerves from reaching your brain.   Using braces for your neck or back   Using assistive devices - Some people need to use devices like a cane or walker to help them move around.  Most people with degenerative disc disease do not need surgery. But if other treatments have not helped, you might be able to have \"spinal decompression surgery.\" This is a type of surgery that takes pressure off of the nerves in your spine. There are different types of procedures. Your doctor or nurse can talk to you about what " "your options are.  Can degenerative disc disease be prevented? -- You can't prevent disc degeneration. But there are things you can do to help prevent neck and back pain. For example, you can:   Use good posture when sitting or standing - Hold your head up, and keep your shoulders down.   Stay active.   Lift with your legs instead of your back.   Keep your neck in line with the rest of your body when you sleep.  Is there anything I can do on my own to feel better? -- At home, to try to ease your pain, you can:   Ice - Put a cold gel pack, bag of ice, or bag of frozen vegetables on the painful area every 1 to 2 hours, for 15 minutes each time. Put a thin towel between the ice (or other cold object) and your skin.   Exercise - Gentle activities like walking, swimming, or using an exercise bike can strengthen the back muscles and help with pain. Some people also find that deirdre chi or yoga help.   Stretch - Gentle stretching can help you feel better.   Maintain a healthy body weight - Being overweight can make your symptoms worse. Talk to your doctor or nurse about whether losing weight could help, and how to do it safely.  Some people also find that these help their symptoms:   Spinal manipulation - This is when a chiropractor, physical therapist, or other professional moves or \"adjusts\" the joints of your back. If you want to try this, talk to your doctor or nurse first.   Acupuncture - This is when someone who knows traditional Chinese medicine inserts tiny needles into your body to block pain signals.   Massage - A massage therapist massages the muscles and other soft tissues in your back.  When should I call the doctor? -- Call for advice if you have:   Pain that gets worse   Numbness in your leg or arm that is new or worse than it was before   Trouble controlling your bladder or bowels that you didn't have before  All topics are updated as new evidence becomes available and our peer review process is " complete.  This topic retrieved from [employer] on: May 10, 2024.  Topic 793064 Version 3.0  Release: 32.4.3 - C32.129  © 2024 UpToDate, Inc. and/or its affiliates. All rights reserved.  figure 1: Anatomy of the back     This drawing shows the different parts of the back. Back pain can be caused by problems with the muscles, ligaments, discs, bones (vertebrae), or nerves.  Graphic 25876 Version 6.0  Consumer Information Use and Disclaimer   Disclaimer: This generalized information is a limited summary of diagnosis, treatment, and/or medication information. It is not meant to be comprehensive and should be used as a tool to help the user understand and/or assess potential diagnostic and treatment options. It does NOT include all information about conditions, treatments, medications, side effects, or risks that may apply to a specific patient. It is not intended to be medical advice or a substitute for the medical advice, diagnosis, or treatment of a health care provider based on the health care provider's examination and assessment of a patient's specific and unique circumstances. Patients must speak with a health care provider for complete information about their health, medical questions, and treatment options, including any risks or benefits regarding use of medications. This information does not endorse any treatments or medications as safe, effective, or approved for treating a specific patient. UpToDate, Inc. and its affiliates disclaim any warranty or liability relating to this information or the use thereof.The use of this information is governed by the Terms of Use, available at https://www.wolterskluwer.com/en/know/clinical-effectiveness-terms. 2024© UpToDate, Inc. and its affiliates and/or licensors. All rights reserved.  Copyright   © 2024 UpToDate, Inc. and/or its affiliates. All rights reserved.

## 2024-09-10 NOTE — PROGRESS NOTES
Orthopaedic Surgery - Office Note  Maritza Gold (74 y.o. female)   : 1950   MRN: [id number]  Encounter Date: 9/10/2024    Chief Complaint   Patient presents with    Right Hip - Pain     Pain shoots down leg, trouble walking,          Assessment/Plan  Diagnoses and all orders for this visit:    Chronic right-sided low back pain with right-sided sciatica  -     Ambulatory Referral to Physical Therapy; Future  -     Ambulatory referral to Spine & Pain Management; Future  -     methylPREDNISolone 4 MG tablet therapy pack; Use as directed on package    Lumbar degenerative disc disease-Moderate to severe degenerative disc disease at T12-L3. Moderate degenerative disc disease and facet atherosis lower lumbar spine.  -     Ambulatory Referral to Physical Therapy; Future  -     Ambulatory referral to Spine & Pain Management; Future  -     methylPREDNISolone 4 MG tablet therapy pack; Use as directed on package    Right leg pain  -     XR hip/pelv 2-3 vws right if performed; Future  -     Ambulatory Referral to Orthopedic Surgery  -     Ambulatory Referral to Physical Therapy; Future  -     Ambulatory referral to Spine & Pain Management; Future  -     methylPREDNISolone 4 MG tablet therapy pack; Use as directed on package    SI (sacroiliac) pain    The diagnosis as well as treatment options were reviewed with the patient in the office today.  I explained I do not believe her symptoms are coming from her right hip.  I do not believe she would benefit from a right hip intra-articular injection or total hip arthroplasty at this time.    I explained I believe most of her symptoms are related to her SI joint and lumbar spine.  I would recommend initial conservative treatment of a short course of oral steroids.  Shared decision making was utilized and she elected to proceed with the medication.  Appropriate precautions were reviewed.  In addition she will be started in physical therapy for the lumbar spine.  I would  recommend follow-up with spine and pain in 6 weeks for a repeat evaluation if conservative treatments have not helped.    All question concerns were answered in the office today     Return for Recheck with spine/pain in 6 weeks.        History of Present Illness  This is a previous orthopedic patient with ongoing right-sided low back pain that radiates down the right leg from the buttocks into the thigh and ankle.  She has had the symptoms in the past and takes gabapentin.  She denies any groin pain or injury.  She discussed the symptoms with her PCP yesterday.  She localizes most of the pain to the right SI joint with radiating symptoms into the buttocks down the lateral thigh into the lateral calf and lateral ankle.  She reports she is starting to notice weakness and difficulty going up and down stairs as the right leg feels weak and heavy.  She denies any calf pain.  She reports rare paresthesias.      Review of Systems  Pertinent items are noted in HPI.  All other systems were reviewed and are negative.    Physical Exam  /69 (BP Location: Left arm, Patient Position: Sitting, Cuff Size: Adult)   Ht 5' (1.524 m) Comment: verbal  Wt 74.8 kg (165 lb) Comment: verbal  BMI 32.22 kg/m²   Cons: Appears well.  No apparent distress.  Psych: Alert. Oriented x3.  Mood and affect normal.  Patient's right hip has a painless active and passive range of motion today in the office.  She has no pain with logrolling and no pain with forced internal rotation.  She has a positive straight leg raise reproducing her pain in the right leg.  She has no tenderness to palpation at the trochanteric bursa.  She has tenderness to palpation at the SI joint on the right side.  She is tender to palpation in the paraspinal muscles of the right lumbar spine.  She has decreased lumbar range of motion.  Her gait is heel-to-toe.  EHL and dorsiflexion strength are at 4+ out of 5.            Independent review of the x-rays of the right hip  show no acute fractures or dislocations.  Mild degenerative changes are seen.  X-rays were reviewed from an orthopedic standpoint will await official radiologist interpretation.    Studies Reviewed  Study Result  Narrative & Impression  XR SPINE LUMBAR 2 OR 3 VIEWS INJURY     INDICATION: W19.XXXA: Unspecified fall, initial encounter.     COMPARISON: None     FINDINGS:     No acute fracture. Intact pedicles.     Five non-rib-bearing lumbar vertebral bodies.     Exaggerated thoracolumbar kyphosis. Minimal anterolisthesis L4 on L5. Grade 1 retrolisthesis L1 on L2.     Moderate to severe degenerative disc disease at T12-L3. Moderate degenerative disc disease and facet atherosis lower lumbar spine.     Unremarkable soft tissues.     IMPRESSION:     No acute osseous abnormality.     Degenerative changes as described.        Workstation performed: [id number]       Procedures  No procedures today.    Medical, Surgical, Family, and Social History  The patient's medical history, family history, and social history, were reviewed and updated as appropriate.    Past Medical History:   Diagnosis Date    Asthma     C. difficile enteritis     Dizziness 01/18/2022    GERD (gastroesophageal reflux disease)     History of acute respiratory failure     Hyperlipidemia     Hypertension     Pneumonia     Respiratory disorder 2013    Tendinitis of right shoulder 04/09/2019       Past Surgical History:   Procedure Laterality Date    BRONCHOSCOPY      COLONOSCOPY  2017    FRACTURE SURGERY      NASAL SINUS SURGERY Bilateral 09/2017    SINUS SURGERY      TUBAL LIGATION      WISDOM TOOTH EXTRACTION         Family History   Problem Relation Age of Onset    Cirrhosis Mother     Colon cancer Father         age dx unknown    Heart disease Father     Heart failure Father     Diabetes Father     Rectal cancer Father     Heart disease Sister     No Known Problems Daughter     No Known Problems Daughter     No Known Problems Daughter     No Known  Problems Maternal Grandmother     No Known Problems Maternal Grandfather     Colon cancer Paternal Grandmother         age dx unknown    No Known Problems Paternal Grandfather     Heart disease Brother     Aortic aneurysm Brother     Sudden death Brother     Hypertension Brother         Everett Sweet    No Known Problems Maternal Aunt     No Known Problems Maternal Aunt     No Known Problems Maternal Aunt     No Known Problems Paternal Aunt     No Known Problems Paternal Aunt     Lung cancer Paternal Uncle         age dx unknown    Throat cancer Paternal Uncle         age dx unknown    Colon polyps Neg Hx        Social History     Occupational History    Not on file   Tobacco Use    Smoking status: Never    Smokeless tobacco: Never   Vaping Use    Vaping status: Never Used   Substance and Sexual Activity    Alcohol use: Not Currently     Comment: soically    Drug use: No    Sexual activity: Not on file       Allergies   Allergen Reactions    Succinylcholine GI Intolerance     Prolonged apnea      Amoxicillin-Pot Clavulanate Hives, Rash and Other (See Comments)    Azithromycin Hives, Rash and Other (See Comments)    Clarithromycin Hives, Nausea Only, Rash, Vomiting and Other (See Comments)         Current Outpatient Medications:     methylPREDNISolone 4 MG tablet therapy pack, Use as directed on package, Disp: 21 tablet, Rfl: 0    albuterol (PROVENTIL HFA,VENTOLIN HFA) 90 mcg/act inhaler, Inhale 2 puffs every 6 (six) hours as needed for wheezing, Disp: 9 g, Rfl: 1    ALPRAZolam (XANAX) 0.5 mg tablet, Take 0.5 mg by mouth if needed, Disp: , Rfl:     aspirin 81 MG tablet, Take 81 mg by mouth, Disp: , Rfl:     Diclofenac Sodium (VOLTAREN) 1 %, Apply 2 g topically 4 (four) times a day (Patient not taking: Reported on 9/9/2024), Disp: , Rfl:     doxycycline monohydrate (MONODOX) 100 mg capsule, Take 1 capsule (100 mg total) by mouth 2 (two) times a day for 10 days, Disp: 20 capsule, Rfl: 0    escitalopram (LEXAPRO) 10  mg tablet, TAKE 1 TABLET BY MOUTH EVERY DAY, Disp: 90 tablet, Rfl: 1    Fluticasone Furoate-Vilanterol (Breo Ellipta) 100-25 mcg/actuation inhaler, Inhale 1 puff daily Rinse mouth after use., Disp: 180 blister, Rfl: 0    gabapentin (Neurontin) 300 mg capsule, Take 1 capsule (300 mg total) by mouth daily at bedtime, Disp: 30 capsule, Rfl: 0    hydroCHLOROthiazide 25 mg tablet, TAKE 1 TABLET (25 MG TOTAL) BY MOUTH DAILY., Disp: 100 tablet, Rfl: 1    losartan (COZAAR) 100 MG tablet, TAKE 1 TABLET BY MOUTH EVERY DAY, Disp: 90 tablet, Rfl: 1    Mupirocin POWD, Inhale 30 mg 2 (two) times a day (Patient not taking: Reported on 1/26/2024), Disp: , Rfl:     NAPROXEN  MG EC tablet, Take 500 mg by mouth if needed, Disp: , Rfl:     omeprazole (PriLOSEC) 40 MG capsule, TAKE 1 CAPSULE BY MOUTH DAILY *DO NOT START BEFORE 6/1/23, Disp: 30 capsule, Rfl: 5    pravastatin (PRAVACHOL) 80 mg tablet, TAKE 1/2 TABLET BY MOUTH EVERY DAY, Disp: 45 tablet, Rfl: 3      Milan Ko PA-C

## 2024-09-17 ENCOUNTER — EVALUATION (OUTPATIENT)
Dept: PHYSICAL THERAPY | Facility: CLINIC | Age: 74
End: 2024-09-17
Payer: COMMERCIAL

## 2024-09-17 DIAGNOSIS — M54.41 CHRONIC RIGHT-SIDED LOW BACK PAIN WITH RIGHT-SIDED SCIATICA: ICD-10-CM

## 2024-09-17 DIAGNOSIS — G89.29 CHRONIC RIGHT-SIDED LOW BACK PAIN WITH RIGHT-SIDED SCIATICA: ICD-10-CM

## 2024-09-17 DIAGNOSIS — M51.36 LUMBAR DEGENERATIVE DISC DISEASE: ICD-10-CM

## 2024-09-17 DIAGNOSIS — M79.604 RIGHT LEG PAIN: ICD-10-CM

## 2024-09-17 PROCEDURE — 97530 THERAPEUTIC ACTIVITIES: CPT | Performed by: PHYSICAL THERAPIST

## 2024-09-17 PROCEDURE — 97161 PT EVAL LOW COMPLEX 20 MIN: CPT | Performed by: PHYSICAL THERAPIST

## 2024-09-17 PROCEDURE — 97140 MANUAL THERAPY 1/> REGIONS: CPT | Performed by: PHYSICAL THERAPIST

## 2024-09-17 NOTE — PROGRESS NOTES
PT Evaluation     Today's date: 2024  Patient name: Maritza Gold  : 1950  MRN: [id number]  Referring provider: Milan Ko PA*  Dx:   Encounter Diagnosis     ICD-10-CM    1. Chronic right-sided low back pain with right-sided sciatica  M54.41 Ambulatory Referral to Physical Therapy    G89.29       2. Lumbar degenerative disc disease-Moderate to severe degenerative disc disease at T12-L3. Moderate degenerative disc disease and facet atherosis lower lumbar spine.  M51.36 Ambulatory Referral to Physical Therapy      3. Right leg pain  M79.604 Ambulatory Referral to Physical Therapy          Start Time: 1000          Assessment  Impairments: abnormal or restricted ROM, activity intolerance, impaired physical strength, lacks appropriate home exercise program and pain with function  Symptom irritability: low    Assessment details: Maritza Gold is a 74 y.o. female presenting to outpatient physical therapy at [facility] with complaints of lumbopelvic and RLE pain, stiffness and weakness.  She presents with decreased range of motion, decreased strength, limited flexibility, poor postural awareness, poor body mechanics, altered gait pattern, poor balance, decreased tolerance to activity and decreased functional mobility due to Chronic right-sided low back pain with right-sided sciatica  Lumbar degenerative disc disease-Moderate to severe degenerative disc disease at T12-L3. Moderate degenerative disc disease and facet atherosis lower lumbar spine.  Right leg pain.  She would benefit from skilled PT services in order to address these deficits and reach maximum level of function.  Thank you for the referral!   Understanding of Dx/Px/POC: good     Prognosis: good    Goals  STG  1. Independent with HEP in 4 weeks  2. Decrease pain at worst by 50% in 4 weeks    LTG  1. Increase lumbopelvic & RLE strength in all planes to 5/5 in 8 weeks  2. Return to full, pain-free and unrestricted standing, walking,  stair climbing in 8 weeks        Plan  Patient would benefit from: skilled physical therapy  Planned modality interventions: thermotherapy: hydrocollator packs    Planned therapy interventions: manual therapy, neuromuscular re-education, therapeutic exercise and therapeutic activities    Frequency: 1x week  Duration in weeks: 8  Treatment plan discussed with: patient    Subjective Evaluation    History of Present Illness  Date of onset: 9/3/2024  Mechanism of injury: Pt reports insidious onset of right LBP & RLE pain which occasionally wakes her up at night. Pt denies pain with coughing/sneezing.   Quality of life: good    Patient Goals  Patient goals for therapy: decreased pain and return to sport/leisure activities  Patient goal: Pt wishes to negotiate stairs painfree, getting up and down off the floor to be with great grand babies.  Pain  Current pain ratin  At best pain ratin  At worst pain rating: 10  Location: RLE anteriorly  Quality: dull ache and sharp  Aggravating factors: stair climbing, standing, walking and sitting  Progression: worsening      Objective     Active Range of Motion     Lumbar   Flexion:  with pain Restriction level: minimal  Extension:  with pain Restriction level: moderate  Left lateral flexion:  Restriction level: minimal  Right lateral flexion:  Restriction level: minimal  Left rotation:  with pain Restriction level: moderate  Right rotation:  with pain Restriction level: moderate    Tests     Lumbar   Negative prone instability  and Valsalva.     Left   Negative crossed SLR and passive SLR.     Right   Negative crossed SLR and passive SLR.     Left Hip   Positive RANI.     Right Hip   Positive RANI.         EPOC:       Manuals             RLE LAD NS G3-4            R glut & piriformis stretch NS            R Crossed LTR (R over L) NS                         Neuro Re-Ed             SLR flx iso             Bridge iso             Clam iso             TvA  activation progression                                                    Ther Ex             LTR             SKTC             Active H/S stretch & sciatic n. glide                                                                              Ther Activity             Patient education: pathoanatomy, nature of sxs, POC, HEP NS            Bike for lumbpelvic endurance             Gait Training                                       Modalities

## 2024-09-24 ENCOUNTER — OFFICE VISIT (OUTPATIENT)
Dept: PHYSICAL THERAPY | Facility: CLINIC | Age: 74
End: 2024-09-24
Payer: COMMERCIAL

## 2024-09-24 DIAGNOSIS — M51.36 LUMBAR DEGENERATIVE DISC DISEASE: ICD-10-CM

## 2024-09-24 DIAGNOSIS — M51.369 LUMBAR DEGENERATIVE DISC DISEASE: ICD-10-CM

## 2024-09-24 DIAGNOSIS — G89.29 CHRONIC RIGHT-SIDED LOW BACK PAIN WITH RIGHT-SIDED SCIATICA: Primary | ICD-10-CM

## 2024-09-24 DIAGNOSIS — M79.604 RIGHT LEG PAIN: ICD-10-CM

## 2024-09-24 DIAGNOSIS — M54.41 CHRONIC RIGHT-SIDED LOW BACK PAIN WITH RIGHT-SIDED SCIATICA: Primary | ICD-10-CM

## 2024-09-24 PROCEDURE — 97140 MANUAL THERAPY 1/> REGIONS: CPT

## 2024-09-24 PROCEDURE — 97110 THERAPEUTIC EXERCISES: CPT

## 2024-09-24 NOTE — PROGRESS NOTES
Daily Note     Today's date: 2024  Patient name: Maritza Gold  : 1950  MRN: [id number]  Referring provider: Milan Ko PA*  Dx:   Encounter Diagnosis     ICD-10-CM    1. Chronic right-sided low back pain with right-sided sciatica  M54.41     G89.29       2. Lumbar degenerative disc disease-Moderate to severe degenerative disc disease at T12-L3. Moderate degenerative disc disease and facet atherosis lower lumbar spine.  M51.36       3. Right leg pain  M79.604           Start Time: 1005  Stop Time: 1045  Total time in clinic (min): 40 minutes    Subjective: Patient reports she is not doing well today. States she is having pain down into her both legs mostly R. States she pain is 10/10 that is constant and is in any positions. Says she is also experiences some tingling into both feet since last session.  Says she takes Advil in the morning and night. She took it today a little after 9am.       Objective: See treatment diary below      Assessment: Tolerated treatment well. First session since IE with focus on stretches and pain management to address symptoms. Pt c/o of some pain down R LE during R LAD; however pt felt relief with distraction. Patient benefits form  PROM, stretches, neural TE's today with significant improvement/ decrease in symptoms to 4/10. Patient was emailed HEP today w patient's consent. Assess symptoms nv. Patient would benefit from continued PT      Plan: Continue per plan of care.     Access Code: [email or web address]  URL: [email or web address]  Date: 2024  Prepared by: Monica Tobar    Exercises  - Supine Lower Trunk Rotation  - 1 x daily - 3-5 x weekly - 1 sets - 5 reps - 10 hold  (R over L)  - Hooklying Single Knee to Chest Stretch  - 1 x daily - 3-5 x weekly - 1 sets - 5 reps - 10 hold  - Supine Hamstring Stretch  - 1 x daily - 7 x weekly - 3 sets - 10 reps     EPOC:       Manuals            RLE LAD NS G3-4 JM           R glut & piriformis  "stretch NS JM           R Crossed LTR (R over L) JASMYNE MENDOZA                        Neuro Re-Ed             SLR flx iso             Bridge iso             Clam iso             TvA activation progression                                                    Ther Ex             LTR  10\" x5 ea           SKTC  10\" x5 ea           Active H/S stretch & sciatic n. glide  X10 ea                                                                            Ther Activity             Patient education: pathoanatomy, nature of sxs, POC, HEP NS            Bike for lumbpelvic endurance             Gait Training                                       Modalities               MHP  10' pre                                "

## 2024-10-01 ENCOUNTER — OFFICE VISIT (OUTPATIENT)
Dept: PHYSICAL THERAPY | Facility: CLINIC | Age: 74
End: 2024-10-01
Payer: COMMERCIAL

## 2024-10-01 DIAGNOSIS — M79.604 RIGHT LEG PAIN: ICD-10-CM

## 2024-10-01 DIAGNOSIS — M54.41 CHRONIC RIGHT-SIDED LOW BACK PAIN WITH RIGHT-SIDED SCIATICA: Primary | ICD-10-CM

## 2024-10-01 DIAGNOSIS — G89.29 CHRONIC RIGHT-SIDED LOW BACK PAIN WITH RIGHT-SIDED SCIATICA: Primary | ICD-10-CM

## 2024-10-01 DIAGNOSIS — M51.362 DEGENERATION OF INTERVERTEBRAL DISC OF LUMBAR REGION WITH DISCOGENIC BACK PAIN AND LOWER EXTREMITY PAIN: ICD-10-CM

## 2024-10-01 PROCEDURE — 97140 MANUAL THERAPY 1/> REGIONS: CPT | Performed by: PHYSICAL THERAPIST

## 2024-10-01 PROCEDURE — 97110 THERAPEUTIC EXERCISES: CPT | Performed by: PHYSICAL THERAPIST

## 2024-10-01 PROCEDURE — 97530 THERAPEUTIC ACTIVITIES: CPT | Performed by: PHYSICAL THERAPIST

## 2024-10-01 NOTE — PROGRESS NOTES
"Daily Note     Today's date: 10/1/2024  Patient name: Maritza Gold  : 1950  MRN: [id number]  Referring provider: Milan Ko PA*  Dx:   Encounter Diagnosis     ICD-10-CM    1. Chronic right-sided low back pain with right-sided sciatica  M54.41     G89.29       2. Degeneration of intervertebral disc of lumbar region with discogenic back pain and lower extremity pain  M51.362       3. Right leg pain  M79.604                      Subjective: Compliant with HEP, no questions regarding POC, motivated to continue PT      Objective: See treatment diary below      Assessment: Patient demonstrates improving lumbopelvic mobility this session.  Patient completed today's session without increase in pain in RLE.  Future sessions should continue to progress lumbopelvic stability & endurance as able. Tolerated treatment well. Patient demonstrated fatigue post treatment, exhibited good technique with therapeutic exercises, and would benefit from continued PT      Plan: Continue per plan of care.  Progress treatment as tolerated.      Access Code: [email or web address]  URL: [email or web address]  Date: 2024  Prepared by: Monica Tobar    Exercises  - Supine Lower Trunk Rotation  - 1 x daily - 3-5 x weekly - 1 sets - 5 reps - 10 hold  (R over L)  - Hooklying Single Knee to Chest Stretch  - 1 x daily - 3-5 x weekly - 1 sets - 5 reps - 10 hold  - Supine Hamstring Stretch  - 1 x daily - 7 x weekly - 3 sets - 10 reps     EPOC:       Manuals 9/17 10/1           RLE LAD NS G3-4 NS           R glut & piriformis stretch NS NS           R Crossed LTR (R over L) NS NS                        Neuro Re-Ed             SLR flx iso             Bridge iso             Clam iso             TvA activation progression                                                    Ther Ex             LTR  10\" x5 ea           SKTC  10\" x5 ea           Active H/S stretch & sciatic n. glide  X20 ea                                      "                                       Ther Activity             Patient education: pathoanatomy, nature of sxs, POC, HEP NS NS           Bike for lumbpelvic endurance             Gait Training                                       Modalities               P  10' pre

## 2024-10-05 ENCOUNTER — OFFICE VISIT (OUTPATIENT)
Dept: URGENT CARE | Facility: CLINIC | Age: 74
End: 2024-10-05
Payer: COMMERCIAL

## 2024-10-05 ENCOUNTER — APPOINTMENT (OUTPATIENT)
Dept: RADIOLOGY | Facility: CLINIC | Age: 74
End: 2024-10-05
Payer: COMMERCIAL

## 2024-10-05 ENCOUNTER — TELEPHONE (OUTPATIENT)
Dept: URGENT CARE | Facility: CLINIC | Age: 74
End: 2024-10-05

## 2024-10-05 VITALS
SYSTOLIC BLOOD PRESSURE: 118 MMHG | RESPIRATION RATE: 20 BRPM | OXYGEN SATURATION: 97 % | DIASTOLIC BLOOD PRESSURE: 70 MMHG | HEART RATE: 105 BPM | TEMPERATURE: 98 F

## 2024-10-05 DIAGNOSIS — J45.20 MILD INTERMITTENT ASTHMA WITHOUT COMPLICATION: Primary | ICD-10-CM

## 2024-10-05 DIAGNOSIS — J45.20 MILD INTERMITTENT ASTHMA WITHOUT COMPLICATION: ICD-10-CM

## 2024-10-05 DIAGNOSIS — J18.9 PNEUMONIA DUE TO INFECTIOUS ORGANISM, UNSPECIFIED LATERALITY, UNSPECIFIED PART OF LUNG: Primary | ICD-10-CM

## 2024-10-05 PROCEDURE — G0463 HOSPITAL OUTPT CLINIC VISIT: HCPCS

## 2024-10-05 PROCEDURE — 99213 OFFICE O/P EST LOW 20 MIN: CPT

## 2024-10-05 PROCEDURE — 71046 X-RAY EXAM CHEST 2 VIEWS: CPT

## 2024-10-05 RX ORDER — ALBUTEROL SULFATE 0.83 MG/ML
2.5 SOLUTION RESPIRATORY (INHALATION) EVERY 6 HOURS PRN
Qty: 75 ML | Refills: 0 | Status: SHIPPED | OUTPATIENT
Start: 2024-10-05

## 2024-10-05 RX ORDER — SULFAMETHOXAZOLE/TRIMETHOPRIM 800-160 MG
1 TABLET ORAL EVERY 12 HOURS SCHEDULED
Qty: 14 TABLET | Refills: 0 | Status: SHIPPED | OUTPATIENT
Start: 2024-10-05 | End: 2024-10-12

## 2024-10-05 RX ORDER — SODIUM CHLORIDE FOR INHALATION 0.9 %
3 VIAL, NEBULIZER (ML) INHALATION AS NEEDED
Qty: 90 ML | Refills: 0 | Status: SHIPPED | OUTPATIENT
Start: 2024-10-05

## 2024-10-05 RX ORDER — METHYLPREDNISOLONE 4 MG
TABLET, DOSE PACK ORAL
Qty: 21 TABLET | Refills: 0 | Status: SHIPPED | OUTPATIENT
Start: 2024-10-05

## 2024-10-05 NOTE — TELEPHONE ENCOUNTER
Chest xray- Increased opacity in the left base with loss of the diaphragm suspicious for pneumonia.       Patient was called. Patient reports she was first placed on doxycycline for symptoms on 9/9/242024 with mild relief. Patient was told we will start Bactrim Ds. Patient wast old this is sent to her pharmacy. All questions were answered.    Any chest pain or shortness of breath go to ED. Patient understood

## 2024-10-05 NOTE — PATIENT INSTRUCTIONS
Your x-rays were read by the provider. A radiologist will also read the x-rays and you will be notified of any abnormalities.     Take steroids as prescribed.    Albuterol nebulizer treatments as needed as prescribed.    Continue over the counter medications for cough / congestion relief.    Follow-up with your PCP in 2-3 days.    Go to the ED for any worsening symptoms.

## 2024-10-05 NOTE — PROGRESS NOTES
"  [facility]        NAME: Maritza Gold is a 74 y.o. female  : 1950    MRN: [id number]  DATE: 2024  TIME: 8:19 PM    Assessment and Plan   Mild intermittent asthma without complication [J45.20]  1. Mild intermittent asthma without complication  methylPREDNISolone 4 MG tablet therapy pack    XR chest pa and lateral    albuterol (2.5 mg/3 mL) 0.083 % nebulizer solution    sodium chloride 0.9 % nebulizer solution            Patient Instructions     Your x-rays were read by the provider. A radiologist will also read the x-rays and you will be notified of any abnormalities.     Take steroids as prescribed.    Albuterol nebulizer treatments as needed as prescribed.    Continue over the counter medications for cough / congestion relief.    Follow-up with your PCP in 2-3 days.    Go to the ED for any worsening symptoms.     If tests are performed, our office will contact you with results only if changes need to made to the care plan discussed with you at the visit. [facility]      Chief Complaint     Chief Complaint   Patient presents with    Possible Asthma Flare Up     Pt reports possible asthma flare up. Pt reports increased coughing, increased shortness of breath, and chest tightness.          History of Present Illness       74-year-old female who presents for evaluation of lingering cough after illness one month ago.  Patient reports chest congestion, chest tightness, and wheezing.  Cough is loose and mostly non-productive of mucous.  If she does bring mucous up it is thick and clear.  Feels short of breath, very noticeable today.  No fevers, chills, or body aches.   Does not feel \"ill.\"  Uses Albuterol inhaler as needed.  Used three times in the last 24 hours.  Was previously prescribed Breo inhaler to use daily for maintenance however it is too expensive for her.  Diagnosed with sinusitis when she saw PCP one month ago.  She was prescribed " doxycycline for treatment which was helpful for her.  Taking an OTC cough/cold medication.  Reached out to her PCP regarding this lingering cough who recommended OTC allergy medication.   She has been taking this but does not find it helpful.        Review of Systems   Review of Systems   Constitutional:  Negative for chills and fever.   HENT:  Positive for congestion. Negative for ear pain, sinus pressure and sore throat.    Eyes:  Negative for discharge and redness.   Respiratory:  Positive for cough, chest tightness, shortness of breath and wheezing.    Cardiovascular:  Negative for chest pain, palpitations and leg swelling.   Gastrointestinal:  Negative for abdominal pain, diarrhea, nausea and vomiting.   Musculoskeletal:  Positive for back pain.   Skin:  Negative for pallor and rash.   Neurological:  Negative for dizziness, light-headedness and headaches.         Current Medications       Current Outpatient Medications:     albuterol (2.5 mg/3 mL) 0.083 % nebulizer solution, Take 3 mL (2.5 mg total) by nebulization every 6 (six) hours as needed for wheezing or shortness of breath, Disp: 75 mL, Rfl: 0    albuterol (PROVENTIL HFA,VENTOLIN HFA) 90 mcg/act inhaler, Inhale 2 puffs every 6 (six) hours as needed for wheezing, Disp: 9 g, Rfl: 1    ALPRAZolam (XANAX) 0.5 mg tablet, Take 0.5 mg by mouth if needed, Disp: , Rfl:     aspirin 81 MG tablet, Take 81 mg by mouth, Disp: , Rfl:     escitalopram (LEXAPRO) 10 mg tablet, TAKE 1 TABLET BY MOUTH EVERY DAY, Disp: 90 tablet, Rfl: 1    gabapentin (Neurontin) 300 mg capsule, Take 1 capsule (300 mg total) by mouth daily at bedtime, Disp: 30 capsule, Rfl: 0    hydroCHLOROthiazide 25 mg tablet, TAKE 1 TABLET (25 MG TOTAL) BY MOUTH DAILY., Disp: 100 tablet, Rfl: 1    losartan (COZAAR) 100 MG tablet, TAKE 1 TABLET BY MOUTH EVERY DAY, Disp: 90 tablet, Rfl: 1    methylPREDNISolone 4 MG tablet therapy pack, Use as directed on package, Disp: 21 tablet, Rfl: 0    omeprazole  (PriLOSEC) 40 MG capsule, TAKE 1 CAPSULE BY MOUTH DAILY *DO NOT START BEFORE 6/1/23, Disp: 30 capsule, Rfl: 5    pravastatin (PRAVACHOL) 80 mg tablet, TAKE 1/2 TABLET BY MOUTH EVERY DAY, Disp: 45 tablet, Rfl: 3    sodium chloride 0.9 % nebulizer solution, Take 3 mL by nebulization as needed for wheezing, Disp: 90 mL, Rfl: 0    Diclofenac Sodium (VOLTAREN) 1 %, Apply 2 g topically 4 (four) times a day (Patient not taking: Reported on 9/9/2024), Disp: , Rfl:     Fluticasone Furoate-Vilanterol (Breo Ellipta) 100-25 mcg/actuation inhaler, Inhale 1 puff daily Rinse mouth after use. (Patient not taking: Reported on 10/5/2024), Disp: 180 blister, Rfl: 0    Mupirocin POWD, Inhale 30 mg 2 (two) times a day (Patient not taking: Reported on 1/26/2024), Disp: , Rfl:     NAPROXEN  MG EC tablet, Take 500 mg by mouth if needed, Disp: , Rfl:     sulfamethoxazole-trimethoprim (BACTRIM DS) 800-160 mg per tablet, Take 1 tablet by mouth every 12 (twelve) hours for 7 days, Disp: 14 tablet, Rfl: 0    Current Allergies     Allergies as of 10/05/2024 - Reviewed 10/05/2024   Allergen Reaction Noted    Succinylcholine GI Intolerance 09/07/2011    Amoxicillin-pot clavulanate Hives, Rash, and Other (See Comments) 02/09/2018    Azithromycin Hives, Rash, and Other (See Comments) 09/07/2011    Clarithromycin Hives, Nausea Only, Rash, Vomiting, and Other (See Comments) 09/07/2011            The following portions of the patient's history were reviewed and updated as appropriate: allergies, current medications, past family history, past medical history, past social history, past surgical history and problem list.     Past Medical History:   Diagnosis Date    Asthma     C. difficile enteritis     Dizziness 01/18/2022    GERD (gastroesophageal reflux disease)     History of acute respiratory failure     Hyperlipidemia     Hypertension     Pneumonia     Respiratory disorder 2013    Tendinitis of right shoulder 04/09/2019       Past Surgical  History:   Procedure Laterality Date    BRONCHOSCOPY      COLONOSCOPY  2017    FRACTURE SURGERY      NASAL SINUS SURGERY Bilateral 09/2017    SINUS SURGERY      TUBAL LIGATION      WISDOM TOOTH EXTRACTION         Family History   Problem Relation Age of Onset    Cirrhosis Mother     Colon cancer Father         age dx unknown    Heart disease Father     Heart failure Father     Diabetes Father     Rectal cancer Father     Heart disease Sister     No Known Problems Daughter     No Known Problems Daughter     No Known Problems Daughter     No Known Problems Maternal Grandmother     No Known Problems Maternal Grandfather     Colon cancer Paternal Grandmother         age dx unknown    No Known Problems Paternal Grandfather     Heart disease Brother     Aortic aneurysm Brother     Sudden death Brother     Hypertension Brother         Everett Sweet    No Known Problems Maternal Aunt     No Known Problems Maternal Aunt     No Known Problems Maternal Aunt     No Known Problems Paternal Aunt     No Known Problems Paternal Aunt     Lung cancer Paternal Uncle         age dx unknown    Throat cancer Paternal Uncle         age dx unknown    Colon polyps Neg Hx          Medications have been verified.        Objective   /70   Pulse 105   Temp 98 °F (36.7 °C)   Resp 20   SpO2 97%        Physical Exam     Physical Exam  Vitals and nursing note reviewed.   Constitutional:       General: She is not in acute distress.     Appearance: She is not ill-appearing.   HENT:      Head: Normocephalic and atraumatic.      Right Ear: Tympanic membrane, ear canal and external ear normal.      Left Ear: Tympanic membrane, ear canal and external ear normal.      Nose: Congestion present.      Mouth/Throat:      Mouth: Mucous membranes are moist.      Pharynx: Oropharynx is clear.   Eyes:      Conjunctiva/sclera: Conjunctivae normal.      Pupils: Pupils are equal, round, and reactive to light.   Cardiovascular:      Rate and Rhythm:  Normal rate and regular rhythm.      Pulses: Normal pulses.      Heart sounds: Normal heart sounds.   Pulmonary:      Effort: Pulmonary effort is normal. No tachypnea, accessory muscle usage or respiratory distress.      Breath sounds: Wheezing (faint, expiratory) and rhonchi present.   Musculoskeletal:         General: Normal range of motion.      Cervical back: Normal range of motion and neck supple.   Skin:     General: Skin is warm and dry.      Capillary Refill: Capillary refill takes less than 2 seconds.   Neurological:      Mental Status: She is alert and oriented to person, place, and time.

## 2024-10-08 ENCOUNTER — OFFICE VISIT (OUTPATIENT)
Dept: PHYSICAL THERAPY | Facility: CLINIC | Age: 74
End: 2024-10-08
Payer: COMMERCIAL

## 2024-10-08 DIAGNOSIS — M79.604 RIGHT LEG PAIN: ICD-10-CM

## 2024-10-08 DIAGNOSIS — M51.362 DEGENERATION OF INTERVERTEBRAL DISC OF LUMBAR REGION WITH DISCOGENIC BACK PAIN AND LOWER EXTREMITY PAIN: ICD-10-CM

## 2024-10-08 DIAGNOSIS — G89.29 CHRONIC RIGHT-SIDED LOW BACK PAIN WITH RIGHT-SIDED SCIATICA: Primary | ICD-10-CM

## 2024-10-08 DIAGNOSIS — M54.41 CHRONIC RIGHT-SIDED LOW BACK PAIN WITH RIGHT-SIDED SCIATICA: Primary | ICD-10-CM

## 2024-10-08 PROCEDURE — 97112 NEUROMUSCULAR REEDUCATION: CPT | Performed by: PHYSICAL THERAPIST

## 2024-10-08 PROCEDURE — 97140 MANUAL THERAPY 1/> REGIONS: CPT | Performed by: PHYSICAL THERAPIST

## 2024-10-08 PROCEDURE — 97110 THERAPEUTIC EXERCISES: CPT | Performed by: PHYSICAL THERAPIST

## 2024-10-08 NOTE — PROGRESS NOTES
"Daily Note     Today's date: 10/8/2024  Patient name: Maritza Gold  : 1950  MRN: [id number]  Referring provider: Milan Ko PA*  Dx:   Encounter Diagnosis     ICD-10-CM    1. Chronic right-sided low back pain with right-sided sciatica  M54.41     G89.29       2. Degeneration of intervertebral disc of lumbar region with discogenic back pain and lower extremity pain  M51.362       3. Right leg pain  M79.604             Subjective: Compliant with HEP, no questions regarding POC, motivated to continue PT, very happy with results so far      Objective: See treatment diary below      Assessment: Patient demonstrates improving lumbopelvic mobility this session.  Patient completed today's session without increase in pain in RLE.  Future sessions should continue to progress lumbopelvic stability & endurance as able. Tolerated treatment well. Patient demonstrated fatigue post treatment, exhibited good technique with therapeutic exercises, and would benefit from continued PT      Plan: Continue per plan of care.  Progress treatment as tolerated.      Access Code: [email or web address]  URL: [email or web address]  Date: 2024  Prepared by: Monica Tobar    Exercises  - Supine Lower Trunk Rotation  - 1 x daily - 3-5 x weekly - 1 sets - 5 reps - 10 hold  (R over L)  - Hooklying Single Knee to Chest Stretch  - 1 x daily - 3-5 x weekly - 1 sets - 5 reps - 10 hold  - Supine Hamstring Stretch  - 1 x daily - 7 x weekly - 3 sets - 10 reps     EPOC:       Manuals 9/17 10/8           RLE LAD NS G3-4 NS G3-4           R glut & piriformis stretch NS NS           R Crossed LTR (R over L) NS NS                        Neuro Re-Ed             SLR flx iso             Bridge iso             Clam iso             TvA activation progression                                                    Ther Ex             Crossed LTR R over L 10\" x10 ea           SKTC  10\" x10 ea           Active H/S stretch & sciatic n. " glide  X20 ea                                                                            Ther Activity             Patient education: pathoanatomy, nature of sxs, POC, HEP NS NS           Bike for lumbpelvic endurance             Gait Training                                       Modalities               [facility]  10' pre

## 2024-10-10 ENCOUNTER — OFFICE VISIT (OUTPATIENT)
Dept: FAMILY MEDICINE CLINIC | Facility: HOSPITAL | Age: 74
End: 2024-10-10
Payer: COMMERCIAL

## 2024-10-10 VITALS
BODY MASS INDEX: 32.59 KG/M2 | SYSTOLIC BLOOD PRESSURE: 128 MMHG | TEMPERATURE: 98.6 F | WEIGHT: 166 LBS | DIASTOLIC BLOOD PRESSURE: 76 MMHG | HEIGHT: 60 IN | OXYGEN SATURATION: 98 % | HEART RATE: 98 BPM

## 2024-10-10 DIAGNOSIS — J45.20 MILD INTERMITTENT ASTHMA WITHOUT COMPLICATION: ICD-10-CM

## 2024-10-10 DIAGNOSIS — J18.9 PNEUMONIA OF LEFT LOWER LOBE DUE TO INFECTIOUS ORGANISM: Primary | ICD-10-CM

## 2024-10-10 PROCEDURE — G2211 COMPLEX E/M VISIT ADD ON: HCPCS | Performed by: NURSE PRACTITIONER

## 2024-10-10 PROCEDURE — 99214 OFFICE O/P EST MOD 30 MIN: CPT | Performed by: NURSE PRACTITIONER

## 2024-10-10 RX ORDER — FLUTICASONE PROPIONATE AND SALMETEROL 250; 50 UG/1; UG/1
1 POWDER RESPIRATORY (INHALATION) 2 TIMES DAILY
Qty: 60 BLISTER | Refills: 1 | Status: SHIPPED | OUTPATIENT
Start: 2024-10-10

## 2024-10-10 NOTE — PROGRESS NOTES
Ambulatory Visit  Name: Maritza Gold      : 1950      MRN: [id number]  Encounter Provider: HANK Anderson  Encounter Date: 10/10/2024   Encounter department: [facility] CARE [address]     Assessment & Plan  Pneumonia of left lower lobe due to infectious organism  She is slowly feeling better.   She is still sob with wheezing. She has completed prednisone. Has a few days left on antibiotic.   Will add in Advair (Breo was too expensive).   Continue with albuterol nebulizer.   Plan to recheck xray in a few weeks.   She will call if worsening or fails to improve.   Orders:    XR chest pa and lateral; Future    Mild intermittent asthma without complication  Asthma has been worsening over the last year.   Now with pneumonia.   Advair added.   She is agreeable to go back to pulmonary. She is ok with establishing with . Referral placed.   Orders:    Ambulatory Referral to Pulmonology; Future    Fluticasone-Salmeterol (Advair Diskus) 250-50 mcg/dose inhaler; Inhale 1 puff 2 (two) times a day Rinse mouth after use.       History of Present Illness     She was diagnosed with pneumonia. Over one month ago she had sinus infection and treated with doxycycline. Cough never really went away. Worsened with sob and wheezing so she went to urgent care. Started on prednisone. Chest xray read later and showed pneumonia. She is being treated with bactrim. Complted prednisone today. Still feels very sob. Light activity triggers this. Not wheezing as much. No fever or chills. Using nebulizer. She is established with [facility] but has not seen in a few years. She was recently put on breo but that was too expensive. She is only using the albuterol neb currently.         History obtained from : patient  Review of Systems   Constitutional:  Negative for chills and fever.   Respiratory:  Positive for cough, shortness of breath and wheezing.            Objective     /76 (BP Location: Left  arm, Patient Position: Sitting, Cuff Size: Standard)   Pulse 98   Temp 98.6 °F (37 °C) (Tympanic)   Ht 5' (1.524 m)   Wt 75.3 kg (166 lb)   SpO2 98%   BMI 32.42 kg/m²     Physical Exam  Vitals reviewed.   Constitutional:       General: She is not in acute distress.     Appearance: Normal appearance. She is not ill-appearing.   Cardiovascular:      Rate and Rhythm: Normal rate and regular rhythm.      Heart sounds: Normal heart sounds. No murmur heard.  Pulmonary:      Effort: Pulmonary effort is normal.      Breath sounds: Wheezing (expiratory) present.   Skin:     General: Skin is warm and dry.   Neurological:      Mental Status: She is alert and oriented to person, place, and time.   Psychiatric:         Mood and Affect: Mood normal.         Behavior: Behavior normal.         Thought Content: Thought content normal.         Judgment: Judgment normal.

## 2024-10-10 NOTE — ASSESSMENT & PLAN NOTE
Asthma has been worsening over the last year.   Now with pneumonia.   Advair added.   She is agreeable to go back to pulmonary. She is ok with establishing with . Referral placed.   Orders:    Ambulatory Referral to Pulmonology; Future    Fluticasone-Salmeterol (Advair Diskus) 250-50 mcg/dose inhaler; Inhale 1 puff 2 (two) times a day Rinse mouth after use.

## 2024-10-15 ENCOUNTER — APPOINTMENT (OUTPATIENT)
Dept: PHYSICAL THERAPY | Facility: CLINIC | Age: 74
End: 2024-10-15
Payer: COMMERCIAL

## 2024-10-22 ENCOUNTER — OFFICE VISIT (OUTPATIENT)
Dept: FAMILY MEDICINE CLINIC | Facility: HOSPITAL | Age: 74
End: 2024-10-22
Payer: COMMERCIAL

## 2024-10-22 ENCOUNTER — OFFICE VISIT (OUTPATIENT)
Dept: PHYSICAL THERAPY | Facility: CLINIC | Age: 74
End: 2024-10-22
Payer: COMMERCIAL

## 2024-10-22 VITALS
OXYGEN SATURATION: 95 % | WEIGHT: 160.2 LBS | SYSTOLIC BLOOD PRESSURE: 120 MMHG | BODY MASS INDEX: 31.45 KG/M2 | HEIGHT: 60 IN | HEART RATE: 89 BPM | TEMPERATURE: 98.1 F | DIASTOLIC BLOOD PRESSURE: 78 MMHG

## 2024-10-22 DIAGNOSIS — R30.0 BURNING WITH URINATION: Primary | ICD-10-CM

## 2024-10-22 DIAGNOSIS — M54.41 CHRONIC RIGHT-SIDED LOW BACK PAIN WITH RIGHT-SIDED SCIATICA: Primary | ICD-10-CM

## 2024-10-22 DIAGNOSIS — R35.0 FREQUENT URINATION: ICD-10-CM

## 2024-10-22 DIAGNOSIS — G89.29 CHRONIC RIGHT-SIDED LOW BACK PAIN WITH RIGHT-SIDED SCIATICA: Primary | ICD-10-CM

## 2024-10-22 DIAGNOSIS — M51.362 DEGENERATION OF INTERVERTEBRAL DISC OF LUMBAR REGION WITH DISCOGENIC BACK PAIN AND LOWER EXTREMITY PAIN: ICD-10-CM

## 2024-10-22 LAB
SL AMB  POCT GLUCOSE, UA: ABNORMAL
SL AMB LEUKOCYTE ESTERASE,UA: ABNORMAL
SL AMB POCT BILIRUBIN,UA: ABNORMAL
SL AMB POCT BLOOD,UA: ABNORMAL
SL AMB POCT CLARITY,UA: CLEAR
SL AMB POCT COLOR,UA: YELLOW
SL AMB POCT KETONES,UA: ABNORMAL
SL AMB POCT NITRITE,UA: ABNORMAL
SL AMB POCT PH,UA: 6.5
SL AMB POCT SPECIFIC GRAVITY,UA: 1.01
SL AMB POCT URINE PROTEIN: ABNORMAL
SL AMB POCT UROBILINOGEN: ABNORMAL

## 2024-10-22 PROCEDURE — 81003 URINALYSIS AUTO W/O SCOPE: CPT

## 2024-10-22 PROCEDURE — 97110 THERAPEUTIC EXERCISES: CPT | Performed by: PHYSICAL THERAPIST

## 2024-10-22 PROCEDURE — 97112 NEUROMUSCULAR REEDUCATION: CPT | Performed by: PHYSICAL THERAPIST

## 2024-10-22 PROCEDURE — G2211 COMPLEX E/M VISIT ADD ON: HCPCS

## 2024-10-22 PROCEDURE — 97140 MANUAL THERAPY 1/> REGIONS: CPT | Performed by: PHYSICAL THERAPIST

## 2024-10-22 PROCEDURE — 99213 OFFICE O/P EST LOW 20 MIN: CPT

## 2024-10-22 RX ORDER — SULFAMETHOXAZOLE AND TRIMETHOPRIM 800; 160 MG/1; MG/1
1 TABLET ORAL EVERY 12 HOURS SCHEDULED
Qty: 6 TABLET | Refills: 0 | Status: SHIPPED | OUTPATIENT
Start: 2024-10-22 | End: 2024-10-25

## 2024-10-22 NOTE — PROGRESS NOTES
"Daily Note     Today's date: 10/22/2024  Patient name: Maritza Gold  : 1950  MRN: [id number]  Referring provider: Milan Ko PA*  Dx:   Encounter Diagnosis     ICD-10-CM    1. Chronic right-sided low back pain with right-sided sciatica  M54.41     G89.29       2. Degeneration of intervertebral disc of lumbar region with discogenic back pain and lower extremity pain  M51.362             Subjective: Compliant with HEP, no questions regarding POC, motivated to continue PT, very happy with results so far      Objective: See treatment diary below      Assessment: Patient demonstrates improving lumbopelvic mobility this session.  Patient completed today's session without increase in pain in RLE.  Future sessions should continue to progress lumbopelvic stability & endurance as able. Tolerated treatment well. Patient demonstrated fatigue post treatment, exhibited good technique with therapeutic exercises, and would benefit from continued PT      Plan: Continue per plan of care.  Progress treatment as tolerated.      Access Code: [email or web address]  URL: [email or web address]  Date: 2024  Prepared by: Monica Tobar    Exercises  - Supine Lower Trunk Rotation  - 1 x daily - 3-5 x weekly - 1 sets - 5 reps - 10 hold  (R over L)  - Hooklying Single Knee to Chest Stretch  - 1 x daily - 3-5 x weekly - 1 sets - 5 reps - 10 hold  - Supine Hamstring Stretch  - 1 x daily - 7 x weekly - 3 sets - 10 reps     EPOC:       Manuals 9/17 10/22           RLE LAD NS G3-4 NS G3-4           R glut & piriformis stretch NS NS           R Crossed LTR (R over L) NS NS           FOTO  NS           Neuro Re-Ed             SLR flx iso             Bridge iso  x20           Clam iso  X30 seated peach           TvA activation progression                                                    Ther Ex             Crossed LTR R over L 10\" x10 ea           SKTC  10\" x10 ea           Active H/S stretch & sciatic n. glide  X20 " ea                                                                            Ther Activity             Patient education: pathoanatomy, nature of sxs, POC, HEP NS NS           Bike for lumbpelvic endurance             Gait Training                                       Modalities               [facility]  10' pre

## 2024-10-22 NOTE — PROGRESS NOTES
Ambulatory Visit  Name: Maritza Gold      : 1950      MRN: [id number]  Encounter Provider: Laura Schultz DO  Encounter Date: 10/22/2024   Encounter department: [facility] CARE [address]    Assessment & Plan  Burning with urination  -Point-of-care urine dip positive leukocytes positive nitrites  -Will send for urine culture, follow-up results  -Will start patient on Bactrim X 3 days, previous urine culture shows no resistant organisms  -Follow-up as needed  Orders:    POCT urine dip auto non-scope    Urine culture; Future    Urine culture    sulfamethoxazole-trimethoprim (BACTRIM DS) 800-160 mg per tablet; Take 1 tablet by mouth every 12 (twelve) hours for 3 days    Frequent urination    Orders:    POCT urine dip auto non-scope    Urine culture; Future    Urine culture    sulfamethoxazole-trimethoprim (BACTRIM DS) 800-160 mg per tablet; Take 1 tablet by mouth every 12 (twelve) hours for 3 days       History of Present Illness     74-year-old female presents for concern of UTI since Saturday  - Ab pain, frequency, burning  - Denies fever  - Denies flank pain  - Denies rashes, vaginal discharge        History obtained from : patient  Review of Systems   Constitutional:  Negative for chills and fever.   Gastrointestinal:  Positive for abdominal pain.   Genitourinary:  Positive for dysuria and frequency. Negative for difficulty urinating, flank pain and hematuria.   Musculoskeletal:  Negative for back pain.     Current Outpatient Medications on File Prior to Visit   Medication Sig Dispense Refill    albuterol (2.5 mg/3 mL) 0.083 % nebulizer solution Take 3 mL (2.5 mg total) by nebulization every 6 (six) hours as needed for wheezing or shortness of breath 75 mL 0    albuterol (PROVENTIL HFA,VENTOLIN HFA) 90 mcg/act inhaler Inhale 2 puffs every 6 (six) hours as needed for wheezing 9 g 1    ALPRAZolam (XANAX) 0.5 mg tablet Take 0.5 mg by mouth if needed      aspirin 81 MG tablet  Take 81 mg by mouth      Diclofenac Sodium (VOLTAREN) 1 % Apply 2 g topically 4 (four) times a day      escitalopram (LEXAPRO) 10 mg tablet TAKE 1 TABLET BY MOUTH EVERY DAY 90 tablet 1    Fluticasone-Salmeterol (Advair Diskus) 250-50 mcg/dose inhaler Inhale 1 puff 2 (two) times a day Rinse mouth after use. 60 blister 1    gabapentin (Neurontin) 300 mg capsule Take 1 capsule (300 mg total) by mouth daily at bedtime 30 capsule 0    hydroCHLOROthiazide 25 mg tablet TAKE 1 TABLET (25 MG TOTAL) BY MOUTH DAILY. 100 tablet 1    losartan (COZAAR) 100 MG tablet TAKE 1 TABLET BY MOUTH EVERY DAY 90 tablet 1    NAPROXEN  MG EC tablet Take 500 mg by mouth if needed      omeprazole (PriLOSEC) 40 MG capsule TAKE 1 CAPSULE BY MOUTH DAILY *DO NOT START BEFORE 6/1/23 30 capsule 5    pravastatin (PRAVACHOL) 80 mg tablet TAKE 1/2 TABLET BY MOUTH EVERY DAY 45 tablet 3    sodium chloride 0.9 % nebulizer solution Take 3 mL by nebulization as needed for wheezing 90 mL 0    methylPREDNISolone 4 MG tablet therapy pack Use as directed on package (Patient not taking: Reported on 10/10/2024) 21 tablet 0    Mupirocin POWD Inhale 30 mg 2 (two) times a day (Patient not taking: Reported on 1/26/2024)       No current facility-administered medications on file prior to visit.      Social History     Tobacco Use    Smoking status: Never    Smokeless tobacco: Never   Vaping Use    Vaping status: Never Used   Substance and Sexual Activity    Alcohol use: Not Currently     Comment: soically    Drug use: No    Sexual activity: Not on file         Objective     /78   Pulse 89   Temp 98.1 °F (36.7 °C) (Tympanic)   Ht 5' (1.524 m)   Wt 72.7 kg (160 lb 3.2 oz)   SpO2 95%   BMI 31.29 kg/m²     Physical Exam  Vitals reviewed.   Constitutional:       General: She is not in acute distress.     Appearance: Normal appearance. She is obese. She is not ill-appearing.   HENT:      Head: Normocephalic and atraumatic.   Cardiovascular:      Rate and  Rhythm: Normal rate and regular rhythm.      Heart sounds: Normal heart sounds.   Pulmonary:      Breath sounds: Normal breath sounds.   Neurological:      Mental Status: She is alert.   Psychiatric:         Mood and Affect: Mood normal.         Behavior: Behavior normal.         Laura Schultz, DO  Family Medicine

## 2024-10-23 ENCOUNTER — APPOINTMENT (OUTPATIENT)
Dept: RADIOLOGY | Facility: CLINIC | Age: 74
End: 2024-10-23
Payer: COMMERCIAL

## 2024-10-23 ENCOUNTER — TELEPHONE (OUTPATIENT)
Age: 74
End: 2024-10-23

## 2024-10-23 ENCOUNTER — APPOINTMENT (OUTPATIENT)
Dept: PHYSICAL THERAPY | Facility: CLINIC | Age: 74
End: 2024-10-23
Payer: COMMERCIAL

## 2024-10-23 ENCOUNTER — CONSULT (OUTPATIENT)
Dept: PAIN MEDICINE | Facility: CLINIC | Age: 74
End: 2024-10-23
Payer: COMMERCIAL

## 2024-10-23 VITALS
WEIGHT: 160 LBS | TEMPERATURE: 100 F | HEIGHT: 60 IN | HEART RATE: 105 BPM | DIASTOLIC BLOOD PRESSURE: 72 MMHG | BODY MASS INDEX: 31.41 KG/M2 | SYSTOLIC BLOOD PRESSURE: 118 MMHG

## 2024-10-23 DIAGNOSIS — M54.41 CHRONIC RIGHT-SIDED LOW BACK PAIN WITH RIGHT-SIDED SCIATICA: ICD-10-CM

## 2024-10-23 DIAGNOSIS — G89.29 CHRONIC RIGHT-SIDED LOW BACK PAIN WITH RIGHT-SIDED SCIATICA: ICD-10-CM

## 2024-10-23 DIAGNOSIS — R29.818 NEUROGENIC CLAUDICATION: ICD-10-CM

## 2024-10-23 DIAGNOSIS — M79.604 RIGHT LEG PAIN: ICD-10-CM

## 2024-10-23 DIAGNOSIS — R30.0 BURNING WITH URINATION: Primary | ICD-10-CM

## 2024-10-23 DIAGNOSIS — R29.818 NEUROGENIC CLAUDICATION: Primary | ICD-10-CM

## 2024-10-23 PROBLEM — J38.7 OTHER DISEASES OF LARYNX: Status: ACTIVE | Noted: 2024-10-23

## 2024-10-23 PROBLEM — R09.81 NASAL CONGESTION: Status: ACTIVE | Noted: 2024-10-23

## 2024-10-23 PROBLEM — J33.8 OTHER POLYP OF SINUS: Status: ACTIVE | Noted: 2024-10-23

## 2024-10-23 PROCEDURE — G2211 COMPLEX E/M VISIT ADD ON: HCPCS | Performed by: ANESTHESIOLOGY

## 2024-10-23 PROCEDURE — 72120 X-RAY BEND ONLY L-S SPINE: CPT

## 2024-10-23 PROCEDURE — 99204 OFFICE O/P NEW MOD 45 MIN: CPT | Performed by: ANESTHESIOLOGY

## 2024-10-23 RX ORDER — ONDANSETRON 4 MG/1
4 TABLET, FILM COATED ORAL EVERY 8 HOURS PRN
Qty: 20 TABLET | Refills: 0 | Status: SHIPPED | OUTPATIENT
Start: 2024-10-23

## 2024-10-23 RX ORDER — CEFPODOXIME PROXETIL 100 MG/1
100 TABLET, FILM COATED ORAL 2 TIMES DAILY
Qty: 6 TABLET | Refills: 0 | Status: SHIPPED | OUTPATIENT
Start: 2024-10-23 | End: 2024-10-26

## 2024-10-23 NOTE — TELEPHONE ENCOUNTER
Spoke w/pt and she does not want to go to the ED. States she is nausea, but did not take antibiotic with food. Please advise if you have any further instructions.

## 2024-10-23 NOTE — PROGRESS NOTES
Assessment  1. Neurogenic claudication    2. Chronic right-sided low back pain with right-sided sciatica    3. Right leg pain        Plan      The patient's pain persists despite time, relative rest, activity modification, and nonsteroidal anti-inflammatories. Her pain is significantly interfering with her daily living activities.  Has tried a course of oral steroids.  She will continue with physical therapy.  It is appropriate to order MRI of the lumbar spine to evaluate any significant etiology of her symptoms.    I will obtain flexion-extension radiographs to check for stability.    Once we obtain MRI results, I will follow-up with the patient, review the results and current symptoms, and discuss the next steps of the treatment plan.    My impressions and treatment recommendations were discussed in detail with the patient who verbalized understanding and had no further questions.  Discharge instructions were provided. I personally saw and examined the patient and I agree with the above discussed plan of care.  This note is created using dictation transcription.  It may contain typographical errors, grammatical errors, improperly dictated words, background noise and other errors.    Orders Placed This Encounter   Procedures    XR lumbar sp/bending only min 2-3 v     Standing Status:   Future     Standing Expiration Date:   10/23/2028     Scheduling Instructions:      Bring along any outside films relating to this procedure.          MRI lumbar spine without contrast     Standing Status:   Future     Standing Expiration Date:   10/23/2028     Scheduling Instructions:      There is no preparation for this test. Please leave your jewelry and valuables at home, wedding rings are the exception. All patients will be required to change into a hospital gown and pants.  Street clothes are not permitted in the MRI.  Magnetic nail polish must be removed prior to arrival for your test. Please bring your insurance cards, a  form of photo ID and a list of your medications with you. Arrive 15 minutes prior to your appointment time in order to register. Please bring any prior CT or MRI studies of this area that were not performed at a [facility].            To schedule this appointment, please contact Central Scheduling at [phone].            Prior to your appointment, please make sure you complete the MRI Screening Form when you e-Check in for your appointment. This will be available starting 7 days before your appointment in [facility]. You may receive an e-mail with an activation code if you do not have a [employer] account. If you do not have access to a device, we will complete your screening at your appointment.     Order Specific Question:   Reason for Exam     Answer:   neurogenic claudication with right radic     Order Specific Question:   What is the patient's sedation requirement?     Answer:   No Sedation     Order Specific Question:   Does the patient need medication for Claustrophobia? If yes, order medication at this point.     Answer:   No     Order Specific Question:   Does the patient wear a life vest, have an implanted cardiac device, a stimulation device, a sleep apnea stimulator, or a breast tissue expansion device?     Answer:   Unknown     Order Specific Question:   Release to patient through [employer]     Answer:   Immediate     No orders of the defined types were placed in this encounter.    Referred By: Milan Ko PA-C  History of Present Illness    Maritza Gold is a 74 y.o. female with 4-month history of low back and right greater than left lower extremity pain with numbness and tingling.  She is unaware of any clear precipitant event denies any trauma or injury.  Her pain is moderate to severe.  (Of note she has a urinary tract infection and currently has a fever on today's visit.)    She rates it a 6 out of 10 on the visual analog scale in terms of her low back pain which radiated down her right  leg and significant affairs with her daily living 70s.  It is intermittent with a throbbing sensation and subjective weakness.  Lying down standing bending and walking all increase her symptoms.  Physical therapy provides moderate but short-term relief.    I have personally reviewed and/or updated the patient's past medical history, past surgical history, family history, social history, current medications, allergies, and vital signs today.     Review of Systems   Constitutional:  Positive for unexpected weight change. Negative for fever.   HENT:  Negative for trouble swallowing.    Eyes:  Negative for visual disturbance.   Respiratory:  Positive for cough. Negative for shortness of breath and wheezing.    Cardiovascular:  Negative for chest pain and palpitations.   Gastrointestinal:  Negative for constipation, diarrhea, nausea and vomiting.   Endocrine: Positive for polyuria. Negative for cold intolerance, heat intolerance and polydipsia.   Genitourinary:  Positive for difficulty urinating. Negative for frequency.   Musculoskeletal:  Negative for arthralgias, gait problem, joint swelling and myalgias.   Skin:  Negative for rash.   Neurological:  Negative for dizziness, seizures, syncope, weakness and headaches.   Hematological:  Does not bruise/bleed easily.   Psychiatric/Behavioral:  Negative for dysphoric mood.    All other systems reviewed and are negative.      Patient Active Problem List   Diagnosis    Essential hypertension    Generalized anxiety disorder    Mixed hyperlipidemia    Osteopenia    Current mild episode of major depressive disorder without prior episode (HCC)    Pes anserinus bursitis of left knee    Chronic sinusitis    Thyroid nodule    Family history of cardiovascular disease    Elevated coronary artery calcium score    Gastroesophageal reflux disease    Chronic idiopathic constipation    Family history of colon cancer in father    Mild intermittent asthma without complication    Symptomatic  varicose veins of right lower extremity    Spider veins of both lower extremities    Pes anserine bursitis    Primary osteoarthritis of right knee    Effusion of right knee    IFG (impaired fasting glucose)    Nasal congestion    Other diseases of larynx    Other polyp of sinus       Past Medical History:   Diagnosis Date    Anxiety     Arthritis     Asthma     C. difficile enteritis     Chest pain     Depression     Dizziness 01/18/2022    GERD (gastroesophageal reflux disease)     History of acute respiratory failure     Hyperlipidemia     Hypertension     Pneumonia     Respiratory disorder 2013    Tendinitis of right shoulder 04/09/2019       Past Surgical History:   Procedure Laterality Date    BRONCHOSCOPY      COLONOSCOPY  2017    FRACTURE SURGERY      NASAL SINUS SURGERY Bilateral 09/2017    SINUS SURGERY      TUBAL LIGATION      WISDOM TOOTH EXTRACTION         Family History   Problem Relation Age of Onset    Cirrhosis Mother     Colon cancer Father         age dx unknown    Heart disease Father     Heart failure Father     Diabetes Father     Rectal cancer Father     Heart disease Sister     No Known Problems Daughter     No Known Problems Daughter     No Known Problems Daughter     No Known Problems Maternal Grandmother     No Known Problems Maternal Grandfather     Colon cancer Paternal Grandmother         age dx unknown    No Known Problems Paternal Grandfather     Heart disease Brother     Aortic aneurysm Brother     Sudden death Brother     Hypertension Brother         Everett Sweet    No Known Problems Maternal Aunt     No Known Problems Maternal Aunt     No Known Problems Maternal Aunt     No Known Problems Paternal Aunt     No Known Problems Paternal Aunt     Lung cancer Paternal Uncle         age dx unknown    Throat cancer Paternal Uncle         age dx unknown    Colon polyps Neg Hx        Social History     Occupational History    Not on file   Tobacco Use    Smoking status: Never    Smokeless  tobacco: Never   Vaping Use    Vaping status: Never Used   Substance and Sexual Activity    Alcohol use: Not Currently     Comment: soically    Drug use: No    Sexual activity: Not on file       Current Outpatient Medications on File Prior to Visit   Medication Sig    albuterol (2.5 mg/3 mL) 0.083 % nebulizer solution Take 3 mL (2.5 mg total) by nebulization every 6 (six) hours as needed for wheezing or shortness of breath    albuterol (PROVENTIL HFA,VENTOLIN HFA) 90 mcg/act inhaler Inhale 2 puffs every 6 (six) hours as needed for wheezing    ALPRAZolam (XANAX) 0.5 mg tablet Take 0.5 mg by mouth if needed    aspirin 81 MG tablet Take 81 mg by mouth    Diclofenac Sodium (VOLTAREN) 1 % Apply 2 g topically 4 (four) times a day    escitalopram (LEXAPRO) 10 mg tablet TAKE 1 TABLET BY MOUTH EVERY DAY    Fluticasone-Salmeterol (Advair Diskus) 250-50 mcg/dose inhaler Inhale 1 puff 2 (two) times a day Rinse mouth after use.    gabapentin (Neurontin) 300 mg capsule Take 1 capsule (300 mg total) by mouth daily at bedtime    hydroCHLOROthiazide 25 mg tablet TAKE 1 TABLET (25 MG TOTAL) BY MOUTH DAILY.    losartan (COZAAR) 100 MG tablet TAKE 1 TABLET BY MOUTH EVERY DAY    NAPROXEN  MG EC tablet Take 500 mg by mouth if needed    omeprazole (PriLOSEC) 40 MG capsule TAKE 1 CAPSULE BY MOUTH DAILY *DO NOT START BEFORE 6/1/23    pravastatin (PRAVACHOL) 80 mg tablet TAKE 1/2 TABLET BY MOUTH EVERY DAY    sodium chloride 0.9 % nebulizer solution Take 3 mL by nebulization as needed for wheezing    sulfamethoxazole-trimethoprim (BACTRIM DS) 800-160 mg per tablet Take 1 tablet by mouth every 12 (twelve) hours for 3 days    methylPREDNISolone 4 MG tablet therapy pack Use as directed on package (Patient not taking: Reported on 10/10/2024)    Mupirocin POWD Inhale 30 mg 2 (two) times a day (Patient not taking: Reported on 1/26/2024)     No current facility-administered medications on file prior to visit.       Allergies   Allergen  Reactions    Succinylcholine GI Intolerance     Prolonged apnea      Amoxicillin-Pot Clavulanate Hives, Rash and Other (See Comments)    Azithromycin Hives, Rash and Other (See Comments)    Clarithromycin Hives, Nausea Only, Rash, Vomiting and Other (See Comments)       Physical Exam    /72 (BP Location: Left arm, Patient Position: Sitting, Cuff Size: Standard)   Pulse 105   Temp 100 °F (37.8 °C)   Ht 5' (1.524 m)   Wt 72.6 kg (160 lb)   BMI 31.25 kg/m²     Constitutional: normal, well developed, well nourished, alert, in no distress and non-toxic and no overt pain behavior. and overweight  Eyes: anicteric  HEENT: grossly intact  Neck: supple, symmetric, trachea midline and no masses   Pulmonary:even and unlabored  Cardiovascular:No edema or pitting edema present  Skin:Normal without rashes or lesions and well hydrated  Psychiatric:Mood and affect appropriate  Neurologic:Cranial Nerves II-XII grossly intact  Musculoskeletal:normal, difficulty going from sitting to standing sitting position; no obvious skin lesions or erythema lumbar sacral spine; positive tenderness along the right PSIS; deep tendon reflexes are diminished but symmetrical bilateral patellar and achilles; no focal motor deficit appreciated lower limbs; positive straight leg raising on the right.    Imaging     RIGHT HIP @  9-10-24     INDICATION:   Pain in right hip.      COMPARISON:  None.     VIEWS:  XR HIP/PELV 2-3 VWS RIGHT W PELVIS IF PERFORMED      FINDINGS:     There is no acute fracture or dislocation.     No significant hip degenerative changes.     No lytic or blastic osseous lesion.     Soft tissues are unremarkable.     Degenerative changes pubic symphysis and visualized lower lumbar spine. Sclerosis of the pubic symphysis.     IMPRESSION:        No acute osseous abnormality of the right hip      XR SPINE LUMBAR 2 OR 3 VIEWS INJURY @  6-5-24     INDICATION: W19.XXXA: Unspecified fall, initial encounter.     COMPARISON:  None     FINDINGS:     No acute fracture. Intact pedicles.     Five non-rib-bearing lumbar vertebral bodies.     Exaggerated thoracolumbar kyphosis. Minimal anterolisthesis L4 on L5. Grade 1 retrolisthesis L1 on L2.     Moderate to severe degenerative disc disease at T12-L3. Moderate degenerative disc disease and facet atherosis lower lumbar spine.     Unremarkable soft tissues.     IMPRESSION:     No acute osseous abnormality.     Degenerative changes as described.    I have personally reviewed pertinent films in PACS and my interpretation is multilevel superior thoracic disc degeneration with low lumbar facet arthropathy and anterolisthesis of L4 and L5.

## 2024-10-23 NOTE — TELEPHONE ENCOUNTER
Patient called stating that she was seen yesterday for UTI at PCP office and is still not feeling well and now has fever.  Patient's fever last evening was 101.2 and is currently 99.9.  Patient did not wish to make new appointment if possible and would like to know any recommendations.  Please advise.

## 2024-10-26 LAB
BACTERIA UR CULT: ABNORMAL
Lab: ABNORMAL
SL AMB ANTIMICROBIAL SUSCEPTIBILITY: ABNORMAL

## 2024-10-29 ENCOUNTER — APPOINTMENT (OUTPATIENT)
Dept: PHYSICAL THERAPY | Facility: CLINIC | Age: 74
End: 2024-10-29
Payer: COMMERCIAL

## 2024-11-04 ENCOUNTER — HOSPITAL ENCOUNTER (OUTPATIENT)
Facility: MEDICAL CENTER | Age: 74
Discharge: HOME | End: 2024-11-04
Payer: COMMERCIAL

## 2024-11-04 DIAGNOSIS — R29.818 NEUROGENIC CLAUDICATION: ICD-10-CM

## 2024-11-04 DIAGNOSIS — G89.29 CHRONIC RIGHT-SIDED LOW BACK PAIN WITH RIGHT-SIDED SCIATICA: ICD-10-CM

## 2024-11-04 DIAGNOSIS — M54.41 CHRONIC RIGHT-SIDED LOW BACK PAIN WITH RIGHT-SIDED SCIATICA: ICD-10-CM

## 2024-11-04 PROCEDURE — 72148 MRI LUMBAR SPINE W/O DYE: CPT

## 2024-11-12 ENCOUNTER — OFFICE VISIT (OUTPATIENT)
Dept: PAIN MEDICINE | Facility: CLINIC | Age: 74
End: 2024-11-12
Payer: COMMERCIAL

## 2024-11-12 VITALS
TEMPERATURE: 97.7 F | HEART RATE: 84 BPM | WEIGHT: 161 LBS | DIASTOLIC BLOOD PRESSURE: 78 MMHG | HEIGHT: 60 IN | BODY MASS INDEX: 31.61 KG/M2 | SYSTOLIC BLOOD PRESSURE: 122 MMHG

## 2024-11-12 DIAGNOSIS — M48.061 LUMBAR FORAMINAL STENOSIS: ICD-10-CM

## 2024-11-12 DIAGNOSIS — M54.16 LUMBAR RADICULOPATHY: Primary | ICD-10-CM

## 2024-11-12 DIAGNOSIS — M79.605 PAIN IN BOTH LOWER EXTREMITIES: ICD-10-CM

## 2024-11-12 DIAGNOSIS — M79.604 PAIN IN BOTH LOWER EXTREMITIES: ICD-10-CM

## 2024-11-12 DIAGNOSIS — M47.816 LUMBAR SPONDYLOSIS: ICD-10-CM

## 2024-11-12 DIAGNOSIS — M48.062 SPINAL STENOSIS OF LUMBAR REGION WITH NEUROGENIC CLAUDICATION: ICD-10-CM

## 2024-11-12 PROCEDURE — G2211 COMPLEX E/M VISIT ADD ON: HCPCS | Performed by: PHYSICIAN ASSISTANT

## 2024-11-12 PROCEDURE — 99214 OFFICE O/P EST MOD 30 MIN: CPT | Performed by: PHYSICIAN ASSISTANT

## 2024-11-12 RX ORDER — GABAPENTIN 300 MG/1
600 CAPSULE ORAL
Qty: 60 CAPSULE | Refills: 2 | Status: SHIPPED | OUTPATIENT
Start: 2024-11-12

## 2024-11-12 NOTE — PROGRESS NOTES
Assessment:  1. Lumbar radiculopathy    2. Lumbar foraminal stenosis    3. Spinal stenosis of lumbar region with neurogenic claudication    4. Lumbar spondylosis    5. Pain in both lower extremities        Plan:  While the patient was in the office today, I did have a thorough conversation regarding their chronic pain syndrome, medication management, and treatment plan options.    After discussing options and reviewing the lumbar spine MRI, the patient be scheduled for an L5-S1 interlaminar epidural steroid injection to address the radicular component of her pain pattern.    Once her radicular symptoms improve, we can then discuss diagnostic lumbar medial branch blocks/radiofrequency ablation to address the facet mediated pain.    Patient has declined neurosurgical consultation.    Increase gabapentin to 600 mg nightly.    Lumbar home stretching exercises as instructed by physical therapy.    The patient was advised to contact the office should their symptoms worsen in the interim. The patient was agreeable and verbalized an understanding.      History of Present Illness:    The patient is a 74 y.o. female last seen on 10/23/2024 who presents for a follow up office visit in regards to chronic low back pain.  The patient currently reports low back pain with radiation into bilateral lower extremities that she rates a 6 out of 10.  She describes the pain as an intermittent throbbing type of pain.  Her pain is made worse with prolonged standing, walking and bending.  At times her legs feel weak.  Patient denies any saddle anesthesia or bowel incontinence.  She has a history of urinary incontinence which is not new.  Patient has participated in physical therapy without any significant improvement however she does continue to perform the exercises at home.  She underwent a lumbar spine MRI which reveals multilevel lumbar spondylosis with varying degrees of moderate to severe central stenosis and foraminal stenosis.    I  have personally reviewed and/or updated the patient's past medical history, past surgical history, family history, social history, current medications, allergies, and vital signs today.       Review of Systems:    Review of Systems   Respiratory:  Negative for shortness of breath.    Cardiovascular:  Negative for chest pain.   Gastrointestinal:  Positive for constipation. Negative for diarrhea, nausea and vomiting.   Musculoskeletal:  Positive for gait problem. Negative for arthralgias, joint swelling and myalgias.   Skin:  Negative for rash.   Neurological:  Negative for dizziness, seizures and weakness.   All other systems reviewed and are negative.        Past Medical History:   Diagnosis Date    Anxiety     Arthritis     Asthma     C. difficile enteritis     Chest pain     Depression     Dizziness 01/18/2022    GERD (gastroesophageal reflux disease)     History of acute respiratory failure     Hyperlipidemia     Hypertension     Pneumonia     Respiratory disorder 2013    Tendinitis of right shoulder 04/09/2019       Past Surgical History:   Procedure Laterality Date    BRONCHOSCOPY      COLONOSCOPY  2017    FRACTURE SURGERY      NASAL SINUS SURGERY Bilateral 09/2017    SINUS SURGERY      TUBAL LIGATION      WISDOM TOOTH EXTRACTION         Family History   Problem Relation Age of Onset    Cirrhosis Mother     Colon cancer Father         age dx unknown    Heart disease Father     Heart failure Father     Diabetes Father     Rectal cancer Father     Heart disease Sister     No Known Problems Daughter     No Known Problems Daughter     No Known Problems Daughter     No Known Problems Maternal Grandmother     No Known Problems Maternal Grandfather     Colon cancer Paternal Grandmother         age dx unknown    No Known Problems Paternal Grandfather     Heart disease Brother     Aortic aneurysm Brother     Sudden death Brother     Hypertension Brother         Everett Callenett    No Known Problems Maternal Aunt     No  Known Problems Maternal Aunt     No Known Problems Maternal Aunt     No Known Problems Paternal Aunt     No Known Problems Paternal Aunt     Lung cancer Paternal Uncle         age dx unknown    Throat cancer Paternal Uncle         age dx unknown    Colon polyps Neg Hx        Social History     Occupational History    Not on file   Tobacco Use    Smoking status: Never    Smokeless tobacco: Never   Vaping Use    Vaping status: Never Used   Substance and Sexual Activity    Alcohol use: Not Currently     Comment: soically    Drug use: No    Sexual activity: Not on file         Current Outpatient Medications:     albuterol (2.5 mg/3 mL) 0.083 % nebulizer solution, Take 3 mL (2.5 mg total) by nebulization every 6 (six) hours as needed for wheezing or shortness of breath, Disp: 75 mL, Rfl: 0    albuterol (PROVENTIL HFA,VENTOLIN HFA) 90 mcg/act inhaler, Inhale 2 puffs every 6 (six) hours as needed for wheezing, Disp: 9 g, Rfl: 1    ALPRAZolam (XANAX) 0.5 mg tablet, Take 0.5 mg by mouth if needed, Disp: , Rfl:     aspirin 81 MG tablet, Take 81 mg by mouth, Disp: , Rfl:     Diclofenac Sodium (VOLTAREN) 1 %, Apply 2 g topically 4 (four) times a day, Disp: , Rfl:     escitalopram (LEXAPRO) 10 mg tablet, TAKE 1 TABLET BY MOUTH EVERY DAY, Disp: 90 tablet, Rfl: 1    Fluticasone-Salmeterol (Advair Diskus) 250-50 mcg/dose inhaler, Inhale 1 puff 2 (two) times a day Rinse mouth after use., Disp: 60 blister, Rfl: 1    gabapentin (Neurontin) 300 mg capsule, Take 2 capsules (600 mg total) by mouth daily at bedtime, Disp: 60 capsule, Rfl: 2    hydroCHLOROthiazide 25 mg tablet, TAKE 1 TABLET (25 MG TOTAL) BY MOUTH DAILY., Disp: 100 tablet, Rfl: 1    losartan (COZAAR) 100 MG tablet, TAKE 1 TABLET BY MOUTH EVERY DAY, Disp: 90 tablet, Rfl: 1    NAPROXEN  MG EC tablet, Take 500 mg by mouth if needed, Disp: , Rfl:     omeprazole (PriLOSEC) 40 MG capsule, TAKE 1 CAPSULE BY MOUTH DAILY *DO NOT START BEFORE 6/1/23, Disp: 30 capsule, Rfl: 5     ondansetron (ZOFRAN) 4 mg tablet, Take 1 tablet (4 mg total) by mouth every 8 (eight) hours as needed for nausea or vomiting, Disp: 20 tablet, Rfl: 0    pravastatin (PRAVACHOL) 80 mg tablet, TAKE 1/2 TABLET BY MOUTH EVERY DAY, Disp: 45 tablet, Rfl: 3    sodium chloride 0.9 % nebulizer solution, Take 3 mL by nebulization as needed for wheezing, Disp: 90 mL, Rfl: 0    methylPREDNISolone 4 MG tablet therapy pack, Use as directed on package (Patient not taking: Reported on 10/10/2024), Disp: 21 tablet, Rfl: 0    Mupirocin POWD, Inhale 30 mg 2 (two) times a day (Patient not taking: Reported on 1/26/2024), Disp: , Rfl:     Allergies   Allergen Reactions    Succinylcholine GI Intolerance     Prolonged apnea      Amoxicillin-Pot Clavulanate Hives, Rash and Other (See Comments)    Azithromycin Hives, Rash and Other (See Comments)    Clarithromycin Hives, Nausea Only, Rash, Vomiting and Other (See Comments)       Physical Exam:    /78 (BP Location: Left arm, Patient Position: Sitting)   Pulse 84   Temp 97.7 °F (36.5 °C)   Ht 5' (1.524 m)   Wt 73 kg (161 lb)   BMI 31.44 kg/m²     Constitutional:normal, well developed, well nourished, alert, in no distress and non-toxic and no overt pain behavior.  Eyes:anicteric  HEENT:grossly intact  Neck:supple, symmetric, trachea midline and no masses   Pulmonary:even and unlabored  Cardiovascular:No edema or pitting edema present  Skin:Normal without rashes or lesions and well hydrated  Psychiatric:Mood and affect appropriate  Neurologic:Cranial Nerves II-XII grossly intact  Musculoskeletal: Gait is slow but stable without the use of assistive devices, tender right lumbar facet joints with a positive facet loading test, limited range of motion      Imaging    MRI LUMBAR SPINE WITHOUT CONTRAST 11/4/2024 @ [facility]     INDICATION: M54.41: Lumbago with sciatica, right side  G89.29: Other chronic pain  R29.818: Other symptoms and signs involving the nervous system.     COMPARISON:  10/23/2024     TECHNIQUE:  Multiplanar, multisequence imaging of the lumbar spine was performed. .        IMAGE QUALITY:  Diagnostic     FINDINGS:     VERTEBRAL BODIES:  There are 5 lumbar type vertebral bodies. Trace grade 1 retrolisthesis of L1 on L2. Mild grade 1 anterolisthesis of L4 on L5. Alignment is similar to the prior lumbar radiograph. Scattered degenerative endplate changes.  No focally   suspicious marrow lesions. No compression abnormality. Type I Modic endplate changes inferior endplate of T12 as well as about the L1-2 intervertebral disc space. Scattered hemangiomata noted.     SACRUM:  Normal signal within the sacrum. No evidence of insufficiency or stress fracture.     DISTAL CORD AND CONUS:  Normal size and signal within the distal cord and conus. The conus medullaris terminates at the inferior endplate of L1.     PARASPINAL SOFT TISSUES: There is a moderate sized hiatal hernia.     LOWER THORACIC DISC SPACES: Mild noncompressive lower thoracic degenerative change.     LUMBAR DISC SPACES:     L1-L2: There is uncovering of the intervertebral disc space. There is bilateral facet hypertrophy. There is a left neural foraminal disc herniation, protrusion type. There is severe narrowing left subarticular zone and left neural foramen. Mild central   canal narrowing. Right neural foramen patent.     L2-L3: There is bilateral facet hypertrophy. There is left subarticular zone disc protrusion. Mild narrowing left subarticular zone. Central canal and right neural foramen patent.     L3-L4: There is a diffuse disc bulge. There is bilateral facet hypertrophy. Mild tricompartmental narrowing.     L4-L5: There is bilateral facet hypertrophy. There is uncovering the intervertebral disc space. There is a right subarticular zone/neural foraminal disc protrusion. Severe narrowing of the right neural foramen. Severe central canal narrowing. Moderate   left neural foraminal narrowing.     L5-S1: There is a diffuse  disk bulge.  No significant central canal or neural foraminal narrowing.  Bilateral facet hypertrophy noted.     OTHER FINDINGS:  None.     IMPRESSION:        1. Grade 1 anterolisthesis of L4 on L5 with associated severe spondylotic narrowing at this level secondary to multifactorial disease.  2. Scattered spondylosis elsewhere.  3. Moderate-sized hiatal hernia.     XRAY LUMBAR SPINE 10/23/2024 @ [facility]     INDICATION:   Lumbago with sciatica, right side. Other chronic pain. Other symptoms and signs involving the nervous system.      COMPARISON: 6/5/2024     VIEWS:  XR LUMBAR SP/BENDING ONLY MIN 2-3 V  Images: 4     FINDINGS:     There are 5 non rib bearing lumbar vertebral bodies.      There is no evidence of acute fracture or destructive osseous lesion.     Mild scoliotic deformity is noted. Minimal anterolisthesis L4-5 with retrolisthesis L2-3. No dynamic instability.      Stable multilevel disc height loss and facet arthropathy.     The pedicles appear intact.     Soft tissues are unremarkable.     IMPRESSION:        No acute osseous abnormality.       Degenerative changes as described.

## 2024-12-01 DIAGNOSIS — K21.9 GASTROESOPHAGEAL REFLUX DISEASE WITHOUT ESOPHAGITIS: ICD-10-CM

## 2024-12-02 ENCOUNTER — HOSPITAL ENCOUNTER (OUTPATIENT)
Dept: RADIOLOGY | Facility: CLINIC | Age: 74
Discharge: HOME | End: 2024-12-02
Payer: COMMERCIAL

## 2024-12-02 VITALS
OXYGEN SATURATION: 98 % | RESPIRATION RATE: 16 BRPM | HEART RATE: 84 BPM | SYSTOLIC BLOOD PRESSURE: 125 MMHG | DIASTOLIC BLOOD PRESSURE: 65 MMHG | TEMPERATURE: 97.4 F

## 2024-12-02 DIAGNOSIS — J45.20 MILD INTERMITTENT ASTHMA WITHOUT COMPLICATION: ICD-10-CM

## 2024-12-02 DIAGNOSIS — M54.16 LUMBAR RADICULOPATHY: ICD-10-CM

## 2024-12-02 PROCEDURE — 62323 NJX INTERLAMINAR LMBR/SAC: CPT | Performed by: ANESTHESIOLOGY

## 2024-12-02 RX ORDER — FLUTICASONE PROPIONATE AND SALMETEROL 250; 50 UG/1; UG/1
1 POWDER RESPIRATORY (INHALATION) 2 TIMES DAILY
Qty: 60 BLISTER | Refills: 1 | Status: SHIPPED | OUTPATIENT
Start: 2024-12-02

## 2024-12-02 RX ORDER — METHYLPREDNISOLONE ACETATE 80 MG/ML
80 INJECTION, SUSPENSION INTRA-ARTICULAR; INTRALESIONAL; INTRAMUSCULAR; PARENTERAL; SOFT TISSUE ONCE
Status: COMPLETED | OUTPATIENT
Start: 2024-12-02 | End: 2024-12-02

## 2024-12-02 RX ADMIN — METHYLPREDNISOLONE ACETATE 80 MG: 80 INJECTION, SUSPENSION INTRA-ARTICULAR; INTRALESIONAL; INTRAMUSCULAR; SOFT TISSUE at 15:28

## 2024-12-02 RX ADMIN — IOHEXOL 1 ML: 300 INJECTION, SOLUTION INTRAVENOUS at 15:28

## 2024-12-02 NOTE — H&P
History of Present Illness: The patient is a 74 y.o. female who presents with complaints of low back and leg pain.    Past Medical History:   Diagnosis Date    Anxiety     Arthritis     Asthma     C. difficile enteritis     Chest pain     Depression     Dizziness 01/18/2022    GERD (gastroesophageal reflux disease)     History of acute respiratory failure     Hyperlipidemia     Hypertension     Pneumonia     Respiratory disorder 2013    Tendinitis of right shoulder 04/09/2019       Past Surgical History:   Procedure Laterality Date    BRONCHOSCOPY      COLONOSCOPY  2017    FRACTURE SURGERY      NASAL SINUS SURGERY Bilateral 09/2017    SINUS SURGERY      TUBAL LIGATION      WISDOM TOOTH EXTRACTION           Current Outpatient Medications:     albuterol (2.5 mg/3 mL) 0.083 % nebulizer solution, Take 3 mL (2.5 mg total) by nebulization every 6 (six) hours as needed for wheezing or shortness of breath, Disp: 75 mL, Rfl: 0    albuterol (PROVENTIL HFA,VENTOLIN HFA) 90 mcg/act inhaler, Inhale 2 puffs every 6 (six) hours as needed for wheezing, Disp: 9 g, Rfl: 1    ALPRAZolam (XANAX) 0.5 mg tablet, Take 0.5 mg by mouth if needed, Disp: , Rfl:     aspirin 81 MG tablet, Take 81 mg by mouth, Disp: , Rfl:     Diclofenac Sodium (VOLTAREN) 1 %, Apply 2 g topically 4 (four) times a day, Disp: , Rfl:     escitalopram (LEXAPRO) 10 mg tablet, TAKE 1 TABLET BY MOUTH EVERY DAY, Disp: 90 tablet, Rfl: 1    Fluticasone-Salmeterol (Advair Diskus) 250-50 mcg/dose inhaler, Inhale 1 puff 2 (two) times a day Rinse mouth after use., Disp: 60 blister, Rfl: 1    gabapentin (Neurontin) 300 mg capsule, Take 2 capsules (600 mg total) by mouth daily at bedtime, Disp: 60 capsule, Rfl: 2    hydroCHLOROthiazide 25 mg tablet, TAKE 1 TABLET (25 MG TOTAL) BY MOUTH DAILY., Disp: 100 tablet, Rfl: 1    losartan (COZAAR) 100 MG tablet, TAKE 1 TABLET BY MOUTH EVERY DAY, Disp: 90 tablet, Rfl: 1    methylPREDNISolone 4 MG tablet therapy pack, Use as directed on  package (Patient not taking: Reported on 10/10/2024), Disp: 21 tablet, Rfl: 0    Mupirocin POWD, Inhale 30 mg 2 (two) times a day (Patient not taking: Reported on 1/26/2024), Disp: , Rfl:     NAPROXEN  MG EC tablet, Take 500 mg by mouth if needed, Disp: , Rfl:     omeprazole (PriLOSEC) 40 MG capsule, TAKE 1 CAPSULE BY MOUTH DAILY *DO NOT START BEFORE 6/1/23, Disp: 30 capsule, Rfl: 5    ondansetron (ZOFRAN) 4 mg tablet, Take 1 tablet (4 mg total) by mouth every 8 (eight) hours as needed for nausea or vomiting, Disp: 20 tablet, Rfl: 0    pravastatin (PRAVACHOL) 80 mg tablet, TAKE 1/2 TABLET BY MOUTH EVERY DAY, Disp: 45 tablet, Rfl: 3    sodium chloride 0.9 % nebulizer solution, Take 3 mL by nebulization as needed for wheezing, Disp: 90 mL, Rfl: 0    Current Facility-Administered Medications:     iohexol (OMNIPAQUE) 300 mg/mL injection 1 mL, 1 mL, Epidural, Once, Ivan Mcknight DO    methylPREDNISolone acetate (DEPO-MEDROL) injection 80 mg, 80 mg, Epidural, Once, Ivan Mcknight DO    Allergies   Allergen Reactions    Succinylcholine GI Intolerance     Prolonged apnea      Amoxicillin-Pot Clavulanate Hives, Rash and Other (See Comments)    Azithromycin Hives, Rash and Other (See Comments)    Clarithromycin Hives, Nausea Only, Rash, Vomiting and Other (See Comments)       Physical Exam:   Vitals:    12/02/24 1519   BP: 139/78   Pulse: 88   Resp: 16   Temp: (!) 97.4 °F (36.3 °C)   SpO2: 96%     General: Awake, Alert, Oriented x 3, Mood and affect appropriate  Respiratory: Respirations even and unlabored  Cardiovascular: Peripheral pulses intact; no edema  Musculoskeletal Exam: Decreased range of motion lumbar spine    ASA Score: 3    Patient/Chart Verification  Patient ID Verified: Verbal  ID Band Applied: No  Consents Confirmed: Procedural, To be obtained in the Pre-Procedure area  Interval H&P(within 24 hr) Complete (required for Outpatients and Surgery Admit only): To be obtained in the Procedural area  Allergies  Reviewed: Yes  Anticoag/NSAID held?: NA  Currently on antibiotics?: No  Pregnancy denied?: NA    Assessment:   1. Lumbar radiculopathy        Plan: L5-S1 LESTRESA

## 2024-12-02 NOTE — DISCHARGE INSTRUCTIONS
Epidural Steroid Injection   WHAT YOU NEED TO KNOW:   An epidural steroid injection (JAGJIT) is a procedure to inject steroid medicine into the epidural space. The epidural space is between your spinal cord and vertebrae. Steroids reduce inflammation and fluid buildup in your spine that may be causing pain. You may be given pain medicine along with the steroids.          ACTIVITY  Do not drive or operate machinery today.  No strenuous activity today - bending, lifting, etc.  You may resume normal activites starting tomorrow - start slowly and as tolerated.  You may shower today, but no tub baths or hot tubs.  You may have numbness for several hours from the local anesthetic. Please use caution and common sense, especially with weight-bearing activities.    CARE OF THE INJECTION SITE  If you have soreness or pain, apply ice to the area today (20 minutes on/20 minutes off).  Starting tomorrow, you may use warm, moist heat or ice if needed.  You may have an increase or change in your discomfort for 36-48 hours after your treatment.  Apply ice and continue with any pain medication you have been prescribed.  Notify the [facility] if you have any of the following: redness, drainage, swelling, headache, stiff neck or fever above 100°F.    SPECIAL INSTRUCTIONS  Our office will contact you in approximately 14 days for a progress report.    MEDICATIONS  Continue to take all routine medications.  Our office may have instructed you to hold some medications.    As no general anesthesia was used in today's procedure, you should not experience any side effects related to anesthesia.     If you are diabetic, the steroids used in today's injection may temporarily increase your blood sugar levels after the first few days after your injection. Please keep a close eye on your sugars and alert the doctor who manages your diabetes if your sugars are significantly high from your baseline or you are symptomatic.     If you have a  problem specifically related to your procedure, please call our office at [phone].  Problems not related to your procedure should be directed to your primary care physician.

## 2024-12-03 RX ORDER — OMEPRAZOLE 40 MG/1
CAPSULE, DELAYED RELEASE ORAL
Qty: 30 CAPSULE | Refills: 2 | Status: SHIPPED | OUTPATIENT
Start: 2024-12-03

## 2024-12-13 ENCOUNTER — CONSULT (OUTPATIENT)
Age: 74
End: 2024-12-13
Payer: COMMERCIAL

## 2024-12-13 VITALS
OXYGEN SATURATION: 98 % | WEIGHT: 160.2 LBS | HEIGHT: 60 IN | DIASTOLIC BLOOD PRESSURE: 80 MMHG | BODY MASS INDEX: 31.45 KG/M2 | SYSTOLIC BLOOD PRESSURE: 130 MMHG | HEART RATE: 81 BPM | TEMPERATURE: 96 F

## 2024-12-13 DIAGNOSIS — J45.40 MODERATE PERSISTENT ASTHMA WITHOUT COMPLICATION: Primary | ICD-10-CM

## 2024-12-13 DIAGNOSIS — J45.20 MILD INTERMITTENT ASTHMA WITHOUT COMPLICATION: ICD-10-CM

## 2024-12-13 PROCEDURE — 99204 OFFICE O/P NEW MOD 45 MIN: CPT | Performed by: INTERNAL MEDICINE

## 2024-12-13 PROCEDURE — 94010 BREATHING CAPACITY TEST: CPT | Performed by: INTERNAL MEDICINE

## 2024-12-13 NOTE — PATIENT INSTRUCTIONS
Continue advair 1puff twice daily for now  Get allergy blood testing completed  Get repeat Chest Xray completed  Follow up in 4 months or sooner as needed

## 2024-12-13 NOTE — PROGRESS NOTES
Pulmonary Consultation   Maritza Gold 74 y.o. female MRN: [id number]  12/13/2024      Reason for Consultation:  Asthma evaluation    Requested by: HANK Winter    Assessment:  Asthma - mild-moderate persistent  Normal spirometry today  Continue advair 250/50 1puff BID for now, can give trial of once daily and monitor REINA needs  Obtain old records  Check [place] RAST panel  Flu vaccine encouraged yearly    Eosinophilia - trend for now    Pneumonia - resolved, check repeat CXR as ordered by PCP    Follow up in 3-4 months or sooner as needed    Plan:    Diagnoses and all orders for this visit:    Moderate persistent asthma without complication  -     POCT spirometry  -     [facility] Allergy Panel, Adult; Future    Mild intermittent asthma without complication  -     Ambulatory Referral to Pulmonology        History of Present Illness   HPI:  Maritza Gold is a 74 y.o. female who has HTN, HLP, IFG, GERD, and asthma.  She reports she was diagnosed with asthma many years ago. She reported admission in 2013 from [facility] and required brief ICU stay but had pneumonia and required bronchoscopy. She was previously seen by Dr. Laguerre at [place] but has not seen him in 5 years.     She reports intermittent asthma symptoms and will need OCS typically twice a year. She had not been on maintenance inhalers for the past 4-5 years. She reported having pneumonia in Oct 2024 and was treated with Doxy and OCS. She started advair again at that time was well as REINA nebulizer use. She notes she has improved back to baseline.     She reports clear sputum production. She has seasonal allergies. She denied issues with strong odors or cold air. She has not need REINA nebulizer or inhaler for many weeks. She denied nocturnal dyspnea. She denie dhemoptysis.  She will produce clear sputum. She denied reflux symptoms.    Review of Systems   Constitutional:  Negative for activity change, chills and fever.   HENT:  Negative for mouth  sores, sore throat and trouble swallowing.    Respiratory:  Negative for cough, chest tightness, shortness of breath and wheezing.    Cardiovascular:  Negative for chest pain and leg swelling.   Gastrointestinal:  Negative for abdominal pain, nausea and vomiting.   Endocrine: Negative for cold intolerance and heat intolerance.   Musculoskeletal:  Positive for arthralgias and back pain.   Skin:  Negative for rash.   Allergic/Immunologic: Positive for environmental allergies. Negative for immunocompromised state.   Neurological:  Negative for light-headedness and headaches.   Hematological:  Negative for adenopathy.   Psychiatric/Behavioral:  Negative for sleep disturbance. The patient is not nervous/anxious.        Historical Information   Past Medical History:   Diagnosis Date    Anxiety     Arthritis     Asthma     C. difficile enteritis     Chest pain     Depression     Dizziness 01/18/2022    GERD (gastroesophageal reflux disease)     History of acute respiratory failure     Hyperlipidemia     Hypertension     Pneumonia     Respiratory disorder 2013    Tendinitis of right shoulder 04/09/2019     Past Surgical History:   Procedure Laterality Date    BRONCHOSCOPY      COLONOSCOPY  2017    FRACTURE SURGERY      NASAL SINUS SURGERY Bilateral 09/2017    SINUS SURGERY      TUBAL LIGATION      WISDOM TOOTH EXTRACTION       Family History   Problem Relation Age of Onset    Cirrhosis Mother     Colon cancer Father         age dx unknown    Heart disease Father     Heart failure Father     Diabetes Father     Rectal cancer Father     Heart disease Sister     No Known Problems Daughter     No Known Problems Daughter     No Known Problems Daughter     No Known Problems Maternal Grandmother     No Known Problems Maternal Grandfather     Colon cancer Paternal Grandmother         age dx unknown    No Known Problems Paternal Grandfather     Heart disease Brother     Aortic aneurysm Brother     Sudden death Brother      Hypertension Brother         Everett Sweet    No Known Problems Maternal Aunt     No Known Problems Maternal Aunt     No Known Problems Maternal Aunt     No Known Problems Paternal Aunt     No Known Problems Paternal Aunt     Lung cancer Paternal Uncle         age dx unknown    Throat cancer Paternal Uncle         age dx unknown    Colon polyps Neg Hx        Occupational History: previous office work    Social History: lifelong nonsmoker, personally no pets but has in-law suite with son who has 4 dogs    Meds/Allergies     Current Outpatient Medications:     albuterol (2.5 mg/3 mL) 0.083 % nebulizer solution, Take 3 mL (2.5 mg total) by nebulization every 6 (six) hours as needed for wheezing or shortness of breath, Disp: 75 mL, Rfl: 0    albuterol (PROVENTIL HFA,VENTOLIN HFA) 90 mcg/act inhaler, Inhale 2 puffs every 6 (six) hours as needed for wheezing, Disp: 9 g, Rfl: 1    ALPRAZolam (XANAX) 0.5 mg tablet, Take 0.5 mg by mouth if needed, Disp: , Rfl:     aspirin 81 MG tablet, Take 81 mg by mouth, Disp: , Rfl:     Diclofenac Sodium (VOLTAREN) 1 %, Apply 2 g topically 4 (four) times a day, Disp: , Rfl:     escitalopram (LEXAPRO) 10 mg tablet, TAKE 1 TABLET BY MOUTH EVERY DAY, Disp: 90 tablet, Rfl: 1    Fluticasone-Salmeterol (Advair Diskus) 250-50 mcg/dose inhaler, Inhale 1 puff 2 (two) times a day Rinse mouth after use., Disp: 60 blister, Rfl: 1    gabapentin (Neurontin) 300 mg capsule, Take 2 capsules (600 mg total) by mouth daily at bedtime, Disp: 60 capsule, Rfl: 2    hydroCHLOROthiazide 25 mg tablet, TAKE 1 TABLET (25 MG TOTAL) BY MOUTH DAILY., Disp: 100 tablet, Rfl: 1    losartan (COZAAR) 100 MG tablet, TAKE 1 TABLET BY MOUTH EVERY DAY, Disp: 90 tablet, Rfl: 1    NAPROXEN  MG EC tablet, Take 500 mg by mouth if needed, Disp: , Rfl:     omeprazole (PriLOSEC) 40 MG capsule, TAKE 1 CAPSULE BY MOUTH DAILY *DO NOT START BEFORE 6/1/23, Disp: 30 capsule, Rfl: 2    ondansetron (ZOFRAN) 4 mg tablet, Take 1  tablet (4 mg total) by mouth every 8 (eight) hours as needed for nausea or vomiting, Disp: 20 tablet, Rfl: 0    pravastatin (PRAVACHOL) 80 mg tablet, TAKE 1/2 TABLET BY MOUTH EVERY DAY, Disp: 45 tablet, Rfl: 3    sodium chloride 0.9 % nebulizer solution, Take 3 mL by nebulization as needed for wheezing, Disp: 90 mL, Rfl: 0    methylPREDNISolone 4 MG tablet therapy pack, Use as directed on package (Patient not taking: Reported on 10/10/2024), Disp: 21 tablet, Rfl: 0    Mupirocin POWD, Inhale 30 mg 2 (two) times a day (Patient not taking: Reported on 1/26/2024), Disp: , Rfl:   Allergies   Allergen Reactions    Succinylcholine GI Intolerance     Prolonged apnea      Amoxicillin-Pot Clavulanate Hives, Rash and Other (See Comments)    Azithromycin Hives, Rash and Other (See Comments)    Clarithromycin Hives, Nausea Only, Rash, Vomiting and Other (See Comments)       Vitals: Blood pressure 130/80, pulse 81, temperature (!) 96 °F (35.6 °C), temperature source Tympanic, height 5' (1.524 m), weight 72.7 kg (160 lb 3.2 oz), SpO2 98%., Body mass index is 31.29 kg/m². Oxygen Therapy  SpO2: 98 %    Physical Exam  Physical Exam  Vitals reviewed.   Constitutional:       General: She is not in acute distress.     Appearance: Normal appearance. She is well-developed and normal weight. She is not ill-appearing, toxic-appearing or diaphoretic.   HENT:      Head: Normocephalic and atraumatic.      Right Ear: External ear normal.      Left Ear: External ear normal.      Nose: Rhinorrhea present.      Mouth/Throat:      Mouth: Mucous membranes are moist.      Pharynx: Oropharynx is clear. No oropharyngeal exudate.   Eyes:      General: No scleral icterus.        Right eye: No discharge.         Left eye: No discharge.      Conjunctiva/sclera: Conjunctivae normal.      Pupils: Pupils are equal, round, and reactive to light.   Neck:      Vascular: No JVD.      Trachea: No tracheal deviation.   Cardiovascular:      Rate and Rhythm: Normal  "rate and regular rhythm.      Heart sounds: Normal heart sounds. No murmur heard.     No gallop.   Pulmonary:      Effort: Pulmonary effort is normal. No respiratory distress.      Breath sounds: Normal breath sounds. No stridor. No wheezing, rhonchi or rales.   Abdominal:      General: Bowel sounds are normal. There is no distension.      Palpations: Abdomen is soft.      Tenderness: There is no abdominal tenderness. There is no guarding or rebound.   Musculoskeletal:         General: No deformity.      Right lower leg: No edema.      Left lower leg: No edema.   Lymphadenopathy:      Cervical: No cervical adenopathy.   Skin:     General: Skin is warm and dry.      Coloration: Skin is not jaundiced.      Findings: No erythema or rash.   Neurological:      General: No focal deficit present.      Mental Status: She is alert and oriented to person, place, and time. Mental status is at baseline.   Psychiatric:         Mood and Affect: Mood normal.         Behavior: Behavior normal.         Thought Content: Thought content normal.         Labs: I have personally reviewed pertinent lab results.  Lab Results   Component Value Date    WBC 11.5 (H) 08/23/2024    HGB 12.7 08/23/2024    HCT 39.1 08/23/2024    MCV 86 08/23/2024     08/23/2024     Lab Results   Component Value Date    CALCIUM 9.6 05/15/2024    K 3.7 08/23/2024    CO2 25 08/23/2024     08/23/2024    BUN 23 08/23/2024    CREATININE 0.95 08/23/2024     No results found for: \"IGE\"  Lab Results   Component Value Date    ALT 11 08/23/2024    AST 21 08/23/2024    ALKPHOS 57 05/15/2024       Abs Eos  8/23/2024 - 400  7/2023 - 500    Imaging and other studies: Results Review Statement: I personally reviewed the following image studies in PACS and associated radiology reports: chest xray. My interpretation of the radiology images/reports is:  .  CXR 10/5/2024 - some blunted left HD, possible basilar infiltrate but not appreciated on lateral image    Pulmonary " function testin2024 - Spirometry - Ratio 68%, FVC 2.64L (113%, Z 0.83), FEV1 1.80L (103%, Z 0.19) - normal spirometry    EKG, Pathology, and Other Studies:   Stress Echo 2024 - normal systolic function, global hyperkinesis with exercise, 7 METS achieved    Vignesh Sotelo DO, FACP  [facility]

## 2024-12-16 ENCOUNTER — TELEPHONE (OUTPATIENT)
Dept: PAIN MEDICINE | Facility: CLINIC | Age: 74
End: 2024-12-16

## 2024-12-16 NOTE — TELEPHONE ENCOUNTER
Caller: Maritza LION  Doctor/office: Dr Mcknight  CB#: [phone]    % of improvement: 60  Pain Scale (1-10): 8/10 in the morning when the pt gets ups in the right leg. Pt is able to walk up steps a little better.

## 2025-01-02 ENCOUNTER — TELEPHONE (OUTPATIENT)
Age: 75
End: 2025-01-02

## 2025-01-02 NOTE — TELEPHONE ENCOUNTER
Made appt for 4m f/u in [facility] with agueda ferrera for April 7

## 2025-01-19 DIAGNOSIS — I10 ESSENTIAL HYPERTENSION: ICD-10-CM

## 2025-01-20 RX ORDER — HYDROCHLOROTHIAZIDE 25 MG/1
25 TABLET ORAL DAILY
Qty: 90 TABLET | Refills: 1 | Status: SHIPPED | OUTPATIENT
Start: 2025-01-20

## 2025-01-20 RX ORDER — PRAVASTATIN SODIUM 80 MG/1
40 TABLET ORAL DAILY
Qty: 45 TABLET | Refills: 1 | Status: SHIPPED | OUTPATIENT
Start: 2025-01-20

## 2025-01-20 RX ORDER — LOSARTAN POTASSIUM 100 MG/1
100 TABLET ORAL DAILY
Qty: 90 TABLET | Refills: 1 | Status: SHIPPED | OUTPATIENT
Start: 2025-01-20

## 2025-01-21 DIAGNOSIS — F32.0 CURRENT MILD EPISODE OF MAJOR DEPRESSIVE DISORDER WITHOUT PRIOR EPISODE (HCC): ICD-10-CM

## 2025-01-21 RX ORDER — ESCITALOPRAM OXALATE 10 MG/1
10 TABLET ORAL DAILY
Qty: 90 TABLET | Refills: 1 | Status: SHIPPED | OUTPATIENT
Start: 2025-01-21

## 2025-02-21 LAB
CHOLEST SERPL-MCNC: 191 MG/DL (ref 100–199)
HDLC SERPL-MCNC: 53 MG/DL
LDLC SERPL CALC-MCNC: 114 MG/DL (ref 0–99)
LDLC/HDLC SERPL: 2.2 RATIO (ref 0–3.2)
SL AMB VLDL CHOLESTEROL CALC: 24 MG/DL (ref 5–40)
TRIGL SERPL-MCNC: 134 MG/DL (ref 0–149)

## 2025-02-26 ENCOUNTER — TELEPHONE (OUTPATIENT)
Dept: FAMILY MEDICINE CLINIC | Facility: HOSPITAL | Age: 75
End: 2025-02-26

## 2025-02-26 ENCOUNTER — OFFICE VISIT (OUTPATIENT)
Dept: FAMILY MEDICINE CLINIC | Facility: HOSPITAL | Age: 75
End: 2025-02-26
Payer: COMMERCIAL

## 2025-02-26 VITALS
HEART RATE: 100 BPM | DIASTOLIC BLOOD PRESSURE: 78 MMHG | SYSTOLIC BLOOD PRESSURE: 128 MMHG | TEMPERATURE: 97.4 F | BODY MASS INDEX: 32.39 KG/M2 | OXYGEN SATURATION: 97 % | HEIGHT: 60 IN | WEIGHT: 165 LBS

## 2025-02-26 DIAGNOSIS — F41.1 GENERALIZED ANXIETY DISORDER: ICD-10-CM

## 2025-02-26 DIAGNOSIS — M54.16 LUMBAR RADICULOPATHY: ICD-10-CM

## 2025-02-26 DIAGNOSIS — I10 ESSENTIAL HYPERTENSION: Primary | ICD-10-CM

## 2025-02-26 DIAGNOSIS — F32.0 CURRENT MILD EPISODE OF MAJOR DEPRESSIVE DISORDER WITHOUT PRIOR EPISODE (HCC): ICD-10-CM

## 2025-02-26 DIAGNOSIS — Z23 ENCOUNTER FOR IMMUNIZATION: ICD-10-CM

## 2025-02-26 DIAGNOSIS — E78.2 MIXED HYPERLIPIDEMIA: ICD-10-CM

## 2025-02-26 DIAGNOSIS — Z12.31 ENCOUNTER FOR SCREENING MAMMOGRAM FOR BREAST CANCER: ICD-10-CM

## 2025-02-26 DIAGNOSIS — J45.40 MODERATE PERSISTENT ASTHMA WITHOUT COMPLICATION: ICD-10-CM

## 2025-02-26 DIAGNOSIS — R73.01 IFG (IMPAIRED FASTING GLUCOSE): ICD-10-CM

## 2025-02-26 PROCEDURE — G2211 COMPLEX E/M VISIT ADD ON: HCPCS | Performed by: NURSE PRACTITIONER

## 2025-02-26 PROCEDURE — 99214 OFFICE O/P EST MOD 30 MIN: CPT | Performed by: NURSE PRACTITIONER

## 2025-02-26 PROCEDURE — 90662 IIV NO PRSV INCREASED AG IM: CPT

## 2025-02-26 PROCEDURE — G0008 ADMIN INFLUENZA VIRUS VAC: HCPCS

## 2025-02-26 NOTE — ASSESSMENT & PLAN NOTE
Diet has been poor.  She has started back on weight watcher's.   Orders:    CBC and differential; Future    Comprehensive metabolic panel; Future    Hemoglobin A1C; Future

## 2025-02-26 NOTE — ASSESSMENT & PLAN NOTE
LDL up slightly.   Diet has been poor.   Takes 40 mg atorvastatin.   On Weight Watcher's now.   Recheck in 6 months.   Orders:    CBC and differential; Future    Comprehensive metabolic panel; Future    Lipid panel; Future

## 2025-02-26 NOTE — PROGRESS NOTES
Name: Maritza Gold      : 1950      MRN: [id number]  Encounter Provider: HANK Anderson  Encounter Date: 2025   Encounter department: [facility] [address]   :  Assessment & Plan  Essential hypertension  BP well controlled.   Continue on current regimen.   F/U in 6 months.    Orders:    CBC and differential; Future    Comprehensive metabolic panel; Future    Moderate persistent asthma without complication  Well controlled.   Managed by pulmonary  Orders:    CBC and differential; Future    IFG (impaired fasting glucose)  Diet has been poor.  She has started back on weight watcher's.   Orders:    CBC and differential; Future    Comprehensive metabolic panel; Future    Hemoglobin A1C; Future    Current mild episode of major depressive disorder without prior episode (HCC)  Controlled.   Continue on current regimen.   F/U in 6 months.          Generalized anxiety disorder  Controlled.   No change in regimen.   F/U in 6 months.        Mixed hyperlipidemia  LDL up slightly.   Diet has been poor.   Takes 40 mg atorvastatin.   On Weight Watcher's now.   Recheck in 6 months.   Orders:    CBC and differential; Future    Comprehensive metabolic panel; Future    Lipid panel; Future    Lumbar radiculopathy  Advised she return to pain management.        Encounter for screening mammogram for breast cancer    Orders:    Mammo screening bilateral w 3d and cad; Future           History of Present Illness   Takes 40 mg pravastatin. Just started weight watcher's. Diet prior was not great. No exercise. Back has been really bad. Affect legs abd hips. Had one spine injection which helped. Will occasionally lose her balance.   Mood has been good. Anxiety is controlled.   Asthma has been good. Following with pulmonologist. Advair cut back to one puff/day. Has not needed albuterol.       Review of Systems   Constitutional:  Negative for fatigue and unexpected weight change.   Eyes:  Negative for  visual disturbance.   Respiratory:  Negative for shortness of breath.    Cardiovascular:  Negative for chest pain, palpitations and leg swelling.   Musculoskeletal:  Positive for arthralgias and back pain. Negative for myalgias.   Neurological:  Negative for dizziness, syncope, light-headedness and headaches.   Psychiatric/Behavioral:  Negative for dysphoric mood. The patient is not nervous/anxious.        Objective   There were no vitals taken for this visit.     Physical Exam  Vitals reviewed.   Constitutional:       Appearance: Normal appearance. She is well-developed.   Cardiovascular:      Rate and Rhythm: Normal rate and regular rhythm.      Heart sounds: Normal heart sounds. No murmur heard.  Pulmonary:      Effort: Pulmonary effort is normal.      Breath sounds: Normal breath sounds.   Skin:     General: Skin is warm and dry.   Neurological:      Mental Status: She is alert and oriented to person, place, and time.   Psychiatric:         Mood and Affect: Mood normal.         Behavior: Behavior normal.         Thought Content: Thought content normal.         Judgment: Judgment normal.

## 2025-02-26 NOTE — TELEPHONE ENCOUNTER
Good morning -     Patient was just in my office for an appt w/ pcp.    She said she has been trying to contact your office to reschedule her appt from early Jan.    She said she has not been able to speak with a person.    Could you please reach out to the patient at the number on file?    Thx!

## 2025-02-26 NOTE — ASSESSMENT & PLAN NOTE
BP well controlled.   Continue on current regimen.   F/U in 6 months.    Orders:    CBC and differential; Future    Comprehensive metabolic panel; Future

## 2025-03-05 ENCOUNTER — TELEPHONE (OUTPATIENT)
Dept: PAIN MEDICINE | Facility: CLINIC | Age: 75
End: 2025-03-05

## 2025-03-05 DIAGNOSIS — K21.9 GASTROESOPHAGEAL REFLUX DISEASE WITHOUT ESOPHAGITIS: ICD-10-CM

## 2025-03-05 RX ORDER — OMEPRAZOLE 40 MG/1
40 CAPSULE, DELAYED RELEASE ORAL DAILY
Qty: 30 CAPSULE | Refills: 2 | Status: SHIPPED | OUTPATIENT
Start: 2025-03-05

## 2025-03-05 NOTE — TELEPHONE ENCOUNTER
----- Message from Ivan Mcknight DO sent at 3/4/2025 11:04 AM EST -----  Okay to move patient to my schedule for next week

## 2025-03-12 ENCOUNTER — TELEPHONE (OUTPATIENT)
Dept: PAIN MEDICINE | Facility: CLINIC | Age: 75
End: 2025-03-12

## 2025-03-20 ENCOUNTER — OFFICE VISIT (OUTPATIENT)
Dept: PAIN MEDICINE | Facility: CLINIC | Age: 75
End: 2025-03-20
Payer: COMMERCIAL

## 2025-03-20 VITALS — WEIGHT: 163 LBS | BODY MASS INDEX: 32 KG/M2 | HEART RATE: 103 BPM | OXYGEN SATURATION: 98 % | HEIGHT: 60 IN

## 2025-03-20 DIAGNOSIS — M54.16 LUMBAR RADICULOPATHY: Primary | ICD-10-CM

## 2025-03-20 DIAGNOSIS — M48.061 LUMBAR FORAMINAL STENOSIS: ICD-10-CM

## 2025-03-20 DIAGNOSIS — M48.062 SPINAL STENOSIS OF LUMBAR REGION WITH NEUROGENIC CLAUDICATION: ICD-10-CM

## 2025-03-20 DIAGNOSIS — M47.816 LUMBAR SPONDYLOSIS: ICD-10-CM

## 2025-03-20 PROCEDURE — G2211 COMPLEX E/M VISIT ADD ON: HCPCS | Performed by: PHYSICIAN ASSISTANT

## 2025-03-20 PROCEDURE — 99214 OFFICE O/P EST MOD 30 MIN: CPT | Performed by: PHYSICIAN ASSISTANT

## 2025-03-20 RX ORDER — PREGABALIN 75 MG/1
CAPSULE ORAL
Qty: 60 CAPSULE | Refills: 2 | Status: SHIPPED | OUTPATIENT
Start: 2025-03-20

## 2025-03-20 NOTE — PROGRESS NOTES
Assessment:  1. Lumbar radiculopathy    2. Lumbar foraminal stenosis    3. Spinal stenosis of lumbar region with neurogenic claudication    4. Lumbar spondylosis        Plan:  While the patient was in the office today, I did have a thorough conversation regarding their chronic pain syndrome, medication management, and treatment plan options.    After discussing options, the patient be scheduled for bilateral L4 transforaminal epidural steroid injection to address the radicular component of her pain pattern.    I have also recommended the patient proceed with a neurosurgical consultation therefore I have placed the referral.  Lumbar spine MRI reviewed with the patient and her  again on today's visit.    In the meantime, as the patient had no relief with gabapentin, trial pregabalin 75 mg titrating up to twice daily gradually if tolerated.    The patient was advised to contact the office should their symptoms worsen in the interim. The patient was agreeable and verbalized an understanding.        History of Present Illness:    The patient is a 74 y.o. female last seen on 12/2/2024 who presents for a follow up office visit in regards to radicular low back pain secondary to lumbar spinal stenosis.  The patient currently reports a significant increase in bilateral lower extremity radicular pain that she rates a 9 out of 10 today.  Patient describes the pain as a constant burning, sharp and shooting pain.  Pain is radiating laterally and anteriorly, severe in the knees and anterior shins.  Patient has significant pain with standing and walking and some relief with rest.  On 12/2/2024 patient underwent an L5-S1 interlaminar epidural steroid injection with 70% relief for a short period of time followed by return of increased pain.  Patient was in [place] with her family in February and states that there were times that she was not even able to walk.    I have personally reviewed and/or updated the patient's past  medical history, past surgical history, family history, social history, current medications, allergies, and vital signs today.       Review of Systems:    Review of Systems   Respiratory:  Negative for shortness of breath.    Cardiovascular:  Negative for chest pain.   Gastrointestinal:  Positive for constipation. Negative for diarrhea, nausea and vomiting.   Musculoskeletal:  Positive for gait problem. Negative for arthralgias, joint swelling (jOINT STIFFNESS) and myalgias.   Skin:  Negative for rash.   Neurological:  Negative for dizziness, seizures and weakness.   All other systems reviewed and are negative.        Past Medical History:   Diagnosis Date   • Anxiety    • Arthritis    • Asthma    • C. difficile enteritis    • Chest pain    • Depression    • Dizziness 01/18/2022   • GERD (gastroesophageal reflux disease)    • History of acute respiratory failure    • Hyperlipidemia    • Hypertension    • Pneumonia    • Respiratory disorder 2013   • Tendinitis of right shoulder 04/09/2019       Past Surgical History:   Procedure Laterality Date   • BRONCHOSCOPY     • COLONOSCOPY  2017   • FRACTURE SURGERY     • NASAL SINUS SURGERY Bilateral 09/2017   • SINUS SURGERY     • TUBAL LIGATION     • WISDOM TOOTH EXTRACTION         Family History   Problem Relation Age of Onset   • Cirrhosis Mother    • Colon cancer Father         age dx unknown   • Heart disease Father    • Heart failure Father    • Diabetes Father    • Rectal cancer Father    • Heart disease Sister    • No Known Problems Daughter    • No Known Problems Daughter    • No Known Problems Daughter    • No Known Problems Maternal Grandmother    • No Known Problems Maternal Grandfather    • Colon cancer Paternal Grandmother         age dx unknown   • No Known Problems Paternal Grandfather    • Heart disease Brother    • Aortic aneurysm Brother    • Sudden death Brother    • Hypertension Brother         Everett Sweet   • No Known Problems Maternal Aunt    • No  Known Problems Maternal Aunt    • No Known Problems Maternal Aunt    • No Known Problems Paternal Aunt    • No Known Problems Paternal Aunt    • Lung cancer Paternal Uncle         age dx unknown   • Throat cancer Paternal Uncle         age dx unknown   • Colon polyps Neg Hx        Social History     Occupational History   • Not on file   Tobacco Use   • Smoking status: Never   • Smokeless tobacco: Never   Vaping Use   • Vaping status: Never Used   Substance and Sexual Activity   • Alcohol use: Not Currently     Comment: soically   • Drug use: No   • Sexual activity: Not on file         Current Outpatient Medications:   •  albuterol (2.5 mg/3 mL) 0.083 % nebulizer solution, Take 3 mL (2.5 mg total) by nebulization every 6 (six) hours as needed for wheezing or shortness of breath, Disp: 75 mL, Rfl: 0  •  albuterol (PROVENTIL HFA,VENTOLIN HFA) 90 mcg/act inhaler, Inhale 2 puffs every 6 (six) hours as needed for wheezing, Disp: 9 g, Rfl: 1  •  ALPRAZolam (XANAX) 0.5 mg tablet, Take 0.5 mg by mouth if needed, Disp: , Rfl:   •  aspirin 81 MG tablet, Take 81 mg by mouth, Disp: , Rfl:   •  Diclofenac Sodium (VOLTAREN) 1 %, Apply 2 g topically 4 (four) times a day, Disp: , Rfl:   •  escitalopram (LEXAPRO) 10 mg tablet, TAKE 1 TABLET BY MOUTH EVERY DAY, Disp: 90 tablet, Rfl: 1  •  Fluticasone-Salmeterol (Advair Diskus) 250-50 mcg/dose inhaler, Inhale 1 puff 2 (two) times a day Rinse mouth after use., Disp: 60 blister, Rfl: 1  •  hydroCHLOROthiazide 25 mg tablet, TAKE 1 TABLET (25 MG TOTAL) BY MOUTH DAILY., Disp: 90 tablet, Rfl: 1  •  losartan (COZAAR) 100 MG tablet, TAKE 1 TABLET BY MOUTH EVERY DAY, Disp: 90 tablet, Rfl: 1  •  NAPROXEN  MG EC tablet, Take 500 mg by mouth if needed, Disp: , Rfl:   •  omeprazole (PriLOSEC) 40 MG capsule, Take 1 capsule (40 mg total) by mouth daily, Disp: 30 capsule, Rfl: 2  •  ondansetron (ZOFRAN) 4 mg tablet, Take 1 tablet (4 mg total) by mouth every 8 (eight) hours as needed for nausea  or vomiting, Disp: 20 tablet, Rfl: 0  •  pravastatin (PRAVACHOL) 80 mg tablet, TAKE 1/2 TABLET BY MOUTH EVERY DAY, Disp: 45 tablet, Rfl: 1  •  pregabalin (LYRICA) 75 mg capsule, Take 1 capsule at bedtime for 5 days and then may increase to 1 capsule twice daily., Disp: 60 capsule, Rfl: 2  •  sodium chloride 0.9 % nebulizer solution, Take 3 mL by nebulization as needed for wheezing, Disp: 90 mL, Rfl: 0  •  Mupirocin POWD, Inhale 30 mg 2 (two) times a day (Patient not taking: Reported on 1/26/2024), Disp: , Rfl:     Allergies   Allergen Reactions   • Succinylcholine GI Intolerance     Prolonged apnea     • Amoxicillin-Pot Clavulanate Hives, Rash and Other (See Comments)   • Azithromycin Hives, Rash and Other (See Comments)   • Clarithromycin Hives, Nausea Only, Rash, Vomiting and Other (See Comments)       Physical Exam:    Pulse 103   Ht 5' (1.524 m)   Wt 73.9 kg (163 lb)   SpO2 98%   BMI 31.83 kg/m²     Constitutional:normal, well developed, well nourished, alert, in no distress and non-toxic and no overt pain behavior.  Eyes:anicteric  HEENT:grossly intact  Neck:supple, symmetric, trachea midline and no masses   Pulmonary:even and unlabored  Cardiovascular:No edema or pitting edema present  Skin:Normal without rashes or lesions and well hydrated  Psychiatric:Mood and affect appropriate  Neurologic:Cranial Nerves II-XII grossly intact  Musculoskeletal: Stable gait, forward leaning      Imaging  FL spine and pain procedure    (Results Pending)         Orders Placed This Encounter   Procedures   • FL spine and pain procedure   • Ambulatory referral to Neurosurgery

## 2025-03-28 ENCOUNTER — HOSPITAL ENCOUNTER (OUTPATIENT)
Dept: RADIOLOGY | Facility: CLINIC | Age: 75
Discharge: HOME | End: 2025-03-28
Payer: COMMERCIAL

## 2025-03-28 VITALS
HEART RATE: 88 BPM | RESPIRATION RATE: 16 BRPM | OXYGEN SATURATION: 98 % | DIASTOLIC BLOOD PRESSURE: 78 MMHG | SYSTOLIC BLOOD PRESSURE: 132 MMHG | TEMPERATURE: 97.6 F

## 2025-03-28 DIAGNOSIS — M54.16 LUMBAR RADICULOPATHY: ICD-10-CM

## 2025-03-28 PROCEDURE — 64483 NJX AA&/STRD TFRM EPI L/S 1: CPT | Performed by: ANESTHESIOLOGY

## 2025-03-28 RX ORDER — PAPAVERINE HCL 150 MG
15 CAPSULE, EXTENDED RELEASE ORAL ONCE
Status: COMPLETED | OUTPATIENT
Start: 2025-03-28 | End: 2025-03-28

## 2025-03-28 RX ADMIN — DEXAMETHASONE SODIUM PHOSPHATE 15 MG: 10 INJECTION, SOLUTION INTRAMUSCULAR; INTRAVENOUS at 10:41

## 2025-03-28 RX ADMIN — IOHEXOL 2 ML: 300 INJECTION, SOLUTION INTRAVENOUS at 10:40

## 2025-03-28 NOTE — H&P
History of Present Illness: The patient is a 74 y.o. female who presents with complaints of low back and leg pain.    Past Medical History:   Diagnosis Date    Anxiety     Arthritis     Asthma     C. difficile enteritis     Chest pain     Depression     Dizziness 01/18/2022    GERD (gastroesophageal reflux disease)     History of acute respiratory failure     Hyperlipidemia     Hypertension     Pneumonia     Respiratory disorder 2013    Tendinitis of right shoulder 04/09/2019       Past Surgical History:   Procedure Laterality Date    BRONCHOSCOPY      COLONOSCOPY  2017    FRACTURE SURGERY      NASAL SINUS SURGERY Bilateral 09/2017    SINUS SURGERY      TUBAL LIGATION      WISDOM TOOTH EXTRACTION           Current Outpatient Medications:     albuterol (2.5 mg/3 mL) 0.083 % nebulizer solution, Take 3 mL (2.5 mg total) by nebulization every 6 (six) hours as needed for wheezing or shortness of breath, Disp: 75 mL, Rfl: 0    albuterol (PROVENTIL HFA,VENTOLIN HFA) 90 mcg/act inhaler, Inhale 2 puffs every 6 (six) hours as needed for wheezing, Disp: 9 g, Rfl: 1    ALPRAZolam (XANAX) 0.5 mg tablet, Take 0.5 mg by mouth if needed, Disp: , Rfl:     aspirin 81 MG tablet, Take 81 mg by mouth, Disp: , Rfl:     Diclofenac Sodium (VOLTAREN) 1 %, Apply 2 g topically 4 (four) times a day, Disp: , Rfl:     escitalopram (LEXAPRO) 10 mg tablet, TAKE 1 TABLET BY MOUTH EVERY DAY, Disp: 90 tablet, Rfl: 1    Fluticasone-Salmeterol (Advair Diskus) 250-50 mcg/dose inhaler, Inhale 1 puff 2 (two) times a day Rinse mouth after use., Disp: 60 blister, Rfl: 1    hydroCHLOROthiazide 25 mg tablet, TAKE 1 TABLET (25 MG TOTAL) BY MOUTH DAILY., Disp: 90 tablet, Rfl: 1    losartan (COZAAR) 100 MG tablet, TAKE 1 TABLET BY MOUTH EVERY DAY, Disp: 90 tablet, Rfl: 1    Mupirocin POWD, Inhale 30 mg 2 (two) times a day (Patient not taking: Reported on 1/26/2024), Disp: , Rfl:     NAPROXEN  MG EC tablet, Take 500 mg by mouth if needed, Disp: , Rfl:      omeprazole (PriLOSEC) 40 MG capsule, Take 1 capsule (40 mg total) by mouth daily, Disp: 30 capsule, Rfl: 2    ondansetron (ZOFRAN) 4 mg tablet, Take 1 tablet (4 mg total) by mouth every 8 (eight) hours as needed for nausea or vomiting, Disp: 20 tablet, Rfl: 0    pravastatin (PRAVACHOL) 80 mg tablet, TAKE 1/2 TABLET BY MOUTH EVERY DAY, Disp: 45 tablet, Rfl: 1    pregabalin (LYRICA) 75 mg capsule, Take 1 capsule at bedtime for 5 days and then may increase to 1 capsule twice daily., Disp: 60 capsule, Rfl: 2    sodium chloride 0.9 % nebulizer solution, Take 3 mL by nebulization as needed for wheezing, Disp: 90 mL, Rfl: 0    Current Facility-Administered Medications:     dexamethasone (PF) (DECADRON) injection 15 mg, 15 mg, Epidural, Once, Ivan Mcknight DO    iohexol (OMNIPAQUE) 300 mg/mL injection 2 mL, 2 mL, Epidural, Once, Ivan Mcknight DO    Allergies   Allergen Reactions    Succinylcholine GI Intolerance     Prolonged apnea      Amoxicillin-Pot Clavulanate Hives, Rash and Other (See Comments)    Azithromycin Hives, Rash and Other (See Comments)    Clarithromycin Hives, Nausea Only, Rash, Vomiting and Other (See Comments)       Physical Exam:   General: Awake, Alert, Oriented x 3, Mood and affect appropriate  Respiratory: Respirations even and unlabored  Cardiovascular: Peripheral pulses intact; no edema  Musculoskeletal Exam: Decreased range of motion lumbar spine    ASA Score: 2         Assessment:   1. Lumbar radiculopathy        Plan: B/L L4 TFESI

## 2025-03-28 NOTE — DISCHARGE INSTR - LAB
Epidural Steroid Injection   WHAT YOU NEED TO KNOW:   An epidural steroid injection (JAGJIT) is a procedure to inject steroid medicine into the epidural space. The epidural space is between your spinal cord and vertebrae. Steroids reduce inflammation and fluid buildup in your spine that may be causing pain. You may be given pain medicine along with the steroids.          ACTIVITY  Do not drive or operate machinery today.  No strenuous activity today - bending, lifting, etc.  You may resume normal activites starting tomorrow - start slowly and as tolerated.  You may shower today, but no tub baths or hot tubs.  You may have numbness for several hours from the local anesthetic. Please use caution and common sense, especially with weight-bearing activities.    CARE OF THE INJECTION SITE  If you have soreness or pain, apply ice to the area today (20 minutes on/20 minutes off).  Starting tomorrow, you may use warm, moist heat or ice if needed.  You may have an increase or change in your discomfort for 36-48 hours after your treatment.  Apply ice and continue with any pain medication you have been prescribed.  Notify the [facility] if you have any of the following: redness, drainage, swelling, headache, stiff neck or fever above 100°F.    SPECIAL INSTRUCTIONS  Our office will contact you in approximately 14 days for a progress report.    MEDICATIONS  Continue to take all routine medications.  Our office may have instructed you to hold some medications.    As no general anesthesia was used in today's procedure, you should not experience any side effects related to anesthesia.     If you are diabetic, the steroids used in today's injection may temporarily increase your blood sugar levels after the first few days after your injection. Please keep a close eye on your sugars and alert the doctor who manages your diabetes if your sugars are significantly high from your baseline or you are symptomatic.     If you have a  problem specifically related to your procedure, please call our office at [phone].  Problems not related to your procedure should be directed to your primary care physician.

## 2025-03-31 ENCOUNTER — OFFICE VISIT (OUTPATIENT)
Dept: FAMILY MEDICINE CLINIC | Facility: HOSPITAL | Age: 75
End: 2025-03-31
Payer: COMMERCIAL

## 2025-03-31 ENCOUNTER — HOSPITAL ENCOUNTER (OUTPATIENT)
Dept: RADIOLOGY | Facility: HOSPITAL | Age: 75
Discharge: HOME | End: 2025-03-31
Payer: COMMERCIAL

## 2025-03-31 VITALS
TEMPERATURE: 98.2 F | WEIGHT: 165 LBS | SYSTOLIC BLOOD PRESSURE: 126 MMHG | BODY MASS INDEX: 32.39 KG/M2 | HEART RATE: 96 BPM | DIASTOLIC BLOOD PRESSURE: 74 MMHG | OXYGEN SATURATION: 98 % | HEIGHT: 60 IN

## 2025-03-31 DIAGNOSIS — R07.89 OTHER CHEST PAIN: ICD-10-CM

## 2025-03-31 DIAGNOSIS — R07.89 OTHER CHEST PAIN: Primary | ICD-10-CM

## 2025-03-31 DIAGNOSIS — R26.89 BALANCE PROBLEM: ICD-10-CM
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
## 2025-03-31 PROCEDURE — G2211 COMPLEX E/M VISIT ADD ON: HCPCS | Performed by: NURSE PRACTITIONER

## 2025-03-31 PROCEDURE — 99214 OFFICE O/P EST MOD 30 MIN: CPT | Performed by: NURSE PRACTITIONER

## 2025-03-31 PROCEDURE — 71120 X-RAY EXAM BREASTBONE 2/>VWS: CPT

## 2025-03-31 NOTE — PROGRESS NOTES
Name: Maritza Gold      : 1950      MRN: [id number]  Encounter Provider: HANK Anderson  Encounter Date: 3/31/2025   Encounter department: [facility] [address]   :  Assessment & Plan  Other chest pain  Chest wall bruising noted with point tenderness towards Xyphoid tip.   Check xray of sternum.   Orders:    XR sternum minimum 2 views; Future    Fall from bed, initial encounter         Balance problem  Again recommended she try balance therapy but she is declining.               History of Present Illness   Feel out of bed about 2 weeks ago. Got caught up in sheets. Fell flat on face and chest on the carpeted floor. Hit head on nightstand. Had a hard time getting up. Felt woozy. The day before she was started on Lyrica and took 2 (which she was only supposed to take 1). Has a bruise on chest. Sternum is sore. Squeezing chest muscle makes pain worse. Fell about 3 feet from bed to floor. No LOC. No headaches. No blurred vision. No sob. Some pain with deep breaths. She has been doing her normal activities. No injury to her face.       Review of Systems   Constitutional:  Negative for fatigue.   Cardiovascular:  Positive for chest pain (chest wall).       Objective   /74 (Patient Position: Sitting, Cuff Size: Standard)   Pulse 96   Temp 98.2 °F (36.8 °C) (Tympanic)   Ht 5' (1.524 m)   Wt 74.8 kg (165 lb)   SpO2 98%   BMI 32.22 kg/m²      Physical Exam  Vitals reviewed.   Constitutional:       Appearance: Normal appearance.   Cardiovascular:      Rate and Rhythm: Normal rate and regular rhythm.      Heart sounds: Normal heart sounds. No murmur heard.  Pulmonary:      Effort: Pulmonary effort is normal.      Breath sounds: Normal breath sounds.   Chest:      Chest wall: Tenderness present. No swelling.       Skin:     General: Skin is warm and dry.   Neurological:      Mental Status: She is alert and oriented to person, place, and time.   Psychiatric:         Mood and  Affect: Mood normal.         Behavior: Behavior normal.         Thought Content: Thought content normal.         Judgment: Judgment normal.

## 2025-04-01 ENCOUNTER — RESULTS FOLLOW-UP (OUTPATIENT)
Dept: FAMILY MEDICINE CLINIC | Facility: HOSPITAL | Age: 75
End: 2025-04-01

## 2025-04-05 PROBLEM — D72.10 EOSINOPHILIA: Status: ACTIVE | Noted: 2025-04-05

## 2025-04-11 ENCOUNTER — TELEPHONE (OUTPATIENT)
Dept: OBGYN CLINIC | Facility: HOSPITAL | Age: 75
End: 2025-04-11

## 2025-04-15 NOTE — TELEPHONE ENCOUNTER
Caller: Susanne  Doctor/office: Dr Mcknight  CB#: [phone]    % of improvement: 50%  Pain Scale (1-10): 8/10

## 2025-04-29 ENCOUNTER — OFFICE VISIT (OUTPATIENT)
Age: 75
End: 2025-04-29
Payer: COMMERCIAL

## 2025-04-29 ENCOUNTER — TELEPHONE (OUTPATIENT)
Age: 75
End: 2025-04-29

## 2025-04-29 ENCOUNTER — OFFICE VISIT (OUTPATIENT)
Dept: FAMILY MEDICINE CLINIC | Facility: HOSPITAL | Age: 75
End: 2025-04-29
Payer: COMMERCIAL

## 2025-04-29 VITALS
HEIGHT: 60 IN | BODY MASS INDEX: 33.18 KG/M2 | SYSTOLIC BLOOD PRESSURE: 126 MMHG | HEART RATE: 76 BPM | TEMPERATURE: 97.2 F | WEIGHT: 169 LBS | DIASTOLIC BLOOD PRESSURE: 72 MMHG

## 2025-04-29 VITALS
HEIGHT: 60 IN | SYSTOLIC BLOOD PRESSURE: 120 MMHG | RESPIRATION RATE: 16 BRPM | BODY MASS INDEX: 32.98 KG/M2 | TEMPERATURE: 98 F | OXYGEN SATURATION: 91 % | HEART RATE: 90 BPM | DIASTOLIC BLOOD PRESSURE: 78 MMHG | WEIGHT: 168 LBS

## 2025-04-29 DIAGNOSIS — R63.5 WEIGHT GAIN: ICD-10-CM

## 2025-04-29 DIAGNOSIS — F32.0 CURRENT MILD EPISODE OF MAJOR DEPRESSIVE DISORDER WITHOUT PRIOR EPISODE (HCC): ICD-10-CM

## 2025-04-29 DIAGNOSIS — R53.83 OTHER FATIGUE: Primary | ICD-10-CM

## 2025-04-29 DIAGNOSIS — Z01.818 PRE-PROCEDURAL EXAMINATION: Primary | ICD-10-CM

## 2025-04-29 DIAGNOSIS — M48.062 SPINAL STENOSIS OF LUMBAR REGION WITH NEUROGENIC CLAUDICATION: ICD-10-CM

## 2025-04-29 DIAGNOSIS — M54.16 LUMBAR RADICULOPATHY: ICD-10-CM

## 2025-04-29 PROCEDURE — 99214 OFFICE O/P EST MOD 30 MIN: CPT | Performed by: NURSE PRACTITIONER

## 2025-04-29 PROCEDURE — 99204 OFFICE O/P NEW MOD 45 MIN: CPT | Performed by: STUDENT IN AN ORGANIZED HEALTH CARE EDUCATION/TRAINING PROGRAM

## 2025-04-29 PROCEDURE — G2211 COMPLEX E/M VISIT ADD ON: HCPCS | Performed by: NURSE PRACTITIONER

## 2025-04-29 NOTE — TELEPHONE ENCOUNTER
Siri this pt had an appt with SUKUMAR and called back to agree to the surgery discussed , I dont think she signed any consents. She asked if she can do them electronically?  Should I call her back and book her an appt?

## 2025-04-29 NOTE — PROGRESS NOTES
Name: Maritza Gold      : 1950      MRN: [id number]  Encounter Provider: Quan Kam MD  Encounter Date: 2025   Encounter department: [facility]  :  Assessment & Plan  Lumbar radiculopathy    Orders:    Ambulatory referral to Neurosurgery    Spinal stenosis of lumbar region with neurogenic claudication    Orders:    Ambulatory referral to Neurosurgery    This is a 74-year-old female presenting for surgery evaluation for low back pain bilateral lower extremity neurogenic claudication.    MRI of her lumbar spine demonstrates diffuse degeneration.  The worst is at L4-5 where the patient has a broad-based disc herniation with ligamentous hypertrophy and facet arthropathy resulting in severe central canal stenosis.    I had a long discussion with the patient about his symptoms as well as imaging findings.  I believe her symptoms are stemming from the disease she has at L4-5.  We discussed treatment options and since she has failed conservative treatment.  I recommended surgical intervention.  I recommended L4-5 TLIF.  I went over the procedure as well as the recovery involved.  The patient and her  decided to think about their option further before proceeding with any surgery.  She will reach out to our office if she decides on surgery.  She will follow-up on a as needed basis.    Addendum:  The patient will reach out to our office after discussing with her family.  She would like to proceed with surgery.  We will make arrangements to schedule her for L4-5 TLIF.    History of Present Illness     Maritza Gold is a 74 y.o. female who presents low back pain and bilateral lower extremity pain.    HPI   She reports chronic low back pain with being upright. 6 months ago, she began to have pain radiating down into bilateral lower extremities. The leg pain is significantly worse than the back. It comes on when she is in bed and sitting. She states she feels  better standing however she can only stand for so long. No numbness or tingling. She reports occasionally sensation that her knees are going to give out.     She has undergone 2 injections which gave her a few days of relief. She is currently on Pregabalin and Ibuprofen.    Review of Systems   Genitourinary: Negative.    Musculoskeletal:  Positive for back pain (pain at base of tailbone, c/o bi/legs are worse than back, feels pulling/stretching pain down to ankles. deep shooting pain in  inner knees) and gait problem.   Neurological:  Positive for weakness (bi/leg, left is worse). Negative for numbness.     I have personally reviewed the MA's review of systems and made changes as necessary.    Past Medical History   Past Medical History:   Diagnosis Date    Anxiety     Arthritis     Asthma     C. difficile enteritis     Chest pain     Depression     Dizziness 01/18/2022    GERD (gastroesophageal reflux disease)     History of acute respiratory failure     Hyperlipidemia     Hypertension     Pneumonia     Respiratory disorder 2013    Tendinitis of right shoulder 04/09/2019     Past Surgical History:   Procedure Laterality Date    BRONCHOSCOPY      COLONOSCOPY  2017    FRACTURE SURGERY      NASAL SINUS SURGERY Bilateral 09/2017    SINUS SURGERY      TUBAL LIGATION      WISDOM TOOTH EXTRACTION       Family History   Problem Relation Age of Onset    Cirrhosis Mother     Colon cancer Father         age dx unknown    Heart disease Father     Heart failure Father     Diabetes Father     Rectal cancer Father     Heart disease Sister     No Known Problems Daughter     No Known Problems Daughter     No Known Problems Daughter     No Known Problems Maternal Grandmother     No Known Problems Maternal Grandfather     Colon cancer Paternal Grandmother         age dx unknown    No Known Problems Paternal Grandfather     Heart disease Brother     Aortic aneurysm Brother     Sudden death Brother     Hypertension Brother          Everett Sweet    No Known Problems Maternal Aunt     No Known Problems Maternal Aunt     No Known Problems Maternal Aunt     No Known Problems Paternal Aunt     No Known Problems Paternal Aunt     Lung cancer Paternal Uncle         age dx unknown    Throat cancer Paternal Uncle         age dx unknown    Colon polyps Neg Hx      she reports that she has never smoked. She has never used smokeless tobacco. She reports that she does not currently use alcohol. She reports that she does not use drugs.  Current Outpatient Medications   Medication Instructions    albuterol (PROVENTIL HFA,VENTOLIN HFA) 90 mcg/act inhaler 2 puffs, Inhalation, Every 6 hours PRN    albuterol 2.5 mg, Nebulization, Every 6 hours PRN    ALPRAZolam (XANAX) 0.5 mg, As needed    aspirin 81 mg    Diclofenac Sodium (VOLTAREN) 2 g, 4 times daily    escitalopram (LEXAPRO) 10 mg, Oral, Daily    Fluticasone-Salmeterol (Advair Diskus) 250-50 mcg/dose inhaler 1 puff, Inhalation, 2 times daily, Rinse mouth after use.    hydroCHLOROthiazide 25 mg, Oral, Daily    losartan (COZAAR) 100 mg, Oral, Daily    Mupirocin POWD 30 mg, 2 times daily    Naproxen  mg, As needed    omeprazole (PRILOSEC) 40 mg, Oral, Daily    ondansetron (ZOFRAN) 4 mg, Oral, Every 8 hours PRN    pravastatin (PRAVACHOL) 40 mg, Oral, Daily    pregabalin (LYRICA) 75 mg capsule Take 1 capsule at bedtime for 5 days and then may increase to 1 capsule twice daily.    sodium chloride 0.9 % nebulizer solution 3 mL, Nebulization, As needed     Allergies   Allergen Reactions    Succinylcholine GI Intolerance     Prolonged apnea      Amoxicillin-Pot Clavulanate Hives, Rash and Other (See Comments)    Azithromycin Hives, Rash and Other (See Comments)    Clarithromycin Hives, Nausea Only, Rash, Vomiting and Other (See Comments)      Objective   /78 (BP Location: Left arm)   Pulse 90   Temp 98 °F (36.7 °C)   Resp 16   Ht 5' (1.524 m)   Wt 76.2 kg (168 lb)   SpO2 91%   BMI 32.81 kg/m²      Physical Exam  Neurological Exam  General:  Normal, well developed, not in distress/pain     Skin:   No issues, no rashes noted     Musculoskeletal:   5/5 strength throughout all muscle groups  No tenderness to palpation of the spine       Neurologic Exam:  Awake and alert  Oriented x3  Speech clear and fluent  EASLEY   Sensation to light touch and pin prick intact throughout  No pfeiffer's  No clonus  2+ patellar reflexes     Gait:   normal gait, normal posture        Administrative Statements   I have spent a total time of 45 minutes in caring for this patient on the day of the visit/encounter including Diagnostic results, Prognosis, Risks and benefits of tx options, Instructions for management, Patient and family education, Impressions, Counseling / Coordination of care, Documenting in the medical record, Reviewing/placing orders in the medical record (including tests, medications, and/or procedures), and Obtaining or reviewing history  .

## 2025-04-29 NOTE — ASSESSMENT & PLAN NOTE
She denies depressive symptoms and would like to consider stopping lexapro.   Discussed with pt that she may not be depressed because she is on Lexapro.   I advised not changing anything right now.   Can consider down the line but given her current symptoms would hold off.

## 2025-04-29 NOTE — PROGRESS NOTES
Name: Maritza Gold      : 1950      MRN: [id number]  Encounter Provider: HANK Anderson  Encounter Date: 2025   Encounter department: [facility] CARE [address]   :  Assessment & Plan  Other fatigue  I suspect her fatigue is secondary to Lyrica and before that gabapentin.   She does not feel she has any benefit with the Lyrica.   I advised she discuss weaning off of this with pain management.   Will also check labs.   If her fatigue continues despite d/c of Lyrica and normal blood work then consider sleep study.   Orders:    CBC and differential; Future    Comprehensive metabolic panel; Future    TSH, 3rd generation with Free T4 reflex; Future    Spinal stenosis of lumbar region with neurogenic claudication  She is a surgical candidate and discussed her options with neurosurgery today.   She has made no decision but is leaning towards surgical intervention which I support given she has had minimal relief with conservative treatment.        Weight gain  This may also be r/t Lyrica.   She also is not overly active given her back pain.   She is interested in weight loss medication.   Aware this is not covered by medicare.   I advised wait and see what blood work shows, d/c of Lyrica and also if she has surgery she will be able to be more active.        Current mild episode of major depressive disorder without prior episode (HCC)    She denies depressive symptoms and would like to consider stopping lexapro.   Discussed with pt that she may not be depressed because she is on Lexapro.   I advised not changing anything right now.   Can consider down the line but given her current symptoms would hold off.               History of Present Illness   Has been feeling very tired. Has been going on for awhile. Sleeping more. Has been going on for a few months. May be when she started gabapentin/Lyrica. Also gaining a lot of weight. Not as active. Diet could be better. Limited by back  pain. Snores. No witnessed apnea. Unrefreshing sleep. Had sleep study years ago and was borderline. Saw neurosurgery today and surgery was recommended. She has been on Lyrica with no improvement in her pain. She has had minimal relief with injections. Not sure what she wants to do and plans on discussing this with her family. She is on lexapro. She denies any depression. Neurosurgery recommend she come off of this.       Review of Systems   Constitutional:  Positive for fatigue and unexpected weight change.   Respiratory:  Negative for shortness of breath.    Cardiovascular:  Negative for chest pain.   Musculoskeletal:  Positive for back pain.   Psychiatric/Behavioral:  Negative for dysphoric mood.        Objective   /72 (Patient Position: Sitting, Cuff Size: Standard)   Pulse 76   Temp (!) 97.2 °F (36.2 °C) (Tympanic)   Ht 5' (1.524 m)   Wt 76.7 kg (169 lb)   BMI 33.01 kg/m²      Physical Exam  Vitals reviewed.   Constitutional:       Appearance: Normal appearance.   Cardiovascular:      Rate and Rhythm: Normal rate and regular rhythm.      Heart sounds: Normal heart sounds. No murmur heard.  Pulmonary:      Effort: Pulmonary effort is normal.      Breath sounds: Normal breath sounds.   Skin:     General: Skin is warm and dry.   Neurological:      Mental Status: She is alert and oriented to person, place, and time.   Psychiatric:         Mood and Affect: Mood normal.         Behavior: Behavior normal.         Thought Content: Thought content normal.         Judgment: Judgment normal.

## 2025-04-30 RX ORDER — CHLORHEXIDINE GLUCONATE ORAL RINSE 1.2 MG/ML
15 SOLUTION DENTAL ONCE
OUTPATIENT
Start: 2025-04-30 | End: 2025-04-30

## 2025-04-30 RX ORDER — SODIUM CHLORIDE 9 MG/ML
125 INJECTION, SOLUTION INTRAVENOUS CONTINUOUS
OUTPATIENT
Start: 2025-04-30

## 2025-05-12 ENCOUNTER — HOSPITAL ENCOUNTER (OUTPATIENT)
Dept: MAMMOGRAPHY | Facility: IMAGING CENTER | Age: 75
Discharge: HOME | End: 2025-05-12
Payer: COMMERCIAL

## 2025-05-12 VITALS — BODY MASS INDEX: 33.18 KG/M2 | HEIGHT: 60 IN | WEIGHT: 169 LBS

## 2025-05-12 DIAGNOSIS — Z12.31 ENCOUNTER FOR SCREENING MAMMOGRAM FOR BREAST CANCER: ICD-10-CM

## 2025-05-12 PROCEDURE — 77067 SCR MAMMO BI INCL CAD: CPT

## 2025-05-12 PROCEDURE — 77063 BREAST TOMOSYNTHESIS BI: CPT

## 2025-05-17 LAB
ALBUMIN SERPL-MCNC: 4.3 G/DL (ref 3.8–4.8)
ALP SERPL-CCNC: 97 IU/L (ref 44–121)
ALT SERPL-CCNC: 9 IU/L (ref 0–32)
AST SERPL-CCNC: 19 IU/L (ref 0–40)
BASOPHILS # BLD AUTO: 0.1 X10E3/UL (ref 0–0.2)
BASOPHILS NFR BLD AUTO: 1 %
BILIRUB SERPL-MCNC: 0.3 MG/DL (ref 0–1.2)
BUN SERPL-MCNC: 23 MG/DL (ref 8–27)
BUN/CREAT SERPL: 23 (ref 12–28)
CALCIUM SERPL-MCNC: 9.6 MG/DL (ref 8.7–10.3)
CHLORIDE SERPL-SCNC: 102 MMOL/L (ref 96–106)
CO2 SERPL-SCNC: 21 MMOL/L (ref 20–29)
CREAT SERPL-MCNC: 0.99 MG/DL (ref 0.57–1)
EGFR: 59 ML/MIN/1.73
EOSINOPHIL # BLD AUTO: 0.9 X10E3/UL (ref 0–0.4)
EOSINOPHIL NFR BLD AUTO: 11 %
ERYTHROCYTE [DISTWIDTH] IN BLOOD BY AUTOMATED COUNT: 13.9 % (ref 11.7–15.4)
GLOBULIN SER-MCNC: 3.1 G/DL (ref 1.5–4.5)
GLUCOSE SERPL-MCNC: 109 MG/DL (ref 70–99)
HCT VFR BLD AUTO: 38.3 % (ref 34–46.6)
HGB BLD-MCNC: 12.4 G/DL (ref 11.1–15.9)
IMM GRANULOCYTES # BLD: 0 X10E3/UL (ref 0–0.1)
IMM GRANULOCYTES NFR BLD: 0 %
LYMPHOCYTES # BLD AUTO: 1.2 X10E3/UL (ref 0.7–3.1)
LYMPHOCYTES NFR BLD AUTO: 16 %
MCH RBC QN AUTO: 27.6 PG (ref 26.6–33)
MCHC RBC AUTO-ENTMCNC: 32.4 G/DL (ref 31.5–35.7)
MCV RBC AUTO: 85 FL (ref 79–97)
MONOCYTES # BLD AUTO: 0.5 X10E3/UL (ref 0.1–0.9)
MONOCYTES NFR BLD AUTO: 7 %
NEUTROPHILS # BLD AUTO: 4.9 X10E3/UL (ref 1.4–7)
NEUTROPHILS NFR BLD AUTO: 65 %
PLATELET # BLD AUTO: 310 X10E3/UL (ref 150–450)
POTASSIUM SERPL-SCNC: 3.8 MMOL/L (ref 3.5–5.2)
PROT SERPL-MCNC: 7.4 G/DL (ref 6–8.5)
RBC # BLD AUTO: 4.5 X10E6/UL (ref 3.77–5.28)
SODIUM SERPL-SCNC: 143 MMOL/L (ref 134–144)
TSH SERPL DL<=0.005 MIU/L-ACNC: 3.87 UIU/ML (ref 0.45–4.5)
WBC # BLD AUTO: 7.6 X10E3/UL (ref 3.4–10.8)

## 2025-05-19 ENCOUNTER — RESULTS FOLLOW-UP (OUTPATIENT)
Dept: FAMILY MEDICINE CLINIC | Facility: HOSPITAL | Age: 75
End: 2025-05-19

## 2025-05-21 ENCOUNTER — TELEPHONE (OUTPATIENT)
Age: 75
End: 2025-05-21

## 2025-05-21 NOTE — TELEPHONE ENCOUNTER
Pt called in to speak to a nurse about pre surgery questions that she has.   Her surgery is scheduled for 6/11/25 with SUKUMAR.   Please call her back when available    Thank you.

## 2025-05-21 NOTE — TELEPHONE ENCOUNTER
Reached out to patient who stated she needs the soap and wipes prior to her surgery. Advised she can go to any of our locations during 8-430pm. She stated she will come to the [place] office tomorrow.     She was appreciative of the call back.

## 2025-05-22 ENCOUNTER — APPOINTMENT (OUTPATIENT)
Dept: LAB | Facility: HOSPITAL | Age: 75
End: 2025-05-22
Payer: COMMERCIAL

## 2025-05-22 ENCOUNTER — LAB REQUISITION (OUTPATIENT)
Dept: LAB | Facility: HOSPITAL | Age: 75
End: 2025-05-22
Payer: COMMERCIAL

## 2025-05-22 DIAGNOSIS — Z01.818 PRE-PROCEDURAL EXAMINATION: ICD-10-CM

## 2025-05-22 DIAGNOSIS — M48.062 SPINAL STENOSIS OF LUMBAR REGION WITH NEUROGENIC CLAUDICATION: ICD-10-CM

## 2025-05-22 DIAGNOSIS — Z01.818 ENCOUNTER FOR OTHER PREPROCEDURAL EXAMINATION: ICD-10-CM

## 2025-05-22 DIAGNOSIS — M54.16 RADICULOPATHY, LUMBAR REGION: ICD-10-CM

## 2025-05-22 DIAGNOSIS — M54.16 LUMBAR RADICULOPATHY: ICD-10-CM

## 2025-05-22 DIAGNOSIS — M48.062 SPINAL STENOSIS, LUMBAR REGION WITH NEUROGENIC CLAUDICATION: ICD-10-CM

## 2025-05-22 LAB
ABO GROUP BLD: NORMAL
BLD GP AB SCN SERPL QL: NEGATIVE
RH BLD: NEGATIVE
SPECIMEN EXPIRATION DATE: NORMAL

## 2025-05-22 PROCEDURE — 86901 BLOOD TYPING SEROLOGIC RH(D): CPT | Performed by: STUDENT IN AN ORGANIZED HEALTH CARE EDUCATION/TRAINING PROGRAM

## 2025-05-22 PROCEDURE — 86850 RBC ANTIBODY SCREEN: CPT | Performed by: STUDENT IN AN ORGANIZED HEALTH CARE EDUCATION/TRAINING PROGRAM

## 2025-05-22 PROCEDURE — 87086 URINE CULTURE/COLONY COUNT: CPT

## 2025-05-22 PROCEDURE — 86900 BLOOD TYPING SEROLOGIC ABO: CPT | Performed by: STUDENT IN AN ORGANIZED HEALTH CARE EDUCATION/TRAINING PROGRAM

## 2025-05-22 PROCEDURE — 81001 URINALYSIS AUTO W/SCOPE: CPT

## 2025-05-23 LAB
ATRIAL RATE: 86 BPM
BACTERIA UR QL AUTO: ABNORMAL /HPF
BILIRUB UR QL STRIP: NEGATIVE
CLARITY UR: ABNORMAL
COLOR UR: YELLOW
GLUCOSE UR STRIP-MCNC: NEGATIVE MG/DL
HGB UR QL STRIP.AUTO: NEGATIVE
HYALINE CASTS #/AREA URNS LPF: ABNORMAL /LPF
KETONES UR STRIP-MCNC: NEGATIVE MG/DL
LEUKOCYTE ESTERASE UR QL STRIP: ABNORMAL
MUCOUS THREADS UR QL AUTO: ABNORMAL
NITRITE UR QL STRIP: NEGATIVE
NON-SQ EPI CELLS URNS QL MICRO: ABNORMAL /HPF
P AXIS: 44 DEGREES
PH UR STRIP.AUTO: 6 [PH]
PR INTERVAL: 138 MS
PROT UR STRIP-MCNC: ABNORMAL MG/DL
QRS AXIS: 35 DEGREES
QRSD INTERVAL: 80 MS
QT INTERVAL: 346 MS
QTC INTERVAL: 414 MS
RBC #/AREA URNS AUTO: ABNORMAL /HPF
SP GR UR STRIP.AUTO: 1.02 (ref 1–1.03)
T WAVE AXIS: 34 DEGREES
UROBILINOGEN UR STRIP-ACNC: <2 MG/DL
VENTRICULAR RATE: 86 BPM
WBC #/AREA URNS AUTO: ABNORMAL /HPF

## 2025-05-23 PROCEDURE — 93010 ELECTROCARDIOGRAM REPORT: CPT | Performed by: INTERNAL MEDICINE

## 2025-05-24 LAB — BACTERIA UR CULT: NORMAL

## 2025-05-27 DIAGNOSIS — K21.9 GASTROESOPHAGEAL REFLUX DISEASE WITHOUT ESOPHAGITIS: ICD-10-CM

## 2025-05-28 RX ORDER — OMEPRAZOLE 40 MG/1
40 CAPSULE, DELAYED RELEASE ORAL DAILY
Qty: 30 CAPSULE | Refills: 2 | Status: SHIPPED | OUTPATIENT
Start: 2025-05-28

## 2025-05-29 ENCOUNTER — CONSULT (OUTPATIENT)
Dept: FAMILY MEDICINE CLINIC | Facility: HOSPITAL | Age: 75
End: 2025-05-29
Payer: COMMERCIAL

## 2025-05-29 ENCOUNTER — TELEPHONE (OUTPATIENT)
Age: 75
End: 2025-05-29

## 2025-05-29 VITALS
DIASTOLIC BLOOD PRESSURE: 80 MMHG | HEART RATE: 104 BPM | OXYGEN SATURATION: 98 % | BODY MASS INDEX: 32.2 KG/M2 | HEIGHT: 60 IN | SYSTOLIC BLOOD PRESSURE: 136 MMHG | WEIGHT: 164 LBS | TEMPERATURE: 97 F

## 2025-05-29 DIAGNOSIS — R94.31 ABNORMAL ECG: ICD-10-CM

## 2025-05-29 DIAGNOSIS — Z01.818 PREOPERATIVE GENERAL PHYSICAL EXAMINATION: Primary | ICD-10-CM

## 2025-05-29 PROCEDURE — G2211 COMPLEX E/M VISIT ADD ON: HCPCS | Performed by: NURSE PRACTITIONER

## 2025-05-29 PROCEDURE — 99214 OFFICE O/P EST MOD 30 MIN: CPT | Performed by: NURSE PRACTITIONER

## 2025-05-29 NOTE — TELEPHONE ENCOUNTER
Caller: Lucretia from [facility] Care  [facility] Doctor:   Call Back Number: [phone]    Does this patient require an office visit for Cardiac Clearance for upcomming surgery or can clearance be given without an office visit?       Date of Last Office Visit: 5/20/25    Date Of Surgery: 6/11/25  Surgical Procedure: back surgery  Name of Facility: [facility]  Name of Surgeon: Dr. Kam  Phone Number for Surgical Facility (If the caller does not have this info, please put N/A): [phone]  Fax Number for Surgical Facility (If the caller does not have this info, please put N/A): [phone]    Does the patient have any new or worsening chest pain or shortness of breath since the last office visit? no  Does the patient have any new hospitalizations or cardiac procedures since the last office visit? no  Does the patient have any specific cardiac concerns regarding the surgery/procedure that they want to discuss with physician before surgery? no  Has the patient had an ECG performed anywhere in the last 30 days? Yes, on 5/22- abnormal      Thank You

## 2025-05-29 NOTE — PROGRESS NOTES
NAME: Maritza Gold  AGE: 74 y.o. SEX: female  : 1950     DATE: 2025    [facility] Pre-Operative Evaluation      Chief Complaint: Pre-operative Evaluation     Surgery: L4-5 TLIF  Anticipated Date of Surgery: 25  Referring Provider: Dr. Kam       History of Present Illness:     Maritza Gold is a 74 y.o. female who presents to the office today for a preoperative consultation at the request of surgeon, Dr. Kam  , who plans on performing L4-5 TLIF on 25. Planned anesthesia is general. Patient has a bleeding risk of: no recent abnormal bleeding. Patient does not have objections to receiving blood products if needed. Current anti-platelet/anti-coagulation medications that the patient is prescribed includes: none.      Assessment of Chronic Conditions:   - Asthma: controlled  - Hypertension: controlled     Assessment of Cardiac Risk:  Denies unstable or severe angina or MI in the last 6 weeks or history of stent placement in the last year   Denies decompensated heart failure (e.g. New onset heart failure, NYHA functional class IV heart failure, or worsening existing heart failure)  Denies significant arrhythmias such as high grade AV block, symptomatic ventricular arrhythmia, newly recognized ventricular tachycardia, supraventricular tachycardia with resting heart rate >100, or symptomatic bradycardia  Denies severe heart valve disease including aortic stenosis or symptomatic mitral stenosis     Exercise Capacity:  Able to walk 4 blocks without symptoms?: Yes  Able to walk 2 flights without symptoms?: Yes    Prior Anesthesia Reactions: No     Personal history of venous thromboembolic disease? No    History of steroid use for >2 weeks within last year? No         Review of Systems:     Review of Systems   Constitutional: Negative.    HENT: Negative.     Eyes: Negative.    Respiratory:  Positive for wheezing (upper airway). Negative for apnea, cough, choking, chest tightness,  shortness of breath and stridor.    Cardiovascular:  Positive for chest pain (occured twice in the last year at night). Negative for palpitations and leg swelling.   Gastrointestinal: Negative.    Endocrine: Negative.    Genitourinary: Negative.    Musculoskeletal:  Positive for back pain. Negative for arthralgias, gait problem, joint swelling, myalgias, neck pain and neck stiffness.   Skin: Negative.    Neurological: Negative.    Hematological: Negative.    Psychiatric/Behavioral: Negative.         Current Problem List:     Problem List[1]    Allergies:     Allergies[2]    Current Medications:     Current Medications[3]    Past Medical History:       Past Medical History[4]     Past Surgical History[5]     Family History[6]     Social History     Socioeconomic History    Marital status: /Civil Union     Spouse name: Not on file    Number of children: Not on file    Years of education: Not on file    Highest education level: Not on file   Occupational History    Not on file   Tobacco Use    Smoking status: Never    Smokeless tobacco: Never   Vaping Use    Vaping status: Never Used   Substance and Sexual Activity    Alcohol use: Not Currently     Comment: soically    Drug use: No    Sexual activity: Not on file   Other Topics Concern    Not on file   Social History Narrative    Not on file     Social Drivers of Health     Financial Resource Strain: Low Risk  (8/3/2023)    Overall Financial Resource Strain (CARDIA)     Difficulty of Paying Living Expenses: Not hard at all   Food Insecurity: No Food Insecurity (8/28/2024)    Nursing - Inadequate Food Risk Classification     Worried About Running Out of Food in the Last Year: Never true     Ran Out of Food in the Last Year: Never true     Ran Out of Food in the Last Year: Not on file   Transportation Needs: No Transportation Needs (8/28/2024)    PRAPARE - Transportation     Lack of Transportation (Medical): No     Lack of Transportation (Non-Medical): No    Physical Activity: Not on file   Stress: Not on file   Social Connections: Not on file   Intimate Partner Violence: Not on file   Housing Stability: Low Risk  (8/28/2024)    Housing Stability Vital Sign     Unable to Pay for Housing in the Last Year: No     Number of Times Moved in the Last Year: 1     Homeless in the Last Year: No        Physical Exam:     /80 (Patient Position: Sitting, Cuff Size: Standard)   Pulse 104   Temp (!) 97 °F (36.1 °C) (Tympanic)   Ht 5' (1.524 m)   Wt 74.4 kg (164 lb)   SpO2 98%   BMI 32.03 kg/m²     Physical Exam  Vitals reviewed.   Constitutional:       Appearance: Normal appearance. She is well-developed.   HENT:      Head: Normocephalic and atraumatic.      Right Ear: There is impacted cerumen.      Left Ear: Tympanic membrane, ear canal and external ear normal.      Nose: Nose normal.      Mouth/Throat:      Mouth: Mucous membranes are moist.      Pharynx: Oropharynx is clear.     Eyes:      Conjunctiva/sclera: Conjunctivae normal.      Pupils: Pupils are equal, round, and reactive to light.     Neck:      Thyroid: No thyromegaly.     Cardiovascular:      Rate and Rhythm: Normal rate and regular rhythm.      Heart sounds: Normal heart sounds. No murmur heard.  Pulmonary:      Effort: Pulmonary effort is normal.      Breath sounds: Normal breath sounds.   Abdominal:      General: Abdomen is flat. Bowel sounds are normal.      Palpations: Abdomen is soft. There is no hepatomegaly or splenomegaly.      Tenderness: There is no abdominal tenderness.     Musculoskeletal:         General: Normal range of motion.      Cervical back: Normal range of motion and neck supple.   Lymphadenopathy:      Cervical: No cervical adenopathy.     Skin:     General: Skin is warm and dry.     Neurological:      Mental Status: She is alert and oriented to person, place, and time.     Psychiatric:         Mood and Affect: Mood normal.         Behavior: Behavior normal.         Thought Content:  Thought content normal.         Judgment: Judgment normal.          Data:     Pre-operative work-up    Laboratory Results: I have personally reviewed the pertinent laboratory results/reports      EKG: Abnormal ECG. Septal infarct age undetermined but new from may 2024    Chest x-ray: Not indicated      Previous cardiopulmonary studies within the past year:  Echocardiogram: N/A  Cardiac Catheterization: N/A  Stress Test: Normal May 2024  Pulmonary Function Testing: N/A      Assessment & Recommendations:     1. Preoperative general physical examination        2. Abnormal ECG  Ambulatory Referral to Cardiology          Pre-Op Evaluation Assessment  74 y.o. female with planned surgery: L4-5 TLIF.    Known risk factors for perioperative complications: None.        Current medications which may produce withdrawal symptoms if withheld perioperatively: none.    Pre-Op Evaluation Plan  1. Further preoperative workup as follows:   - None; no further preoperative work-up is required    2. Medication Management/Recommendations:   - None, continue medication regimen including morning of surgery, with sip of water    3. Prophylaxis for cardiac events with perioperative beta-blockers: not indicated.    4. Patient requires further consultation with: Cardiology    Clearance  Patient is CLEARED for surgery without any additional cardiac testing.pending cardiac clearance     HANK Anderson  [facility] PRIMARY CARE [address]   [address] [place] [id number]  Phone#  [phone]  Fax#  [phone]          [1]   Patient Active Problem List  Diagnosis    Essential hypertension    Generalized anxiety disorder    Mixed hyperlipidemia    Osteopenia    Current mild episode of major depressive disorder without prior episode (HCC)    Pes anserinus bursitis of left knee    Chronic sinusitis    Thyroid nodule    Family history of cardiovascular disease    Elevated coronary artery calcium score     Gastroesophageal reflux disease    Chronic idiopathic constipation    Family history of colon cancer in father    Moderate persistent asthma without complication    Symptomatic varicose veins of right lower extremity    Spider veins of both lower extremities    Pes anserine bursitis    Primary osteoarthritis of right knee    Effusion of right knee    IFG (impaired fasting glucose)    Nasal congestion    Other diseases of larynx    Other polyp of sinus    Lumbar radiculopathy    Balance problem    Eosinophilia   [2]   Allergies  Allergen Reactions    Succinylcholine GI Intolerance     Prolonged apnea      Amoxicillin-Pot Clavulanate Hives, Rash and Other (See Comments)    Azithromycin Hives, Rash and Other (See Comments)    Clarithromycin Hives, Nausea Only, Rash, Vomiting and Other (See Comments)   [3]   Current Outpatient Medications:     albuterol (2.5 mg/3 mL) 0.083 % nebulizer solution, Take 3 mL (2.5 mg total) by nebulization every 6 (six) hours as needed for wheezing or shortness of breath, Disp: 75 mL, Rfl: 0    albuterol (PROVENTIL HFA,VENTOLIN HFA) 90 mcg/act inhaler, Inhale 2 puffs every 6 (six) hours as needed for wheezing, Disp: 9 g, Rfl: 1    ALPRAZolam (XANAX) 0.5 mg tablet, Take 0.5 mg by mouth if needed, Disp: , Rfl:     aspirin 81 MG tablet, Take 81 mg by mouth, Disp: , Rfl:     Diclofenac Sodium (VOLTAREN) 1 %, Apply 2 g topically in the morning and 2 g at noon and 2 g in the evening and 2 g before bedtime., Disp: , Rfl:     escitalopram (LEXAPRO) 10 mg tablet, TAKE 1 TABLET BY MOUTH EVERY DAY, Disp: 90 tablet, Rfl: 1    Fluticasone-Salmeterol (Advair Diskus) 250-50 mcg/dose inhaler, Inhale 1 puff 2 (two) times a day Rinse mouth after use., Disp: 60 blister, Rfl: 1    hydroCHLOROthiazide 25 mg tablet, TAKE 1 TABLET (25 MG TOTAL) BY MOUTH DAILY., Disp: 90 tablet, Rfl: 1    losartan (COZAAR) 100 MG tablet, TAKE 1 TABLET BY MOUTH EVERY DAY, Disp: 90 tablet, Rfl: 1    Mupirocin POWD, Inhale 30 mg in  the morning and 30 mg before bedtime., Disp: , Rfl:     NAPROXEN  MG EC tablet, Take 500 mg by mouth if needed, Disp: , Rfl:     omeprazole (PriLOSEC) 40 MG capsule, TAKE 1 CAPSULE (40 MG TOTAL) BY MOUTH DAILY., Disp: 30 capsule, Rfl: 2    ondansetron (ZOFRAN) 4 mg tablet, Take 1 tablet (4 mg total) by mouth every 8 (eight) hours as needed for nausea or vomiting, Disp: 20 tablet, Rfl: 0    pravastatin (PRAVACHOL) 80 mg tablet, TAKE 1/2 TABLET BY MOUTH EVERY DAY, Disp: 45 tablet, Rfl: 1    sodium chloride 0.9 % nebulizer solution, Take 3 mL by nebulization as needed for wheezing, Disp: 90 mL, Rfl: 0  [4]   Past Medical History:  Diagnosis Date    Anxiety     Arthritis     Asthma     C. difficile enteritis     Chest pain     Depression     Dizziness 01/18/2022    GERD (gastroesophageal reflux disease)     History of acute respiratory failure     Hyperlipidemia     Hypertension     Pneumonia     Respiratory disorder 2013    Tendinitis of right shoulder 04/09/2019   [5]   Past Surgical History:  Procedure Laterality Date    BRONCHOSCOPY      COLONOSCOPY  2017    FRACTURE SURGERY      NASAL SINUS SURGERY Bilateral 09/2017    SINUS SURGERY      TUBAL LIGATION      WISDOM TOOTH EXTRACTION     [6]   Family History  Problem Relation Name Age of Onset    Cirrhosis Mother Maritza Martínez     Colon cancer Father Everett Sweet         age dx unknown    Heart disease Father Everett Sweet     Heart failure Father Everett Sweet     Diabetes Father Everett Sweet     Rectal cancer Father Everett Sweet     Heart disease Sister Saloni     No Known Problems Daughter      No Known Problems Daughter      No Known Problems Daughter      No Known Problems Maternal Grandmother      No Known Problems Maternal Grandfather      Colon cancer Paternal Grandmother Majo Micki         age dx unknown    No Known Problems Paternal Grandfather      Heart disease Brother Everett     Aortic aneurysm Brother Everett     Sudden  death Brother Everett     Hypertension Brother Vignesh Floyd Micki    No Known Problems Maternal Aunt      No Known Problems Maternal Aunt      No Known Problems Maternal Aunt      No Known Problems Paternal Aunt      No Known Problems Paternal Aunt      Lung cancer Paternal Uncle          age dx unknown    Throat cancer Paternal Uncle          age dx unknown    Colon polyps Neg Hx

## 2025-05-30 ENCOUNTER — TELEPHONE (OUTPATIENT)
Age: 75
End: 2025-05-30

## 2025-05-30 NOTE — TELEPHONE ENCOUNTER
"Hello,    The following message was sent via e-mail to the leadership team:     Please advise if you can help facilitate the following overbook request:    Patient Name: Maritza Gold    Patient MRN: [id number]    Call back #: [phone]    Insurance: [facility]    Department:Cardiology    Speciality: General Cardiology    Reason for overbook request: CARDIAC CLEARANCE PRIOR TO PROCEDURE (14-30 DAYS PRIOR TO PROCEDURE)    Comments (Write \"N/a\" if no comments): surgery is on 6/11/25    [facility] and [facility] [place]    Date of current appointment: 6/20/25      Thank you.      "

## 2025-05-30 NOTE — TELEPHONE ENCOUNTER
I mistakenly thought that pt was seen in 2025, but it was 5/20/24 that she was seen, so she will need an appt.

## 2025-06-02 ENCOUNTER — TELEPHONE (OUTPATIENT)
Age: 75
End: 2025-06-02

## 2025-06-02 NOTE — TELEPHONE ENCOUNTER
Patient is returning call from Lucretia. Office unavailable to assist. Please advise back to patient if needed.

## 2025-06-02 NOTE — TELEPHONE ENCOUNTER
Patient called asking that Lucretia give her a call she stated she is unable to get into cardiology prior to her back surgery.  Please call patient when able.

## 2025-06-02 NOTE — TELEPHONE ENCOUNTER
Siri from  Neurology would like a call back regarding status of patient's cardiac clearance appt for upcoming back surgery scheduled on 6/11/2025.  Overbook request was sent on 5/30/2025 as patient currently is scheduled with Fco Cook for CC on 6/20/2025.  If sooner appt is able to be scheduled, Neurology would like to be notified.     Call back # [phone]

## 2025-06-02 NOTE — TELEPHONE ENCOUNTER
Called cardio [facility]    there is a message   working on  moving her appt     will call again Wednesday   cardio  #  [phone]

## 2025-06-02 NOTE — TELEPHONE ENCOUNTER
Unsure why pt can't get in before surgery. Can someone please call cardiology. Pt needs cardiac clearance or surgery will need to be postponed.

## 2025-06-03 ENCOUNTER — ANESTHESIA EVENT (OUTPATIENT)
Dept: PERIOP | Facility: HOSPITAL | Age: 75
End: 2025-06-03
Payer: COMMERCIAL

## 2025-06-03 ENCOUNTER — TELEPHONE (OUTPATIENT)
Age: 75
End: 2025-06-03

## 2025-06-03 NOTE — PRE-PROCEDURE INSTRUCTIONS
See Geriatric Assessment below...  Cognitive Assessment: alert/ oriented  CAM:   TUG <15 sec:  Falls (last 6 months): yes  Hand  score:  -Attempt 1:  -Attempt 2:  -Attempt 3:  Lyle Total Score: 20  PHQ- 9 Depression Scale:9  Nutrition Assessment Score:13  METS:7.44  Incentive Spirometry Level:   Health goals:  -What are your overall health goals? (quit smoking, wt. loss, rest, decrease stress)stand for longer periods / take longer car rides    -What brings you strength? (family, friends, Gnosticist, health)family    -What activities are important to you? (exercise, reading, travel, work) great granchildren    Pre-Surgery Instructions:   Medication Instructions    albuterol (2.5 mg/3 mL) 0.083 % nebulizer solution Uses PRN- OK to take day of surgery    albuterol (PROVENTIL HFA,VENTOLIN HFA) 90 mcg/act inhaler Uses PRN- OK to take day of surgery    ALPRAZolam (XANAX) 0.5 mg tablet Uses PRN- OK to take day of surgery    aspirin 81 MG tablet Instructions provided by MD    escitalopram (LEXAPRO) 10 mg tablet Take day of surgery.    Fluticasone-Salmeterol (Advair Diskus) 250-50 mcg/dose inhaler Take day of surgery.    hydroCHLOROthiazide 25 mg tablet Hold day of surgery.    losartan (COZAAR) 100 MG tablet Hold day of surgery.    NAPROXEN  MG EC tablet Stop taking 3 days prior to surgery.    omeprazole (PriLOSEC) 40 MG capsule Take day of surgery.    pravastatin (PRAVACHOL) 80 mg tablet Take day of surgery.    sodium chloride 0.9 % nebulizer solution Uses PRN- OK to take day of surgery   Medication instructions for day of surgery reviewed. Please take all instructed medications with only a sip of water. Please do not take any over the counter (non-prescribed) vitamins or supplements for one week prior to date of surgery.      You will receive a call one business day prior to surgery with an arrival time and hospital directions. If your surgery is scheduled on a Monday, the hospital will be calling you on the  Friday prior to your surgery. If you have not heard from anyone by 8pm, please call the hospital supervisor through the hospital  at [phone]. ([place] [phone] or [place] [phone]).    Do not eat or drink anything after midnight the night before your surgery, including candy, mints, lifesavers, or chewing gum. Do not drink alcohol 24hrs before your surgery. Try not to smoke at least 24hrs before your surgery.       Follow the pre surgery showering instructions as listed in the “My Surgical Experience Booklet” or otherwise provided by your surgeon's office. Do not use a blade to shave the surgical area 1 week before surgery. It is okay to use a clean electric clippers up to 24 hours before surgery. Do not apply any lotions, creams, including makeup, cologne, deodorant, or perfumes after showering on the day of your surgery. Do not use dry shampoo, hair spray, hair gel, or any type of hair products.     No contact lenses, eye make-up, or artificial eyelashes. Remove nail polish, including gel polish, and any artificial, gel, or acrylic nails if possible. Remove all jewelry including rings and body piercing jewelry.     Wear causal clothing that is easy to take on and off. Consider your type of surgery.    Keep any valuables, jewelry, piercings at home. Please bring any specially ordered equipment (sling, braces) if indicated.    Arrange for a responsible person to drive you to and from the hospital on the day of your surgery. Please confirm the visitor policy for the day of your procedure when you receive your phone call with an arrival time.     Call the surgeon's office with any new illnesses, exposures, or additional questions prior to surgery.    Please reference your “My Surgical Experience Booklet” for additional information to prepare for your upcoming surgery.

## 2025-06-03 NOTE — TELEPHONE ENCOUNTER
PT CALLED TO ADVISE HER CARDIO CLEARANCE APPT FOR 6/11/2025 NSX JA IS SCHEDULED FOR 6/9/2025.    PT ALSO WANTED TO ADVISE SHE HAD AN ASTHMA ATTACK YESTERDAY 6/2/2025 BUT HAS BEEN ABLE TO MANAGE IT WITH HER INHALER/NEBULIZER.

## 2025-06-03 NOTE — TELEPHONE ENCOUNTER
Called patient to advise I received her message as well as I see her Cardio appointment is scheduled for 6/9/25 and I will keep checking her chart for the Cardiac Clearance to ensure she is cleared for surgery per Cardiology.    As for her asthma flare up, I advised patient to reach out to her PCP to review her symptoms and remind them of her upcoming surgery to ensure she would still be cleared for surgery as they may want to see her or offer additional medication to control her asthma symptoms.     Patient appreciative of all information and return call. She will call to keep us informed.

## 2025-06-04 ENCOUNTER — OFFICE VISIT (OUTPATIENT)
Dept: FAMILY MEDICINE CLINIC | Facility: HOSPITAL | Age: 75
End: 2025-06-04
Payer: COMMERCIAL

## 2025-06-04 VITALS
HEART RATE: 107 BPM | WEIGHT: 166.6 LBS | HEIGHT: 60 IN | BODY MASS INDEX: 32.71 KG/M2 | OXYGEN SATURATION: 97 % | DIASTOLIC BLOOD PRESSURE: 72 MMHG | SYSTOLIC BLOOD PRESSURE: 116 MMHG | TEMPERATURE: 97.4 F

## 2025-06-04 DIAGNOSIS — F32.0 CURRENT MILD EPISODE OF MAJOR DEPRESSIVE DISORDER WITHOUT PRIOR EPISODE (HCC): ICD-10-CM

## 2025-06-04 DIAGNOSIS — J45.41 MODERATE PERSISTENT ASTHMA WITH EXACERBATION: Primary | ICD-10-CM

## 2025-06-04 DIAGNOSIS — J45.20 MILD INTERMITTENT ASTHMA WITHOUT COMPLICATION: ICD-10-CM

## 2025-06-04 PROCEDURE — 99214 OFFICE O/P EST MOD 30 MIN: CPT | Performed by: NURSE PRACTITIONER

## 2025-06-04 PROCEDURE — G2211 COMPLEX E/M VISIT ADD ON: HCPCS | Performed by: NURSE PRACTITIONER

## 2025-06-04 RX ORDER — SODIUM CHLORIDE FOR INHALATION 0.9 %
3 VIAL, NEBULIZER (ML) INHALATION AS NEEDED
Qty: 180 ML | Refills: 0 | Status: SHIPPED | OUTPATIENT
Start: 2025-06-04

## 2025-06-04 RX ORDER — FLUTICASONE PROPIONATE AND SALMETEROL 250; 50 UG/1; UG/1
1 POWDER RESPIRATORY (INHALATION) 2 TIMES DAILY
Qty: 60 BLISTER | Refills: 1 | Status: SHIPPED | OUTPATIENT
Start: 2025-06-04

## 2025-06-04 RX ORDER — ALBUTEROL SULFATE 0.83 MG/ML
2.5 SOLUTION RESPIRATORY (INHALATION) EVERY 6 HOURS PRN
Qty: 180 ML | Refills: 0 | Status: SHIPPED | OUTPATIENT
Start: 2025-06-04 | End: 2025-09-02

## 2025-06-04 NOTE — ASSESSMENT & PLAN NOTE
Depression Screening Follow-up Plan: Patient's depression screening was positive with a PHQ-9 score of 6. Patient with underlying depression and was advised to continue current medications as prescribed.    Denies depression. No change in lexapro dose.

## 2025-06-04 NOTE — PROGRESS NOTES
Name: Maritza Gold      : 1950      MRN: [id number]  Encounter Provider: HANK Anderson  Encounter Date: 2025   Encounter department: [facility] [address]   :  Assessment & Plan  Moderate persistent asthma with exacerbation  Recent asthma attack.   Lungs were clear today.   Increase Advair to twice/day. Continue with nebulizer treatments.   Start daily Claritin as asthma likely triggered by allergies.   She is scheduled for back surgery next week. At this time it is ok to proceed.   She will call with any worsening of symptoms.        Mild intermittent asthma without complication    Orders:    albuterol (2.5 mg/3 mL) 0.083 % nebulizer solution; Take 3 mL (2.5 mg total) by nebulization every 6 (six) hours as needed for wheezing or shortness of breath    sodium chloride 0.9 % nebulizer solution; Take 3 mL by nebulization as needed for wheezing    Fluticasone-Salmeterol (Advair Diskus) 250-50 mcg/dose inhaler; Inhale 1 puff 2 (two) times a day Rinse mouth after use.    Current mild episode of major depressive disorder without prior episode ([facility])  Depression Screening Follow-up Plan: Patient's depression screening was positive with a PHQ-9 score of 6. Patient with underlying depression and was advised to continue current medications as prescribed.    Denies depression. No change in lexapro dose.                 History of Present Illness   Woke up in the middle of the night 2 nights ago with trouble breathing. Felt very sob. Unsure if she was wheezing. Felt like an asthma attack but worse one she has ever had. Has asthma. Used nebulizer and felt better. She has been coughing  a lot of mucus recently. White. No fever or chills. She does have seasonal allergies. Yesterday did a treatment before bed and no issues last night.       Review of Systems   Constitutional:  Positive for appetite change and fatigue. Negative for chills and fever.   Respiratory:  Positive for cough and  shortness of breath. Negative for wheezing.    Psychiatric/Behavioral:  Positive for sleep disturbance. Negative for dysphoric mood.        Objective   /72 (BP Location: Left arm, Patient Position: Sitting, Cuff Size: Standard)   Pulse (!) 107   Temp (!) 97.4 °F (36.3 °C)   Ht 5' (1.524 m)   Wt 75.6 kg (166 lb 9.6 oz)   SpO2 97%   BMI 32.54 kg/m²      Physical Exam  Vitals reviewed.   Constitutional:       Appearance: Normal appearance.   HENT:      Right Ear: Tympanic membrane, ear canal and external ear normal.      Left Ear: Tympanic membrane, ear canal and external ear normal.      Mouth/Throat:      Mouth: Mucous membranes are moist.      Pharynx: Oropharynx is clear.     Cardiovascular:      Rate and Rhythm: Normal rate and regular rhythm.      Heart sounds: Normal heart sounds. No murmur heard.  Pulmonary:      Effort: Pulmonary effort is normal.      Breath sounds: Normal breath sounds.   Lymphadenopathy:      Cervical: No cervical adenopathy.     Skin:     General: Skin is warm and dry.     Neurological:      Mental Status: She is alert and oriented to person, place, and time.     Psychiatric:         Mood and Affect: Mood normal.         Behavior: Behavior normal.         Thought Content: Thought content normal.         Judgment: Judgment normal.

## 2025-06-09 ENCOUNTER — OFFICE VISIT (OUTPATIENT)
Dept: CARDIOLOGY CLINIC | Facility: CLINIC | Age: 75
End: 2025-06-09
Payer: COMMERCIAL

## 2025-06-09 VITALS
HEART RATE: 97 BPM | DIASTOLIC BLOOD PRESSURE: 78 MMHG | HEIGHT: 60 IN | BODY MASS INDEX: 32.39 KG/M2 | WEIGHT: 165 LBS | SYSTOLIC BLOOD PRESSURE: 130 MMHG

## 2025-06-09 DIAGNOSIS — Z01.810 PREOPERATIVE CARDIOVASCULAR EXAMINATION: Primary | ICD-10-CM

## 2025-06-09 DIAGNOSIS — Z82.49 FAMILY HISTORY OF CARDIOVASCULAR DISEASE: ICD-10-CM

## 2025-06-09 DIAGNOSIS — I10 ESSENTIAL HYPERTENSION: ICD-10-CM

## 2025-06-09 DIAGNOSIS — E78.2 MIXED HYPERLIPIDEMIA: ICD-10-CM

## 2025-06-09 PROCEDURE — 99213 OFFICE O/P EST LOW 20 MIN: CPT | Performed by: INTERNAL MEDICINE

## 2025-06-09 NOTE — PROGRESS NOTES
"                                           Cardiology Consultation     Maritza Gold  [id number]  1950  [facility] [place] [address] [address]HAEL REYES  [address] [address] [phone]    1. Preoperative cardiovascular examination        2. Essential hypertension        3. Family history of cardiovascular disease        4. Mixed hyperlipidemia            Discussion/Summary:    Preoperative cardiovascular risk assessment - Maritza is low risk for a lumbar laminectomy and can proceed without further testing.  She had an ischemic evaluation about a year ago which was normal.  Also, her ECG obtained recently shows no significant change and is normal.  The reason a \"septal infarct\" was reported was because of lead placement.  She also has not clinically changed.    Hypertension - Well controlled on losartan and HCTZ.  She should periodically check her blood pressure at home.    Hyperlipidemia - She is on pravastatin 40 mg daily.  She does have blood work followed on a regular basis.  Her LDL is not at goal and she can discuss changing agents when she returns for follow-up with her primary cardiologist.  She will be back to see us within 6 to 8 months.    HPI:    Mrs. Gold comes in for a preoperative cardiovascular risk assessment.  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  She went and had a consultation with neurosurgery who did recommend an L4-5 laminectomy.  1 reason she is here as a preoperative visit is that she had a preoperative ECG that suggested a change.  It suggested a prior anterior septal infarct, with her ECGs in the past being normal.    Maritza was last seen by us about a year ago.  Around that time she was in the ER with atypical and what sounded like noncardiac chest pain.  She had a stress echocardiogram that was normal.  She has hypertension that is " well-controlled and hyperlipidemia in which she takes pravastatin, but her LDL is not at goal.  She also had a coronary calcium score done a few years back that showed a value of 46.      Maritza denies any specific cardiac symptoms.  She has not had any return of chest pain recently.  She has had some shortness of breath and is treated for asthma.  Last week she did have an asthma exacerbation in which she has used her nebulizer several times over the last week.  She is clinically improving from this standpoint.  She denies any orthopnea or PND.  No signs of volume overload.  She denies palpitations, lightheadedness or syncope.    Problem List[1]  Past Medical History[2]  Social History     Socioeconomic History    Marital status: /Civil Union     Spouse name: Not on file    Number of children: Not on file    Years of education: Not on file    Highest education level: Not on file   Occupational History    Not on file   Tobacco Use    Smoking status: Never    Smokeless tobacco: Never   Vaping Use    Vaping status: Never Used   Substance and Sexual Activity    Alcohol use: Not Currently     Comment: soically    Drug use: No    Sexual activity: Not on file   Other Topics Concern    Not on file   Social History Narrative    Not on file     Social Drivers of Health     Financial Resource Strain: Low Risk  (8/3/2023)    Overall Financial Resource Strain (CARDIA)     Difficulty of Paying Living Expenses: Not hard at all   Food Insecurity: No Food Insecurity (8/28/2024)    Nursing - Inadequate Food Risk Classification     Worried About Running Out of Food in the Last Year: Never true     Ran Out of Food in the Last Year: Never true     Ran Out of Food in the Last Year: Not on file   Transportation Needs: No Transportation Needs (8/28/2024)    PRAPARE - Transportation     Lack of Transportation (Medical): No     Lack of Transportation (Non-Medical): No   Physical Activity: Not on file   Stress: Not on file   Social  Connections: Not on file   Intimate Partner Violence: Not on file   Housing Stability: Low Risk  (8/28/2024)    Housing Stability Vital Sign     Unable to Pay for Housing in the Last Year: No     Number of Times Moved in the Last Year: 1     Homeless in the Last Year: No      Family History[3]  Past Surgical History[4]  Current Medications[5]  Allergies   Allergen Reactions    Succinylcholine GI Intolerance     Prolonged apnea      Amoxicillin-Pot Clavulanate Hives, Rash and Other (See Comments)    Azithromycin Hives, Rash and Other (See Comments)    Clarithromycin Hives, Nausea Only, Rash, Vomiting and Other (See Comments)     Vitals:    06/09/25 1009   BP: 130/78   BP Location: Left arm   Patient Position: Sitting   Cuff Size: Standard   Pulse: 97   Weight: 74.8 kg (165 lb)   Height: 5' (1.524 m)       Labs:  Lab Results   Component Value Date    K 3.2 (L) 05/22/2025    K 3.8 05/16/2025     05/22/2025     05/16/2025    CO2 30 05/22/2025    CO2 21 05/16/2025    BUN 22 05/22/2025    BUN 23 05/16/2025    CREATININE 0.89 05/22/2025    CALCIUM 9.4 05/22/2025     Lab Results   Component Value Date    WBC 7.91 05/22/2025    HGB 12.6 05/22/2025    HCT 40.3 05/22/2025    MCV 87 05/22/2025     05/22/2025     Lab Results   Component Value Date    TRIG 134 02/20/2025    HDL 53 02/20/2025     Imaging:   ECG obtained last month showed normal sinus rhythm and was within normal limits.    Review of Systems:  Review of Systems   Constitutional: Negative.    HENT: Negative.     Eyes: Negative.    Respiratory:  Positive for shortness of breath.    Cardiovascular: Negative.    Gastrointestinal: Negative.    Genitourinary:  Positive for vaginal pain.   Musculoskeletal:  Positive for back pain.   Skin: Negative.    Allergic/Immunologic: Negative.    Neurological: Negative.    Hematological: Negative.    Psychiatric/Behavioral: Negative.     All other systems reviewed and are negative.    Vitals:    06/09/25 1009    BP: 130/78   BP Location: Left arm   Patient Position: Sitting   Cuff Size: Standard   Pulse: 97   Weight: 74.8 kg (165 lb)   Height: 5' (1.524 m)       Physical Exam  Constitutional:       Appearance: She is well-developed.   HENT:      Head: Normocephalic and atraumatic.     Eyes:      General: No scleral icterus.        Right eye: No discharge.         Left eye: No discharge.      Pupils: Pupils are equal, round, and reactive to light.     Neck:      Thyroid: No thyromegaly.      Vascular: No JVD.     Cardiovascular:      Rate and Rhythm: Normal rate and regular rhythm. No extrasystoles are present.     Pulses: Normal pulses. No decreased pulses.      Heart sounds: Normal heart sounds, S1 normal and S2 normal. No murmur heard.     No friction rub. No gallop.   Pulmonary:      Effort: Pulmonary effort is normal. No respiratory distress.      Breath sounds: Normal breath sounds. No wheezing, rhonchi or rales.   Abdominal:      General: Bowel sounds are normal. There is no distension.      Palpations: Abdomen is soft.      Tenderness: There is no abdominal tenderness.     Musculoskeletal:         General: No tenderness or deformity. Normal range of motion.      Cervical back: Normal range of motion and neck supple.      Right lower leg: No edema.      Left lower leg: No edema.     Skin:     General: Skin is warm and dry.      Findings: No rash.     Neurological:      Mental Status: She is alert and oriented to person, place, and time.      Cranial Nerves: No cranial nerve deficit.     Psychiatric:         Thought Content: Thought content normal.         Judgment: Judgment normal.       Counseling / Coordination of Care  Total office time spent today 30 minutes.  Greater than 50% of total time was spent with the patient and / or family counseling and / or coordination of care.           [1]   Patient Active Problem List  Diagnosis    Essential hypertension    Generalized anxiety disorder    Mixed hyperlipidemia     Osteopenia    Current mild episode of major depressive disorder without prior episode (HCC)    Pes anserinus bursitis of left knee    Chronic sinusitis    Thyroid nodule    Family history of cardiovascular disease    Elevated coronary artery calcium score    Gastroesophageal reflux disease    Chronic idiopathic constipation    Family history of colon cancer in father    Moderate persistent asthma without complication    Symptomatic varicose veins of right lower extremity    Spider veins of both lower extremities    Pes anserine bursitis    Primary osteoarthritis of right knee    Effusion of right knee    IFG (impaired fasting glucose)    Nasal congestion    Other diseases of larynx    Other polyp of sinus    Lumbar radiculopathy    Balance problem    Eosinophilia   [2]   Past Medical History:  Diagnosis Date    Anxiety     Arthritis     Asthma     C. difficile enteritis     years ago    Chest pain     2024    Depression     Dizziness 01/18/2022    GERD (gastroesophageal reflux disease)     History of acute respiratory failure     History of recent fall     fell out of bed    Hyperlipidemia     Hypertension     Incontinence of urine     Pneumonia     Respiratory disorder 2013    Sleep apnea     borderline    Thyroid nodule     Varicose veins of both lower extremities    [3]   Family History  Problem Relation Name Age of Onset    Cirrhosis Mother Maritza Martínez     Colon cancer Father Everett Sweet         age dx unknown    Heart disease Father Everett Sweet     Heart failure Father Everett Sweet     Diabetes Father Everett Sweet     Rectal cancer Father Everett Sweet     Heart disease Sister Saloni     No Known Problems Daughter      No Known Problems Daughter      No Known Problems Daughter      No Known Problems Maternal Grandmother      No Known Problems Maternal Grandfather      Colon cancer Paternal Grandmother Majo Sweet         age dx unknown    No Known Problems Paternal Grandfather       Heart disease Brother Everett     Aortic aneurysm Brother Everett     Sudden death Brother Everett     Hypertension Brother Vignesh Sweet    No Known Problems Maternal Aunt      No Known Problems Maternal Aunt      No Known Problems Maternal Aunt      No Known Problems Paternal Aunt      No Known Problems Paternal Aunt      Lung cancer Paternal Uncle          age dx unknown    Throat cancer Paternal Uncle          age dx unknown    Colon polyps Neg Hx     [4]   Past Surgical History:  Procedure Laterality Date    BRONCHOSCOPY      COLONOSCOPY  2017    FRACTURE SURGERY Bilateral     wrist    NASAL SINUS SURGERY Bilateral 09/2017    SINUS SURGERY      TUBAL LIGATION      WISDOM TOOTH EXTRACTION     [5]   Current Outpatient Medications:     albuterol (2.5 mg/3 mL) 0.083 % nebulizer solution, Take 3 mL (2.5 mg total) by nebulization every 6 (six) hours as needed for wheezing or shortness of breath, Disp: 180 mL, Rfl: 0    albuterol (PROVENTIL HFA,VENTOLIN HFA) 90 mcg/act inhaler, Inhale 2 puffs every 6 (six) hours as needed for wheezing, Disp: 9 g, Rfl: 1    ALPRAZolam (XANAX) 0.5 mg tablet, Take 0.5 mg by mouth if needed, Disp: , Rfl:     aspirin 81 MG tablet, Take 81 mg by mouth daily, Disp: , Rfl:     escitalopram (LEXAPRO) 10 mg tablet, TAKE 1 TABLET BY MOUTH EVERY DAY, Disp: 90 tablet, Rfl: 1    Fluticasone-Salmeterol (Advair Diskus) 250-50 mcg/dose inhaler, Inhale 1 puff 2 (two) times a day Rinse mouth after use., Disp: 60 blister, Rfl: 1    hydroCHLOROthiazide 25 mg tablet, TAKE 1 TABLET (25 MG TOTAL) BY MOUTH DAILY., Disp: 90 tablet, Rfl: 1    NAPROXEN  MG EC tablet, Take 500 mg by mouth if needed, Disp: , Rfl:     omeprazole (PriLOSEC) 40 MG capsule, TAKE 1 CAPSULE (40 MG TOTAL) BY MOUTH DAILY., Disp: 30 capsule, Rfl: 2    pravastatin (PRAVACHOL) 80 mg tablet, TAKE 1/2 TABLET BY MOUTH EVERY DAY, Disp: 45 tablet, Rfl: 1    sodium chloride 0.9 % nebulizer solution, Take 3 mL by  nebulization as needed for wheezing, Disp: 180 mL, Rfl: 0    losartan (COZAAR) 100 MG tablet, TAKE 1 TABLET BY MOUTH EVERY DAY, Disp: 90 tablet, Rfl: 1    Mupirocin POWD, Inhale 30 mg in the morning and 30 mg before bedtime. (Patient not taking: Reported on 6/3/2025), Disp: , Rfl:

## 2025-06-11 ENCOUNTER — ANESTHESIA (OUTPATIENT)
Dept: PERIOP | Facility: HOSPITAL | Age: 75
End: 2025-06-11
Payer: COMMERCIAL

## 2025-06-11 ENCOUNTER — HOSPITAL ENCOUNTER (INPATIENT)
Facility: HOSPITAL | Age: 75
LOS: 3 days | Discharge: HOME HEALTH | End: 2025-06-14
Attending: STUDENT IN AN ORGANIZED HEALTH CARE EDUCATION/TRAINING PROGRAM | Admitting: STUDENT IN AN ORGANIZED HEALTH CARE EDUCATION/TRAINING PROGRAM
Payer: COMMERCIAL

## 2025-06-11 ENCOUNTER — APPOINTMENT (OUTPATIENT)
Dept: RADIOLOGY | Facility: HOSPITAL | Age: 75
End: 2025-06-11
Payer: COMMERCIAL

## 2025-06-11 DIAGNOSIS — M54.16 LUMBAR RADICULOPATHY: Primary | ICD-10-CM

## 2025-06-11 LAB
ABO GROUP BLD: NORMAL
PLATELET # BLD AUTO: 225 THOUSANDS/UL (ref 149–390)
PMV BLD AUTO: 9.1 FL (ref 8.9–12.7)
RH BLD: NEGATIVE

## 2025-06-11 PROCEDURE — C1713 ANCHOR/SCREW BN/BN,TIS/BN: HCPCS | Performed by: STUDENT IN AN ORGANIZED HEALTH CARE EDUCATION/TRAINING PROGRAM

## 2025-06-11 PROCEDURE — 01NB0ZZ RELEASE LUMBAR NERVE, OPEN APPROACH: ICD-10-PCS | Performed by: STUDENT IN AN ORGANIZED HEALTH CARE EDUCATION/TRAINING PROGRAM

## 2025-06-11 PROCEDURE — 85049 AUTOMATED PLATELET COUNT: CPT | Performed by: PHYSICIAN ASSISTANT

## 2025-06-11 PROCEDURE — 20936 SP BONE AGRFT LOCAL ADD-ON: CPT | Performed by: PHYSICIAN ASSISTANT

## 2025-06-11 PROCEDURE — 99024 POSTOP FOLLOW-UP VISIT: CPT | Performed by: STUDENT IN AN ORGANIZED HEALTH CARE EDUCATION/TRAINING PROGRAM

## 2025-06-11 PROCEDURE — 61783 SCAN PROC SPINAL: CPT | Performed by: STUDENT IN AN ORGANIZED HEALTH CARE EDUCATION/TRAINING PROGRAM

## 2025-06-11 PROCEDURE — C1781 MESH (IMPLANTABLE): HCPCS | Performed by: STUDENT IN AN ORGANIZED HEALTH CARE EDUCATION/TRAINING PROGRAM

## 2025-06-11 PROCEDURE — 0SG00AJ FUSION OF LUMBAR VERTEBRAL JOINT WITH INTERBODY FUSION DEVICE, POSTERIOR APPROACH, ANTERIOR COLUMN, OPEN APPROACH: ICD-10-PCS | Performed by: STUDENT IN AN ORGANIZED HEALTH CARE EDUCATION/TRAINING PROGRAM

## 2025-06-11 PROCEDURE — 72100 X-RAY EXAM L-S SPINE 2/3 VWS: CPT

## 2025-06-11 PROCEDURE — 8E0WXBZ COMPUTER ASSISTED PROCEDURE OF TRUNK REGION: ICD-10-PCS | Performed by: STUDENT IN AN ORGANIZED HEALTH CARE EDUCATION/TRAINING PROGRAM

## 2025-06-11 PROCEDURE — 94760 N-INVAS EAR/PLS OXIMETRY 1: CPT

## 2025-06-11 PROCEDURE — 20930 SP BONE ALGRFT MORSEL ADD-ON: CPT | Performed by: PHYSICIAN ASSISTANT

## 2025-06-11 PROCEDURE — 0SB20ZZ EXCISION OF LUMBAR VERTEBRAL DISC, OPEN APPROACH: ICD-10-PCS | Performed by: STUDENT IN AN ORGANIZED HEALTH CARE EDUCATION/TRAINING PROGRAM

## 2025-06-11 PROCEDURE — 20936 SP BONE AGRFT LOCAL ADD-ON: CPT | Performed by: STUDENT IN AN ORGANIZED HEALTH CARE EDUCATION/TRAINING PROGRAM

## 2025-06-11 PROCEDURE — 22853 INSJ BIOMECHANICAL DEVICE: CPT | Performed by: PHYSICIAN ASSISTANT

## 2025-06-11 PROCEDURE — 22214 INCIS 1 VERTEBRAL SEG LUMBAR: CPT | Performed by: STUDENT IN AN ORGANIZED HEALTH CARE EDUCATION/TRAINING PROGRAM

## 2025-06-11 PROCEDURE — 22853 INSJ BIOMECHANICAL DEVICE: CPT | Performed by: STUDENT IN AN ORGANIZED HEALTH CARE EDUCATION/TRAINING PROGRAM

## 2025-06-11 PROCEDURE — 20930 SP BONE ALGRFT MORSEL ADD-ON: CPT | Performed by: STUDENT IN AN ORGANIZED HEALTH CARE EDUCATION/TRAINING PROGRAM

## 2025-06-11 PROCEDURE — 22633 ARTHRD CMBN 1NTRSPC LUMBAR: CPT | Performed by: STUDENT IN AN ORGANIZED HEALTH CARE EDUCATION/TRAINING PROGRAM

## 2025-06-11 PROCEDURE — 61783 SCAN PROC SPINAL: CPT | Performed by: PHYSICIAN ASSISTANT

## 2025-06-11 PROCEDURE — NC001 PR NO CHARGE: Performed by: PHYSICIAN ASSISTANT

## 2025-06-11 PROCEDURE — 22633 ARTHRD CMBN 1NTRSPC LUMBAR: CPT | Performed by: PHYSICIAN ASSISTANT

## 2025-06-11 PROCEDURE — 94664 DEMO&/EVAL PT USE INHALER: CPT

## 2025-06-11 PROCEDURE — 0SG00J1 FUSION OF LUMBAR VERTEBRAL JOINT WITH SYNTHETIC SUBSTITUTE, POSTERIOR APPROACH, POSTERIOR COLUMN, OPEN APPROACH: ICD-10-PCS | Performed by: STUDENT IN AN ORGANIZED HEALTH CARE EDUCATION/TRAINING PROGRAM

## 2025-06-11 PROCEDURE — 22840 INSERT SPINE FIXATION DEVICE: CPT | Performed by: PHYSICIAN ASSISTANT

## 2025-06-11 PROCEDURE — 00NY0ZZ RELEASE LUMBAR SPINAL CORD, OPEN APPROACH: ICD-10-PCS | Performed by: STUDENT IN AN ORGANIZED HEALTH CARE EDUCATION/TRAINING PROGRAM

## 2025-06-11 PROCEDURE — 22214 INCIS 1 VERTEBRAL SEG LUMBAR: CPT | Performed by: PHYSICIAN ASSISTANT

## 2025-06-11 PROCEDURE — 22840 INSERT SPINE FIXATION DEVICE: CPT | Performed by: STUDENT IN AN ORGANIZED HEALTH CARE EDUCATION/TRAINING PROGRAM

## 2025-06-11 DEVICE — SCREW 54840046545 4.75 ATS MAS 6.5X45
Type: IMPLANTABLE DEVICE | Site: SPINE LUMBAR | Status: FUNCTIONAL
Brand: CD HORIZON® SPINAL SYSTEM

## 2025-06-11 DEVICE — GRAFT DBF WITH DELIVERY TUBES 12ML: Type: IMPLANTABLE DEVICE | Site: SPINE LUMBAR | Status: FUNCTIONAL

## 2025-06-11 DEVICE — SPACER 6068073 CATALYFT PL SHORT 7MM
Type: IMPLANTABLE DEVICE | Site: SPINE LUMBAR | Status: FUNCTIONAL
Brand: CATALYFT PL EXPANDABLE INTERBODY SYSTEM

## 2025-06-11 RX ORDER — SODIUM CHLORIDE, SODIUM LACTATE, POTASSIUM CHLORIDE, CALCIUM CHLORIDE 600; 310; 30; 20 MG/100ML; MG/100ML; MG/100ML; MG/100ML
INJECTION, SOLUTION INTRAVENOUS CONTINUOUS PRN
Status: DISCONTINUED | OUTPATIENT
Start: 2025-06-11 | End: 2025-06-11

## 2025-06-11 RX ORDER — FENTANYL CITRATE 50 UG/ML
INJECTION, SOLUTION INTRAMUSCULAR; INTRAVENOUS AS NEEDED
Status: DISCONTINUED | OUTPATIENT
Start: 2025-06-11 | End: 2025-06-11

## 2025-06-11 RX ORDER — PROPOFOL 10 MG/ML
INJECTION, EMULSION INTRAVENOUS CONTINUOUS PRN
Status: DISCONTINUED | OUTPATIENT
Start: 2025-06-11 | End: 2025-06-11

## 2025-06-11 RX ORDER — PRAVASTATIN SODIUM 40 MG
40 TABLET ORAL DAILY
Status: DISCONTINUED | OUTPATIENT
Start: 2025-06-12 | End: 2025-06-14 | Stop reason: HOSPADM

## 2025-06-11 RX ORDER — PROPOFOL 10 MG/ML
INJECTION, EMULSION INTRAVENOUS AS NEEDED
Status: DISCONTINUED | OUTPATIENT
Start: 2025-06-11 | End: 2025-06-11

## 2025-06-11 RX ORDER — DEXAMETHASONE SODIUM PHOSPHATE 10 MG/ML
INJECTION, SOLUTION INTRAMUSCULAR; INTRAVENOUS AS NEEDED
Status: DISCONTINUED | OUTPATIENT
Start: 2025-06-11 | End: 2025-06-11

## 2025-06-11 RX ORDER — ALBUTEROL SULFATE 0.83 MG/ML
2.5 SOLUTION RESPIRATORY (INHALATION) EVERY 6 HOURS PRN
Status: DISCONTINUED | OUTPATIENT
Start: 2025-06-11 | End: 2025-06-14 | Stop reason: HOSPADM

## 2025-06-11 RX ORDER — SODIUM CHLORIDE 9 MG/ML
100 INJECTION, SOLUTION INTRAVENOUS CONTINUOUS
Status: DISCONTINUED | OUTPATIENT
Start: 2025-06-11 | End: 2025-06-12

## 2025-06-11 RX ORDER — ONDANSETRON 2 MG/ML
INJECTION INTRAMUSCULAR; INTRAVENOUS AS NEEDED
Status: DISCONTINUED | OUTPATIENT
Start: 2025-06-11 | End: 2025-06-11

## 2025-06-11 RX ORDER — LOSARTAN POTASSIUM 50 MG/1
100 TABLET ORAL DAILY
Status: DISCONTINUED | OUTPATIENT
Start: 2025-06-12 | End: 2025-06-14 | Stop reason: HOSPADM

## 2025-06-11 RX ORDER — CEFAZOLIN SODIUM 2 G/50ML
2000 SOLUTION INTRAVENOUS EVERY 8 HOURS
Status: COMPLETED | OUTPATIENT
Start: 2025-06-11 | End: 2025-06-12

## 2025-06-11 RX ORDER — ALPRAZOLAM 0.5 MG
0.5 TABLET ORAL
Status: DISCONTINUED | OUTPATIENT
Start: 2025-06-11 | End: 2025-06-14 | Stop reason: HOSPADM

## 2025-06-11 RX ORDER — FENTANYL CITRATE/PF 50 MCG/ML
50 SYRINGE (ML) INJECTION
Status: DISCONTINUED | OUTPATIENT
Start: 2025-06-11 | End: 2025-06-11 | Stop reason: HOSPADM

## 2025-06-11 RX ORDER — SENNOSIDES 8.6 MG
1 TABLET ORAL
Status: DISCONTINUED | OUTPATIENT
Start: 2025-06-11 | End: 2025-06-14 | Stop reason: HOSPADM

## 2025-06-11 RX ORDER — LIDOCAINE HYDROCHLORIDE 10 MG/ML
INJECTION, SOLUTION EPIDURAL; INFILTRATION; INTRACAUDAL; PERINEURAL AS NEEDED
Status: DISCONTINUED | OUTPATIENT
Start: 2025-06-11 | End: 2025-06-11

## 2025-06-11 RX ORDER — SODIUM CHLORIDE 9 MG/ML
125 INJECTION, SOLUTION INTRAVENOUS CONTINUOUS
Status: DISCONTINUED | OUTPATIENT
Start: 2025-06-11 | End: 2025-06-11 | Stop reason: SDUPTHER

## 2025-06-11 RX ORDER — HYDROCHLOROTHIAZIDE 25 MG/1
25 TABLET ORAL DAILY
Status: DISCONTINUED | OUTPATIENT
Start: 2025-06-11 | End: 2025-06-14 | Stop reason: HOSPADM

## 2025-06-11 RX ORDER — LIDOCAINE HYDROCHLORIDE AND EPINEPHRINE 10; 10 MG/ML; UG/ML
INJECTION, SOLUTION INFILTRATION; PERINEURAL AS NEEDED
Status: DISCONTINUED | OUTPATIENT
Start: 2025-06-11 | End: 2025-06-11 | Stop reason: HOSPADM

## 2025-06-11 RX ORDER — ROCURONIUM BROMIDE 10 MG/ML
INJECTION, SOLUTION INTRAVENOUS AS NEEDED
Status: DISCONTINUED | OUTPATIENT
Start: 2025-06-11 | End: 2025-06-11

## 2025-06-11 RX ORDER — ACETAMINOPHEN 325 MG/1
975 TABLET ORAL EVERY 8 HOURS SCHEDULED
Status: DISCONTINUED | OUTPATIENT
Start: 2025-06-11 | End: 2025-06-14 | Stop reason: HOSPADM

## 2025-06-11 RX ORDER — CHLORHEXIDINE GLUCONATE ORAL RINSE 1.2 MG/ML
15 SOLUTION DENTAL ONCE
Status: COMPLETED | OUTPATIENT
Start: 2025-06-11 | End: 2025-06-11

## 2025-06-11 RX ORDER — FLUTICASONE FUROATE AND VILANTEROL 200; 25 UG/1; UG/1
1 POWDER RESPIRATORY (INHALATION)
Status: DISCONTINUED | OUTPATIENT
Start: 2025-06-12 | End: 2025-06-14 | Stop reason: HOSPADM

## 2025-06-11 RX ORDER — OXYCODONE HYDROCHLORIDE 5 MG/1
5 TABLET ORAL EVERY 4 HOURS PRN
Status: COMPLETED | OUTPATIENT
Start: 2025-06-11 | End: 2025-06-11

## 2025-06-11 RX ORDER — METHOCARBAMOL 500 MG/1
500 TABLET, FILM COATED ORAL EVERY 6 HOURS SCHEDULED
Status: DISCONTINUED | OUTPATIENT
Start: 2025-06-11 | End: 2025-06-13

## 2025-06-11 RX ORDER — HEPARIN SODIUM 5000 [USP'U]/ML
5000 INJECTION, SOLUTION INTRAVENOUS; SUBCUTANEOUS EVERY 8 HOURS SCHEDULED
Status: DISCONTINUED | OUTPATIENT
Start: 2025-06-12 | End: 2025-06-14 | Stop reason: HOSPADM

## 2025-06-11 RX ORDER — NALOXONE HYDROCHLORIDE 0.4 MG/ML
0.04 INJECTION, SOLUTION INTRAMUSCULAR; INTRAVENOUS; SUBCUTANEOUS ONCE AS NEEDED
Status: DISCONTINUED | OUTPATIENT
Start: 2025-06-11 | End: 2025-06-14 | Stop reason: HOSPADM

## 2025-06-11 RX ORDER — OXYCODONE HYDROCHLORIDE 5 MG/1
5 TABLET ORAL EVERY 4 HOURS PRN
Refills: 0 | Status: DISCONTINUED | OUTPATIENT
Start: 2025-06-11 | End: 2025-06-14 | Stop reason: HOSPADM

## 2025-06-11 RX ORDER — BUPIVACAINE HYDROCHLORIDE 2.5 MG/ML
INJECTION, SOLUTION EPIDURAL; INFILTRATION; INTRACAUDAL; PERINEURAL AS NEEDED
Status: DISCONTINUED | OUTPATIENT
Start: 2025-06-11 | End: 2025-06-11 | Stop reason: HOSPADM

## 2025-06-11 RX ORDER — DOCUSATE SODIUM 100 MG/1
100 CAPSULE, LIQUID FILLED ORAL 2 TIMES DAILY
Status: DISCONTINUED | OUTPATIENT
Start: 2025-06-11 | End: 2025-06-14 | Stop reason: HOSPADM

## 2025-06-11 RX ORDER — ALBUTEROL SULFATE 90 UG/1
2 INHALANT RESPIRATORY (INHALATION) EVERY 6 HOURS PRN
Status: DISCONTINUED | OUTPATIENT
Start: 2025-06-11 | End: 2025-06-14 | Stop reason: HOSPADM

## 2025-06-11 RX ORDER — HYDROMORPHONE HCL/PF 1 MG/ML
0.5 SYRINGE (ML) INJECTION
Status: DISCONTINUED | OUTPATIENT
Start: 2025-06-11 | End: 2025-06-13

## 2025-06-11 RX ORDER — PANTOPRAZOLE SODIUM 40 MG/1
40 TABLET, DELAYED RELEASE ORAL
Status: DISCONTINUED | OUTPATIENT
Start: 2025-06-12 | End: 2025-06-14 | Stop reason: HOSPADM

## 2025-06-11 RX ORDER — MIDAZOLAM HYDROCHLORIDE 2 MG/2ML
INJECTION, SOLUTION INTRAMUSCULAR; INTRAVENOUS AS NEEDED
Status: DISCONTINUED | OUTPATIENT
Start: 2025-06-11 | End: 2025-06-11

## 2025-06-11 RX ORDER — ONDANSETRON 2 MG/ML
4 INJECTION INTRAMUSCULAR; INTRAVENOUS EVERY 6 HOURS PRN
Status: DISCONTINUED | OUTPATIENT
Start: 2025-06-11 | End: 2025-06-14 | Stop reason: HOSPADM

## 2025-06-11 RX ORDER — ESCITALOPRAM OXALATE 10 MG/1
10 TABLET ORAL DAILY
Status: DISCONTINUED | OUTPATIENT
Start: 2025-06-12 | End: 2025-06-14 | Stop reason: HOSPADM

## 2025-06-11 RX ORDER — PHENYLEPHRINE HCL IN 0.9% NACL 1 MG/10 ML
SYRINGE (ML) INTRAVENOUS AS NEEDED
Status: DISCONTINUED | OUTPATIENT
Start: 2025-06-11 | End: 2025-06-11

## 2025-06-11 RX ORDER — OXYCODONE HYDROCHLORIDE 10 MG/1
10 TABLET ORAL EVERY 4 HOURS PRN
Refills: 0 | Status: DISCONTINUED | OUTPATIENT
Start: 2025-06-11 | End: 2025-06-14 | Stop reason: HOSPADM

## 2025-06-11 RX ORDER — ONDANSETRON 2 MG/ML
4 INJECTION INTRAMUSCULAR; INTRAVENOUS ONCE AS NEEDED
Status: DISCONTINUED | OUTPATIENT
Start: 2025-06-11 | End: 2025-06-11 | Stop reason: HOSPADM

## 2025-06-11 RX ORDER — OXYCODONE HYDROCHLORIDE 10 MG/1
10 TABLET ORAL EVERY 4 HOURS PRN
Status: COMPLETED | OUTPATIENT
Start: 2025-06-11 | End: 2025-06-11

## 2025-06-11 RX ORDER — HYDRALAZINE HYDROCHLORIDE 20 MG/ML
5 INJECTION INTRAMUSCULAR; INTRAVENOUS EVERY 6 HOURS PRN
Status: DISCONTINUED | OUTPATIENT
Start: 2025-06-11 | End: 2025-06-13

## 2025-06-11 RX ORDER — CEFAZOLIN SODIUM 2 G/50ML
2000 SOLUTION INTRAVENOUS ONCE
Status: COMPLETED | OUTPATIENT
Start: 2025-06-11 | End: 2025-06-11

## 2025-06-11 RX ORDER — ACETAMINOPHEN 10 MG/ML
1000 INJECTION, SOLUTION INTRAVENOUS ONCE
Status: COMPLETED | OUTPATIENT
Start: 2025-06-11 | End: 2025-06-11

## 2025-06-11 RX ADMIN — METHOCARBAMOL 500 MG: 500 TABLET ORAL at 14:01

## 2025-06-11 RX ADMIN — ACETAMINOPHEN 1000 MG: 10 INJECTION INTRAVENOUS at 12:22

## 2025-06-11 RX ADMIN — PROPOFOL 150 MG: 10 INJECTION, EMULSION INTRAVENOUS at 07:46

## 2025-06-11 RX ADMIN — CEFAZOLIN SODIUM 2000 MG: 2 SOLUTION INTRAVENOUS at 08:25

## 2025-06-11 RX ADMIN — SODIUM CHLORIDE 100 ML/HR: 0.9 INJECTION, SOLUTION INTRAVENOUS at 23:38

## 2025-06-11 RX ADMIN — CHLORHEXIDINE GLUCONATE 15 ML: 1.2 SOLUTION ORAL at 06:35

## 2025-06-11 RX ADMIN — ONDANSETRON 4 MG: 2 INJECTION, SOLUTION INTRAMUSCULAR; INTRAVENOUS at 07:40

## 2025-06-11 RX ADMIN — PROPOFOL 100 MCG/KG/MIN: 10 INJECTION, EMULSION INTRAVENOUS at 07:51

## 2025-06-11 RX ADMIN — METHOCARBAMOL 500 MG: 500 TABLET ORAL at 17:59

## 2025-06-11 RX ADMIN — ROCURONIUM 30 MG: 50 INJECTION, SOLUTION INTRAVENOUS at 08:42

## 2025-06-11 RX ADMIN — ACETAMINOPHEN 975 MG: 325 TABLET, FILM COATED ORAL at 21:07

## 2025-06-11 RX ADMIN — OXYCODONE HYDROCHLORIDE 10 MG: 10 TABLET ORAL at 14:02

## 2025-06-11 RX ADMIN — REMIFENTANIL HYDROCHLORIDE 0.1 MCG/KG/MIN: 1 INJECTION, POWDER, LYOPHILIZED, FOR SOLUTION INTRAVENOUS at 07:51

## 2025-06-11 RX ADMIN — FENTANYL CITRATE 100 MCG: 50 INJECTION INTRAMUSCULAR; INTRAVENOUS at 07:46

## 2025-06-11 RX ADMIN — Medication 100 MCG: at 08:04

## 2025-06-11 RX ADMIN — SODIUM CHLORIDE, SODIUM LACTATE, POTASSIUM CHLORIDE, CALCIUM CHLORIDE: 600; 310; 30; 20 INJECTION, SOLUTION INTRAVENOUS at 07:53

## 2025-06-11 RX ADMIN — METHOCARBAMOL 500 MG: 500 TABLET ORAL at 23:36

## 2025-06-11 RX ADMIN — MIDAZOLAM 2 MG: 1 INJECTION INTRAMUSCULAR; INTRAVENOUS at 07:40

## 2025-06-11 RX ADMIN — LIDOCAINE HYDROCHLORIDE 50 MG: 10 INJECTION, SOLUTION EPIDURAL; INFILTRATION; INTRACAUDAL; PERINEURAL at 07:46

## 2025-06-11 RX ADMIN — FENTANYL CITRATE 50 MCG: 50 INJECTION INTRAMUSCULAR; INTRAVENOUS at 11:43

## 2025-06-11 RX ADMIN — OXYCODONE HYDROCHLORIDE 10 MG: 10 TABLET ORAL at 21:23

## 2025-06-11 RX ADMIN — Medication 100 MCG: at 08:06

## 2025-06-11 RX ADMIN — CEFAZOLIN SODIUM 2000 MG: 2 SOLUTION INTRAVENOUS at 15:59

## 2025-06-11 RX ADMIN — FENTANYL CITRATE 50 MCG: 50 INJECTION INTRAMUSCULAR; INTRAVENOUS at 11:55

## 2025-06-11 RX ADMIN — DOCUSATE SODIUM 100 MG: 100 CAPSULE, LIQUID FILLED ORAL at 17:59

## 2025-06-11 RX ADMIN — SODIUM CHLORIDE, SODIUM LACTATE, POTASSIUM CHLORIDE, AND CALCIUM CHLORIDE: .6; .31; .03; .02 INJECTION, SOLUTION INTRAVENOUS at 07:30

## 2025-06-11 RX ADMIN — DEXAMETHASONE SODIUM PHOSPHATE 10 MG: 10 INJECTION, SOLUTION INTRAMUSCULAR; INTRAVENOUS at 07:55

## 2025-06-11 RX ADMIN — CEFAZOLIN SODIUM 2000 MG: 2 SOLUTION INTRAVENOUS at 23:36

## 2025-06-11 RX ADMIN — HYDROCHLOROTHIAZIDE 25 MG: 25 TABLET ORAL at 15:59

## 2025-06-11 RX ADMIN — SODIUM CHLORIDE 100 ML/HR: 0.9 INJECTION, SOLUTION INTRAVENOUS at 13:41

## 2025-06-11 NOTE — DISCHARGE INSTR - AVS FIRST PAGE
Discharge Instructions  Posterior Lumbar Fusion/Fixation      Surgical incisional care:  Maintain dressing in place for 3 days. On postoperative day 3, dressing may be removed and incision may be left open to air.  Keep incision clean and dry. Avoid applying creams, lotion or antiseptic to incision area.  Check the wound daily. If the incision becomes red, swollen, tender, warm, or has increased drainage please notify physician immediately.  If steri-strips are in place, allow them to fall off.  If still in place at two week postoperative visit, we will remove them.  May shower 3 days after surgery, but do not soak in a tub and no swimming until cleared.  Incision may be cleaned with water and a mild antimicrobial soap using a clean washcloth. Incision is to be gently patted dry with a clean towel.   Continue to change bed linens and pajamas more frequently. Wear clean clothes daily.     Activity Restrictions:  No heavy lifting greater than 10lbs and no strenuous activities until cleared.   No bending or twisting. No BLTs (bending, lifting, twisting). Avoid pushing/pulling movements.  May walk as tolerated. Encourage at least 4 short walks per day. No prolonged sitting.   No driving for at least 2 weeks or until cleared by physician.  If you were provided a brace, it is to be worn whenever you are out of bed until directed otherwise. It may be put on while sitting at bedside.     Postoperative medication:  Take pain medications to relieve incision pain, and muscle relaxants to prevent spasms as directed. Please see after visit summary (AVS) for details.   Do not operate heavy machinery or vehicles while taking sedating medications.  Use a bowel regimen while on opioids as they induce constipation. Ie. Senokot-S, Miralax, Colace, etc. Increase fiber and water intake.   Do not take ibuprofen, Naproxen/Aleve or any non steroidal anti-inflammatory medications, NSAIDs, until cleared by surgeon. May take Tylenol  instead.  If taking Coumadin, Aspirin, or Plavix, you may resume these medications when cleared by Neurosurgery.    Follow-up as scheduled for a 2 week incision check. Follow-up as scheduled for 6 week postoperative visit with repeat Xrays.     **Please notify MD if you experience a fever 101F, chills or have increased pain, numbness, tingling, or weakness in your legs, increased walking difficulties and/or bowel/bladder dysfunction/incontinence

## 2025-06-11 NOTE — ANESTHESIA POSTPROCEDURE EVALUATION
Post-Op Assessment Note    CV Status:  Stable  Pain Score: 0    Pain management: adequate    Multimodal analgesia used between 6 hours prior to anesthesia start to PACU discharge    Mental Status:  Sleepy   Hydration Status:  Stable   PONV Controlled:  None   Airway Patency:  Patent  Airway: intubated  There is a medical reason for not screening for obstructive sleep apnea and/or for not using two or more mitigation strategies   Post Op Vitals Reviewed: Yes    No anethesia notable event occurred.    Staff: CRNA           Last Filed PACU Vitals:  Vitals Value Taken Time   Temp     Pulse     BP     Resp     SpO2

## 2025-06-11 NOTE — ANESTHESIA POSTPROCEDURE EVALUATION
Post-Op Assessment Note    CV Status:  Stable  Pain Score: 9    Pain management: inadequate (pain relief to continue while in room)       Mental Status:  Alert and awake   Hydration Status:  Stable   PONV Controlled:  None   Airway Patency:  Patent     Post Op Vitals Reviewed: Yes    No anethesia notable event occurred.    Staff: Anesthesiologist           Last Filed PACU Vitals:  Vitals Value Taken Time   Temp     Pulse 82 06/11/25 12:54   /86 06/11/25 12:53   Resp 13 06/11/25 12:54   SpO2 95 % 06/11/25 12:54   Vitals shown include unfiled device data.    Modified Nini:     Vitals Value Taken Time   Activity 2 06/11/25 12:45   Respiration 2 06/11/25 12:45   Circulation 2 06/11/25 12:45   Consciousness 2 06/11/25 12:45   Oxygen Saturation 2 06/11/25 12:45     Modified Nini Score: 10

## 2025-06-11 NOTE — ASSESSMENT & PLAN NOTE
Home dose hydrochlorothiazide 25mg tab and losartan 100mg tab once daily ordered   PRN Hydralazine 5mg ordered if SBP > 150mmHg

## 2025-06-11 NOTE — ANESTHESIA PREPROCEDURE EVALUATION
"Procedure:  L4-5 transforaminal lumbar interbody fusion, with neuromonitoring and neuronavigation (Spine Lumbar)    Relevant Problems   CARDIO   (+) Elevated coronary artery calcium score   (+) Essential hypertension   (+) Mixed hyperlipidemia      GI/HEPATIC   (+) Gastroesophageal reflux disease      NEURO/PSYCH   (+) Generalized anxiety disorder      PULMONARY   (+) Moderate persistent asthma without complication      Preop cardiac evaluation: patient is low-risk for procedure. EKG mention of \"septal infarct\" related to lead placement    Medical evaluation (6/4/25)  -Patient's asthma treatment escalated due to recent asthma exacerbation that woke her up from sleep  -patient with more frequent coughing of clear mucous. No other symptoms    STRESS ECHO (8/2024)    Stress ECG: A Rey protocol stress test was performed. Overall, the patient's exercise capacity was normal for their age. The patient reached stage 2.0of the protocol after exercising for 6 min and 1 sec and had a maximal HR of 162 bpm (111% of MPHR) and 7.0 METS. The patient experienced no angina during the test. The patient requested the test to be stopped due to dypsnea. Symptoms ended during recovery.    Left Ventricle: Left ventricular cavity size is normal. The left ventricular ejection fraction is %. Systolic function is normal. Wall motion is normal.    Stress ECG: The stress ECG is negative for ischemia after maximal exercise, without reproduction of symptoms.    Peak Stress Echo: Left ventricle cavity has normal reduction in size at peak stress. The left ventricle systolic function is normal at peak stress. The peak stress echo showed normal wall motion.    Echo Post Impression: The study is normal.     Normal exercise stress echocardiogram    Physical Exam    Airway     Mallampati score: II  TM Distance: >3 FB  Neck ROM: full  Mouth opening: >= 4 cm      Cardiovascular      Dental   Comment: None loose, No notable dental hx "     Pulmonary      Neurological    She appears awake, alert and oriented x3.      Other Findings  post-pubertal.         Latest Reference Range & Units 05/22/25 10:35   Sodium 135 - 147 mmol/L 144   Potassium 3.5 - 5.3 mmol/L 3.2 (L)   Chloride 96 - 108 mmol/L 103   Carbon Dioxide 21 - 32 mmol/L 30   ANION GAP 4 - 13 mmol/L 11   BUN 5 - 25 mg/dL 22   Creatinine 0.60 - 1.30 mg/dL 0.89   GLUCOSE, FASTING 65 - 99 mg/dL 90   Calcium 8.4 - 10.2 mg/dL 9.4   AST 13 - 39 U/L 16   ALT 7 - 52 U/L 10   ALK PHOS 34 - 104 U/L 75   Total Protein 6.4 - 8.4 g/dL 7.5   Albumin 3.5 - 5.0 g/dL 3.9   Total Bilirubin 0.20 - 1.00 mg/dL 0.35   GFR, Calculated ml/min/1.73sq m 63   (L): Data is abnormally low       Latest Reference Range & Units 05/22/25 10:35   WBC 4.31 - 10.16 Thousand/uL 7.91   RBC 3.81 - 5.12 Million/uL 4.61   Hemoglobin 11.5 - 15.4 g/dL 12.6   Hematocrit 34.8 - 46.1 % 40.3   MCV 82 - 98 fL 87   MCH 26.8 - 34.3 pg 27.3   MCHC 31.4 - 37.4 g/dL 31.3 (L)   RDW 11.6 - 15.1 % 15.0   Platelet Count 149 - 390 Thousands/uL 317   (L): Data is abnormally low         Latest Reference Range & Units 05/22/25 10:35   PROTIME 12.3 - 15.0 seconds 13.6   POCT INR 0.85 - 1.19  1.01   PTT 23 - 34 seconds 31         EKG (2025)  Normal sinus rhythm  Cannot rule out Septal infarct , age undetermined  Abnormal ECG  When compared with ECG of 15-May-2024 17:37,  Septal infarct is now Present    Anesthesia Plan  ASA Score- 2     Anesthesia Type- general with ASA Monitors.         Additional Monitors:     Airway Plan: Oral ETT.    Comment: NPO after MN    Slightly low K+ noted - one isolated reading as all previous/recent readings WNL. As K+ is above 3.0, will proceed as long as patient does not exhibit signs of hypokalemia    Patient states that she received sux for a tubal ligation over 35 years ago and was told that she had a reaction to the sux. She remembers hearing people calling her name but she was unable to respond.  Patient and family  members have never followed up for testing for pseudocholinesterase deficiency testing.  We will avoid use of sux in case patient has a true disorder.       Plan Factors-Exercise tolerance (METS): >4 METS.    Chart reviewed. EKG reviewed.  Existing labs reviewed. Patient summary reviewed.    Patient is not a current smoker.              Induction- intravenous.    Postoperative Plan- Plan for postoperative opioid use.   Monitoring Plan - Monitoring plan - standard ASA monitoring  Post Operative Pain Plan - non-opiod analgesics and plan for postoperative opioid use    Perioperative Resuscitation Plan - Level 1 - Full Code.       Informed Consent- Anesthetic plan and risks discussed with spouse and patient.  I personally reviewed this patient with the CRNA. Discussed and agreed on the Anesthesia Plan with the CRNA..      NPO Status:  No vitals data found for the desired time range.

## 2025-06-11 NOTE — PLAN OF CARE
Problem: PAIN - ADULT  Goal: Verbalizes/displays adequate comfort level or baseline comfort level  Description: Interventions:  - Encourage patient to monitor pain and request assistance  - Assess pain using appropriate pain scale  - Administer analgesics as ordered based on type and severity of pain and evaluate response  - Implement non-pharmacological measures as appropriate and evaluate response  - Consider cultural and social influences on pain and pain management  - Notify physician/advanced practitioner if interventions unsuccessful or patient reports new pain  - Educate patient/family on pain management process including their role and importance of  reporting pain   - Provide non-pharmacologic/complimentary pain relief interventions  Outcome: Progressing     Problem: INFECTION - ADULT  Goal: Absence or prevention of progression during hospitalization  Description: INTERVENTIONS:  - Assess and monitor for signs and symptoms of infection  - Monitor lab/diagnostic results  - Monitor all insertion sites, i.e. indwelling lines, tubes, and drains  - Monitor endotracheal if appropriate and nasal secretions for changes in amount and color  - Pinch appropriate cooling/warming therapies per order  - Administer medications as ordered  - Instruct and encourage patient and family to use good hand hygiene technique  - Identify and instruct in appropriate isolation precautions for identified infection/condition  Outcome: Progressing  Goal: Absence of fever/infection during neutropenic period  Description: INTERVENTIONS:  - Monitor WBC  - Perform strict hand hygiene  - Limit to healthy visitors only  - No plants, dried, fresh or silk flowers with bowens in patient room  Outcome: Progressing     Problem: SAFETY ADULT  Goal: Patient will remain free of falls  Description: INTERVENTIONS:  - Educate patient/family on patient safety including physical limitations  - Instruct patient to call for assistance with activity   -  Consider consulting OT/PT to assist with strengthening/mobility based on AM PAC & JH-HLM score  - Consult OT/PT to assist with strengthening/mobility   - Keep Call bell within reach  - Keep bed low and locked with side rails adjusted as appropriate  - Keep care items and personal belongings within reach  - Initiate and maintain comfort rounds  - Make Fall Risk Sign visible to staff  - Offer Toileting every  Hours, in advance of need  - Initiate/Maintain alarm  - Obtain necessary fall risk management equipment:   - Apply yellow socks and bracelet for high fall risk patients  - Consider moving patient to room near nurses station  Outcome: Progressing  Goal: Maintain or return to baseline ADL function  Description: INTERVENTIONS:  -  Assess patient's ability to carry out ADLs; assess patient's baseline for ADL function and identify physical deficits which impact ability to perform ADLs (bathing, care of mouth/teeth, toileting, grooming, dressing, etc.)  - Assess/evaluate cause of self-care deficits   - Assess range of motion  - Assess patient's mobility; develop plan if impaired  - Assess patient's need for assistive devices and provide as appropriate  - Encourage maximum independence but intervene and supervise when necessary  - Involve family in performance of ADLs  - Assess for home care needs following discharge   - Consider OT consult to assist with ADL evaluation and planning for discharge  - Provide patient education as appropriate  - Monitor functional capacity and physical performance, use of AM PAC & JH-HLM   - Monitor gait, balance and fatigue with ambulation    Outcome: Progressing  Goal: Maintains/Returns to pre admission functional level  Description: INTERVENTIONS:  - Perform AM-PAC 6 Click Basic Mobility/ Daily Activity assessment daily.  - Set and communicate daily mobility goal to care team and patient/family/caregiver.   - Collaborate with rehabilitation services on mobility goals if consulted  -  Perform Range of Motion 3 times a day.  - Reposition patient every 3 hours.  - Dangle patient 3 times a day  - Stand patient 3 times a day  - Ambulate patient 3 times a day  - Out of bed to chair 3 times a day   - Out of bed for meals 3 times a day  - Out of bed for toileting  - Record patient progress and toleration of activity level   Outcome: Progressing     Problem: DISCHARGE PLANNING  Goal: Discharge to home or other facility with appropriate resources  Description: INTERVENTIONS:  - Identify barriers to discharge w/patient and caregiver  - Arrange for needed discharge resources and transportation as appropriate  - Identify discharge learning needs (meds, wound care, etc.)  - Arrange for interpretive services to assist at discharge as needed  - Refer to Case Management Department for coordinating discharge planning if the patient needs post-hospital services based on physician/advanced practitioner order or complex needs related to functional status, cognitive ability, or social support system  Outcome: Progressing     Problem: Knowledge Deficit  Goal: Patient/family/caregiver demonstrates understanding of disease process, treatment plan, medications, and discharge instructions  Description: Complete learning assessment and assess knowledge base.  Interventions:  - Provide teaching at level of understanding  - Provide teaching via preferred learning methods  Outcome: Progressing     Problem: Potential for Falls  Goal: Patient will remain free of falls  Description: INTERVENTIONS:  - Educate patient/family on patient safety including physical limitations  - Instruct patient to call for assistance with activity   - Consider consulting OT/PT to assist with strengthening/mobility based on AM PAC & JH-HLM score  - Consult OT/PT to assist with strengthening/mobility   - Keep Call bell within reach  - Keep bed low and locked with side rails adjusted as appropriate  - Keep care items and personal belongings within  reach  - Initiate and maintain comfort rounds  - Make Fall Risk Sign visible to staff  - Offer Toileting every 2 Hours, in advance of need  - Initiate/Maintain alarm  - Obtain necessary fall risk management equipment:   - Apply yellow socks and bracelet for high fall risk patients  - Consider moving patient to room near nurses station  Outcome: Progressing

## 2025-06-11 NOTE — RESPIRATORY THERAPY NOTE
RT Protocol Note  Maritza Gold 74 y.o. female MRN: [id number]  Unit/Bed#: -01 Encounter: [id number]    Assessment    Principal Problem:    Lumbar radiculopathy  Active Problems:    Essential hypertension    Generalized anxiety disorder    Mixed hyperlipidemia    Current mild episode of major depressive disorder without prior episode (HCC)    Gastroesophageal reflux disease    Chronic idiopathic constipation    Moderate persistent asthma without complication      Home Pulmonary Medications:  Advair, Albuterol       Past Medical History[1]  Social History[2]    Subjective         Objective    Physical Exam:   Assessment Type: Assess only  General Appearance: Alert, Awake  Respiratory Pattern: Normal  Chest Assessment: Chest expansion symmetrical  Bilateral Breath Sounds: Clear  Cough: None  O2 Device: 3L    Vitals:  Blood pressure 104/56, pulse 88, temperature (!) 97 °F (36.1 °C), temperature source Oral, resp. rate 18, height 5' (1.524 m), weight 75.3 kg (166 lb 0.1 oz), SpO2 95%, not currently breastfeeding.          Imaging and other studies: Results Review Statement: No pertinent imaging studies reviewed.    O2 Device: 3L     Plan    Respiratory Plan: Home Bronchodilator Patient pathway                 [1]   Past Medical History:  Diagnosis Date    Anxiety     Arthritis     Asthma     C. difficile enteritis     years ago    Chest pain     2024    Depression     Dizziness 01/18/2022    GERD (gastroesophageal reflux disease)     History of acute respiratory failure     History of recent fall     fell out of bed    Hyperlipidemia     Hypertension     Incontinence of urine     Pneumonia     Respiratory disorder 2013    Sleep apnea     borderline    Thyroid nodule     Varicose veins of both lower extremities    [2]   Social History  Socioeconomic History    Marital status: /Civil Union   Tobacco Use    Smoking status: Never    Smokeless tobacco: Never   Vaping Use    Vaping status: Never Used    Substance and Sexual Activity    Alcohol use: Not Currently     Comment: socially    Drug use: No     Social Drivers of Health     Financial Resource Strain: Low Risk  (8/3/2023)    Overall Financial Resource Strain (CARDIA)     Difficulty of Paying Living Expenses: Not hard at all   Food Insecurity: No Food Insecurity (2025)    Nursing - Inadequate Food Risk Classification     Worried About Running Out of Food in the Last Year: Never true     Ran Out of Food in the Last Year: Never true     Ran Out of Food in the Last Year: Never true   Transportation Needs: No Transportation Needs (2025)    Nursing - Transportation Risk Classification     Lack of Transportation: No   Intimate Partner Violence: Unknown (2025)    Nursing IPS     Physically Hurt by Someone: No     Hurt or Threatened by Someone: No   Housing Stability: Unknown (2025)    Nursing: Inadequate Housing Risk Classification     Unable to Pay for Housing in the Last Year: No     Has Housin

## 2025-06-11 NOTE — ASSESSMENT & PLAN NOTE
Pt is 75 y/o F pmhx lumbar radiculopathy, spinal stenosis of lumbar region with neurogenic claudication, HTN, UBALDO, HLD, GERD, and asthma who presented for an elective L4-L5 TLIF.     POD 0 s/p L4-L5 TLIF     Afebrile, VSS     Plan:   Cont IVF, IV abx (x2 more doses)  Neurovascular checks Q4H   Pain control as prescribed  Trend vitals, labs, replace lytes as needed   XR lumbar spine ordered, follow   LON drain placed, drain care and monitor outpt   Encourage OOB, ambulation   IS for pulm hygiene   PT / OT consult

## 2025-06-11 NOTE — PROGRESS NOTES
Progress Note - Neurosurgery   Name: Maritza Gold 74 y.o. female I MRN: [id number]  Unit/Bed#: -01 I Date of Admission: 6/11/2025   Date of Service: 6/11/2025 I Hospital Day: 0    Assessment & Plan  Lumbar radiculopathy    Pt is 73 y/o F pmhx lumbar radiculopathy, spinal stenosis of lumbar region with neurogenic claudication, HTN, UBALDO, HLD, GERD, and asthma who presented for an elective L4-L5 TLIF.     POD 0 s/p L4-L5 TLIF     Afebrile, VSS     Plan:   Cont IVF, IV abx (x2 more doses)  Neurovascular checks Q4H   Pain control as prescribed  Trend vitals, labs, replace lytes as needed   XR lumbar spine ordered, follow   LON drain placed, drain care and monitor outpt   Encourage OOB, ambulation   IS for pulm hygiene   PT / OT consult   Essential hypertension  Home dose hydrochlorothiazide 25mg tab and losartan 100mg tab once daily ordered   PRN Hydralazine 5mg ordered if SBP > 150mmHg   Generalized anxiety disorder  Home dose Lexapro 10mg tab once daily ordered   Xanax 0.5mg HS PRN ordered  Mixed hyperlipidemia  Home dose Pravastatin 40mg tab once daily ordered   Current mild episode of major depressive disorder without prior episode (HCC)  Home dose Lexapro 10mg tab once daily ordered   Gastroesophageal reflux disease  Protonix 40mg tab daily once daily ordered   Chronic idiopathic constipation  Stool softeners ordered while inpatient   Moderate persistent asthma without complication  Home inhalers ordered   Respiratory protocol     Subjective     Pt seen and examined at bedside. Pt with minimal complaints of back pain. Tolerated dinner without complaints. Denies fevers, chills, N/V. Has not been OOB yet. Using IS.     Objective :  Temp:  [97.3 °F (36.3 °C)-97.9 °F (36.6 °C)] 97.4 °F (36.3 °C)  HR:  [84-97] 89  BP: (109-171)/(60-90) 113/66  Resp:  [10-28] 14  SpO2:  [91 %-98 %] 95 %  O2 Device: Nasal cannula  Nasal Cannula O2 Flow Rate (L/min):  [4 L/min] 4 L/min    I/O         06/09 0701  06/10 0700 06/10  "0701  06/11 0700 06/11 0701  06/12 0700    I.V. (mL/kg)   1600 (21.2)    NG/GT   5    IV Piggyback   50    Total Intake(mL/kg)   1655 (22)    Urine (mL/kg/hr)   1100 (1.5)    Blood   50    Total Output   1150    Net   +505                 Lines/Drains/Airways       Active Status       Name Placement date Placement time Site Days    Closed/Suction Drain Inferior;Left Back Accordion 10 Fr. 06/11/25  1039  Back  less than 1    Urethral Catheter Latex 16 Fr. 06/11/25  0750  Latex  less than 1                  Physical Exam  Vitals and nursing note reviewed.   HENT:      Head: Normocephalic and atraumatic.      Nose: Nose normal.     Cardiovascular:      Rate and Rhythm: Normal rate and regular rhythm.   Pulmonary:      Effort: Pulmonary effort is normal.      Breath sounds: Normal breath sounds.   Abdominal:      General: Abdomen is flat. There is no distension.      Palpations: Abdomen is soft.      Tenderness: There is no abdominal tenderness.     Musculoskeletal:      Comments: Incision site C/D/I. No strikethrough or dressing saturation. LON drain with 50 mL bloody SS outpt.      Skin:     General: Skin is warm and dry.     Neurological:      Mental Status: She is alert.      Sensory: Sensation is intact.      Comments: Strength 5/5 b/l UE and LE    Psychiatric:         Mood and Affect: Mood normal.         Behavior: Behavior normal.         Thought Content: Thought content normal.         Judgment: Judgment normal.           Lab Results: I have reviewed the following results:  No results for input(s): \"WBC\", \"HGB\", \"HCT\", \"PLT\", \"BANDSPCT\", \"SODIUM\", \"K\", \"CL\", \"CO2\", \"BUN\", \"CREATININE\", \"GLUC\", \"CAIONIZED\", \"MG\", \"PHOS\", \"AST\", \"ALT\", \"ALB\", \"TBILI\", \"DBILI\", \"ALKPHOS\", \"PTT\", \"INR\", \"HSTNI0\", \"HSTNI2\", \"BNP\", \"LACTICACID\" in the last 72 hours.    Imaging Results Review: No pertinent imaging studies reviewed.  Other Study Results Review: No additional pertinent studies reviewed.    VTE Pharmacologic Prophylaxis: " Heparin  VTE Mechanical Prophylaxis: sequential compression device and foot pump applied    Kiki Huang PA-C

## 2025-06-12 ENCOUNTER — APPOINTMENT (INPATIENT)
Dept: RADIOLOGY | Facility: HOSPITAL | Age: 75
End: 2025-06-12
Payer: COMMERCIAL

## 2025-06-12 LAB
ANION GAP SERPL CALCULATED.3IONS-SCNC: 4 MMOL/L (ref 4–13)
BUN SERPL-MCNC: 21 MG/DL (ref 5–25)
CALCIUM SERPL-MCNC: 8.2 MG/DL (ref 8.4–10.2)
CHLORIDE SERPL-SCNC: 107 MMOL/L (ref 96–108)
CO2 SERPL-SCNC: 28 MMOL/L (ref 21–32)
CREAT SERPL-MCNC: 0.8 MG/DL (ref 0.6–1.3)
DME PARACHUTE DELIVERY DATE REQUESTED: NORMAL
DME PARACHUTE ITEM DESCRIPTION: NORMAL
DME PARACHUTE ORDER STATUS: NORMAL
DME PARACHUTE SUPPLIER NAME: NORMAL
DME PARACHUTE SUPPLIER PHONE: NORMAL
ERYTHROCYTE [DISTWIDTH] IN BLOOD BY AUTOMATED COUNT: 15.2 % (ref 11.6–15.1)
GFR SERPL CREATININE-BSD FRML MDRD: 72 ML/MIN/1.73SQ M
GLUCOSE SERPL-MCNC: 120 MG/DL (ref 65–140)
HCT VFR BLD AUTO: 30.9 % (ref 34.8–46.1)
HGB BLD-MCNC: 9.7 G/DL (ref 11.5–15.4)
MAGNESIUM SERPL-MCNC: 1.8 MG/DL (ref 1.9–2.7)
MCH RBC QN AUTO: 27.7 PG (ref 26.8–34.3)
MCHC RBC AUTO-ENTMCNC: 31.4 G/DL (ref 31.4–37.4)
MCV RBC AUTO: 88 FL (ref 82–98)
PLATELET # BLD AUTO: 220 THOUSANDS/UL (ref 149–390)
PMV BLD AUTO: 8.8 FL (ref 8.9–12.7)
POTASSIUM SERPL-SCNC: 4.5 MMOL/L (ref 3.5–5.3)
RBC # BLD AUTO: 3.5 MILLION/UL (ref 3.81–5.12)
SODIUM SERPL-SCNC: 139 MMOL/L (ref 135–147)
WBC # BLD AUTO: 11.3 THOUSAND/UL (ref 4.31–10.16)

## 2025-06-12 PROCEDURE — 85027 COMPLETE CBC AUTOMATED: CPT | Performed by: STUDENT IN AN ORGANIZED HEALTH CARE EDUCATION/TRAINING PROGRAM

## 2025-06-12 PROCEDURE — 99024 POSTOP FOLLOW-UP VISIT: CPT | Performed by: STUDENT IN AN ORGANIZED HEALTH CARE EDUCATION/TRAINING PROGRAM

## 2025-06-12 PROCEDURE — 83735 ASSAY OF MAGNESIUM: CPT | Performed by: STUDENT IN AN ORGANIZED HEALTH CARE EDUCATION/TRAINING PROGRAM

## 2025-06-12 PROCEDURE — 97163 PT EVAL HIGH COMPLEX 45 MIN: CPT

## 2025-06-12 PROCEDURE — 80048 BASIC METABOLIC PNL TOTAL CA: CPT | Performed by: STUDENT IN AN ORGANIZED HEALTH CARE EDUCATION/TRAINING PROGRAM

## 2025-06-12 PROCEDURE — 72100 X-RAY EXAM L-S SPINE 2/3 VWS: CPT

## 2025-06-12 PROCEDURE — 94760 N-INVAS EAR/PLS OXIMETRY 1: CPT

## 2025-06-12 PROCEDURE — 94640 AIRWAY INHALATION TREATMENT: CPT

## 2025-06-12 RX ORDER — MAGNESIUM SULFATE HEPTAHYDRATE 40 MG/ML
2 INJECTION, SOLUTION INTRAVENOUS ONCE
Status: COMPLETED | OUTPATIENT
Start: 2025-06-12 | End: 2025-06-13

## 2025-06-12 RX ORDER — LANOLIN ALCOHOL/MO/W.PET/CERES
800 CREAM (GRAM) TOPICAL 2 TIMES DAILY
Status: DISCONTINUED | OUTPATIENT
Start: 2025-06-12 | End: 2025-06-12

## 2025-06-12 RX ADMIN — HEPARIN SODIUM 5000 UNITS: 5000 INJECTION, SOLUTION INTRAVENOUS; SUBCUTANEOUS at 09:39

## 2025-06-12 RX ADMIN — HEPARIN SODIUM 5000 UNITS: 5000 INJECTION, SOLUTION INTRAVENOUS; SUBCUTANEOUS at 15:43

## 2025-06-12 RX ADMIN — ESCITALOPRAM OXALATE 10 MG: 10 TABLET ORAL at 09:39

## 2025-06-12 RX ADMIN — HYDROMORPHONE HYDROCHLORIDE 0.5 MG: 1 INJECTION, SOLUTION INTRAMUSCULAR; INTRAVENOUS; SUBCUTANEOUS at 15:42

## 2025-06-12 RX ADMIN — FLUTICASONE FUROATE AND VILANTEROL TRIFENATATE 1 PUFF: 200; 25 POWDER RESPIRATORY (INHALATION) at 09:46

## 2025-06-12 RX ADMIN — OXYCODONE HYDROCHLORIDE 10 MG: 10 TABLET ORAL at 12:03

## 2025-06-12 RX ADMIN — PANTOPRAZOLE SODIUM 40 MG: 40 TABLET, DELAYED RELEASE ORAL at 05:45

## 2025-06-12 RX ADMIN — ALBUTEROL SULFATE 2.5 MG: 2.5 SOLUTION RESPIRATORY (INHALATION) at 10:05

## 2025-06-12 RX ADMIN — HEPARIN SODIUM 5000 UNITS: 5000 INJECTION, SOLUTION INTRAVENOUS; SUBCUTANEOUS at 21:47

## 2025-06-12 RX ADMIN — METHOCARBAMOL 500 MG: 500 TABLET ORAL at 12:03

## 2025-06-12 RX ADMIN — ACETAMINOPHEN 975 MG: 325 TABLET, FILM COATED ORAL at 21:46

## 2025-06-12 RX ADMIN — OXYCODONE HYDROCHLORIDE 10 MG: 10 TABLET ORAL at 07:35

## 2025-06-12 RX ADMIN — METHOCARBAMOL 500 MG: 500 TABLET ORAL at 17:04

## 2025-06-12 RX ADMIN — MAGNESIUM SULFATE HEPTAHYDRATE 2 G: 40 INJECTION, SOLUTION INTRAVENOUS at 09:32

## 2025-06-12 RX ADMIN — SENNOSIDES 8.6 MG: 8.6 TABLET, FILM COATED ORAL at 21:46

## 2025-06-12 RX ADMIN — METHOCARBAMOL 500 MG: 500 TABLET ORAL at 23:08

## 2025-06-12 RX ADMIN — PRAVASTATIN SODIUM 40 MG: 40 TABLET ORAL at 09:39

## 2025-06-12 RX ADMIN — DOCUSATE SODIUM 100 MG: 100 CAPSULE, LIQUID FILLED ORAL at 17:04

## 2025-06-12 RX ADMIN — METHOCARBAMOL 500 MG: 500 TABLET ORAL at 05:45

## 2025-06-12 RX ADMIN — OXYCODONE HYDROCHLORIDE 10 MG: 10 TABLET ORAL at 20:55

## 2025-06-12 RX ADMIN — CEPHALEXIN 500 MG: 250 CAPSULE ORAL at 23:08

## 2025-06-12 RX ADMIN — DOCUSATE SODIUM 100 MG: 100 CAPSULE, LIQUID FILLED ORAL at 09:39

## 2025-06-12 RX ADMIN — ACETAMINOPHEN 975 MG: 325 TABLET, FILM COATED ORAL at 05:45

## 2025-06-12 RX ADMIN — ACETAMINOPHEN 975 MG: 325 TABLET, FILM COATED ORAL at 15:42

## 2025-06-12 NOTE — CASE MANAGEMENT
Case Management Assessment & Discharge Planning Note    Patient name Maritza Gold  Location /-01 MRN [id number]  : 1950 Date 2025       Current Admission Date: 2025  Current Admission Diagnosis:Lumbar radiculopathy   Patient Active Problem List    Diagnosis Date Noted    Eosinophilia 2025    Balance problem 2025    Lumbar radiculopathy 2024    Nasal congestion 10/23/2024    Other diseases of larynx 10/23/2024    Other polyp of sinus 10/23/2024    IFG (impaired fasting glucose) 2024    Effusion of right knee 2024    Pes anserine bursitis 2024    Primary osteoarthritis of right knee 2024    Symptomatic varicose veins of right lower extremity 2024    Spider veins of both lower extremities 2024    Moderate persistent asthma without complication 2023    Family history of colon cancer in father 2023    Gastroesophageal reflux disease 2022    Chronic idiopathic constipation 2022    Family history of cardiovascular disease 10/13/2022    Elevated coronary artery calcium score 10/13/2022    Thyroid nodule 2022    Chronic sinusitis 2022    Pes anserinus bursitis of left knee 2021    Current mild episode of major depressive disorder without prior episode (HCC) 2020    Osteopenia 2019    Essential hypertension 2018    Generalized anxiety disorder 2018    Mixed hyperlipidemia 2018      LOS (days): 1  Geometric Mean LOS (GMLOS) (days): 2.9  Days to GMLOS:2     OBJECTIVE:    Risk of Unplanned Readmission Score: 8.83         Current admission status: Inpatient       Preferred Pharmacy:   [facility]/pharm[id number] - [place] - [address]  [address] [address]  Phone: [phone] Fax: [phone]    Primary Care Provider: HANK Anderson    Primary Insurance: [employer]  Secondary Insurance:     ASSESSMENT:  Active Health Care Proxies     There are no active Health Care Proxies on file.       Advance Directives  Does patient have a Health Care POA?: No  Was patient offered paperwork?: Yes (information given)  Does patient have Advance Directives?: No  Was patient offered paperwork?: Yes (information given)         Readmission Root Cause  30 Day Readmission: No    Patient Information  Admitted from:: Home  Mental Status: Alert  During Assessment patient was accompanied by: Not accompanied during assessment  Assessment information provided by:: Patient  Primary Caregiver: Self  Support Systems: Spouse/significant other  County of Residence: [place]  What city do you live in?: [place]  Home entry access options. Select all that apply.: Stairs  Number of steps to enter home.: 1  Do the steps have railings?: No  Type of Current Residence: [facility]  Living Arrangements: Lives w/ Spouse/significant other  Is patient a ?: No    Activities of Daily Living Prior to Admission  Functional Status: Independent  Completes ADLs independently?: Yes  Ambulates independently?: Yes  Does patient use assisted devices?: No  Does patient currently own DME?: No  Does patient have a history of Outpatient Therapy (PT/OT)?: Yes  Does the patient have a history of Short-Term Rehab?: No  Does patient have a history of HHC?: No  Does patient currently have HHC?: No         Patient Information Continued  Income Source: Employed  Does patient have prescription coverage?: Yes  Can the patient afford their medications and any related supplies (such as glucometers or test strips)?: Yes  Does patient have a history of substance abuse?: No  Does patient have a history of Mental Health Diagnosis?: No         Means of Transportation  Means of Transport to [facility]:: Drives Self          DISCHARGE DETAILS:    Discharge planning discussed with:: patient  Freedom of Choice: Yes     CM contacted family/caregiver?: No- see comments (patient alert and in contact w/ spouse)  Were Treatment  Team discharge recommendations reviewed with patient/caregiver?: Yes  Did patient/caregiver verbalize understanding of patient care needs?: Yes  Were patient/caregiver advised of the risks associated with not following Treatment Team discharge recommendations?: Yes              DME Referral Provided  Referral made for DME?: Yes  DME referral completed for the following items:: [place]  DME Supplier Name:: [employer]            Met with patient to review the role of CM and discuss needs patient may have for discharge. Patient reports residing in a one level in-law suite with 1 OSITO connected to her son's home. She reports being independent of ADL's,using no DME. She reports OPPT and denies HHC/SNF/D&A/BHU admisson. She hopes to go home and reports no RW, referral to [employer] via parachute made. Wait PT/OT's assessment. Her spouse will transport her home.

## 2025-06-12 NOTE — PHYSICAL THERAPY NOTE
PHYSICAL THERAPY EVALUATION NOTE      Patient Name: Maritza Gold  Today's Date: 2025    AGE:   74 y.o.  Mrn:   [id number]  ADMIT DX:  Lumbar radiculopathy [M54.16]  Spinal stenosis of lumbar region with neurogenic claudication [M48.062]    Past Medical History[1]  Length Of Stay: 1  PHYSICAL THERAPY EVALUATION :   Patient's identity confirmed via 2 patient identifiers (full name and ) at start of session       25 1251   PT Last Visit   PT Visit Date 25   Note Type   Note type Evaluation   Pain Assessment   Pain Assessment Tool 0-10   Pain Score 5   Pain Location/Orientation Orientation: Lower;Location: Back   Patient's Stated Pain Goal No pain   Hospital Pain Intervention(s) Repositioned;Ambulation/increased activity;Emotional support   Restrictions/Precautions   Weight Bearing Precautions Per Order No   Other Precautions Chair Alarm;Bed Alarm;WBS;Fall Risk;Pain;Multiple lines  (IV pole, accordian drain, jennings catheter)   Home Living   Type of Home House   Home Layout One level  (1+1 OSITO)   Bathroom Shower/Tub Walk-in shower   Bathroom Equipment Grab bars in shower;Built-in shower seat   Home Equipment   (reports she did purchase a bed rail for her bed)   Additional Comments Pt reports no DME PTA   Prior Function   Level of [place] Independent with ADLs;Independent with functional mobility   Lives With Significant other   IADLs Independent with driving   Falls in the last 6 months 1 to 4  (2)   Vocational Retired   General   Additional Pertinent History POD1 s/p L4-5 TLIF   Family/Caregiver Present Yes  ()   Cognition   Overall Cognitive Status WFL   Arousal/Participation Alert   Orientation Level Oriented X4   Memory Within functional limits   Following Commands Follows all commands and directions without difficulty   Comments Maritza is very pleasant and agreeable to participate in PT evaluation.    RLE Assessment   RLE Assessment WFL   Strength RLE   RLE Overall Strength 4/5   LLE Assessment   LLE Assessment WFL   Strength LLE   LLE Overall Strength 4/5   Bed Mobility   Rolling L 6  Modified independent   Additional items Assist x 1;Bedrails   Supine to Sit 4  Minimal assistance   Additional items Assist x 1;Increased time required;Bedrails  (for trunk managment d/t pain)   Additional Comments Reviewed log rolling and role to limit bending/twisting movements   Transfers   Sit to Stand 5  Supervision   Additional items Assist x 1   Stand to Sit 5  Supervision   Additional items Assist x 1   Ambulation/Elevation   Gait pattern Decreased foot clearance   Gait Assistance 5  Supervision   Additional items Assist x 1   Assistive Device Rolling walker   Distance 120 ft   Balance   Static Sitting Good   Static Standing Fair +   Ambulatory Fair   Activity Tolerance   Activity Tolerance Patient tolerated treatment well   Nurse Made Aware RN Sunita   Assessment   Prognosis Good   Problem List Decreased strength;Impaired balance;Decreased mobility;Pain;Obesity   Assessment Maritza Gold is a 74 y.o. Female who presents to [facility] on 6/11/2025 from home for scheduled L4-5 TLIF, seen POD1. Orders for PT eval and treat received, w/ activity orders of up and out of bed as tolerated and fall, spinal precautions. Pt presents w/ comorbidities of HTN, UBALDO, lumbar radiculopathy, MDD, asthma, osteopenia. At baseline, pt mobilizes independently w/ no AD, and reports 2 falls in the last 6 months. Upon evaluation, pt presents w/ the following deficits: weakness, impaired balance, decreased endurance, and pain limiting functional mobility. Upon eval, pt requires min A x 1 for bed mobility, supervision for transfers, and supervision for gait. Based on this PT evaluation today, patient's discharge recommendation is for Level III - Low Rehab Resource Intensity. During this admission, pt would benefit from continued skilled inpatient PT  in the acute care setting in order to address the abovementioned deficits to maximize function and mobility before DC from acute care.   Goals   Patient Goals to mobilize without pain; be able to get on/off the floor to play with her grandkids   STG Expiration Date 06/22/25   Short Term Goal #1 Patient will: Perform all bed mobility tasks modified independent to improve pt's independence w/ repositioning for decrease risk of skin breakdown, Perform all transfers modified independent consistently from various height surfaces in order to improve I w/ engagement w/ real-world environments/situations, Ambulate at least 150 ft. +/- LRAD modified independent w/o LOB to facilitate return and engagement w/ previous living environment, and Navigate 2 stairs w/ supervision without handrail to either improve independence w/ entering home and/or so patient can fully access living areas in home   PT Treatment Day 0   Plan   Treatment/Interventions Functional transfer training;LE strengthening/ROM;Elevations;Therapeutic exercise;Endurance training;Patient/family training;Equipment eval/education;Bed mobility;Gait training   PT Frequency 2-3x/wk   Discharge Recommendation   Rehab Resource Intensity Level, PT III (Minimum Resource Intensity)   Equipment Recommended Walker   AM-PAC Basic Mobility Inpatient   Turning in Flat Bed Without Bedrails 4   Lying on Back to Sitting on Edge of Flat Bed Without Bedrails 3   Moving Bed to Chair 3   Standing Up From Chair Using Arms 3   Walk in Room 3   Climb 3-5 Stairs With Railing 3   Basic Mobility Inpatient Raw Score 19   Basic Mobility Standardized Score 42.48   [facility] Highest Level Of Mobility   -HLM Goal 6: Walk 10 steps or more   -HLM Achieved 7: Walk 25 feet or more   End of Consult   Patient Position at End of Consult Bedside chair;Bed/Chair alarm activated;All needs within reach         The patient's AM-PAC Basic Mobility Inpatient Short Form Raw Score is 19, Standardized  Score is 42.48. A standardized score greater than 38.32 (raw score of 16) suggests the patient may benefit from discharge to home which may not coincide with above PT recommendations. However please refer to therapist recommendation for discharge planning given other factors that may influence destination.    Pt would benefit from skilled inpatient PT during this admission in order to facilitate progress towards goals to maximize functional independence    Nicole Michel PT, DPT            [1]   Past Medical History:  Diagnosis Date    Anxiety     Arthritis     Asthma     C. difficile enteritis     years ago    Chest pain     2024    Depression     Dizziness 01/18/2022    GERD (gastroesophageal reflux disease)     History of acute respiratory failure     History of recent fall     fell out of bed    Hyperlipidemia     Hypertension     Incontinence of urine     Pneumonia     Respiratory disorder 2013    Sleep apnea     borderline    Thyroid nodule     Varicose veins of both lower extremities

## 2025-06-12 NOTE — PROGRESS NOTES
Progress Note - Neurosurgery   Name: Maritza Gold 74 y.o. female I MRN: [id number]  Unit/Bed#: -01 I Date of Admission: 6/11/2025   Date of Service: 6/12/2025 I Hospital Day: 1    Assessment & Plan  Lumbar radiculopathy    Pt is 75 y/o F pmhx lumbar radiculopathy, spinal stenosis of lumbar region with neurogenic claudication, HTN, UBALDO, HLD, GERD, and asthma who presented for an elective L4-L5 TLIF.     POD 1 s/p L4-L5 TLIF     Afebrile, VSS   Mg 1.8  Wbc 11.30  Hgb 9.7  Drain output 10    Plan:   D/C IVF  LON drain placed, drain care and monitor outpt   Encourage OOB, ambulation   PT/OT  Xrays this am  IS for pulm hygiene   PT / OT consult   Essential hypertension  Home dose hydrochlorothiazide 25mg tab and losartan 100mg tab once daily ordered   PRN Hydralazine 5mg ordered if SBP > 150mmHg   Generalized anxiety disorder  Home dose Lexapro 10mg tab once daily ordered   Xanax 0.5mg HS PRN ordered  Mixed hyperlipidemia  Home dose Pravastatin 40mg tab once daily ordered   Current mild episode of major depressive disorder without prior episode (HCC)  Home dose Lexapro 10mg tab once daily ordered   Gastroesophageal reflux disease  Protonix 40mg tab daily once daily ordered   Chronic idiopathic constipation  Stool softeners ordered while inpatient   Moderate persistent asthma without complication  Home inhalers ordered   Respiratory protocol         Subjective   Doing well overall  C/o incisional pain  Legs feel normal, denies weakness    Objective :  Temp:  [97 °F (36.1 °C)-97.8 °F (36.6 °C)] 97.6 °F (36.4 °C)  HR:  [82-98] 86  BP: (104-171)/(56-90) 126/77  Resp:  [10-28] 16  SpO2:  [91 %-98 %] 95 %  O2 Device: Nasal cannula  Nasal Cannula O2 Flow Rate (L/min):  [3 L/min-4 L/min] 3 L/min    I/O         06/10 0701 06/11 0700 06/11 0701  06/12 0700 06/12 0701  06/13 0700    P.O.   720    I.V. (mL/kg)  1600 (21.2)     NG/GT  7.5     IV Piggyback  50     Total Intake(mL/kg)  1657.5 (22) 720 (9.6)    Urine  (mL/kg/hr)  1100 (0.6)     Drains  10     Blood  50     Total Output  1160     Net  +497.5 +720                 Physical Exam Neurological Exam    General Appearance: NAD, cooperative, alert  Eyes: Anicteric, conjunctiva clear  HENT:  Normocephalic, atraumatic, normal mucosa.  external ears normal, external nose normal, no drainage  Neck:    Supple, symmetrical, trachea midline  Resp:  Clear to auscultation bilaterally; no rales, rhonchi or wheezing; respirations unlabored   CV:  S1 S2, Regular rate and rhythm; no murmur, rub, or gallop.  GI:  Soft, non-tender, non-distended; normal bowel sounds; no masses, no organomegaly   Musculoskeletal: No cyanosis, clubbing or edema. Normal ROM.  Skin:   No jaundice, rashes, or lesions   Psych: Normal affect, good eye contact  Neuro: No gross deficits, AAOx3, speech nl, gómez  Strength is 5/5  Sensation is intact  Drain with sanguinous drainage  Incision c/d/i      Lab Results: I have reviewed the following results:  Recent Labs     06/12/25  0541   WBC 11.30*   HGB 9.7*   HCT 30.9*      SODIUM 139   K 4.5      CO2 28   BUN 21   CREATININE 0.80   GLUC 120   MG 1.8*       VTE Pharmacologic Prophylaxis: VTE covered by:  heparin (porcine), Subcutaneous, 5,000 Units at 06/12/25 0914

## 2025-06-12 NOTE — PROGRESS NOTES
Patient:  SHANIQUA GRANADOS    MRN:  [id number]    Aidin Request ID:  [id number]    Level of care reserved:  [facility]    [facility] [place] [address] [phone]    Clinical needs requested:    Geography searched:  [id number]    Start of Service:    Request sent:  1:25pm EDT on 6/12/2025 by Tess Jean    Partner reserved:  3:07pm EDT on 6/12/2025 by Tess Jean    Choice list shared:  2:45pm EDT on 6/12/2025 by Tess Jean

## 2025-06-12 NOTE — PLAN OF CARE
Problem: PAIN - ADULT  Goal: Verbalizes/displays adequate comfort level or baseline comfort level  Description: Interventions:  - Encourage patient to monitor pain and request assistance  - Assess pain using appropriate pain scale  - Administer analgesics as ordered based on type and severity of pain and evaluate response  - Implement non-pharmacological measures as appropriate and evaluate response  - Consider cultural and social influences on pain and pain management  - Notify physician/advanced practitioner if interventions unsuccessful or patient reports new pain  - Educate patient/family on pain management process including their role and importance of  reporting pain   - Provide non-pharmacologic/complimentary pain relief interventions  Outcome: Progressing     Problem: INFECTION - ADULT  Goal: Absence or prevention of progression during hospitalization  Description: INTERVENTIONS:  - Assess and monitor for signs and symptoms of infection  - Monitor lab/diagnostic results  - Monitor all insertion sites, i.e. indwelling lines, tubes, and drains  - Monitor endotracheal if appropriate and nasal secretions for changes in amount and color  - [place] appropriate cooling/warming therapies per order  - Administer medications as ordered  - Instruct and encourage patient and family to use good hand hygiene technique  - Identify and instruct in appropriate isolation precautions for identified infection/condition  Outcome: Progressing  Goal: Absence of fever/infection during neutropenic period  Description: INTERVENTIONS:  - Monitor WBC  - Perform strict hand hygiene  - Limit to healthy visitors only  - No plants, dried, fresh or silk flowers with bowens in patient room  Outcome: Progressing

## 2025-06-12 NOTE — CASE MANAGEMENT
Case Management Discharge Planning Note    Patient name Maritza Gold  Location /-01 MRN [id number]  : 1950 Date 2025       Current Admission Date: 2025  Current Admission Diagnosis:Lumbar radiculopathy   Patient Active Problem List    Diagnosis Date Noted    Eosinophilia 2025    Balance problem 2025    Lumbar radiculopathy 2024    Nasal congestion 10/23/2024    Other diseases of larynx 10/23/2024    Other polyp of sinus 10/23/2024    IFG (impaired fasting glucose) 2024    Effusion of right knee 2024    Pes anserine bursitis 2024    Primary osteoarthritis of right knee 2024    Symptomatic varicose veins of right lower extremity 2024    Spider veins of both lower extremities 2024    Moderate persistent asthma without complication 2023    Family history of colon cancer in father 2023    Gastroesophageal reflux disease 2022    Chronic idiopathic constipation 2022    Family history of cardiovascular disease 10/13/2022    Elevated coronary artery calcium score 10/13/2022    Thyroid nodule 2022    Chronic sinusitis 2022    Pes anserinus bursitis of left knee 2021    Current mild episode of major depressive disorder without prior episode (HCC) 2020    Osteopenia 2019    Essential hypertension 2018    Generalized anxiety disorder 2018    Mixed hyperlipidemia 2018      LOS (days): 1  Geometric Mean LOS (GMLOS) (days): 2.9  Days to GMLOS:1.8     OBJECTIVE:  Risk of Unplanned Readmission Score: 8.75         Current admission status: Inpatient   Preferred Pharmacy:   [id number] - [place] - [address]  [address] [address]  Phone: [phone] Fax: [phone]    Primary Care Provider: HANK Anderson    Primary Insurance: SNADEC  Secondary Insurance:     DISCHARGE DETAILS:     Met with patient and provided list  of [facility] and she prefers [facility]. [facility] reserved on Aidin and their contact information added to patient's AVS.  RW approved via parachute. RW given to patient, receipt signed and placed in [employer] foler in consignment closet.

## 2025-06-12 NOTE — PROGRESS NOTES
"Responded to pt care consult for \"new admission.\" Introduced self and role, offered supportive conversation and exploration of spiritual/Yarsanism needs. \"Maritza\" and spouse at bedside identify as \"Lutheran\" but deny a need to meet with a pried at this time. Pt states \"I have really been taken care of here at the hospital\" and expresses gratitude for support.     Available to follow upon request.     Thank you!        06/12/25 1000   Clinical Encounter Type   Visited With Patient and family together   Routine Visit Introduction       "

## 2025-06-12 NOTE — UTILIZATION REVIEW
Initial Clinical Review    Elective outpatient procedure, converted to inpatient admission due to need for > 2 midnight stay with ongoing monitoring of neurovascular and neuro checks every 4 hours,  LON drain care and IV antibiotics,  pain control.    Age/Sex: 74 y.o. female  Surgery Date: 6/11/25  Procedure: L4-L5 TLIF  Anesthesia: general  Operative Findings:     POD#1 Progress Note  6/12/25 :   POD Day 1 6/12/25 L40L5 TLIF.     Today with incisional pain.   On exam:  no gross neuro deficits.  Strength is 5/5.  Sensation intact.  Drain with sanguinous drainage (10 cc ).  Incision C/D/I.   Wbc 11,30.  H&H 9.7/30.9.  mg 1.8.    Plan:  continue IVF,  IV antibiotics to completion.  Pain control.  LON drain care and monitor output.   Incentive spirometry.  PT/OT.  Neuro checks every 4 hours.  Neurovascular checks every 4 hours.  Continuous pulse oximetry.    Urinary catheter.   Fall precautions.      Admission Orders: Date/Time/Statement:   Admission Orders (From admission, onward)       Ordered        06/11/25 1136  Inpatient Admission  Once                          Orders Placed This Encounter   Procedures    Inpatient Admission     Standing Status:   Standing     Number of Occurrences:   1     Level of Care:   Med Surg [16]     Estimated length of stay:   More than 2 Midnights     Certification:   I certify that inpatient services are medically necessary for this patient for a duration of greater than two midnights. See H&P and MD Progress Notes for additional information about the patient's course of treatment.     Diet: Regular.   Mobility: OOB 3 times a day.  PT/OT  DVT Prophylaxis: Bilateral SCDs    Medications/Pain Control:   Scheduled Medications:  acetaminophen, 975 mg, Oral, Q8H CORY  docusate sodium, 100 mg, Oral, BID  escitalopram, 10 mg, Oral, Daily  fluticasone-vilanterol, 1 puff, Inhalation, Daily  heparin (porcine), 5,000 Units, Subcutaneous, Q8H CORY  hydroCHLOROthiazide, 25 mg, Oral, Daily  losartan, 100  mg, Oral, Daily  magnesium sulfate, 2 g, Intravenous, Once  methocarbamol, 500 mg, Oral, Q6H CORY  pantoprazole, 40 mg, Oral, Early Morning  pravastatin, 40 mg, Oral, Daily  senna, 1 tablet, Oral, HS    ceFAZolin (ANCEF) IVPB (premix in dextrose) 2,000 mg 50 mL  Dose: 2,000 mg  Freq: Every 8 hours Route: IV  Last Dose: 2,000 mg (06/11/25 2336)  Start: 06/11/25 1600 End: 06/12/25 0006   ceFAZolin (ANCEF) IVPB (premix in dextrose) 2,000 mg 50 mL  Dose: 2,000 mg  Freq: Once Route: IV  Indications of Use: PROPHYLAXIS  Start: 06/11/25 0600 End: 06/11/25 0825    magnesium sulfate 2 g/50 mL IVPB (premix) 2 g  Dose: 2 g  Freq: Once Route: IV  Start: 06/12/25 0730    bupivacaine (PF) (MARCAINE) 0.25 % injection  Start: 06/11/25 1101 End: 06/11/25 1130     Continuous IV Infusions:  sodium chloride 0.9 % infusion  Rate: 100 mL/hr Dose: 100 mL/hr  Freq: Continuous Route: IV  Indications of Use: IV Hydration  Last Dose: Stopped (06/12/25 0748)  Start: 06/11/25 1330 End: 06/12/25 0707        PRN Meds:  albuterol, 2 puff, Inhalation, Q6H PRN  albuterol, 2.5 mg, Nebulization, Q6H PRN  ALPRAZolam, 0.5 mg, Oral, HS PRN  hydrALAZINE, 5 mg, Intravenous, Q6H PRN  HYDROmorphone, 0.5 mg, Intravenous, Q3H PRN  naloxone, 0.04 mg, Intravenous, Once PRN  ondansetron, 4 mg, Intravenous, Q6H PRN  oxyCODONE, 5 mg, Oral, Q4H PRN   Or  oxyCODONE, 10 mg, Oral, Q4H PRN x 2   6/11.  X 1 6/12      Vital Signs (last 3 days)       Date/Time Temp Pulse Resp BP MAP (mmHg) SpO2 Calculated FIO2 (%) - Nasal Cannula Nasal Cannula O2 Flow Rate (L/min) O2 Device Patient Position - Orthostatic VS Pain    06/12/25 1542 -- -- -- -- -- -- -- -- -- -- 9    06/12/25 1251 -- -- -- -- -- -- -- -- -- -- 5 06/12/25 1203 -- -- -- -- -- -- -- -- -- -- 7 06/12/25 11:52:20 98.2 °F (36.8 °C) 78 16 112/64 80 88 % -- -- -- -- --    06/12/25 1006 -- -- -- -- -- 94 % -- -- None (Room air) -- --    06/12/25 0900 -- -- -- -- -- -- -- -- None (Room air) -- --    06/12/25 0800  97.6 °F (36.4 °C) 86 16 126/77 -- 95 % -- -- -- -- --    06/12/25 0735 -- -- -- -- -- -- -- -- -- -- 8 06/12/25 0545 -- -- -- -- -- -- -- -- -- -- 3    06/12/25 04:15:21 -- 86 -- 126/77 93 95 % -- -- -- -- --    06/12/25 0400 97.6 °F (36.4 °C) 86 16 126/77 -- 95 % -- -- -- -- --    06/12/25 0000 97.8 °F (36.6 °C) 82 18 118/66 -- 96 % -- -- -- -- --    06/11/25 23:36:07 -- 87 -- 118/66 83 96 % -- -- -- -- --    06/11/25 2123 -- -- -- -- -- -- -- -- -- -- 8 06/11/25 2107 -- -- -- -- -- -- -- -- -- -- 6 06/11/25 20:11:30 -- 84 -- 107/58 74 95 % -- -- -- -- --    06/11/25 2000 97.5 °F (36.4 °C) 84 18 107/58 -- 95 % -- -- -- -- --    06/11/25 1745 -- 88 18 -- -- 95 % 32 3 L/min Nasal cannula -- --    06/11/25 1740 -- 83 -- -- -- -- -- -- -- -- --    06/11/25 1730 -- 98 -- -- -- -- -- -- -- -- --    06/11/25 1720 -- 93 -- 104/56 72 95 % -- -- -- -- --    06/11/25 1710 -- 89 -- -- -- -- -- -- -- -- --    06/11/25 1700 97 °F (36.1 °C) 83 14 -- -- -- -- -- -- -- --    06/11/25 1650 97 °F (36.1 °C) 85 14 108/66 80 94 % 32 3 L/min Nasal cannula -- --    06/11/25 1640 -- 83 -- 116/70 85 95 % -- -- -- -- --    06/11/25 1620 97.4 °F (36.3 °C) 89 14 113/66 82 -- -- -- -- Lying 1 06/11/25 16:13:28 -- 86 -- 116/67 83 95 % -- -- -- -- --    06/11/25 1610 -- 87 -- 116/67 83 -- -- -- -- -- --    06/11/25 16:04:12 97.4 °F (36.3 °C) 91 14 116/67 83 94 % -- -- -- Lying 1 06/11/25 1600 -- 90 -- 109/60 76 96 % -- -- -- -- --    06/11/25 1550 -- 96 -- -- -- -- -- -- -- -- --    06/11/25 1540 -- 89 -- 115/65 82 -- -- -- -- -- --    06/11/25 1500 97.4 °F (36.3 °C) -- -- -- -- -- -- -- -- -- --    06/11/25 1457 -- -- -- -- -- 96 % -- -- Nasal cannula -- --    06/11/25 1402 -- -- -- -- -- -- -- -- -- -- 8    06/11/25 1355 97.5 °F (36.4 °C) -- -- -- -- -- -- -- -- -- 8    06/11/25 13:14:13 97.5 °F (36.4 °C) 86 12 138/90 106 97 % 36 4 L/min Nasal cannula -- --    06/11/25 1245 -- 84 11 147/75 103 91 % -- -- -- -- --    06/11/25 1230  "-- 84 10 157/81 112 95 % -- -- None (Room air) -- --    06/11/25 1215 -- -- -- -- -- -- -- -- None (Room air) -- --    06/11/25 1155 -- 92 11 146/82 109 97 % -- -- None (Room air) -- 9    06/11/25 1145 -- 92 28 160/79 114 98 % -- -- -- -- --    06/11/25 1143 -- -- -- -- -- -- -- -- -- -- 10 - Worst Possible Pain    06/11/25 1139 97.4 °F (36.3 °C) 86 12 138/90 -- 96 % -- -- -- -- --    06/11/25 1133 97.3 °F (36.3 °C) 97 -- 171/89 -- -- -- -- -- -- --    06/11/25 0628 97.9 °F (36.6 °C) 89 17 161/87 -- 94 % -- -- None (Room air) -- --          Weight (last 2 days)       Date/Time Weight    06/11/25 1500 75.3 (166.01)    06/11/25 0606 75.4 (166.2)          Date and Time R Carotid Pulse L Carotid Pulse R Radial Pulse L Radial Pulse R Pedal Pulse L Pedal Pulse R Posterior Tibial Pulse L Posterior Tibial Pulse   06/11/25 2100 -- -- -- -- +2 +2 -- --   06/11/25 1700 -- -- -- -- +2 +2 -- --   06/11/25 1355 -- -- -- -- +2 +2       Pertinent Labs/Diagnostic Test Results:   Radiology:  XR spine lumbar 2 or 3 views injury    (Results Pending)   XR lumbar spine 2 or 3 views    (Results Pending)     Cardiology:  No orders to display     GI:  No orders to display           Results from last 7 days   Lab Units 06/12/25  0541 06/11/25  1758   WBC Thousand/uL 11.30*  --    HEMOGLOBIN g/dL 9.7*  --    HEMATOCRIT % 30.9*  --    PLATELETS Thousands/uL 220 225         Results from last 7 days   Lab Units 06/12/25  0541   SODIUM mmol/L 139   POTASSIUM mmol/L 4.5   CHLORIDE mmol/L 107   CO2 mmol/L 28   ANION GAP mmol/L 4   BUN mg/dL 21   CREATININE mg/dL 0.80   EGFR ml/min/1.73sq m 72   CALCIUM mg/dL 8.2*   MAGNESIUM mg/dL 1.8*             Results from last 7 days   Lab Units 06/12/25  0541   GLUCOSE RANDOM mg/dL 120             No results found for: \"BETA-HYDROXYBUTYRATE\"                                                                                                                                         Network Utilization Review " Department  ATTENTION: Please call with any questions or concerns to [phone] and carefully listen to the prompts so that you are directed to the right person. All voicemails are confidential.   For Discharge needs, contact Care Management DC Support Team at [phone] opt. 2  Send all requests for admission clinical reviews, approved or denied determinations and any other requests to dedicated fax number below belonging to the campus where the patient is receiving treatment. List of dedicated fax numbers for the Facilities:  FACILITY NAME UR FAX NUMBER   ADMISSION DENIALS (Administrative/Medical Necessity) [phone]   DISCHARGE SUPPORT TEAM (NETWORK) [phone]   PARENT CHILD HEALTH (Maternity/NICU/Pediatrics) [phone]   [facility] [phone]   [facility] [phone]   [facility] [phone]   [facility] [phone]   [facility] [phone]   [facility] [phone]   [facility] [phone]   [facility] [phone]   [facility] [phone]   [facility] [phone]   [facility] [phone]   [facility] [phone]

## 2025-06-12 NOTE — ASSESSMENT & PLAN NOTE
Pt is 75 y/o F pmhx lumbar radiculopathy, spinal stenosis of lumbar region with neurogenic claudication, HTN, UBALDO, HLD, GERD, and asthma who presented for an elective L4-L5 TLIF.     POD 1 s/p L4-L5 TLIF     Afebrile, VSS   Mg 1.8  Wbc 11.30  Hgb 9.7  Drain output 10    Plan:   D/C IVF  LON drain placed, drain care and monitor outpt   Encourage OOB, ambulation   PT/OT  Xrays this am  IS for pulm hygiene   PT / OT consult

## 2025-06-12 NOTE — CASE MANAGEMENT
Case Management Discharge Planning Note    Patient name Maritza Gold  Location /-01 MRN [id number]  : 1950 Date 2025       Current Admission Date: 2025  Current Admission Diagnosis:Lumbar radiculopathy   Patient Active Problem List    Diagnosis Date Noted    Eosinophilia 2025    Balance problem 2025    Lumbar radiculopathy 2024    Nasal congestion 10/23/2024    Other diseases of larynx 10/23/2024    Other polyp of sinus 10/23/2024    IFG (impaired fasting glucose) 2024    Effusion of right knee 2024    Pes anserine bursitis 2024    Primary osteoarthritis of right knee 2024    Symptomatic varicose veins of right lower extremity 2024    Spider veins of both lower extremities 2024    Moderate persistent asthma without complication 2023    Family history of colon cancer in father 2023    Gastroesophageal reflux disease 2022    Chronic idiopathic constipation 2022    Family history of cardiovascular disease 10/13/2022    Elevated coronary artery calcium score 10/13/2022    Thyroid nodule 2022    Chronic sinusitis 2022    Pes anserinus bursitis of left knee 2021    Current mild episode of major depressive disorder without prior episode (HCC) 2020    Osteopenia 2019    Essential hypertension 2018    Generalized anxiety disorder 2018    Mixed hyperlipidemia 2018      LOS (days): 1  Geometric Mean LOS (GMLOS) (days): 2.9  Days to GMLOS:2     OBJECTIVE:  Risk of Unplanned Readmission Score: 8.83         Current admission status: Inpatient   Preferred Pharmacy:   [id number] - [place] - [address]  [address] [address]  Phone: [phone] Fax: [phone]    Primary Care Provider: HANK Anderson    Primary Insurance: [employer]  Secondary Insurance:     DISCHARGE DETAILS:    Discharge planning discussed with::  patient  Freedom of Choice: Yes     CM contacted family/caregiver?: No- see comments (patient alert and in contact w/ spouse)  Were Treatment Team discharge recommendations reviewed with patient/caregiver?: Yes  Did patient/caregiver verbalize understanding of patient care needs?: Yes  Were patient/caregiver advised of the risks associated with not following Treatment Team discharge recommendations?: Yes       Met with patient and discussed PT/OT's recommendation for HHC and a RW . She is agreeable to both. Referral for HHC for PT/OT/SN made via [employer], Referral to [employer] [facility] for a RW made via parachute, wait approval                                                       IMM Given (Date):: 06/12/25  IMM Given to:: Patient

## 2025-06-12 NOTE — PLAN OF CARE
Problem: PHYSICAL THERAPY ADULT  Goal: Performs mobility at highest level of function for planned discharge setting.  See evaluation for individualized goals.  Description: Treatment/Interventions: Functional transfer training, LE strengthening/ROM, Elevations, Therapeutic exercise, Endurance training, Patient/family training, Equipment eval/education, Bed mobility, Gait training  Equipment Recommended: Walker       See flowsheet documentation for full assessment, interventions and recommendations.  6/12/2025 1339 by Nicole Michel, PT  Note: Prognosis: Good  Problem List: Decreased strength, Impaired balance, Decreased mobility, Pain, Obesity  Assessment: Maritza Gold is a 74 y.o. Female who presents to [facility] on 6/11/2025 from home for scheduled L4-5 TLIF, seen POD1. Orders for PT eval and treat received, w/ activity orders of up and out of bed as tolerated and fall, spinal precautions. Pt presents w/ comorbidities of HTN, UBALDO, lumbar radiculopathy, MDD, asthma, osteopenia. At baseline, pt mobilizes independently w/ no AD, and reports 2 falls in the last 6 months. Upon evaluation, pt presents w/ the following deficits: weakness, impaired balance, decreased endurance, and pain limiting functional mobility. Upon eval, pt requires min A x 1 for bed mobility, supervision for transfers, and supervision for gait. Based on this PT evaluation today, patient's discharge recommendation is for Level III - Low Rehab Resource Intensity. During this admission, pt would benefit from continued skilled inpatient PT in the acute care setting in order to address the abovementioned deficits to maximize function and mobility before DC from acute care.        Rehab Resource Intensity Level, PT: III (Minimum Resource Intensity)    See flowsheet documentation for full assessment.

## 2025-06-12 NOTE — PLAN OF CARE
Problem: PAIN - ADULT  Goal: Verbalizes/displays adequate comfort level or baseline comfort level  Description: Interventions:  - Encourage patient to monitor pain and request assistance  - Assess pain using appropriate pain scale  - Administer analgesics as ordered based on type and severity of pain and evaluate response  - Implement non-pharmacological measures as appropriate and evaluate response  - Consider cultural and social influences on pain and pain management  - Notify physician/advanced practitioner if interventions unsuccessful or patient reports new pain  - Educate patient/family on pain management process including their role and importance of  reporting pain   - Provide non-pharmacologic/complimentary pain relief interventions  Outcome: Progressing     Problem: INFECTION - ADULT  Goal: Absence or prevention of progression during hospitalization  Description: INTERVENTIONS:  - Assess and monitor for signs and symptoms of infection  - Monitor lab/diagnostic results  - Monitor all insertion sites, i.e. indwelling lines, tubes, and drains  - Monitor endotracheal if appropriate and nasal secretions for changes in amount and color  - [place] appropriate cooling/warming therapies per order  - Administer medications as ordered  - Instruct and encourage patient and family to use good hand hygiene technique  - Identify and instruct in appropriate isolation precautions for identified infection/condition  Outcome: Progressing  Goal: Absence of fever/infection during neutropenic period  Description: INTERVENTIONS:  - Monitor WBC  - Perform strict hand hygiene  - Limit to healthy visitors only  - No plants, dried, fresh or silk flowers with bowens in patient room  Outcome: Progressing     Problem: DISCHARGE PLANNING  Goal: Discharge to home or other facility with appropriate resources  Description: INTERVENTIONS:  - Identify barriers to discharge w/patient and caregiver  - Arrange for needed discharge resources  and transportation as appropriate  - Identify discharge learning needs (meds, wound care, etc.)  - Arrange for interpretive services to assist at discharge as needed  - Refer to Case Management Department for coordinating discharge planning if the patient needs post-hospital services based on physician/advanced practitioner order or complex needs related to functional status, cognitive ability, or social support system  Outcome: Progressing     Problem: Knowledge Deficit  Goal: Patient/family/caregiver demonstrates understanding of disease process, treatment plan, medications, and discharge instructions  Description: Complete learning assessment and assess knowledge base.  Interventions:  - Provide teaching at level of understanding  - Provide teaching via preferred learning methods  Outcome: Progressing

## 2025-06-13 LAB
DME PARACHUTE DELIVERY DATE ACTUAL: NORMAL
DME PARACHUTE DELIVERY DATE REQUESTED: NORMAL
DME PARACHUTE ITEM DESCRIPTION: NORMAL
DME PARACHUTE ORDER STATUS: NORMAL
DME PARACHUTE SUPPLIER NAME: NORMAL
DME PARACHUTE SUPPLIER PHONE: NORMAL

## 2025-06-13 PROCEDURE — 94640 AIRWAY INHALATION TREATMENT: CPT

## 2025-06-13 PROCEDURE — 99024 POSTOP FOLLOW-UP VISIT: CPT | Performed by: STUDENT IN AN ORGANIZED HEALTH CARE EDUCATION/TRAINING PROGRAM

## 2025-06-13 PROCEDURE — 97167 OT EVAL HIGH COMPLEX 60 MIN: CPT

## 2025-06-13 PROCEDURE — 94760 N-INVAS EAR/PLS OXIMETRY 1: CPT

## 2025-06-13 RX ORDER — METHOCARBAMOL 500 MG/1
750 TABLET, FILM COATED ORAL EVERY 6 HOURS SCHEDULED
Status: DISCONTINUED | OUTPATIENT
Start: 2025-06-13 | End: 2025-06-14 | Stop reason: HOSPADM

## 2025-06-13 RX ADMIN — ALBUTEROL SULFATE 2.5 MG: 2.5 SOLUTION RESPIRATORY (INHALATION) at 18:32

## 2025-06-13 RX ADMIN — ACETAMINOPHEN 975 MG: 325 TABLET, FILM COATED ORAL at 05:02

## 2025-06-13 RX ADMIN — CEPHALEXIN 500 MG: 250 CAPSULE ORAL at 11:52

## 2025-06-13 RX ADMIN — ACETAMINOPHEN 975 MG: 325 TABLET, FILM COATED ORAL at 21:24

## 2025-06-13 RX ADMIN — CEPHALEXIN 500 MG: 250 CAPSULE ORAL at 23:19

## 2025-06-13 RX ADMIN — PRAVASTATIN SODIUM 40 MG: 40 TABLET ORAL at 08:38

## 2025-06-13 RX ADMIN — METHOCARBAMOL 750 MG: 500 TABLET ORAL at 17:26

## 2025-06-13 RX ADMIN — DOCUSATE SODIUM 100 MG: 100 CAPSULE, LIQUID FILLED ORAL at 17:26

## 2025-06-13 RX ADMIN — SENNOSIDES 8.6 MG: 8.6 TABLET, FILM COATED ORAL at 21:24

## 2025-06-13 RX ADMIN — CEPHALEXIN 500 MG: 250 CAPSULE ORAL at 17:26

## 2025-06-13 RX ADMIN — HEPARIN SODIUM 5000 UNITS: 5000 INJECTION, SOLUTION INTRAVENOUS; SUBCUTANEOUS at 05:02

## 2025-06-13 RX ADMIN — FLUTICASONE FUROATE AND VILANTEROL TRIFENATATE 1 PUFF: 200; 25 POWDER RESPIRATORY (INHALATION) at 08:41

## 2025-06-13 RX ADMIN — ESCITALOPRAM OXALATE 10 MG: 10 TABLET ORAL at 08:38

## 2025-06-13 RX ADMIN — HEPARIN SODIUM 5000 UNITS: 5000 INJECTION, SOLUTION INTRAVENOUS; SUBCUTANEOUS at 14:21

## 2025-06-13 RX ADMIN — METHOCARBAMOL 500 MG: 500 TABLET ORAL at 05:02

## 2025-06-13 RX ADMIN — DOCUSATE SODIUM 100 MG: 100 CAPSULE, LIQUID FILLED ORAL at 08:38

## 2025-06-13 RX ADMIN — OXYCODONE HYDROCHLORIDE 10 MG: 10 TABLET ORAL at 08:38

## 2025-06-13 RX ADMIN — OXYCODONE HYDROCHLORIDE 5 MG: 5 TABLET ORAL at 21:24

## 2025-06-13 RX ADMIN — SODIUM CHLORIDE, SODIUM LACTATE, POTASSIUM CHLORIDE, AND CALCIUM CHLORIDE 500 ML: .6; .31; .03; .02 INJECTION, SOLUTION INTRAVENOUS at 09:50

## 2025-06-13 RX ADMIN — METHOCARBAMOL 750 MG: 500 TABLET ORAL at 11:52

## 2025-06-13 RX ADMIN — ACETAMINOPHEN 975 MG: 325 TABLET, FILM COATED ORAL at 14:21

## 2025-06-13 RX ADMIN — CEPHALEXIN 500 MG: 250 CAPSULE ORAL at 05:02

## 2025-06-13 RX ADMIN — HEPARIN SODIUM 5000 UNITS: 5000 INJECTION, SOLUTION INTRAVENOUS; SUBCUTANEOUS at 21:25

## 2025-06-13 RX ADMIN — METHOCARBAMOL 750 MG: 500 TABLET ORAL at 23:19

## 2025-06-13 RX ADMIN — PANTOPRAZOLE SODIUM 40 MG: 40 TABLET, DELAYED RELEASE ORAL at 05:02

## 2025-06-13 NOTE — OCCUPATIONAL THERAPY NOTE
"    Occupational Therapy Evaluation     Patient Name: Maritza Gold  Today's Date: 6/13/2025  Problem List  Principal Problem:    Lumbar radiculopathy  Active Problems:    Essential hypertension    Generalized anxiety disorder    Mixed hyperlipidemia    Current mild episode of major depressive disorder without prior episode (HCC)    Gastroesophageal reflux disease    Chronic idiopathic constipation    Moderate persistent asthma without complication    Past Medical History  Past Medical History[1]  Past Surgical History  Past Surgical History[2]          06/13/25 0836   OT Last Visit   OT Visit Date 06/13/25   Note Type   Note type Evaluation   Pain Assessment   Pain Assessment Tool 0-10   Pain Score 10 - Worst Possible Pain   Pain Location/Orientation Location: Back   Patient's Stated Pain Goal No pain   Hospital Pain Intervention(s) Repositioned;Ambulation/increased activity   Restrictions/Precautions   Weight Bearing Precautions Per Order No   Other Precautions Chair Alarm;Bed Alarm;Fall Risk;Pain;Spinal precautions   Home Living   Type of Home House   Home Layout One level  (In-law suite attached to [facility], 1 [address])   Bathroom Shower/Tub Walk-in shower   Bathroom Equipment Grab bars in shower   Home Equipment Walker  (RW received this admission)   Additional Comments Did not use DME PTA. Reports she purchased a bed rail for home. Interested in getting BSC   Prior Function   Level of [place] Independent with ADLs;Independent with functional mobility;Independent with IADLS   Lives With Spouse   Receives Help From Family   IADLs Independent with meal prep;Independent with driving;Independent with medication management   Falls in the last 6 months 0   General   Additional Pertinent History POD#2 L4-5 TLIF   Family/Caregiver Present No   Subjective   Subjective \"I went for the pixie cut\"   ADL   Eating Assistance 7  Independent   Grooming Assistance 5  Supervision/Setup   UB Bathing Assistance 5  " Supervision/Setup   LB Bathing Assistance 5  Supervision/Setup   UB Dressing Assistance 5  Supervision/Setup   LB Dressing Assistance 4  Minimal Assistance   Toileting Assistance  5  Supervision/Setup   Additional Comments Educated pt on LB dressing compensatory strategies & LHAD to maintain spinal precautions. Pt declining need to trial/learn LHAD, states her spouse will help her get dressed   Bed Mobility   Sit to Supine 4  Minimal assistance   Additional items Increased time required;Verbal cues;LE management;Assist x 1  (D/t orthostatic hypotension)   Transfers   Sit to Stand 5  Supervision   Additional items Armrests;Increased time required;Verbal cues  (VCs for hand placement)   Stand to Sit 5  Supervision   Additional items Increased time required;Verbal cues;Armrests   Stand pivot 4  Minimal assistance   Additional items Assist x 1  (CGA d/t orthostatic hypotension)   Additional Comments 2 sit <> stands performed, 2nd sit<> stand pt required CGA d/t orthostatic hypotension   Functional Mobility   Functional Mobility 5  Supervision   Additional Comments Greater than functional household distance through unit halls. Pt suddently felt weak, SOB, dizzy. Pt required WC to be brought behind her. (+) orthostatic hypotension: sitting in WC 84/49; BP improved in trendelenburg to 129/69. RN & EVAN Iyer aware   Additional items Rolling walker   Balance   Static Sitting Good   Dynamic Sitting Good   Static Standing Good   Dynamic Standing Fair +   Ambulatory Fair +   Activity Tolerance   Activity Tolerance Patient tolerated treatment well   Medical Staff Made Aware EVAN Iyer   Nurse Made Aware BILL SAWYER Assessment   RUE Assessment WFL   LUE Assessment   LUE Assessment WFL   Cognition   Overall Cognitive Status WFL   Arousal/Participation Alert   Attention Within functional limits   Orientation Level Oriented X4   Memory Within functional limits   Following Commands Follows all commands and directions without difficulty    Assessment   Limitation Decreased ADL status;Decreased endurance;Decreased high-level ADLs;Decreased self-care trans   Prognosis Good   Assessment Pt is a 74 y.o. female seen for OT evaluation at [facility], admitted 6/11/2025 w/ Lumbar radiculopathy. Pt is POD#2 L4-5 TLIF. OT completed extensive review of pt's medical and social history. Comorbidities affecting pt's functional performance at time of assessment include: HTN, UBALDO, osteopenia, balance problem,. Personal factors affecting pt at time of IE include: steps to enter environment, difficulty performing ADLS, and difficulty performing IADLS . Prior to admission, pt was living with her spouse in a 1SH in-law suite with 1 OSITO.  Pt was I w/  ADLS and w/ IADLS, (+) drove, & required use of no DME/AD PTA. Upon evaluation: Pt requires Min Ax1 for bed mobility, S for functional transfers, S for functional mobility, S for UB ADLs and Min A for LB ADLS 2* the following deficits impacting occupational performance: weakness, decreased balance, decreased tolerance, increased pain, and orthopedic restrictions. Full objective findings from OT assessment regarding body systems outlined above. Pt to benefit from continued skilled OT tx while in the hospital to address deficits as defined above and maximize level of functional independence w/ ADL's and functional mobility. Occupational Performance areas to address include: bathing/shower, toilet hygiene, dressing, functional mobility, and clothing management. Based on findings, pt is of high complexity. The patient's raw score on the AM-PAC Daily Activity inpatient short form is 20, standardized score is 42.03, greater than 39.4. Patients at this level are likely to benefit from DC to home. However, please refer to therapist recommendation for discharge planning given other factors that may influence destination. At this time, OT recommendations at time of discharge are DC with No rehabilitation needs.   Goals    Patient Goals To be able to watch her great grandkids   Plan   Treatment Interventions ADL retraining;Functional transfer training;Equipment evaluation/education;Compensatory technique education;Continued evaluation;Patient/family training   Goal Expiration Date 06/23/25   OT Treatment Day 0   OT Frequency 1-2x/wk   Discharge Recommendation   Rehab Resource Intensity Level, OT No post-acute rehabilitation needs   Equipment Recommended Bedside commode;Shower/Tub chair with back ($)   Commode Type Standard   AM-PAC Daily Activity Inpatient   Lower Body Dressing 3   Bathing 3   Toileting 3   Upper Body Dressing 3   Grooming 4   Eating 4   Daily Activity Raw Score 20   Daily Activity Standardized Score (Calc for Raw Score >=11) 42.03   AM-PAC Applied Cognition Inpatient   Following a Speech/Presentation 4   Understanding Ordinary Conversation 4   Taking Medications 4   Remembering Where Things Are Placed or Put Away 4   Remembering List of 4-5 Errands 4   Taking Care of Complicated Tasks 4   Applied Cognition Raw Score 24   Applied Cognition Standardized Score 62.21   End of Consult   Education Provided Yes- thoroughly educated on spinal precautions   Patient Position at End of Consult Supine;Bed/Chair alarm activated;All needs within reach   Nurse Communication Nurse aware of consult     Pt will achieve the following goals within 10 days.    *Pt will complete LB bathing and dressing with S & AD PRN.    *Pt will complete toileting w/ Mod I w/ G hygiene/thoroughness using AD PRN    *Pt will verbalize and demonstrate understanding in use of compensatory techniques and use of long handled AE for increased safety and independence during LB ADL tasks.       *Pt will verbalize and demonstrate good body mechanics and joint protection techniques during  ADLs/ IADLs with no verbal cues.    *Pt will complete bed mobility with Mod I, with HOB elevated & use of side rails PRN to prep for purposeful tasks    *Pt will perform  functional transfers with on/off all surfaces with Mod I using DME as needed w/ G balance/safety.    *Pt will improve functional mobility during ADL/IADL/leisure tasks to Mod I using DME as needed w/ G balance/safety.    *Pt will improve functional activity tolerance to >15 minutes of sustained functional tasks to increase participation in basic self-care and decrease assistance level.      *Assess DME needs    Mariam Ortega OTR/L              [1]   Past Medical History:  Diagnosis Date    Anxiety     Arthritis     Asthma     C. difficile enteritis     years ago    Chest pain     2024    Depression     Dizziness 01/18/2022    GERD (gastroesophageal reflux disease)     History of acute respiratory failure     History of recent fall     fell out of bed    Hyperlipidemia     Hypertension     Incontinence of urine     Pneumonia     Respiratory disorder 2013    Sleep apnea     borderline    Thyroid nodule     Varicose veins of both lower extremities    [2]   Past Surgical History:  Procedure Laterality Date    BRONCHOSCOPY      COLONOSCOPY  2017    FRACTURE SURGERY Bilateral     wrist    NASAL SINUS SURGERY Bilateral 09/2017    MT ARTHRODESIS COMBINED TQ 1NTRSPC LUMBAR N/A 6/11/2025    Procedure: L4-5 transforaminal lumbar interbody fusion, with neuromonitoring and neuronavigation;  Surgeon: Quan Kam MD;  Location:  MAIN OR;  Service: Neurosurgery    SINUS SURGERY      TUBAL LIGATION      WISDOM TOOTH EXTRACTION

## 2025-06-13 NOTE — OP NOTE
OPERATIVE REPORT  PATIENT NAME: Maritza Gold    :  1950  MRN: [id number]  Pt Location: [facility] OR ROOM 03    SURGERY DATE: 2025    Surgeons and Role:     * Quan Kam MD - Primary     * Majo Zelaya PA-C - Assisting    Preop Diagnosis:  Lumbar radiculopathy [M54.16]  Spinal stenosis of lumbar region with neurogenic claudication [M48.062]    Post-Op Diagnosis Codes:     * Lumbar radiculopathy [M54.16]     * Spinal stenosis of lumbar region with neurogenic claudication [M48.062]    Procedure(s):  1) L4-5 Hernandez-Castillo Osteotomies and transforaminal interbody fusion  2) Bilateral pedicle screws placements L4 and L5  3) Arthrodesis L4-5 with autograft and [place]  4) Use of neuromonitoring  5) Use of neuronavigation (Fraxion with O-arm)  6) Use of fluoroscopy <1hr    Specimen(s):  * No specimens in log *    Estimated Blood Loss:   50 mL    Drains:  Closed/Suction Drain Inferior;Left Back Accordion 10 Fr. (Active)   Site Description Healing 25 1553   Dressing Status Clean;Intact;Dry 25 0501   Drainage Appearance Bloody 25 0501   Status Accordion suction 25 0501   Intake (mL) 2.5 mL 25 1901   Output (mL) 0 mL 25 0501   Number of days: 2       [REMOVED] Urethral Catheter Latex 16 Fr. (Removed)   Goal for Removal No longer needed- Will place order to discontinue 25 1701   Site Assessment Clean;Skin intact 25 1701   Vela Care Done 25 1701   Collection Container Standard drainage bag 25 1701   Securement Method Securing device (Describe) 25 1701   Output (mL) 100 mL 25 1701   Number of days: 1       Anesthesia Type:   General    Operative Indications:  Lumbar radiculopathy [M54.16]  Spinal stenosis of lumbar region with neurogenic claudication [M48.062]    Operative Findings:  Severe L4-5 central canal stenosis due to grade 1 spondylolisthesis with ligamentous hypertrophy and face arthropathy     Complications:    None    Procedure and Technique:    After obtaining written informed consent, the patient was brought to the operating room.  General endotracheal anesthesia was induced.  Neuro monitoring was then placed as was an arterial line and Vela catheter. The patient was then flipped prone onto gel rolls in position. Baseline monitoring was obtained.      The patient was then given Ancef as perioperative antibiotic.       The patient was prepped and draped in the usual sterile fashion.  Prior to draping, 3 spinal needles were used to localize the operative levels under fluoroscopy. Lidocaine with epinephrine was injected in the surgical site. Surgical time-out was performed.       A ten blade was then used to open a midline lumbar incision. Monopolar cautery was then used to dissect down through the dorsal lumbar fascia to the spinous process of L4 and L5. Dissection was carried in a subperiosteal manner over the lamina to the facet joints. Dissection was carried out over the facets to the transverse processes of L4 and L5. After all the transverse processes were exposed, a spinous process clamp was placed on L5 and an  intraoperative O-arm spin was performed. Neuro navigation was then registered and confirmed to be accurate. Image guidance from the [employer] Stealth was used throughout the duration of the case. Images were loaded into the system and used for preoperative planning as well as intraoperative guidance. It was critically important through the duration of the case for navigation and avoidance of critical structures.      Using navigation guidance, the entry site for the L4 pedicle was identified. A high speed drill with Matchstick attachment was used to decorticate the entry site. A navigated pedicle probe was then used to create the screw path. A Hurst ball tip probe was used and no breech in the pedicle was noted. A screw was then placed on the left l4. Similar steps were repeated and screws were placed at  the right L4 and bilateral L5.      Screws were confirmed to be in satisfactory position with fluoroscopy     2 pedicle distractors were then placed in L4 and L5 screw heads bilaterally. The spinous processes of L4 were removed with [place]ell rongeurs. The matchstick was used to drill across the pars of L4. Osteotomes were used to complete the cut into the pars. Using a combination of Kerrison rongeurs and Leksell rongeurs, annette osteotomy was performed. The superior articular process of L5 was then removed using Kerrison rongeurs. Bilateral foramina of L4 were decompressed using Kerrison rongeurs. There was severe central canal stenosis due to facet arthropathy and ligamentous hypertrophy. There was also a grade 1 spondylolisthesis. Attention was then turned to performing the interbody fusion. The TLIF was performed from the right.     A [place]field #4 was used to dissect and expose the disc space of L4-5. A nerve root retractor was then used to retract the thecal sac. A knife was used to make an opening into the disc space. Using a combination of chris, dilators, and pituitary rongeurs, the disc material was removed piecemeal. The Kerrison rongeurs were used to widen the disc space opening. Once adequate disc material had been removed, disc space rasps were used to scrape the endplates and any remaining disc was removed. Fluoroscopy was used to confirm the depth of disc removal. A cage was subsequently placed. Placement was confirmed with fluoroscopy and noted to be satisfactory. Prior to cage placement, a mixture of autograft and [place] was funneled into the disc space. Additional graft material was also placed behind the cage.     Two lordotic rods were measured, bent and cut to size. The rods were placed in the screw heads and set caps were placed and final tightened.  The wound was then copiously irrigated with antibiotic irrigation.  The drill was used to decorticate the transverse processes of L4 and L5.  The  mixture of autograft and [place] was then placed in the intertransverse space.      A 10 Citizen of [place] Hemovac drain was tunneled in a subfascial fashion and secured in place with a 4-0 monocryl suture.     Next the muscle and fascia were closed with 0 Vicryl. A #1 stratafix was used to further support the fascia in a running fashion. The deep dermal layer was closed with an inverted 2 0 Vicryl.  The skin was closed with 2-0 prolene sutures in a running fashion. Sterile dressing was applied.       Surgical sign-out was performed.     No qualified resident was available. Majo Zelaya PA-C was present for the procedure, and provided essential assistance with proper exposure, retraction, hemostasis, instrumentation and decompression, and wound closure, which was necessary secondary to the complex nature of this case.     I was present for the entire procedure.    Patient Disposition:  PACU          SIGNATURE: Quan Kam MD  DATE: June 13, 2025  TIME: 11:50 AM

## 2025-06-13 NOTE — PROGRESS NOTES
Progress Note - Vascular Surgery   Name: Maritza Gold 74 y.o. female I MRN: [id number]  Unit/Bed#: -01 I Date of Admission: 6/11/2025   Date of Service: 6/13/2025 I Hospital Day: 2    Assessment & Plan  Lumbar radiculopathy    Pt is 75 y/o F pmhx lumbar radiculopathy, spinal stenosis of lumbar region with neurogenic claudication, HTN, UBALDO, HLD, GERD, and asthma who presented for an elective L4-L5 TLIF.     POD 2 s/p L4-L5 TLIF   Tmax 100.1    Afebrile, VSS   Drain dc'ed  Neuro intact  Incisional pain  Cont keflex  Vasovagal episode with OT, bolus ordered. No sob or cp, no weakness, alert and oriented  Symptoms resolved as pt lay down. Family reports she has hx of occasional vasovagal episodes, has been seen by cardiology in the past as well.     Essential hypertension  Home dose hydrochlorothiazide 25mg tab and losartan 100mg tab once daily ordered     Generalized anxiety disorder  Home dose Lexapro 10mg tab once daily ordered   Xanax 0.5mg HS PRN ordered  Mixed hyperlipidemia  Home dose Pravastatin 40mg tab once daily ordered   Current mild episode of major depressive disorder without prior episode (HCC)  Home dose Lexapro 10mg tab once daily ordered   Gastroesophageal reflux disease  Protonix 40mg tab daily once daily ordered   Chronic idiopathic constipation  Stool softeners ordered while inpatient   Moderate persistent asthma without complication  Home inhalers ordered   Respiratory protocol     Subjective : Doing well  C/o incisional pain  NV intact  Tolerating a diet    Objective :  Temp:  [98.2 °F (36.8 °C)-100.1 °F (37.8 °C)] 100.1 °F (37.8 °C)  HR:  [] 98  BP: ()/(49-70) 123/70  Resp:  [14-18] 18  SpO2:  [84 %-93 %] 90 %  O2 Device: Nasal cannula  Nasal Cannula O2 Flow Rate (L/min):  [2 L/min] 2 L/min     I/O         06/11 0701 06/12 0700 06/12 0701  06/13 0700 06/13 0701  06/14 0700    P.O.  960     I.V. (mL/kg) 1600 (21.2)      NG/GT 7.5      IV Piggyback 50      Total Intake(mL/kg)  1657.5 (22) 960 (12.7)     Urine (mL/kg/hr) 1100 (0.6) 900 (0.5)     Drains 10 1     Blood 50      Total Output 1160 901     Net +497.5 +59            Unmeasured Urine Occurrence  1 x           Lines/Drains/Airways       Active Status       Name Placement date Placement time Site Days    Closed/Suction Drain Inferior;Left Back Accordion 10 Fr. 06/11/25  1039  Back  1                  Physical Exam  General Appearance: NAD, cooperative, alert  Eyes: Anicteric, conjunctiva clear  HENT:  Normocephalic, atraumatic, normal mucosa.  external ears normal, external nose normal, no drainage  Neck:    Supple, symmetrical, trachea midline  Resp:  Clear to auscultation bilaterally; no rales, rhonchi or wheezing; respirations unlabored   CV:  S1 S2, Regular rate and rhythm; no murmur, rub, or gallop.  GI:  Soft, non-tender, non-distended; normal bowel sounds; no masses, no organomegaly   Musculoskeletal: No cyanosis, clubbing or edema. Normal ROM.  Skin:   No jaundice, rashes, or lesions   Psych: Normal affect, good eye contact  Neuro: No gross deficits, AAOx3, speech nl, gómez  Strength is 5/5  Drain dc'ed  No hematoma a the site  Incision re-dressed      Lab Results: I have reviewed the following results:  CBC with diff:   Lab Results   Component Value Date    WBC 11.30 (H) 06/12/2025    HGB 9.7 (L) 06/12/2025    HCT 30.9 (L) 06/12/2025    MCV 88 06/12/2025     06/12/2025    RBC 3.50 (L) 06/12/2025    MCH 27.7 06/12/2025    MCHC 31.4 06/12/2025    RDW 15.2 (H) 06/12/2025    MPV 8.8 (L) 06/12/2025    NRBC 0 05/22/2025   ,   BMP/CMP:  Lab Results   Component Value Date    SODIUM 139 06/12/2025    SODIUM 143 05/16/2025    K 4.5 06/12/2025    K 3.8 05/16/2025     06/12/2025     05/16/2025    CO2 28 06/12/2025    CO2 21 05/16/2025    BUN 21 06/12/2025    BUN 23 05/16/2025    CREATININE 0.80 06/12/2025    CALCIUM 8.2 (L) 06/12/2025    AST 16 05/22/2025    AST 19 05/16/2025    ALT 10 05/22/2025    ALT 9 05/16/2025     "ALKPHOS 75 05/22/2025    EGFR 72 06/12/2025   ,   Lipid Panel: No results found for: \"CHOL\",   Coags:   Lab Results   Component Value Date    PTT 31 05/22/2025    INR 1.01 05/22/2025   ,   Blood Culture: No results found for: \"BLOODCX\",   Urinalysis:   Lab Results   Component Value Date    COLORU Yellow 05/22/2025    CLARITYU Extra Turbid 05/22/2025    SPECGRAV 1.017 05/22/2025    PHUR 6.0 05/22/2025    LEUKOCYTESUR Large (A) 05/22/2025    NITRITE Negative 05/22/2025    GLUCOSEU Negative 05/22/2025    KETONESU Negative 05/22/2025    BILIRUBINUR Negative 05/22/2025    BLOODU Negative 05/22/2025   ,   Urine Culture:   Lab Results   Component Value Date    URINECX 30,000-39,000 cfu/ml 05/22/2025   ,   Wound Culure: No results found for: \"WOUNDCULT\"      VTE Prophylaxis: VTE covered by:  heparin (porcine), Subcutaneous, 5,000 Units at 06/13/25 0502     "

## 2025-06-13 NOTE — ASSESSMENT & PLAN NOTE
Pt is 73 y/o F pmhx lumbar radiculopathy, spinal stenosis of lumbar region with neurogenic claudication, HTN, UABLDO, HLD, GERD, and asthma who presented for an elective L4-L5 TLIF.     POD 2 s/p L4-L5 TLIF   Tmax 100.1    Afebrile, VSS   Drain dc'ed  Neuro intact  Incisional pain  Cont keflex  Vasovagal episode with OT, bolus ordered. No sob or cp, no weakness, alert and oriented  Symptoms resolved as pt lay down. Family reports she has hx of occasional vasovagal episodes, has been seen by cardiology in the past as well.

## 2025-06-13 NOTE — UTILIZATION REVIEW
NOTIFICATION OF INPATIENT ADMISSION   AUTHORIZATION REQUEST   SERVICING FACILITY:   [place] [address]  Tax ID: [id number]  NPI: [id number] ATTENDING PROVIDER:  Attending Name and NPI#: Quan Reyes Md [[id number]]  Address: [address] [place] [address]  Phone: [phone]   ADMISSION INFORMATION:  Place of Service: Inpatient [facility]  Place of Service Code: 21  Inpatient Admission Date/Time: 6/11/25 11:36 AM  Discharge Date/Time: No discharge date for patient encounter.  Admitting Diagnosis Code/Description:  Lumbar radiculopathy [M54.16]  Spinal stenosis of lumbar region with neurogenic claudication [M48.062]     UTILIZATION REVIEW CONTACT:  Brenda Camilo Utilization   Network Utilization Review Department  Phone: [phone]  Fax: [phone]  Email: [email or web address]  Contact for approvals/pending authorizations, clinical reviews, and discharge.     PHYSICIAN ADVISORY SERVICES:  Medical Necessity Denial & Cczm-ny-Znsb Review  Phone: [phone]  Fax: [phone]  Email: [email or web address]     DISCHARGE SUPPORT TEAM:  For Patients Discharge Needs & Updates  Phone: [phone] opt. 2 Fax: [phone]  Email: [email or web address]

## 2025-06-13 NOTE — CASE MANAGEMENT
Case Management Discharge Planning Note    Patient name Maritza Gold  Location /-01 MRN [id number]  : 1950 Date 2025       Current Admission Date: 2025  Current Admission Diagnosis:Lumbar radiculopathy   Patient Active Problem List    Diagnosis Date Noted    Eosinophilia 2025    Balance problem 2025    Lumbar radiculopathy 2024    Nasal congestion 10/23/2024    Other diseases of larynx 10/23/2024    Other polyp of sinus 10/23/2024    IFG (impaired fasting glucose) 2024    Effusion of right knee 2024    Pes anserine bursitis 2024    Primary osteoarthritis of right knee 2024    Symptomatic varicose veins of right lower extremity 2024    Spider veins of both lower extremities 2024    Moderate persistent asthma without complication 2023    Family history of colon cancer in father 2023    Gastroesophageal reflux disease 2022    Chronic idiopathic constipation 2022    Family history of cardiovascular disease 10/13/2022    Elevated coronary artery calcium score 10/13/2022    Thyroid nodule 2022    Chronic sinusitis 2022    Pes anserinus bursitis of left knee 2021    Current mild episode of major depressive disorder without prior episode (HCC) 2020    Osteopenia 2019    Essential hypertension 2018    Generalized anxiety disorder 2018    Mixed hyperlipidemia 2018      LOS (days): 2  Geometric Mean LOS (GMLOS) (days): 2.9  Days to GMLOS:1     OBJECTIVE:  Risk of Unplanned Readmission Score: 8.67         Current admission status: Inpatient   Preferred Pharmacy:   [id number] - [place] - [address]  [address] [address]  Phone: [phone] Fax: [phone]    Primary Care Provider: HANK Anderson    Primary Insurance: [employer]  Secondary Insurance:     DISCHARGE DETAILS:    DME Referral Provided  Referral made  for DME?: Yes  DME referral completed for the following items:: Bedside Commode  DME Supplier Name:: [employer]    Other Referral/Resources/Interventions Provided:  Interventions: DME    Additional Comments: Pt in need of a bedside commode, order placed via [facility].  department to follow.    UPDATE 2320    BSC approved with a $10.44 copay, pt aware and agreeable to copay. BSC delivered to bedside, delivery ticket uploaded to [facility].

## 2025-06-13 NOTE — PLAN OF CARE
Problem: PAIN - ADULT  Goal: Verbalizes/displays adequate comfort level or baseline comfort level  Description: Interventions:  - Encourage patient to monitor pain and request assistance  - Assess pain using appropriate pain scale  - Administer analgesics as ordered based on type and severity of pain and evaluate response  - Implement non-pharmacological measures as appropriate and evaluate response  - Consider cultural and social influences on pain and pain management  - Notify physician/advanced practitioner if interventions unsuccessful or patient reports new pain  - Educate patient/family on pain management process including their role and importance of  reporting pain   - Provide non-pharmacologic/complimentary pain relief interventions  Outcome: Progressing     Problem: INFECTION - ADULT  Goal: Absence or prevention of progression during hospitalization  Description: INTERVENTIONS:  - Assess and monitor for signs and symptoms of infection  - Monitor lab/diagnostic results  - Monitor all insertion sites, i.e. indwelling lines, tubes, and drains  - Monitor endotracheal if appropriate and nasal secretions for changes in amount and color  - [place] appropriate cooling/warming therapies per order  - Administer medications as ordered  - Instruct and encourage patient and family to use good hand hygiene technique  - Identify and instruct in appropriate isolation precautions for identified infection/condition  Outcome: Progressing  Goal: Absence of fever/infection during neutropenic period  Description: INTERVENTIONS:  - Monitor WBC  - Perform strict hand hygiene  - Limit to healthy visitors only  - No plants, dried, fresh or silk flowers with bowens in patient room  Outcome: Progressing     Problem: SAFETY ADULT  Goal: Patient will remain free of falls  Description: INTERVENTIONS:  - Educate patient/family on patient safety including physical limitations  - Instruct patient to call for assistance with activity   -  Consider consulting OT/PT to assist with strengthening/mobility based on AM PAC & JH-HLM score  - Consult OT/PT to assist with strengthening/mobility   - Keep Call bell within reach  - Keep bed low and locked with side rails adjusted as appropriate  - Keep care items and personal belongings within reach  - Initiate and maintain comfort rounds  - Make Fall Risk Sign visible to staff  - Offer Toileting every 2 Hours, in advance of need  - Initiate/Maintain alarm  - Obtain necessary fall risk management equipment:   - Apply yellow socks and bracelet for high fall risk patients  - Consider moving patient to room near nurses station  Outcome: Progressing  Goal: Maintain or return to baseline ADL function  Description: INTERVENTIONS:  -  Assess patient's ability to carry out ADLs; assess patient's baseline for ADL function and identify physical deficits which impact ability to perform ADLs (bathing, care of mouth/teeth, toileting, grooming, dressing, etc.)  - Assess/evaluate cause of self-care deficits   - Assess range of motion  - Assess patient's mobility; develop plan if impaired  - Assess patient's need for assistive devices and provide as appropriate  - Encourage maximum independence but intervene and supervise when necessary  - Involve family in performance of ADLs  - Assess for home care needs following discharge   - Consider OT consult to assist with ADL evaluation and planning for discharge  - Provide patient education as appropriate  - Monitor functional capacity and physical performance, use of AM PAC & JH-HLM   - Monitor gait, balance and fatigue with ambulation    Outcome: Progressing  Goal: Maintains/Returns to pre admission functional level  Description: INTERVENTIONS:  - Perform AM-PAC 6 Click Basic Mobility/ Daily Activity assessment daily.  - Set and communicate daily mobility goal to care team and patient/family/caregiver.   - Collaborate with rehabilitation services on mobility goals if consulted  -  Perform Range of Motion 3 times a day.  - Reposition patient every 2 hours.  - Dangle patient 3 times a day  - Stand patient 3 times a day  - Ambulate patient 3 times a day  - Out of bed to chair 3 times a day   - Out of bed for meals 3 times a day  - Out of bed for toileting  - Record patient progress and toleration of activity level   Outcome: Progressing     Problem: DISCHARGE PLANNING  Goal: Discharge to home or other facility with appropriate resources  Description: INTERVENTIONS:  - Identify barriers to discharge w/patient and caregiver  - Arrange for needed discharge resources and transportation as appropriate  - Identify discharge learning needs (meds, wound care, etc.)  - Arrange for interpretive services to assist at discharge as needed  - Refer to Case Management Department for coordinating discharge planning if the patient needs post-hospital services based on physician/advanced practitioner order or complex needs related to functional status, cognitive ability, or social support system  Outcome: Progressing     Problem: Knowledge Deficit  Goal: Patient/family/caregiver demonstrates understanding of disease process, treatment plan, medications, and discharge instructions  Description: Complete learning assessment and assess knowledge base.  Interventions:  - Provide teaching at level of understanding  - Provide teaching via preferred learning methods  Outcome: Progressing     Problem: Potential for Falls  Goal: Patient will remain free of falls  Description: INTERVENTIONS:  - Educate patient/family on patient safety including physical limitations  - Instruct patient to call for assistance with activity   - Consider consulting OT/PT to assist with strengthening/mobility based on AM PAC & JH-HLM score  - Consult OT/PT to assist with strengthening/mobility   - Keep Call bell within reach  - Keep bed low and locked with side rails adjusted as appropriate  - Keep care items and personal belongings within  reach  - Initiate and maintain comfort rounds  - Make Fall Risk Sign visible to staff  - Offer Toileting every 2 Hours, in advance of need  - Initiate/Maintain alarm  - Obtain necessary fall risk management equipment:   - Apply yellow socks and bracelet for high fall risk patients  - Consider moving patient to room near nurses station  Outcome: Progressing

## 2025-06-13 NOTE — PLAN OF CARE
Problem: OCCUPATIONAL THERAPY ADULT  Goal: Performs self-care activities at highest level of function for planned discharge setting.  See evaluation for individualized goals.  Description: Treatment Interventions: ADL retraining, Functional transfer training, Equipment evaluation/education, Compensatory technique education, Continued evaluation, Patient/family training  Equipment Recommended: Bedside commode, Shower/Tub chair with back ($)       See flowsheet documentation for full assessment, interventions and recommendations.   Note: Limitation: Decreased ADL status, Decreased endurance, Decreased high-level ADLs, Decreased self-care trans  Prognosis: Good  Assessment: Pt is a 74 y.o. female seen for OT evaluation at [facility], admitted 6/11/2025 w/ Lumbar radiculopathy. Pt is POD#2 L4-5 TLIF. OT completed extensive review of pt's medical and social history. Comorbidities affecting pt's functional performance at time of assessment include: HTN, UBALDO, osteopenia, balance problem,. Personal factors affecting pt at time of IE include: steps to enter environment, difficulty performing ADLS, and difficulty performing IADLS . Prior to admission, pt was living with her spouse in a 1SH in-law suite with 1 OSITO.  Pt was I w/  ADLS and w/ IADLS, (+) drove, & required use of no DME/AD PTA. Upon evaluation: Pt requires Min Ax1 for bed mobility, S for functional transfers, S for functional mobility, S for UB ADLs and Min A for LB ADLS 2* the following deficits impacting occupational performance: weakness, decreased balance, decreased tolerance, increased pain, and orthopedic restrictions. Full objective findings from OT assessment regarding body systems outlined above. Pt to benefit from continued skilled OT tx while in the hospital to address deficits as defined above and maximize level of functional independence w/ ADL's and functional mobility. Occupational Performance areas to address include: bathing/shower,  toilet hygiene, dressing, functional mobility, and clothing management. Based on findings, pt is of high complexity. The patient's raw score on the AM-PAC Daily Activity inpatient short form is 20, standardized score is 42.03, greater than 39.4. Patients at this level are likely to benefit from DC to home. However, please refer to therapist recommendation for discharge planning given other factors that may influence destination. At this time, OT recommendations at time of discharge are DC with No rehabilitation needs.     Rehab Resource Intensity Level, OT: No post-acute rehabilitation needs

## 2025-06-13 NOTE — PLAN OF CARE
Problem: PAIN - ADULT  Goal: Verbalizes/displays adequate comfort level or baseline comfort level  Description: Interventions:  - Encourage patient to monitor pain and request assistance  - Assess pain using appropriate pain scale  - Administer analgesics as ordered based on type and severity of pain and evaluate response  - Implement non-pharmacological measures as appropriate and evaluate response  - Consider cultural and social influences on pain and pain management  - Notify physician/advanced practitioner if interventions unsuccessful or patient reports new pain  - Educate patient/family on pain management process including their role and importance of  reporting pain   - Provide non-pharmacologic/complimentary pain relief interventions  Outcome: Progressing     Problem: INFECTION - ADULT  Goal: Absence or prevention of progression during hospitalization  Description: INTERVENTIONS:  - Assess and monitor for signs and symptoms of infection  - Monitor lab/diagnostic results  - Monitor all insertion sites, i.e. indwelling lines, tubes, and drains  - Monitor endotracheal if appropriate and nasal secretions for changes in amount and color  - [place] appropriate cooling/warming therapies per order  - Administer medications as ordered  - Instruct and encourage patient and family to use good hand hygiene technique  - Identify and instruct in appropriate isolation precautions for identified infection/condition  Outcome: Progressing  Goal: Absence of fever/infection during neutropenic period  Description: INTERVENTIONS:  - Monitor WBC  - Perform strict hand hygiene  - Limit to healthy visitors only  - No plants, dried, fresh or silk flowers with bowens in patient room  Outcome: Progressing     Problem: SAFETY ADULT  Goal: Patient will remain free of falls  Description: INTERVENTIONS:  - Educate patient/family on patient safety including physical limitations  - Instruct patient to call for assistance with activity   -  Consider consulting OT/PT to assist with strengthening/mobility based on AM PAC & JH-HLM score  - Consult OT/PT to assist with strengthening/mobility   - Keep Call bell within reach  - Keep bed low and locked with side rails adjusted as appropriate  - Keep care items and personal belongings within reach  - Initiate and maintain comfort rounds  - Make Fall Risk Sign visible to staff  - Offer Toileting every 2 Hours, in advance of need  - Initiate/Maintain alarm  - Obtain necessary fall risk management equipment:   - Apply yellow socks and bracelet for high fall risk patients  - Consider moving patient to room near nurses station  Outcome: Progressing  Goal: Maintain or return to baseline ADL function  Description: INTERVENTIONS:  -  Assess patient's ability to carry out ADLs; assess patient's baseline for ADL function and identify physical deficits which impact ability to perform ADLs (bathing, care of mouth/teeth, toileting, grooming, dressing, etc.)  - Assess/evaluate cause of self-care deficits   - Assess range of motion  - Assess patient's mobility; develop plan if impaired  - Assess patient's need for assistive devices and provide as appropriate  - Encourage maximum independence but intervene and supervise when necessary  - Involve family in performance of ADLs  - Assess for home care needs following discharge   - Consider OT consult to assist with ADL evaluation and planning for discharge  - Provide patient education as appropriate  - Monitor functional capacity and physical performance, use of AM PAC & JH-HLM   - Monitor gait, balance and fatigue with ambulation    Outcome: Progressing  Goal: Maintains/Returns to pre admission functional level  Description: INTERVENTIONS:  - Perform AM-PAC 6 Click Basic Mobility/ Daily Activity assessment daily.  - Set and communicate daily mobility goal to care team and patient/family/caregiver.   - Collaborate with rehabilitation services on mobility goals if consulted  -  Perform Range of Motion 3 times a day.  - Reposition patient every 2 hours.  - Dangle patient 3 times a day  - Stand patient 3 times a day  - Ambulate patient 3 times a day  - Out of bed to chair 3 times a day   - Out of bed for meals 3 times a day  - Out of bed for toileting  - Record patient progress and toleration of activity level   Outcome: Progressing     Problem: DISCHARGE PLANNING  Goal: Discharge to home or other facility with appropriate resources  Description: INTERVENTIONS:  - Identify barriers to discharge w/patient and caregiver  - Arrange for needed discharge resources and transportation as appropriate  - Identify discharge learning needs (meds, wound care, etc.)  - Arrange for interpretive services to assist at discharge as needed  - Refer to Case Management Department for coordinating discharge planning if the patient needs post-hospital services based on physician/advanced practitioner order or complex needs related to functional status, cognitive ability, or social support system  Outcome: Progressing     Problem: Knowledge Deficit  Goal: Patient/family/caregiver demonstrates understanding of disease process, treatment plan, medications, and discharge instructions  Description: Complete learning assessment and assess knowledge base.  Interventions:  - Provide teaching at level of understanding  - Provide teaching via preferred learning methods  Outcome: Progressing     Problem: Potential for Falls  Goal: Patient will remain free of falls  Description: INTERVENTIONS:  - Educate patient/family on patient safety including physical limitations  - Instruct patient to call for assistance with activity   - Consider consulting OT/PT to assist with strengthening/mobility based on AM PAC & JH-HLM score  - Consult OT/PT to assist with strengthening/mobility   - Keep Call bell within reach  - Keep bed low and locked with side rails adjusted as appropriate  - Keep care items and personal belongings within  reach  - Initiate and maintain comfort rounds  - Make Fall Risk Sign visible to staff  - Offer Toileting every 2 Hours, in advance of need  - Initiate/Maintain 3alarm  - Obtain necessary fall risk management equipment:   - Apply yellow socks and bracelet for high fall risk patients  - Consider moving patient to room near nurses station  Outcome: Progressing

## 2025-06-14 VITALS
RESPIRATION RATE: 17 BRPM | BODY MASS INDEX: 32.59 KG/M2 | TEMPERATURE: 99.7 F | WEIGHT: 166.01 LBS | DIASTOLIC BLOOD PRESSURE: 79 MMHG | OXYGEN SATURATION: 95 % | HEIGHT: 60 IN | SYSTOLIC BLOOD PRESSURE: 139 MMHG | HEART RATE: 98 BPM

## 2025-06-14 PROCEDURE — 99024 POSTOP FOLLOW-UP VISIT: CPT | Performed by: STUDENT IN AN ORGANIZED HEALTH CARE EDUCATION/TRAINING PROGRAM

## 2025-06-14 PROCEDURE — NC001 PR NO CHARGE: Performed by: STUDENT IN AN ORGANIZED HEALTH CARE EDUCATION/TRAINING PROGRAM

## 2025-06-14 RX ORDER — METHOCARBAMOL 750 MG/1
750 TABLET, FILM COATED ORAL 4 TIMES DAILY PRN
Qty: 12 TABLET | Refills: 0 | Status: SHIPPED | OUTPATIENT
Start: 2025-06-14 | End: 2025-06-17 | Stop reason: SDUPTHER

## 2025-06-14 RX ORDER — ACETAMINOPHEN 325 MG/1
650 TABLET ORAL EVERY 6 HOURS PRN
COMMUNITY
Start: 2025-06-14

## 2025-06-14 RX ORDER — OXYCODONE HYDROCHLORIDE 5 MG/1
5 TABLET ORAL EVERY 6 HOURS PRN
Qty: 15 TABLET | Refills: 0 | Status: SHIPPED | OUTPATIENT
Start: 2025-06-14 | End: 2025-06-17 | Stop reason: SDUPTHER

## 2025-06-14 RX ORDER — CEPHALEXIN 500 MG/1
500 CAPSULE ORAL EVERY 12 HOURS SCHEDULED
Qty: 20 CAPSULE | Refills: 0 | Status: SHIPPED | OUTPATIENT
Start: 2025-06-14 | End: 2025-06-25 | Stop reason: ALTCHOICE

## 2025-06-14 RX ADMIN — METHOCARBAMOL 750 MG: 500 TABLET ORAL at 05:29

## 2025-06-14 RX ADMIN — PANTOPRAZOLE SODIUM 40 MG: 40 TABLET, DELAYED RELEASE ORAL at 05:29

## 2025-06-14 RX ADMIN — CEPHALEXIN 500 MG: 250 CAPSULE ORAL at 12:48

## 2025-06-14 RX ADMIN — METHOCARBAMOL 750 MG: 500 TABLET ORAL at 12:48

## 2025-06-14 RX ADMIN — PRAVASTATIN SODIUM 40 MG: 40 TABLET ORAL at 09:06

## 2025-06-14 RX ADMIN — HEPARIN SODIUM 5000 UNITS: 5000 INJECTION, SOLUTION INTRAVENOUS; SUBCUTANEOUS at 05:31

## 2025-06-14 RX ADMIN — FLUTICASONE FUROATE AND VILANTEROL TRIFENATATE 1 PUFF: 200; 25 POWDER RESPIRATORY (INHALATION) at 09:18

## 2025-06-14 RX ADMIN — ACETAMINOPHEN 975 MG: 325 TABLET, FILM COATED ORAL at 05:29

## 2025-06-14 RX ADMIN — DOCUSATE SODIUM 100 MG: 100 CAPSULE, LIQUID FILLED ORAL at 09:06

## 2025-06-14 RX ADMIN — OXYCODONE HYDROCHLORIDE 5 MG: 5 TABLET ORAL at 09:18

## 2025-06-14 RX ADMIN — ESCITALOPRAM OXALATE 10 MG: 10 TABLET ORAL at 09:06

## 2025-06-14 RX ADMIN — CEPHALEXIN 500 MG: 250 CAPSULE ORAL at 05:29

## 2025-06-14 NOTE — PLAN OF CARE
Problem: PAIN - ADULT  Goal: Verbalizes/displays adequate comfort level or baseline comfort level  Description: Interventions:  - Encourage patient to monitor pain and request assistance  - Assess pain using appropriate pain scale  - Administer analgesics as ordered based on type and severity of pain and evaluate response  - Implement non-pharmacological measures as appropriate and evaluate response  - Consider cultural and social influences on pain and pain management  - Notify physician/advanced practitioner if interventions unsuccessful or patient reports new pain  - Educate patient/family on pain management process including their role and importance of  reporting pain   - Provide non-pharmacologic/complimentary pain relief interventions  Outcome: Progressing     Problem: INFECTION - ADULT  Goal: Absence or prevention of progression during hospitalization  Description: INTERVENTIONS:  - Assess and monitor for signs and symptoms of infection  - Monitor lab/diagnostic results  - Monitor all insertion sites, i.e. indwelling lines, tubes, and drains  - Monitor endotracheal if appropriate and nasal secretions for changes in amount and color  - [place] appropriate cooling/warming therapies per order  - Administer medications as ordered  - Instruct and encourage patient and family to use good hand hygiene technique  - Identify and instruct in appropriate isolation precautions for identified infection/condition  Outcome: Progressing  Goal: Absence of fever/infection during neutropenic period  Description: INTERVENTIONS:  - Monitor WBC  - Perform strict hand hygiene  - Limit to healthy visitors only  - No plants, dried, fresh or silk flowers with bowens in patient room  Outcome: Progressing     Problem: SAFETY ADULT  Goal: Patient will remain free of falls  Description: INTERVENTIONS:  - Educate patient/family on patient safety including physical limitations  - Instruct patient to call for assistance with activity   -  Consider consulting OT/PT to assist with strengthening/mobility based on AM PAC & JH-HLM score  - Consult OT/PT to assist with strengthening/mobility   - Keep Call bell within reach  - Keep bed low and locked with side rails adjusted as appropriate  - Keep care items and personal belongings within reach  - Initiate and maintain comfort rounds  - Make Fall Risk Sign visible to staff  - Offer Toileting every x Hours, in advance of need  - Initiate/Maintain xalarm  - Obtain necessary fall risk management equipment: x  - Apply yellow socks and bracelet for high fall risk patients  - Consider moving patient to room near nurses station  Outcome: Progressing  Goal: Maintain or return to baseline ADL function  Description: INTERVENTIONS:  -  Assess patient's ability to carry out ADLs; assess patient's baseline for ADL function and identify physical deficits which impact ability to perform ADLs (bathing, care of mouth/teeth, toileting, grooming, dressing, etc.)  - Assess/evaluate cause of self-care deficits   - Assess range of motion  - Assess patient's mobility; develop plan if impaired  - Assess patient's need for assistive devices and provide as appropriate  - Encourage maximum independence but intervene and supervise when necessary  - Involve family in performance of ADLs  - Assess for home care needs following discharge   - Consider OT consult to assist with ADL evaluation and planning for discharge  - Provide patient education as appropriate  - Monitor functional capacity and physical performance, use of AM PAC & JH-HLM   - Monitor gait, balance and fatigue with ambulation    Outcome: Progressing  Goal: Maintains/Returns to pre admission functional level  Description: INTERVENTIONS:  - Perform AM-PAC 6 Click Basic Mobility/ Daily Activity assessment daily.  - Set and communicate daily mobility goal to care team and patient/family/caregiver.   - Collaborate with rehabilitation services on mobility goals if consulted  -  Perform Range of Motion x times a day.  - Reposition patient every x hours.  - Dangle patient x times a day  - Stand patient x times a day  - Ambulate patient x times a day  - Out of bed to chair x times a day   - Out of bed for meals x times a day  - Out of bed for toileting  - Record patient progress and toleration of activity level   Outcome: Progressing     Problem: DISCHARGE PLANNING  Goal: Discharge to home or other facility with appropriate resources  Description: INTERVENTIONS:  - Identify barriers to discharge w/patient and caregiver  - Arrange for needed discharge resources and transportation as appropriate  - Identify discharge learning needs (meds, wound care, etc.)  - Arrange for interpretive services to assist at discharge as needed  - Refer to Case Management Department for coordinating discharge planning if the patient needs post-hospital services based on physician/advanced practitioner order or complex needs related to functional status, cognitive ability, or social support system  Outcome: Progressing     Problem: Knowledge Deficit  Goal: Patient/family/caregiver demonstrates understanding of disease process, treatment plan, medications, and discharge instructions  Description: Complete learning assessment and assess knowledge base.  Interventions:  - Provide teaching at level of understanding  - Provide teaching via preferred learning methods  Outcome: Progressing     Problem: Potential for Falls  Goal: Patient will remain free of falls  Description: INTERVENTIONS:  - Educate patient/family on patient safety including physical limitations  - Instruct patient to call for assistance with activity   - Consider consulting OT/PT to assist with strengthening/mobility based on AM PAC & JH-HLM score  - Consult OT/PT to assist with strengthening/mobility   - Keep Call bell within reach  - Keep bed low and locked with side rails adjusted as appropriate  - Keep care items and personal belongings within  reach  - Initiate and maintain comfort rounds  - Make Fall Risk Sign visible to staff  - Offer Toileting every x Hours, in advance of need  - Initiate/Maintain xalarm  - Obtain necessary fall risk management equipment: x  - Apply yellow socks and bracelet for high fall risk patients  - Consider moving patient to room near nurses station  Outcome: Progressing

## 2025-06-14 NOTE — PROGRESS NOTES
Progress Note - Neurosurgery   Name: Maritza Gold 74 y.o. female I MRN: [id number]  Unit/Bed#: -01 I Date of Admission: 6/11/2025   Date of Service: 6/14/2025 I Hospital Day: 3         Pt is 73 y/o F pmhx lumbar radiculopathy, spinal stenosis of lumbar region with neurogenic claudication, HTN, UBALDO, HLD, GERD, and asthma who presented  6/11/25 for an elective L4-L5 TLIF  Assessment & Plan  Lumbar radiculopathy  POD3 s/p L4-5 TLIF  Afebrile, VSS,  Episode of symptomatic hypotension yesterday without recurrence  Has been ambulating with walker  PT/OT- discussed with PT today, confirmed recommendation: home with RW and home PT   Dispo planning for today  Essential hypertension  On home hydrochlorothiazide 25mg tab and losartan 100mg tab once daily    Generalized anxiety disorder  Home dose Lexapro 10mg tab once daily ordered   Xanax 0.5mg HS PRN ordered  Mixed hyperlipidemia  Home dose Pravastatin 40mg tab once daily ordered   Current mild episode of major depressive disorder without prior episode (HCC)  Home dose Lexapro 10mg tab once daily ordered   Gastroesophageal reflux disease  Protonix 40mg tab daily once daily ordered   Chronic idiopathic constipation  Stool softeners ordered while inpatient   Moderate persistent asthma without complication  Home inhalers ordered   Respiratory protocol     Discussed patient and above plan with neurosurgery AP     Subjective   No acute overnight events. Patient has been ambulating with walker frequently to the bathroom. She denies any recurrent episodes of dizziness/nausea. She is tolerating her diet well and pain is well controlled on her current pain regimen.     Objective :  Temp:  [99.3 °F (37.4 °C)-100.5 °F (38.1 °C)] 99.7 °F (37.6 °C)  HR:  [] 98  BP: (102-151)/(65-79) 139/79  Resp:  [16-18] 17  SpO2:  [88 %-95 %] 95 %  O2 Device: None (Room air)    I/O         06/12 0701 06/13 0700 06/13 0701 06/14 0700 06/14 0701  06/15 0700    P.O. 960 702 120    I.V.  (mL/kg)       NG/GT  350     IV Piggyback       Total Intake(mL/kg) 960 (12.7) 1052 (14) 120 (1.6)    Urine (mL/kg/hr) 900 (0.5)      Drains 1      Blood       Total Output 901      Net +59 +1052 +120           Unmeasured Urine Occurrence 1 x 6 x 1 x            Physical Exam  Constitutional:       Appearance: Normal appearance.   HENT:      Head: Normocephalic and atraumatic.      Nose: Nose normal.      Mouth/Throat:      Mouth: Mucous membranes are dry.     Eyes:      Conjunctiva/sclera: Conjunctivae normal.       Cardiovascular:      Rate and Rhythm: Normal rate and regular rhythm.      Pulses: Normal pulses.   Pulmonary:      Effort: Pulmonary effort is normal.   Abdominal:      General: Abdomen is flat. Bowel sounds are normal. There is no distension.      Palpations: Abdomen is soft. There is no mass.      Tenderness: There is no abdominal tenderness. There is no guarding.     Musculoskeletal:      Cervical back: Neck supple.     Skin:     General: Skin is warm and dry.      Comments: Lumbar incision site c/d/I with sutures, covered with clean gauze 4x4 and tape. There is mild surrounding ecchymosis. No erythema or drainage.      Neurological:      Mental Status: She is alert and oriented to person, place, and time.     Psychiatric:         Mood and Affect: Mood normal.         Behavior: Behavior normal.           Lab Results: I have reviewed the following results:  Recent Labs     06/12/25  0541   WBC 11.30*   HGB 9.7*   HCT 30.9*      SODIUM 139   K 4.5      CO2 28   BUN 21   CREATININE 0.80   GLUC 120   MG 1.8*       Imaging Results Review: No pertinent imaging studies reviewed.  Other Study Results Review: No additional pertinent studies reviewed.    VTE Pharmacologic Prophylaxis: VTE covered by:  heparin (porcine), Subcutaneous, 5,000 Units at 06/14/25 0531     VTE Mechanical Prophylaxis: sequential compression device

## 2025-06-14 NOTE — DISCHARGE SUMMARY
Discharge Summary   Maritza Gold 74 y.o. female MRN: [id number]  Unit/Bed#: -01 Encounter: [id number]    Admission Date: 6/11/2025     Discharge Date:06/14/25    Attending:Quan Kam MD     Consultants: None    Admitting Diagnosis: Lumbar radiculopathy [M54.16]  Spinal stenosis of lumbar region with neurogenic claudication [M48.062]    Principle/ Secondary Diagnosis:  Past Medical History[1]  Past Surgical History[2]    Procedures Performed  Procedure(s):  L4-5 transforaminal lumbar interbody fusion, with neuromonitoring and neuronavigation    Laboratory data at discharge:   Results from last 7 days   Lab Units 06/12/25  0541 06/11/25  1758   WBC Thousand/uL 11.30*  --    HEMOGLOBIN g/dL 9.7*  --    HEMATOCRIT % 30.9*  --    PLATELETS Thousands/uL 220 225     Results from last 7 days   Lab Units 06/12/25  0541   POTASSIUM mmol/L 4.5   CHLORIDE mmol/L 107   CO2 mmol/L 28   BUN mg/dL 21   CREATININE mg/dL 0.80   CALCIUM mg/dL 8.2*               Discharge instructions/Information to patient and family:   See after visit summary for information provided to patient and family.     Discharge Medications:  See after visit summary for reconciled discharge medications provided to patient and family.      HPI:   Maritza Gold is a 74-year-old female with history of chronic low back pain while being upright with more recent radicular pain down the bilateral lower extremities.  She presented to the hospital 6/11/2025 for elective L4-5 TLIF with Dr. Young and underwent planned procedure with operative findings of severe L4-5 central canal stenosis due to grade 1 spondylolisthesis with ligamentous hypertrophy and facet arthropathy.  Further details can be found in the operative report.    Hospital Course:  Postoperatively, the patient was extubated in the operating room and transferred immediately to PACU for vital sign monitoring and pain control.  Once stable, she was transferred to the medical surgical unit  for further observation and monitoring.  Her postoperative hospital course was uneventful.  Postoperative x-rays showed hardware in good position and operative drain was discontinued on postoperative day 2 with decreasing output.  She was maintained on appropriate DVT prophylaxis throughout her hospital course.  She was evaluated by PT/OT during her hospital stay and recommended for home with home PT, rolling walker and bedside commode.      Prior to discharge, the patient was given instructions for outpatient care and follow-up.  The patient has been instructed to call w/ any questions, changes, or concerns for the medical condition.    For further details of the hospitalization, please refer to the medical record.    Condition at Discharge: fair     Provisions for Follow-Up Care:  See after visit summary for information related to follow-up care and any pertinent home health orders.      Disposition: See After Visit Summary for discharge disposition information.    Planned Readmission: No    Discharge Statement   I spent 20 minutes discharging the patient. This time was spent on the day of discharge. I had direct contact with the patient on the day of discharge. Additional documentation is required if more than 30 minutes were spent on discharge.     Thien Ashley PA-C  6/14/2025      This text is generated with voice recognition software. There may be translation, syntax,  or grammatical errors. If you have any questions, please contact the dictating provider.            [1]   Past Medical History:  Diagnosis Date    Anxiety     Arthritis     Asthma     C. difficile enteritis     years ago    Chest pain     2024    Depression     Dizziness 01/18/2022    GERD (gastroesophageal reflux disease)     History of acute respiratory failure     History of recent fall     fell out of bed    Hyperlipidemia     Hypertension     Incontinence of urine     Pneumonia     Respiratory disorder 2013    Sleep apnea     borderline     Thyroid nodule     Varicose veins of both lower extremities    [2]   Past Surgical History:  Procedure Laterality Date    BRONCHOSCOPY      COLONOSCOPY  2017    FRACTURE SURGERY Bilateral     wrist    NASAL SINUS SURGERY Bilateral 09/2017    OK ARTHRODESIS COMBINED TQ 1NTRSPC LUMBAR N/A 6/11/2025    Procedure: L4-5 transforaminal lumbar interbody fusion, with neuromonitoring and neuronavigation;  Surgeon: Quan Kam MD;  Location:  MAIN OR;  Service: Neurosurgery    SINUS SURGERY      TUBAL LIGATION      WISDOM TOOTH EXTRACTION

## 2025-06-14 NOTE — ASSESSMENT & PLAN NOTE
POD3 s/p L4-5 TLIF  Afebrile, VSS,  Episode of symptomatic hypotension yesterday without recurrence  Has been ambulating with walker  PT/OT- discussed with PT today, confirmed recommendation: home with RW and home PT   Dispo planning for today

## 2025-06-16 ENCOUNTER — TELEPHONE (OUTPATIENT)
Age: 75
End: 2025-06-16

## 2025-06-16 ENCOUNTER — TRANSITIONAL CARE MANAGEMENT (OUTPATIENT)
Dept: FAMILY MEDICINE CLINIC | Facility: HOSPITAL | Age: 75
End: 2025-06-16

## 2025-06-16 NOTE — TELEPHONE ENCOUNTER
2nd attempt - Called patient on primary contact number after surgery & recent discharge from the hospital to check in on recovery and provide post surgical instructions. Got voicemail, left message to callback. Will make 1 more attempt if no callback is received.

## 2025-06-16 NOTE — UTILIZATION REVIEW
NOTIFICATION OF ADMISSION DISCHARGE   This is a Notification of Discharge from [facility]. Please be advised that this patient has been discharge from our facility. Below you will find the admission and discharge date and time including the patient’s disposition.   UTILIZATION REVIEW CONTACT:  Utilization Review Assistants  Network Utilization Review Department  Phone: [phone] x carefully listen to the prompts. All voicemails are confidential.  Email: [email or web address]     ADMISSION INFORMATION  PRESENTATION DATE: 6/11/2025  5:58 AM  OBERVATION ADMISSION DATE: N/A  INPATIENT ADMISSION DATE: 6/11/25 11:36 AM   DISCHARGE DATE: 6/14/2025  2:17 PM   DISPOSITION:Home with Home Health Care    [facility] Utilization Review Department  ATTENTION: Please call with any questions or concerns to [phone] and carefully listen to the prompts so that you are directed to the right person. All voicemails are confidential.   For Discharge needs, contact Care Management DC Support Team at [phone] opt. 2  Send all requests for admission clinical reviews, approved or denied determinations and any other requests to dedicated fax number below belonging to the campus where the patient is receiving treatment. List of dedicated fax numbers for the Facilities:  FACILITY NAME UR FAX NUMBER   ADMISSION DENIALS (Administrative/Medical Necessity) [phone]   DISCHARGE SUPPORT TEAM ([facility]) [phone]   PARENT CHILD HEALTH (Maternity/NICU/Pediatrics) [phone]   [facility] [phone]   [facility] [phone]   [facility] [phone]   [facility] [phone]   [facility] [phone]   [facility] [phone]   General acute hospital [phone]   [facility] [phone]    [facility] [phone]   [facility] [phone]   [facility] [phone]   [facility] [phone]

## 2025-06-16 NOTE — TELEPHONE ENCOUNTER
1st attempt - Called patient on primary contact number after surgery & recent discharge from the hospital to check in on recovery and provide post surgical instructions. No voicemail.  Will make another attempt if no callback is received.

## 2025-06-17 ENCOUNTER — TELEPHONE (OUTPATIENT)
Age: 75
End: 2025-06-17

## 2025-06-17 DIAGNOSIS — Z98.890 STATUS POST SURGERY: Primary | ICD-10-CM

## 2025-06-17 DIAGNOSIS — M54.16 LUMBAR RADICULOPATHY: ICD-10-CM

## 2025-06-17 RX ORDER — OXYCODONE HYDROCHLORIDE 5 MG/1
5 TABLET ORAL EVERY 6 HOURS PRN
Qty: 28 TABLET | Refills: 0 | Status: SHIPPED | OUTPATIENT
Start: 2025-06-17 | End: 2025-06-24 | Stop reason: SDUPTHER

## 2025-06-17 RX ORDER — METHOCARBAMOL 750 MG/1
750 TABLET, FILM COATED ORAL 4 TIMES DAILY PRN
Qty: 28 TABLET | Refills: 0 | Status: SHIPPED | OUTPATIENT
Start: 2025-06-17 | End: 2025-06-24 | Stop reason: SDUPTHER

## 2025-06-17 NOTE — TELEPHONE ENCOUNTER
Patient transferred to this RN from [facility].     Patient requesting determination of when [facility] will be reaching out regarding Home Care.  This RN stated that she will reach out to [facility] and ensure that they had received the referral and determine when they would likely be reaching out.  Patient noted that she has a hard time getting out of bed as well as up from a seated position.  Pt noted she is able to ambulate without her walker. This RN recommended pt should use walker for safety as needed.      Patient states that she has been compliant with her antibiotic, she is need of refills for her oxycodone and Robaxin.  She has four oxycodone and one robaxin remaining, this RN verified patient pharmacy and noted that refill request will be routed to provider for approval and this RN will call her back once that is completed then.     Pt stated that she has been able to get into the shower.  Incisional care reviewed with patient from [facility].Physical restrictions reviewed with patient from [facility].      Pt states that she has not had a bowel movement since before surgery.  Surgery was on 6/11/2025.  Patient was asked if she had been taking anything, like a stool softener.  Patient noted that they had been giving her something while she was in the hospital.  Patient was discharged on 6/14/2025.  Patient encouraged to take stool softener as well as Mirilax and to do so today.  This RN noting that patient needs to increase fluids as able as well to aid in return of bowel pattern.  Patient denies any gas pain in abdomen.

## 2025-06-17 NOTE — TELEPHONE ENCOUNTER
Patient phoned and informed that her refill requests were approved and sent to her pharmacy.  This RN update on [facility] referral and patient noted that someone reached out and already provided her with new referral and contact.  Pt noted she will give that 24 hours and then call herself as she had the number.     Pt two week POV date, time, location reviewed and pt encouraged to call back with any further needs.      Pt thankful for assistance.

## 2025-06-17 NOTE — TELEPHONE ENCOUNTER
This RN phoned and spoke with Belinda at [facility] ([phone]) to verify that referral had been received.  Belinda informed this RN that this is the [facility] office and this RN would need to speak with the [place] office, due to patient address.  Belinda was able to provide phone number for the [facility].  Number is [phone] was phoned and this RN spoke with  Laura  who was able to inform this RN that the   original referral was received and that  they are not able to accept due to her insurance no contact [facility] insurance at this time.  Laura noted that communication had been sent to network to inform of need to find another service that the patients insurance would be able to accept. This RN thanked Laura for her assistance.    Ordering provider for Home Care referral for [facility] routed this message to patient SW  for assistance in assisting patient.

## 2025-06-18 ENCOUNTER — RA CDI HCC (OUTPATIENT)
Dept: OTHER | Facility: HOSPITAL | Age: 75
End: 2025-06-18

## 2025-06-18 ENCOUNTER — TELEPHONE (OUTPATIENT)
Age: 75
End: 2025-06-18

## 2025-06-18 NOTE — TELEPHONE ENCOUNTER
Maritza would like for Dr. Nance to call her. She had back surgery on 6/11/25. She has not had a bowel movement. She also does not have the urge. Please call

## 2025-06-18 NOTE — TELEPHONE ENCOUNTER
Likely the narcotic she is taking for pain is causing constipation. Also decreased mobility can do this. Start OTC dulcolax. Take 2 tablets right away preferably tonight then 1 tablet once/day while she is taking pain medication. If diarrhea occurs then she can stop taking it. Increase water intake and try to be as mobile as possible.

## 2025-06-24 ENCOUNTER — CLINICAL SUPPORT (OUTPATIENT)
Age: 75
End: 2025-06-24

## 2025-06-24 DIAGNOSIS — Z98.890 STATUS POST SURGERY: Primary | ICD-10-CM

## 2025-06-24 DIAGNOSIS — Z98.890 STATUS POST SURGERY: ICD-10-CM

## 2025-06-24 DIAGNOSIS — M54.16 LUMBAR RADICULOPATHY: ICD-10-CM

## 2025-06-24 PROCEDURE — 99024 POSTOP FOLLOW-UP VISIT: CPT | Performed by: STUDENT IN AN ORGANIZED HEALTH CARE EDUCATION/TRAINING PROGRAM

## 2025-06-24 RX ORDER — METHOCARBAMOL 750 MG/1
750 TABLET, FILM COATED ORAL 4 TIMES DAILY PRN
Qty: 42 TABLET | Refills: 0 | Status: SHIPPED | OUTPATIENT
Start: 2025-06-24

## 2025-06-24 RX ORDER — OXYCODONE HYDROCHLORIDE 5 MG/1
5 TABLET ORAL EVERY 6 HOURS PRN
Qty: 28 TABLET | Refills: 0 | Status: SHIPPED | OUTPATIENT
Start: 2025-06-24

## 2025-06-24 NOTE — PATIENT INSTRUCTIONS
Please complete x-rays 2-3 days prior to your next scheduled office visit.     Continue incision care as instructed. Cleans incision area(s) with soap and water and pat dry.  Avoid lotions, creams or ointments for four more weeks.  Avoid soaking in water (bath tubs, hot tubs) for four more weeks.    Maintain activity restrictions until cleared by the surgeon. No heavy lifting greater than 10lbs and no strenuous activities until cleared. No bending or twisting. No BLTs (bending, lifting, twisting). Avoid pushing/pulling movements. May walk as tolerated. Encourage at least 4 short walks per day. No prolonged sitting.     Call the office immediately with any signs or symptoms of infection including: increasing redness, swelling, drainage or fevers (101 or greater).

## 2025-06-24 NOTE — TELEPHONE ENCOUNTER
Pt phoned to update that refill requests were approved and sent to her pharmacy.  Pt encouraged to call back with any additional questions or concerns.  Pt agreeable and appreciative for assistance.

## 2025-06-24 NOTE — PROGRESS NOTES
Post-Op Visit- Neurosurgery    Maritza Gold 74 y.o. female MRN: [id number]    Chief Complaint:  Patient presents post: L4-5 transforaminal lumbar interbody fusion, with neuromonitoring and neuronavigation    History of Present Illness:  Patient presents for 2 week POV for incision check accompanied by spouse and ambulating without an assistive device.  Patient reports she is doing well overall and denies any incisional issues or fevers.  she denies any new weakness, numbness or tingling since the surgery.  Patient has been compliant  taking the post-operative antibiotic as prescribed.  Patient admits to surgical pain at this time and rates their pain as a Pain Score:   8/10.  Patient is currently taking tylenol, robaxin, and oxycodone with some relief of pain symptoms.      Assessment:   There were no vitals filed for this visit.    Wound Exam: Incision well approximated.  No erythema, edema or drainage present.   Location: Lumbar spine    Procedure:  Suture removal.   Procedure Note: Incision cleansed with NSS.  Sutures were removed.  Incision cleansed with NSS post suture removal.  Patient Status: Patient tolerated the procedure well.     Complications: None.           Discussion/Summary:  Doing well postoperatively. Reviewed incision care with patient including daily observation for s/s infection including: increased erythema, edema, drainage, dehiscence of incision or fever >101.  Should these be observed, she understands that she is to call and/or return immediately for reassessment.  Advised patient to continue cleansing area with mild soap and water and pat dry. Not to apply any lotions, creams, or ointments, & not to submerge in any water for 4 more weeks.     She is to maintain activity restrictions until cleared by the surgeon. Activity levels were also reviewed with the patient in detail, from patient AVS; No heavy lifting  greater than 10lbs and no strenuous activities until cleared. No bending or twisting. No BLTs (bending, lifting, twisting). Avoid pushing/pulling movements. May walk as tolerated. Encourage at least 4 short walks per day. No prolonged sitting.     Verified date/time/location of upcoming POV and reminded her to complete x-rays prior to her next appointment. She is to call the office with any further questions or concerns, or if any incisional issues or fevers would arise.

## 2025-06-25 ENCOUNTER — OFFICE VISIT (OUTPATIENT)
Dept: FAMILY MEDICINE CLINIC | Facility: HOSPITAL | Age: 75
End: 2025-06-25
Payer: COMMERCIAL

## 2025-06-25 VITALS
DIASTOLIC BLOOD PRESSURE: 78 MMHG | TEMPERATURE: 96.9 F | BODY MASS INDEX: 32.98 KG/M2 | HEART RATE: 91 BPM | HEIGHT: 60 IN | SYSTOLIC BLOOD PRESSURE: 116 MMHG | OXYGEN SATURATION: 97 % | WEIGHT: 168 LBS

## 2025-06-25 DIAGNOSIS — K59.03 DRUG-INDUCED CONSTIPATION: ICD-10-CM

## 2025-06-25 DIAGNOSIS — M54.16 LUMBAR RADICULOPATHY: Primary | ICD-10-CM

## 2025-06-25 DIAGNOSIS — Z09 HOSPITAL DISCHARGE FOLLOW-UP: ICD-10-CM

## 2025-06-25 PROCEDURE — 99495 TRANSJ CARE MGMT MOD F2F 14D: CPT | Performed by: NURSE PRACTITIONER

## 2025-06-25 NOTE — ASSESSMENT & PLAN NOTE
S/p elective L4-5 TLIF.   She is doing very well postoperatively.   Continues with oxycodone every 6 hours. Discussed trying to use this less if possible.   F/U with neurosurgery next month

## 2025-07-21 ENCOUNTER — APPOINTMENT (OUTPATIENT)
Dept: RADIOLOGY | Facility: CLINIC | Age: 75
End: 2025-07-21
Payer: COMMERCIAL

## 2025-07-21 ENCOUNTER — TELEPHONE (OUTPATIENT)
Age: 75
End: 2025-07-21

## 2025-07-21 DIAGNOSIS — Z98.890 STATUS POST SURGERY: ICD-10-CM

## 2025-07-21 PROCEDURE — 72100 X-RAY EXAM L-S SPINE 2/3 VWS: CPT

## 2025-07-21 NOTE — TELEPHONE ENCOUNTER
Pt called stating she just spoke to someone from our office and was told that she needs an xray prior to her POV with Dr. Choudhury tomorrow 7/22/25.  Pt was originally unaware of this and was calling because she was unable to find this xray order on her MyChart where she was also told she would be able to see this.  Informed pt that as long as she presents to a  radiology, they will be able to pull her order from there to complete what is needed.  She will go to  [facility] today to have this done prior to her appt,

## 2025-07-22 ENCOUNTER — OFFICE VISIT (OUTPATIENT)
Age: 75
End: 2025-07-22

## 2025-07-22 VITALS
WEIGHT: 168 LBS | SYSTOLIC BLOOD PRESSURE: 114 MMHG | RESPIRATION RATE: 16 BRPM | OXYGEN SATURATION: 97 % | HEIGHT: 60 IN | HEART RATE: 108 BPM | BODY MASS INDEX: 32.98 KG/M2 | DIASTOLIC BLOOD PRESSURE: 80 MMHG | TEMPERATURE: 98 F

## 2025-07-22 DIAGNOSIS — Z98.1 S/P LUMBAR FUSION: Primary | ICD-10-CM

## 2025-07-22 DIAGNOSIS — Z09 FOLLOW-UP EXAMINATION, FOLLOWING OTHER SURGERY: ICD-10-CM

## 2025-07-22 PROCEDURE — 99024 POSTOP FOLLOW-UP VISIT: CPT | Performed by: STUDENT IN AN ORGANIZED HEALTH CARE EDUCATION/TRAINING PROGRAM

## 2025-07-22 NOTE — PROGRESS NOTES
Name: Maritza Gold      : 1950      MRN: [id number]  Encounter Provider: Quan Kam MD  Encounter Date: 2025   [facility] [place]  :  Assessment & Plan  S/P lumbar fusion    Orders:    X-ray lumbar spine 2 or 3 views; Future    Follow-up examination, following other surgery    Orders:    X-ray lumbar spine 2 or 3 views; Future    This is a 74-year-old female status post L4-5 TLIF presenting for her 6-week postop visit.  X-rays of her lumbar spine demonstrate hardware in place without any evidence of hardware failure.  The patient is doing very well and most of her preoperative symptoms have resolved.  She does not have any issues or complaints.  She is back to a lot of her usual activities without any issues.  I will bring her back in 3 months for follow-up visit with x-rays of her lumbar spine.    History of Present Illness     Maritza Gold is a 74 y.o. female who presents for her 6-week postop visit.    HPI   The patient states she is doing very well.  She states after surgery, her lower extremity symptoms resolved.  She has been walking without issues.  Pain is no longer an issue for her.  She reports mild tenderness in her lower back.  Overall she is very happy with her surgical results.  Review of Systems   Neurological: Negative.    All other systems reviewed and are negative.    I have personally reviewed the MA's review of systems and made changes as necessary.    Past Medical History   Past Medical History[1]  Past Surgical History[2]  Family History[3]  she reports that she has never smoked. She has never used smokeless tobacco. She reports that she does not currently use alcohol. She reports that she does not use drugs.  Current Outpatient Medications   Medication Instructions    acetaminophen (TYLENOL) 650 mg, Oral, Every 6 hours PRN    albuterol (PROVENTIL HFA,VENTOLIN HFA) 90 mcg/act inhaler 2 puffs, Inhalation, Every 6  hours PRN    albuterol 2.5 mg, Nebulization, Every 6 hours PRN    escitalopram (LEXAPRO) 10 mg, Oral, Daily    Fluticasone-Salmeterol (Advair Diskus) 250-50 mcg/dose inhaler 1 puff, Inhalation, 2 times daily, Rinse mouth after use.    hydroCHLOROthiazide 25 mg, Oral, Daily    losartan (COZAAR) 100 mg, Oral, Daily    methocarbamol (ROBAXIN) 750 mg, Oral, 4 times daily PRN    omeprazole (PRILOSEC) 40 mg, Oral, Daily    oxyCODONE (ROXICODONE) 5 mg, Oral, Every 6 hours PRN    pravastatin (PRAVACHOL) 40 mg, Oral, Daily    sodium chloride 0.9 % nebulizer solution 3 mL, Nebulization, As needed   Allergies[4]   Objective   There were no vitals taken for this visit.    Physical Exam  Neurological Exam  General:  Normal, well developed, not in distress/pain     Skin:   No issues, no rashes noted     Musculoskeletal:   5/5 strength throughout all muscle groups  No tenderness to palpation of the spine       Neurologic Exam:  Awake and alert  Oriented x3  Speech clear and fluent  EASLEY   Sensation to light touch and pin prick intact throughout  No pfeiffer's  No clonus  2+ patellar reflexes     Gait:   normal gait, normal posture        Administrative Statements   I have spent a total time of 20 minutes in caring for this patient on the day of the visit/encounter including Diagnostic results, Prognosis, Instructions for management, Patient and family education, Impressions, Counseling / Coordination of care, Documenting in the medical record, Reviewing/placing orders in the medical record (including tests, medications, and/or procedures), and Obtaining or reviewing history  .           [1]   Past Medical History:  Diagnosis Date    Anxiety     Arthritis     Asthma     C. difficile enteritis     years ago    Chest pain     2024    Depression     Dizziness 01/18/2022    GERD (gastroesophageal reflux disease)     History of acute respiratory failure     History of recent fall     fell out of bed    Hyperlipidemia     Hypertension      Incontinence of urine     Pneumonia     Respiratory disorder 2013    Sleep apnea     borderline    Thyroid nodule     Varicose veins of both lower extremities    [2]   Past Surgical History:  Procedure Laterality Date    BRONCHOSCOPY      COLONOSCOPY  2017    FRACTURE SURGERY Bilateral     wrist    NASAL SINUS SURGERY Bilateral 09/2017    CA ARTHRODESIS COMBINED TQ 1NTRSPC LUMBAR N/A 6/11/2025    Procedure: L4-5 transforaminal lumbar interbody fusion, with neuromonitoring and neuronavigation;  Surgeon: Quan Kam MD;  Location:  MAIN OR;  Service: Neurosurgery    SINUS SURGERY      TUBAL LIGATION      WISDOM TOOTH EXTRACTION     [3]   Family History  Problem Relation Name Age of Onset    Cirrhosis Mother Maritza Martínez     Colon cancer Father Everett Sweet         age dx unknown    Heart disease Father Everett Sweet     Heart failure Father Everett Sweet     Diabetes Father Everett Sweet     Rectal cancer Father Everett Sweet     Heart disease Sister Saloni     No Known Problems Daughter      No Known Problems Daughter      No Known Problems Daughter      No Known Problems Maternal Grandmother      No Known Problems Maternal Grandfather      Colon cancer Paternal Grandmother Majo Sweet         age dx unknown    No Known Problems Paternal Grandfather      Heart disease Brother Everett     Aortic aneurysm Brother Everett     Sudden death Brother Everett     Hypertension Brother Vignesh Floyd Micki    No Known Problems Maternal Aunt      No Known Problems Maternal Aunt      No Known Problems Maternal Aunt      No Known Problems Paternal Aunt      No Known Problems Paternal Aunt      Lung cancer Paternal Uncle          age dx unknown    Throat cancer Paternal Uncle          age dx unknown    Colon polyps Neg Hx     [4]   Allergies  Allergen Reactions    Succinylcholine GI Intolerance     Prolonged apnea      Amoxicillin-Pot Clavulanate Hives, Rash and Other (See  Comments)    Azithromycin Hives, Rash and Other (See Comments)    Clarithromycin Hives, Nausea Only, Rash, Vomiting and Other (See Comments)

## 2025-07-25 DIAGNOSIS — I10 ESSENTIAL HYPERTENSION: ICD-10-CM

## 2025-07-26 DIAGNOSIS — F32.0 CURRENT MILD EPISODE OF MAJOR DEPRESSIVE DISORDER WITHOUT PRIOR EPISODE (HCC): ICD-10-CM

## 2025-07-26 DIAGNOSIS — I10 ESSENTIAL HYPERTENSION: ICD-10-CM

## 2025-07-28 RX ORDER — LOSARTAN POTASSIUM 100 MG/1
100 TABLET ORAL DAILY
Qty: 90 TABLET | Refills: 1 | Status: SHIPPED | OUTPATIENT
Start: 2025-07-28

## 2025-07-29 RX ORDER — HYDROCHLOROTHIAZIDE 25 MG/1
25 TABLET ORAL DAILY
Qty: 90 TABLET | Refills: 1 | Status: SHIPPED | OUTPATIENT
Start: 2025-07-29

## 2025-07-29 RX ORDER — PRAVASTATIN SODIUM 80 MG/1
40 TABLET ORAL DAILY
Qty: 45 TABLET | Refills: 1 | Status: SHIPPED | OUTPATIENT
Start: 2025-07-29

## 2025-07-29 RX ORDER — ESCITALOPRAM OXALATE 10 MG/1
10 TABLET ORAL DAILY
Qty: 90 TABLET | Refills: 1 | Status: SHIPPED | OUTPATIENT
Start: 2025-07-29

## 2025-08-17 ENCOUNTER — APPOINTMENT (OUTPATIENT)
Dept: RADIOLOGY | Facility: CLINIC | Age: 75
End: 2025-08-17
Attending: FAMILY MEDICINE
Payer: COMMERCIAL

## 2025-08-17 ENCOUNTER — OFFICE VISIT (OUTPATIENT)
Dept: URGENT CARE | Facility: CLINIC | Age: 75
End: 2025-08-17
Payer: COMMERCIAL

## 2025-08-17 VITALS
OXYGEN SATURATION: 98 % | RESPIRATION RATE: 16 BRPM | HEART RATE: 107 BPM | DIASTOLIC BLOOD PRESSURE: 80 MMHG | SYSTOLIC BLOOD PRESSURE: 122 MMHG | BODY MASS INDEX: 32.98 KG/M2 | TEMPERATURE: 97.9 F | WEIGHT: 168 LBS | HEIGHT: 60 IN

## 2025-08-17 DIAGNOSIS — M25.561 ACUTE PAIN OF RIGHT KNEE: ICD-10-CM

## 2025-08-17 DIAGNOSIS — M17.11 PRIMARY OSTEOARTHRITIS OF RIGHT KNEE: Primary | ICD-10-CM

## 2025-08-17 PROCEDURE — G0463 HOSPITAL OUTPT CLINIC VISIT: HCPCS | Performed by: FAMILY MEDICINE

## 2025-08-17 PROCEDURE — 73564 X-RAY EXAM KNEE 4 OR MORE: CPT

## 2025-08-17 PROCEDURE — 99213 OFFICE O/P EST LOW 20 MIN: CPT | Performed by: FAMILY MEDICINE

## 2025-08-20 ENCOUNTER — OFFICE VISIT (OUTPATIENT)
Dept: OBGYN CLINIC | Facility: CLINIC | Age: 75
End: 2025-08-20
Payer: COMMERCIAL

## 2025-08-20 VITALS — HEIGHT: 60 IN | WEIGHT: 168 LBS | BODY MASS INDEX: 32.98 KG/M2

## 2025-08-20 DIAGNOSIS — M17.11 PRIMARY OSTEOARTHRITIS OF RIGHT KNEE: Primary | ICD-10-CM

## 2025-08-20 PROCEDURE — 99204 OFFICE O/P NEW MOD 45 MIN: CPT | Performed by: FAMILY MEDICINE

## 2025-08-20 PROCEDURE — 20611 DRAIN/INJ JOINT/BURSA W/US: CPT | Performed by: FAMILY MEDICINE

## 2025-08-20 RX ORDER — LIDOCAINE HYDROCHLORIDE 10 MG/ML
4 INJECTION, SOLUTION INFILTRATION; PERINEURAL
Status: COMPLETED | OUTPATIENT
Start: 2025-08-20 | End: 2025-08-20

## 2025-08-20 RX ORDER — TRIAMCINOLONE ACETONIDE 40 MG/ML
40 INJECTION, SUSPENSION INTRA-ARTICULAR; INTRAMUSCULAR
Status: COMPLETED | OUTPATIENT
Start: 2025-08-20 | End: 2025-08-20

## 2025-08-20 RX ADMIN — TRIAMCINOLONE ACETONIDE 40 MG: 40 INJECTION, SUSPENSION INTRA-ARTICULAR; INTRAMUSCULAR at 13:15

## 2025-08-20 RX ADMIN — LIDOCAINE HYDROCHLORIDE 4 ML: 10 INJECTION, SOLUTION INFILTRATION; PERINEURAL at 13:15

## (undated) DEVICE — SUT STRATAFIX SPIRAL MONOCRYL PLUS 4-0 PS-2 45CM SXMP1B118

## (undated) DEVICE — ANTIBACTERIAL VIOLET BRAIDED (POLYGLACTIN 910), SYNTHETIC ABSORBABLE SUTURE: Brand: COATED VICRYL

## (undated) DEVICE — LAPAROTOMY DRAPE WITH POUCHES: Brand: CONVERTORS

## (undated) DEVICE — MARKER REFLECTIVE RADIOPAQUE SPHERE

## (undated) DEVICE — JACKSON TABLE FOAM POSITIONING KIT: Brand: CARDINAL HEALTH

## (undated) DEVICE — GLOVE INDICATOR PI UNDERGLOVE SZ 7.5 BLUE

## (undated) DEVICE — TOOL 14MH30 LEGEND 14CM 3MM: Brand: MIDAS REX ™

## (undated) DEVICE — SKIN MARKER DUAL TIP WITH RULER CAP, FLEXIBLE RULER AND LABELS: Brand: DEVON

## (undated) DEVICE — DRAPE C-ARMOUR

## (undated) DEVICE — 3M™ IOBAN™ 2 ANTIMICROBIAL INCISE DRAPE 6650EZ: Brand: IOBAN™ 2

## (undated) DEVICE — SUT PROLENE 2-0 FS 18 IN 8685H

## (undated) DEVICE — CHLORAPREP HI-LITE 26ML ORANGE

## (undated) DEVICE — INSULATED BLADE ELECTRODE: Brand: EDGE

## (undated) DEVICE — SYRINGE 10ML LL

## (undated) DEVICE — HEMOSTATIC MATRIX SURGIFLO 8ML W/THROMBIN

## (undated) DEVICE — BETHLEHEM UNIVERSAL SPINE, KIT: Brand: CARDINAL HEALTH

## (undated) DEVICE — GLOVE SRG BIOGEL 7.5

## (undated) DEVICE — SPONGE SCRUB 4 PCT CHLORHEXIDINE

## (undated) DEVICE — NEEDLE 25G X 1 1/2

## (undated) DEVICE — PREMIUM DRY TRAY LF: Brand: MEDLINE INDUSTRIES, INC.

## (undated) DEVICE — DRESSING MEPILEX AG BORDER POST-OP 4 X 8 IN

## (undated) DEVICE — SPECIMEN CONTAINER STERILE PEEL PACK

## (undated) DEVICE — POV-IOD SOLUTION 4OZ BT

## (undated) DEVICE — TRAY FOLEY 16FR URIMETER SURESTEP

## (undated) DEVICE — JP 3-SPRING RES W/10FR PVC DRAIN/TR: Brand: CARDINAL HEALTH

## (undated) DEVICE — SUT STRATAFIX SPIRAL PDS PLUS 1 CTX 18 IN SXPP1A400

## (undated) DEVICE — SMOKE EVAC FLUID SUCTION HEPA FILTER

## (undated) DEVICE — NEURO PATTIES 1/2 X 3

## (undated) DEVICE — BOWL ASSY BM210 DUAL BLADE DISPOSABLE: Brand: MIDAS REX™

## (undated) DEVICE — NEPTUNE E-SEP SMOKE EVACUATION PENCIL, COATED, 70MM BLADE, PUSH BUTTON SWITCH: Brand: NEPTUNE E-SEP

## (undated) DEVICE — SUT ETHILON 3-0 FS-1 18 IN 663G

## (undated) DEVICE — REM POLYHESIVE ADULT PATIENT RETURN ELECTRODE: Brand: VALLEYLAB

## (undated) DEVICE — GAUZE SPONGES,16 PLY: Brand: CURITY

## (undated) DEVICE — DISPOSABLE EQUIPMENT COVER: Brand: SMALL TOWEL DRAPE

## (undated) DEVICE — INTENDED FOR TISSUE SEPARATION, AND OTHER PROCEDURES THAT REQUIRE A SHARP SURGICAL BLADE TO PUNCTURE OR CUT.: Brand: BARD-PARKER ® CARBON RIB-BACK BLADES

## (undated) DEVICE — DRAPE SHEET THREE QUARTER

## (undated) DEVICE — SNAP KOVER: Brand: UNBRANDED

## (undated) DEVICE — INTENDED FOR TISSUE SEPARATION, AND OTHER PROCEDURES THAT REQUIRE A SHARP SURGICAL BLADE TO PUNCTURE OR CUT.: Brand: BARD-PARKER SAFETY BLADES SIZE 10, STERILE